# Patient Record
Sex: FEMALE | Race: WHITE | Employment: OTHER | URBAN - METROPOLITAN AREA
[De-identification: names, ages, dates, MRNs, and addresses within clinical notes are randomized per-mention and may not be internally consistent; named-entity substitution may affect disease eponyms.]

---

## 2017-01-19 RX ORDER — ZOLPIDEM TARTRATE 10 MG/1
TABLET ORAL
Qty: 90 TABLET | Refills: 3 | Status: SHIPPED | OUTPATIENT
Start: 2017-01-19 | End: 2017-09-27

## 2017-05-09 ENCOUNTER — OFFICE VISIT (OUTPATIENT)
Dept: DERMATOLOGY CLINIC | Facility: CLINIC | Age: 82
End: 2017-05-09

## 2017-05-09 DIAGNOSIS — L81.4 SOLAR LENTIGO: ICD-10-CM

## 2017-05-09 DIAGNOSIS — L57.0 ACTINIC KERATOSIS: Primary | ICD-10-CM

## 2017-05-09 PROCEDURE — 99212 OFFICE O/P EST SF 10 MIN: CPT | Performed by: DERMATOLOGY

## 2017-05-09 PROCEDURE — 17000 DESTRUCT PREMALG LESION: CPT | Performed by: DERMATOLOGY

## 2017-05-09 RX ORDER — CELECOXIB 200 MG/1
200 CAPSULE ORAL 2 TIMES DAILY
COMMUNITY
End: 2018-02-22 | Stop reason: CLARIF

## 2017-05-09 NOTE — PROGRESS NOTES
HPI:     Chief Complaint     Derm Problem        HPI     Derm Problem    Additional comments: Patient presents with lesion to left cheek x many years. Per patient, area was rough to touch when she made appt. Now area smooth.  No personal or family hx of ski [place] RAG[place]/ Dr. Tenzin Monzon / Dr. Aristides Flores and to reapply it every few hours. Orders Placed This Encounter  DESTRUCTION PREMALIGNANT LESIONS, FIRST LES    Results From Past 48 Hours:  No results found for this or any previous visit (from the past 50 hour(s)).     Meds This Visit:      Imaging

## 2017-05-31 ENCOUNTER — TELEPHONE (OUTPATIENT)
Dept: PULMONOLOGY | Facility: CLINIC | Age: 82
End: 2017-05-31

## 2017-05-31 RX ORDER — CELECOXIB 200 MG/1
200 CAPSULE ORAL
Qty: 90 CAPSULE | Refills: 1 | Status: SHIPPED | OUTPATIENT
Start: 2017-05-31 | End: 2017-11-04

## 2017-05-31 NOTE — TELEPHONE ENCOUNTER
V/O from Dr. Franci Jacobson with read back for Celebrex 200 mg daily as needed #90 R#1. Message left informing pt medication was approved and will be sent to [address].

## 2017-05-31 NOTE — TELEPHONE ENCOUNTER
Ari Alva at [facility] pt filled meloxicam on 5-2-17 that Dr. Tavares Holloway prescribed. Ari Alva states they left a message for pt to call back. Ari Alva notified this office will call pt to discuss medication.   Pt states she is not taking the meloxicam a

## 2017-06-27 NOTE — TELEPHONE ENCOUNTER
RN, these messages have been reviewed. Either medication is okay with me. Do I need to take any action here?

## 2017-06-28 NOTE — TELEPHONE ENCOUNTER
Dr. Yarelis Sequeira, nothing additional is needed. Celebrex has been filled and pt states she will only be taking Celebrex.

## 2017-08-17 RX ORDER — TRIAMTERENE AND HYDROCHLOROTHIAZIDE 37.5; 25 MG/1; MG/1
CAPSULE ORAL
Qty: 90 CAPSULE | Refills: 3 | Status: SHIPPED | OUTPATIENT
Start: 2017-08-17 | End: 2018-08-13

## 2017-08-21 RX ORDER — FLUTICASONE PROPIONATE 50 MCG
SPRAY, SUSPENSION (ML) NASAL
Qty: 16 G | Refills: 6 | Status: SHIPPED | OUTPATIENT
Start: 2017-08-21 | End: 2018-09-08

## 2017-09-28 RX ORDER — ZOLPIDEM TARTRATE 10 MG/1
TABLET ORAL
Qty: 90 TABLET | Refills: 1 | Status: SHIPPED
Start: 2017-09-28 | End: 2018-06-11

## 2017-11-06 RX ORDER — CELECOXIB 200 MG/1
CAPSULE ORAL
Qty: 90 CAPSULE | Refills: 1 | Status: SHIPPED | OUTPATIENT
Start: 2017-11-06 | End: 2018-05-11

## 2018-02-05 ENCOUNTER — TELEPHONE (OUTPATIENT)
Dept: PULMONOLOGY | Facility: CLINIC | Age: 83
End: 2018-02-05

## 2018-02-05 RX ORDER — AZITHROMYCIN 250 MG/1
TABLET, FILM COATED ORAL
Qty: 1 PACKAGE | Refills: 0 | Status: SHIPPED | OUTPATIENT
Start: 2018-02-05 | End: 2018-02-22 | Stop reason: ALTCHOICE

## 2018-02-05 NOTE — TELEPHONE ENCOUNTER
Pt c/o productive cough green sputum and nasal congestion for 1 week. Pt denies HA, fever, body aches, SOB, chest congestion/tightness.

## 2018-02-05 NOTE — TELEPHONE ENCOUNTER
Pt states that she has had nasal congestion w/ green discharge for last 3 wks and would like to speak to RN. Please call.

## 2018-02-05 NOTE — TELEPHONE ENCOUNTER
Dr. Ching Wells notified of below. Telephone order with read back from Dr. Ching Wells for z-amanda. Pt notified of orders. Pt verbalized understanding.

## 2018-02-21 ENCOUNTER — TELEPHONE (OUTPATIENT)
Dept: PULMONOLOGY | Facility: CLINIC | Age: 83
End: 2018-02-21

## 2018-02-21 NOTE — TELEPHONE ENCOUNTER
Pt states she was getting better after taking z-pack but symptoms has returned. Pls call. Thank you.

## 2018-02-21 NOTE — TELEPHONE ENCOUNTER
Pt c/o sinus/nasal congestion, yellow nasal drainage, and slight HA for 1 month. Pt states she has had SOB with ambulation for years. Pt is able to speak in clear complete sentences. No audible wheezing heard over phone.  Pt denies fever cough, body aches

## 2018-02-22 ENCOUNTER — OFFICE VISIT (OUTPATIENT)
Dept: PULMONOLOGY | Facility: CLINIC | Age: 83
End: 2018-02-22

## 2018-02-22 VITALS
BODY MASS INDEX: 25.3 KG/M2 | DIASTOLIC BLOOD PRESSURE: 80 MMHG | OXYGEN SATURATION: 99 % | HEART RATE: 76 BPM | HEIGHT: 63.75 IN | RESPIRATION RATE: 18 BRPM | SYSTOLIC BLOOD PRESSURE: 127 MMHG | WEIGHT: 146.38 LBS

## 2018-02-22 DIAGNOSIS — J06.9 VIRAL UPPER RESPIRATORY TRACT INFECTION: Primary | ICD-10-CM

## 2018-02-22 PROCEDURE — G0463 HOSPITAL OUTPT CLINIC VISIT: HCPCS | Performed by: INTERNAL MEDICINE

## 2018-02-22 PROCEDURE — 99213 OFFICE O/P EST LOW 20 MIN: CPT | Performed by: INTERNAL MEDICINE

## 2018-02-22 NOTE — PROGRESS NOTES
The patient is an 80-year-old female who I know well from prior evaluation who comes in now for follow-up. She has frequent upper respiratory tract infections and possibly early sinusitis. She does do nasal saline spray.   She feels as though she is on th

## 2018-03-15 ENCOUNTER — OFFICE VISIT (OUTPATIENT)
Dept: AUDIOLOGY | Facility: CLINIC | Age: 83
End: 2018-03-15

## 2018-03-15 ENCOUNTER — OFFICE VISIT (OUTPATIENT)
Dept: OTOLARYNGOLOGY | Facility: CLINIC | Age: 83
End: 2018-03-15

## 2018-03-15 VITALS
TEMPERATURE: 98 F | HEIGHT: 64 IN | BODY MASS INDEX: 24.75 KG/M2 | WEIGHT: 145 LBS | DIASTOLIC BLOOD PRESSURE: 77 MMHG | SYSTOLIC BLOOD PRESSURE: 131 MMHG

## 2018-03-15 DIAGNOSIS — H90.3 SENSORINEURAL HEARING LOSS, BILATERAL: Primary | ICD-10-CM

## 2018-03-15 DIAGNOSIS — H61.23 BILATERAL IMPACTED CERUMEN: Primary | ICD-10-CM

## 2018-03-15 PROCEDURE — 99213 OFFICE O/P EST LOW 20 MIN: CPT | Performed by: OTOLARYNGOLOGY

## 2018-03-15 PROCEDURE — 92593 HEARING AID CHECK, BOTH EARS: CPT | Performed by: AUDIOLOGIST

## 2018-03-15 PROCEDURE — 99070 SPECIAL SUPPLIES PHYS/QHP: CPT | Performed by: AUDIOLOGIST

## 2018-03-15 PROCEDURE — 69210 REMOVE IMPACTED EAR WAX UNI: CPT | Performed by: OTOLARYNGOLOGY

## 2018-03-15 NOTE — PROGRESS NOTES
HEARING AID FOLLOW-UP    [facility]  7/29/1928  [id number]      Pt was seen for routine follow-up. Aids were weak/dead. Cleaned and suctioned aids. Changed microphone covers, wax traps (right was occluded), and medium closed domes. .  Completed

## 2018-03-15 NOTE — PROGRESS NOTES
Sukumar Barber is a 80year old female. Patient presents with:  Ear Wax: bilateral ear cleaning        HISTORY OF PRESENT ILLNESS    5/5/16 Here for evaluation of   hearing loss.  Patient feels this has worsened over the las months and  is not  associated 2014   • Other and unspecified hyperlipidemia    • Other ill-defined conditions(799.89)     Left wrist trauma, surgical repair   • Posterior capsule opacification     OS, YAG laser 2014   • Ptosis of right eyelid 2014    Ptosis RUL     Past Surgical Histor lesions bruises or masses.    Constitutional Normal Overall appearance - Normal.   Head/Face Normal Facial features - Normal. Eyebrows - Normal. Skull - Normal.   Nasopharynx Normal External nose - Normal.  . She is very very congested nostrils turbinates p

## 2018-05-14 RX ORDER — CELECOXIB 200 MG/1
CAPSULE ORAL
Qty: 90 CAPSULE | Refills: 1 | Status: ON HOLD | OUTPATIENT
Start: 2018-05-14 | End: 2018-10-16

## 2018-05-14 NOTE — TELEPHONE ENCOUNTER
Last seen 02/22/2018  Last refill 11/06/2017  Routed to [facility] for sign off

## 2018-06-11 RX ORDER — ZOLPIDEM TARTRATE 10 MG/1
TABLET ORAL
Qty: 90 TABLET | Refills: 1 | Status: SHIPPED | OUTPATIENT
Start: 2018-06-11 | End: 2018-11-26

## 2018-06-11 NOTE — TELEPHONE ENCOUNTER
Please advise regarding refill request.   Refill Protocol Appointment Criteria  · Appointment scheduled in the past 6 months or in the next 3 months  Recent Outpatient Visits            2 months ago Bilateral impacted cerumen    [facility] for

## 2018-06-13 ENCOUNTER — TELEPHONE (OUTPATIENT)
Dept: DERMATOLOGY CLINIC | Facility: CLINIC | Age: 83
End: 2018-06-13

## 2018-06-14 NOTE — TELEPHONE ENCOUNTER
Tell pt right now we are swamped for that day, but we willl try to make it work- she she call us 6/19 after 8am for a definitive answer

## 2018-06-19 ENCOUNTER — OFFICE VISIT (OUTPATIENT)
Dept: DERMATOLOGY CLINIC | Facility: CLINIC | Age: 83
End: 2018-06-19

## 2018-06-19 DIAGNOSIS — Z87.2 HISTORY OF ACTINIC KERATOSES: ICD-10-CM

## 2018-06-19 DIAGNOSIS — L72.0 EPIDERMAL CYST: Primary | ICD-10-CM

## 2018-06-19 DIAGNOSIS — L82.1 SEBORRHEIC KERATOSES: ICD-10-CM

## 2018-06-19 PROCEDURE — 99212 OFFICE O/P EST SF 10 MIN: CPT | Performed by: DERMATOLOGY

## 2018-06-19 PROCEDURE — G0463 HOSPITAL OUTPT CLINIC VISIT: HCPCS | Performed by: DERMATOLOGY

## 2018-06-19 RX ORDER — AMOXICILLIN 500 MG/1
CAPSULE ORAL
Status: ON HOLD | COMMUNITY
Start: 2018-05-24 | End: 2018-10-16 | Stop reason: ALTCHOICE

## 2018-06-19 NOTE — PROGRESS NOTES
HPI:     Chief Complaint     Lesion        HPI     Lesion    Additional comments: LOV: 05/09/17. Pt presents with spot of concern on back, denies symptoms. Pt denies personal and family hx of sc, has personal hx of AK.        Last edited by SEBLE Gerardo YAG laser 2014   • Ptosis of right eyelid 2014    Ptosis RUL     Past Surgical History:  No date: CATARACT      Comment: cataract extraction  1997: CATARACT Left      Comment: Phaco w/PC IOL  1998: CATARACT Right      Comment: Phaco w/ PC IOL  1997: Dru Reynolds brown stuck on keratotic lesions on back and some cherry red papules along with some blotchy brown macules      ASSESSMENT/PLAN:   Epidermal cyst  (primary encounter diagnosis)-lesion on back–reassurance–no treatment necessary  Seborrheic keratoses-reassur

## 2018-06-19 NOTE — PROGRESS NOTES
Past Medical History:   Diagnosis Date   • Arthritis    • Cataracts, bilateral     cataracts, PC [place] Fails Dr. Jenni Miner in [place], [place]   • Colon polyp 3/24/2006    small polyps   • Macular degeneration of right eye 2014   • Other and un Problems Mother      hearing loss   • Other [OTHER] Father      emphysema   • Diabetes Neg    • Glaucoma Neg    • Macular degeneration Neg        No Known Allergies

## 2018-08-13 RX ORDER — TRIAMTERENE AND HYDROCHLOROTHIAZIDE 37.5; 25 MG/1; MG/1
CAPSULE ORAL
Qty: 90 CAPSULE | Refills: 3 | Status: SHIPPED | OUTPATIENT
Start: 2018-08-13 | End: 2019-08-26

## 2018-08-13 NOTE — TELEPHONE ENCOUNTER
Last seen 2-22-18   Last refill 8-17-17   Routed to [facility] for sign off.

## 2018-08-16 ENCOUNTER — OFFICE VISIT (OUTPATIENT)
Dept: AUDIOLOGY | Facility: CLINIC | Age: 83
End: 2018-08-16

## 2018-08-16 DIAGNOSIS — H90.3 SENSORINEURAL HEARING LOSS, BILATERAL: Primary | ICD-10-CM

## 2018-08-16 PROCEDURE — 92593 HEARING AID CHECK, BOTH EARS: CPT | Performed by: AUDIOLOGIST

## 2018-08-16 PROCEDURE — V5014 HEARING AID REPAIR/MODIFYING: HCPCS | Performed by: AUDIOLOGIST

## 2018-08-20 NOTE — PROGRESS NOTES
HEARING AID FOLLOW-UP    Ean Zhang  7/29/1928  [id number]    Patient is here because her left hearing aid has a broken  tube. Cleaned and suctioned aids  Replaced left  with a new #2 standard .   Changed microphone covers,

## 2018-09-08 NOTE — TELEPHONE ENCOUNTER
Patient is requesting refill for Fluticasone 50 mcg Nasal Spray, to use 2 sprays in each nostril daily. LOV 03/15/18. Last refill authorized on 08/21/17 for 16 g / 6 refills. Please review and advise.

## 2018-09-10 ENCOUNTER — TELEPHONE (OUTPATIENT)
Dept: PULMONOLOGY | Facility: CLINIC | Age: 83
End: 2018-09-10

## 2018-09-10 NOTE — TELEPHONE ENCOUNTER
As long as she is doing okay, she can remain on the Prilosec. I think that is a good idea. However, if problem begins to recur, we will get her in sooner.

## 2018-09-10 NOTE — TELEPHONE ENCOUNTER
Pt is having some stomach issues and requesting to discuss with clinical staff. Pls call. Thank you.

## 2018-09-10 NOTE — TELEPHONE ENCOUNTER
Pt c/o of nausea and abdominal discomfort 4/10 that comes and goes for over 1 month and only lasts for a few minutes at a time. Pt does not know any aggravating factors.   Pt states she had diarrhea once over this past weekend but thinks this was unrelated

## 2018-09-12 RX ORDER — FLUTICASONE PROPIONATE 50 MCG
SPRAY, SUSPENSION (ML) NASAL
Qty: 16 G | Refills: 6 | Status: SHIPPED | OUTPATIENT
Start: 2018-09-12 | End: 2020-10-12

## 2018-09-20 ENCOUNTER — OFFICE VISIT (OUTPATIENT)
Dept: OPTOMETRY | Facility: CLINIC | Age: 83
End: 2018-09-20
Payer: MEDICARE

## 2018-09-20 DIAGNOSIS — Z96.1 PSEUDOPHAKIA OF BOTH EYES: ICD-10-CM

## 2018-09-20 DIAGNOSIS — H35.89 RPE MOTTLING OF MACULA: Primary | ICD-10-CM

## 2018-09-20 DIAGNOSIS — H52.4 PRESBYOPIA: ICD-10-CM

## 2018-09-20 PROCEDURE — 92015 DETERMINE REFRACTIVE STATE: CPT | Performed by: OPTOMETRIST

## 2018-09-20 PROCEDURE — 92014 COMPRE OPH EXAM EST PT 1/>: CPT | Performed by: OPTOMETRIST

## 2018-09-20 NOTE — PATIENT INSTRUCTIONS
Presbyopia  New glasses RX given to update as needed. Pseudophakia of both eyes  No treatment is required. Will continue to observe.     RPE mottling of macula, right eye  Advised patient to continue using her eye vitamins and wear sunglasses when out

## 2018-09-20 NOTE — PROGRESS NOTES
Kay Batista is a 80year old female. HPI:     HPI     Patient is in for an annual eye exam. She had phaco ou in 1997 Had Yag laser OS. She was last here to see MAHAD for an EE in 2015. Has no complaints with her vision.     Last edited by Gabe Dumont, Never Used    Alcohol use:  Yes      Alcohol/week: 0.0 oz    Drug use: No      Medications:    Current Outpatient Medications:  FLUTICASONE PROPIONATE 50 MCG/ACT Nasal Suspension USE 2 SPRAYS IN EACH NOSTRIL DAILY Disp: 16 g Rfl: 6   TRIAMTERENE-HCTZ 37.5-2 pinguecula    Cornea Trace Guttata Trace Guttata    Anterior Chamber Deep and quiet Deep and quiet    Iris Normal Normal    Lens Posterior chamber intraocular lens with clear posterior capsule PC IOL with YAG     Vitreous Vitreous floaters Vitreous floater

## 2018-10-02 ENCOUNTER — OFFICE VISIT (OUTPATIENT)
Dept: PULMONOLOGY | Facility: CLINIC | Age: 83
End: 2018-10-02
Payer: MEDICARE

## 2018-10-02 ENCOUNTER — HOSPITAL ENCOUNTER (OUTPATIENT)
Dept: GENERAL RADIOLOGY | Facility: HOSPITAL | Age: 83
Discharge: HOME | End: 2018-10-02
Attending: INTERNAL MEDICINE | Admitting: INTERNAL MEDICINE
Payer: MEDICARE

## 2018-10-02 ENCOUNTER — TELEPHONE (OUTPATIENT)
Dept: PULMONOLOGY | Facility: CLINIC | Age: 83
End: 2018-10-02

## 2018-10-02 ENCOUNTER — LAB ENCOUNTER (OUTPATIENT)
Dept: LAB | Facility: HOSPITAL | Age: 83
End: 2018-10-02
Attending: INTERNAL MEDICINE
Payer: MEDICARE

## 2018-10-02 VITALS
SYSTOLIC BLOOD PRESSURE: 117 MMHG | WEIGHT: 133 LBS | HEIGHT: 64 IN | BODY MASS INDEX: 22.71 KG/M2 | RESPIRATION RATE: 16 BRPM | HEART RATE: 88 BPM | OXYGEN SATURATION: 99 % | DIASTOLIC BLOOD PRESSURE: 77 MMHG

## 2018-10-02 DIAGNOSIS — R06.00 DYSPNEA ON EXERTION: ICD-10-CM

## 2018-10-02 DIAGNOSIS — R11.0 NAUSEA: Primary | ICD-10-CM

## 2018-10-02 PROCEDURE — 71046 X-RAY EXAM CHEST 2 VIEWS: CPT | Performed by: INTERNAL MEDICINE

## 2018-10-02 PROCEDURE — 84443 ASSAY THYROID STIM HORMONE: CPT

## 2018-10-02 PROCEDURE — 83880 ASSAY OF NATRIURETIC PEPTIDE: CPT | Performed by: INTERNAL MEDICINE

## 2018-10-02 PROCEDURE — 85025 COMPLETE CBC W/AUTO DIFF WBC: CPT

## 2018-10-02 PROCEDURE — 99213 OFFICE O/P EST LOW 20 MIN: CPT | Performed by: INTERNAL MEDICINE

## 2018-10-02 PROCEDURE — G0463 HOSPITAL OUTPT CLINIC VISIT: HCPCS | Performed by: INTERNAL MEDICINE

## 2018-10-02 PROCEDURE — 36415 COLL VENOUS BLD VENIPUNCTURE: CPT

## 2018-10-02 RX ORDER — ONDANSETRON 4 MG/1
4 TABLET, ORALLY DISINTEGRATING ORAL EVERY 8 HOURS PRN
Qty: 10 TABLET | Refills: 6 | Status: ON HOLD | OUTPATIENT
Start: 2018-10-02 | End: 2018-10-17

## 2018-10-02 NOTE — PROGRESS NOTES
The patient is a 5year-old female who I know well from prior evaluation comes in now for follow-up. She has had nausea for 2 months with occasional dry heave. She took Prilosec which seemed to help. Her weight is down 45 pounds.   There is been no blood

## 2018-10-02 NOTE — TELEPHONE ENCOUNTER
Spoke w/ pt she reports nausea and dry heaving x few months. Pt reports it occurs off and on every few days. Pt reports use of Prilosec with minimal relief. Pt reports recent SOB with episodes.  Pt offered appt today for evaluation at 12:30 pm w/ Dr. Ele Costello,

## 2018-10-04 ENCOUNTER — HOSPITAL ENCOUNTER (OUTPATIENT)
Dept: CT IMAGING | Facility: HOSPITAL | Age: 83
Discharge: HOME | End: 2018-10-04
Attending: INTERNAL MEDICINE
Payer: MEDICARE

## 2018-10-04 DIAGNOSIS — R11.0 NAUSEA: ICD-10-CM

## 2018-10-04 PROCEDURE — 82565 ASSAY OF CREATININE: CPT

## 2018-10-04 PROCEDURE — 74176 CT ABD & PELVIS W/O CONTRAST: CPT | Performed by: INTERNAL MEDICINE

## 2018-10-09 ENCOUNTER — TELEPHONE (OUTPATIENT)
Dept: PULMONOLOGY | Facility: CLINIC | Age: 83
End: 2018-10-09

## 2018-10-09 DIAGNOSIS — R93.5 ABNORMAL COMPUTED TOMOGRAPHY ANGIOGRAPHY (CTA) OF ABDOMEN AND PELVIS: Primary | ICD-10-CM

## 2018-10-09 NOTE — TELEPHONE ENCOUNTER
----- Message from Joyce Farias MD sent at 10/2/2018  9:38 PM CDT -----  RN, please let the patient know that her blood tests and chest x-ray were unremarkable.

## 2018-10-09 NOTE — TELEPHONE ENCOUNTER
GI RNs -    CT scan images open and reviewed.     Yes, by the looks of this CT scan and her severe symptoms this woman needs an office consult and likely ERCP ASAP(!!)   This is most likely a common bile duct stone as per the report, less likely neoplasm di

## 2018-10-09 NOTE — TELEPHONE ENCOUNTER
----- Message from Jose R Felix MD sent at 10/4/2018  4:05 PM CDT -----  RN, please call the patient to let her know that she has dilated biliary duct with a retained stone. She needs to see gastroenterology promptly. Please facilitate appointment.

## 2018-10-09 NOTE — TELEPHONE ENCOUNTER
[facility]    Dr. HOFFMAN/Dr. TAVERAS/Dr. BAEZ-     Per Dr. Franci Jacobson message below, please advise as this pt may need GI consultation as well as ERCP with either Dr. Josephine Negrete or Dr. Mariam Howe, no sooner appts currently available with either provider.

## 2018-10-09 NOTE — TELEPHONE ENCOUNTER
Spoke with pt and notified with understanding. Message routed to [facility] clinical staff for triage and appointment scheduling. GI staff please see CT results and message below.

## 2018-10-10 NOTE — TELEPHONE ENCOUNTER
Spoke to the pt. She accepted tomorrow's appt.  Date, time and location verified    Future Appointments   Date Time Provider Benjamin Briggs   10/11/2018  2:00 PM Tena Oconnor MD [facility] Joey JOHNSON   11/26/2018  1:00 PM Wing Art

## 2018-10-11 ENCOUNTER — APPOINTMENT (OUTPATIENT)
Dept: LAB | Facility: HOSPITAL | Age: 83
End: 2018-10-11
Attending: INTERNAL MEDICINE
Payer: MEDICARE

## 2018-10-11 ENCOUNTER — OFFICE VISIT (OUTPATIENT)
Dept: GASTROENTEROLOGY | Facility: CLINIC | Age: 83
End: 2018-10-11
Payer: MEDICARE

## 2018-10-11 ENCOUNTER — TELEPHONE (OUTPATIENT)
Dept: GASTROENTEROLOGY | Facility: CLINIC | Age: 83
End: 2018-10-11

## 2018-10-11 VITALS
HEART RATE: 76 BPM | WEIGHT: 133 LBS | BODY MASS INDEX: 22.71 KG/M2 | SYSTOLIC BLOOD PRESSURE: 158 MMHG | DIASTOLIC BLOOD PRESSURE: 81 MMHG | HEIGHT: 64 IN

## 2018-10-11 DIAGNOSIS — Z01.818 ENCOUNTER FOR PREOPERATIVE EXAMINATION FOR GENERAL SURGICAL PROCEDURE: ICD-10-CM

## 2018-10-11 DIAGNOSIS — R11.0 NAUSEA: ICD-10-CM

## 2018-10-11 DIAGNOSIS — K80.50 CHOLEDOCHOLITHIASIS: ICD-10-CM

## 2018-10-11 DIAGNOSIS — K83.8 DILATED BILE DUCT: ICD-10-CM

## 2018-10-11 DIAGNOSIS — R93.5 ABNORMAL COMPUTED TOMOGRAPHY ANGIOGRAPHY (CTA) OF ABDOMEN AND PELVIS: ICD-10-CM

## 2018-10-11 DIAGNOSIS — Z01.818 ENCOUNTER FOR PREOPERATIVE EXAMINATION FOR GENERAL SURGICAL PROCEDURE: Primary | ICD-10-CM

## 2018-10-11 DIAGNOSIS — Z90.49 S/P LAPAROSCOPIC CHOLECYSTECTOMY: ICD-10-CM

## 2018-10-11 DIAGNOSIS — K83.9 BILE DUCT ABNORMALITY: Primary | ICD-10-CM

## 2018-10-11 DIAGNOSIS — R10.13 EPIGASTRIC ABDOMINAL PAIN: ICD-10-CM

## 2018-10-11 PROCEDURE — 85610 PROTHROMBIN TIME: CPT

## 2018-10-11 PROCEDURE — G0463 HOSPITAL OUTPT CLINIC VISIT: HCPCS | Performed by: INTERNAL MEDICINE

## 2018-10-11 PROCEDURE — 36415 COLL VENOUS BLD VENIPUNCTURE: CPT

## 2018-10-11 PROCEDURE — 99214 OFFICE O/P EST MOD 30 MIN: CPT | Performed by: INTERNAL MEDICINE

## 2018-10-11 PROCEDURE — 80048 BASIC METABOLIC PNL TOTAL CA: CPT

## 2018-10-11 PROCEDURE — 80076 HEPATIC FUNCTION PANEL: CPT

## 2018-10-11 RX ORDER — ONDANSETRON 4 MG/1
TABLET, FILM COATED ORAL
Status: ON HOLD | COMMUNITY
Start: 2018-10-02 | End: 2018-10-16 | Stop reason: DRUGHIGH

## 2018-10-11 NOTE — PATIENT INSTRUCTIONS
Schedule ERCP exam at [facility] (always with Anesthesia and Fluoro)    Either 10/23 Tues or 10/25 Thurs    Body mass index is 22.83 kg/m².     With Anesthesia    DX = Abnormal bile duct on CT scan, choledocholithiasis

## 2018-10-11 NOTE — PROGRESS NOTES
HPI:    Patient ID: Ean Zhang is a 80year old woman with BMI 22.8, history of arthritis and macular degeneration, taking a fairly minimal medication regimen of triamterene–hydrochlorothiazide, multivitamins calcium occasional Ambien who is referred tolerate these episodes. Dealing with this problem is not an emergency for her. She is actually asking to schedule any necessary procedures in 10 days or more from today. Body mass index is 22.83 kg/m².      Wt Readings from Last 20 Encounters:  10/1 present. GI/MESENTERY:           There is no bowel obstruction. There is extensive colonic diverticulosis. No active inflammatory changes are identified. A normal caliber appendix is present in the right lower quadrant. VASCULAR:      Unremarkable.     LY Dispersible Take 1 tablet (4 mg total) by mouth every 8 (eight) hours as needed for Nausea.  Disp: 10 tablet Rfl: 6   FLUTICASONE PROPIONATE 50 MCG/ACT Nasal Suspension USE 2 SPRAYS IN EACH NOSTRIL DAILY Disp: 16 g Rfl: 6   amoxicillin 500 MG Oral Cap  Disp suffering at least 2 months of recurring pain and nausea, retching syndrome. We do not yet have a recent set of LFTs.   Noncontrast CT scan ordered last week 10/4/2018 shows unexpected finding of significant intrahepatic and extrahepatic biliary dilation, the defined types were placed in this encounter.       Meds This Visit:  Requested Prescriptions      No prescriptions requested or ordered in this encounter       Imaging & Referrals:  None       LG#[id number]

## 2018-10-11 NOTE — TELEPHONE ENCOUNTER
Marcy Ojeda and schedulers–    Unfortunately, today's labs after the visit and scheduling show that we need to get Ms Malik's ERCP on the schedule for next week instead, Monday 10/15 (after 3pm) or Tuesday 10/16 only. Currently she is scheduled for 10/25.

## 2018-10-11 NOTE — TELEPHONE ENCOUNTER
Scheduled for:  ERCP w/Fluro 06676  Provider Name: Dr. Douglas Yarbrough  Date:  10/25/18  Location:  [facility]  Sedation:  General  Time:  9896 (pt is aware to arrive at 1200)   Prep:   Nothing to eat after midnight   Nothing to drink after 0900 the day of the procedure

## 2018-10-12 NOTE — TELEPHONE ENCOUNTER
I spoke with this patient to inform her of the corrected date and the time to arrive which she approved and will arrive on 10/16/18 at 1200. This patient will contact her daughter to inform her of the change.     The procedure was changed by Senait in [place] and

## 2018-10-16 ENCOUNTER — ANESTHESIA (OUTPATIENT)
Dept: ENDOSCOPY | Facility: HOSPITAL | Age: 83
End: 2018-10-16

## 2018-10-16 ENCOUNTER — ANESTHESIA EVENT (OUTPATIENT)
Dept: ENDOSCOPY | Facility: HOSPITAL | Age: 83
End: 2018-10-16

## 2018-10-16 ENCOUNTER — HOSPITAL ENCOUNTER (OUTPATIENT)
Facility: HOSPITAL | Age: 83
Setting detail: OBSERVATION
Discharge: HOME | End: 2018-10-17
Attending: INTERNAL MEDICINE | Admitting: INTERNAL MEDICINE
Payer: MEDICARE

## 2018-10-16 ENCOUNTER — APPOINTMENT (OUTPATIENT)
Dept: GENERAL RADIOLOGY | Facility: HOSPITAL | Age: 83
End: 2018-10-16
Attending: INTERNAL MEDICINE
Payer: MEDICARE

## 2018-10-16 DIAGNOSIS — K83.9 BILE DUCT ABNORMALITY: ICD-10-CM

## 2018-10-16 DIAGNOSIS — K80.50 CHOLEDOCHOLITHIASIS: ICD-10-CM

## 2018-10-16 PROCEDURE — 0F798ZZ DILATION OF COMMON BILE DUCT, VIA NATURAL OR ARTIFICIAL OPENING ENDOSCOPIC: ICD-10-PCS | Performed by: INTERNAL MEDICINE

## 2018-10-16 PROCEDURE — 99214 OFFICE O/P EST MOD 30 MIN: CPT | Performed by: INTERNAL MEDICINE

## 2018-10-16 PROCEDURE — 43264 ERCP REMOVE DUCT CALCULI: CPT | Performed by: INTERNAL MEDICINE

## 2018-10-16 PROCEDURE — 74330 X-RAY BILE/PANC ENDOSCOPY: CPT | Performed by: INTERNAL MEDICINE

## 2018-10-16 PROCEDURE — 0FC98ZZ EXTIRPATION OF MATTER FROM COMMON BILE DUCT, VIA NATURAL OR ARTIFICIAL OPENING ENDOSCOPIC: ICD-10-PCS | Performed by: INTERNAL MEDICINE

## 2018-10-16 PROCEDURE — 43262 ENDO CHOLANGIOPANCREATOGRAPH: CPT | Performed by: INTERNAL MEDICINE

## 2018-10-16 RX ORDER — LIDOCAINE HYDROCHLORIDE 10 MG/ML
INJECTION, SOLUTION EPIDURAL; INFILTRATION; INTRACAUDAL; PERINEURAL AS NEEDED
Status: DISCONTINUED | OUTPATIENT
Start: 2018-10-16 | End: 2018-10-16 | Stop reason: SURG

## 2018-10-16 RX ORDER — SODIUM CHLORIDE 0.9 % (FLUSH) 0.9 %
3 SYRINGE (ML) INJECTION AS NEEDED
Status: DISCONTINUED | OUTPATIENT
Start: 2018-10-16 | End: 2018-10-17

## 2018-10-16 RX ORDER — HYDROCODONE BITARTRATE AND ACETAMINOPHEN 5; 325 MG/1; MG/1
2 TABLET ORAL AS NEEDED
Status: DISCONTINUED | OUTPATIENT
Start: 2018-10-16 | End: 2018-10-16 | Stop reason: HOSPADM

## 2018-10-16 RX ORDER — HALOPERIDOL 5 MG/ML
0.25 INJECTION INTRAMUSCULAR ONCE AS NEEDED
Status: DISCONTINUED | OUTPATIENT
Start: 2018-10-16 | End: 2018-10-16 | Stop reason: HOSPADM

## 2018-10-16 RX ORDER — ACETAMINOPHEN 325 MG/1
650 TABLET ORAL EVERY 4 HOURS PRN
Status: DISCONTINUED | OUTPATIENT
Start: 2018-10-16 | End: 2018-10-17

## 2018-10-16 RX ORDER — SODIUM CHLORIDE, SODIUM LACTATE, POTASSIUM CHLORIDE, CALCIUM CHLORIDE 600; 310; 30; 20 MG/100ML; MG/100ML; MG/100ML; MG/100ML
INJECTION, SOLUTION INTRAVENOUS CONTINUOUS
Status: DISCONTINUED | OUTPATIENT
Start: 2018-10-16 | End: 2018-10-17

## 2018-10-16 RX ORDER — MORPHINE SULFATE 4 MG/ML
4 INJECTION, SOLUTION INTRAMUSCULAR; INTRAVENOUS EVERY 10 MIN PRN
Status: DISCONTINUED | OUTPATIENT
Start: 2018-10-16 | End: 2018-10-16 | Stop reason: HOSPADM

## 2018-10-16 RX ORDER — ONDANSETRON 2 MG/ML
4 INJECTION INTRAMUSCULAR; INTRAVENOUS ONCE AS NEEDED
Status: DISCONTINUED | OUTPATIENT
Start: 2018-10-16 | End: 2018-10-16 | Stop reason: HOSPADM

## 2018-10-16 RX ORDER — MORPHINE SULFATE 4 MG/ML
2 INJECTION, SOLUTION INTRAMUSCULAR; INTRAVENOUS EVERY 10 MIN PRN
Status: DISCONTINUED | OUTPATIENT
Start: 2018-10-16 | End: 2018-10-16 | Stop reason: HOSPADM

## 2018-10-16 RX ORDER — ONDANSETRON 2 MG/ML
4 INJECTION INTRAMUSCULAR; INTRAVENOUS EVERY 6 HOURS PRN
Status: DISCONTINUED | OUTPATIENT
Start: 2018-10-16 | End: 2018-10-17

## 2018-10-16 RX ORDER — ONDANSETRON 2 MG/ML
INJECTION INTRAMUSCULAR; INTRAVENOUS AS NEEDED
Status: DISCONTINUED | OUTPATIENT
Start: 2018-10-16 | End: 2018-10-16 | Stop reason: SURG

## 2018-10-16 RX ORDER — LABETALOL HYDROCHLORIDE 5 MG/ML
5 INJECTION, SOLUTION INTRAVENOUS EVERY 10 MIN PRN
Status: DISCONTINUED | OUTPATIENT
Start: 2018-10-16 | End: 2018-10-17

## 2018-10-16 RX ORDER — LABETALOL HYDROCHLORIDE 5 MG/ML
5 INJECTION, SOLUTION INTRAVENOUS EVERY 10 MIN PRN
Status: DISCONTINUED | OUTPATIENT
Start: 2018-10-16 | End: 2018-10-16

## 2018-10-16 RX ORDER — HYDROCODONE BITARTRATE AND ACETAMINOPHEN 5; 325 MG/1; MG/1
2 TABLET ORAL EVERY 4 HOURS PRN
Status: DISCONTINUED | OUTPATIENT
Start: 2018-10-16 | End: 2018-10-17

## 2018-10-16 RX ORDER — MORPHINE SULFATE 10 MG/ML
6 INJECTION, SOLUTION INTRAMUSCULAR; INTRAVENOUS EVERY 10 MIN PRN
Status: DISCONTINUED | OUTPATIENT
Start: 2018-10-16 | End: 2018-10-16 | Stop reason: HOSPADM

## 2018-10-16 RX ORDER — SODIUM CHLORIDE, SODIUM LACTATE, POTASSIUM CHLORIDE, CALCIUM CHLORIDE 600; 310; 30; 20 MG/100ML; MG/100ML; MG/100ML; MG/100ML
INJECTION, SOLUTION INTRAVENOUS CONTINUOUS
Status: DISCONTINUED | OUTPATIENT
Start: 2018-10-16 | End: 2018-10-16 | Stop reason: HOSPADM

## 2018-10-16 RX ORDER — ACETAMINOPHEN 325 MG/1
650 TABLET ORAL EVERY 6 HOURS PRN
Status: DISCONTINUED | OUTPATIENT
Start: 2018-10-16 | End: 2018-10-16

## 2018-10-16 RX ORDER — HYDROCODONE BITARTRATE AND ACETAMINOPHEN 5; 325 MG/1; MG/1
1 TABLET ORAL EVERY 4 HOURS PRN
Status: DISCONTINUED | OUTPATIENT
Start: 2018-10-16 | End: 2018-10-17

## 2018-10-16 RX ORDER — NALOXONE HYDROCHLORIDE 0.4 MG/ML
80 INJECTION, SOLUTION INTRAMUSCULAR; INTRAVENOUS; SUBCUTANEOUS AS NEEDED
Status: DISCONTINUED | OUTPATIENT
Start: 2018-10-16 | End: 2018-10-16 | Stop reason: HOSPADM

## 2018-10-16 RX ORDER — HYDROCODONE BITARTRATE AND ACETAMINOPHEN 5; 325 MG/1; MG/1
1 TABLET ORAL AS NEEDED
Status: DISCONTINUED | OUTPATIENT
Start: 2018-10-16 | End: 2018-10-16 | Stop reason: HOSPADM

## 2018-10-16 RX ADMIN — LIDOCAINE HYDROCHLORIDE 40 MG: 10 INJECTION, SOLUTION EPIDURAL; INFILTRATION; INTRACAUDAL; PERINEURAL at 13:26:00

## 2018-10-16 RX ADMIN — ONDANSETRON 4 MG: 2 INJECTION INTRAMUSCULAR; INTRAVENOUS at 13:44:00

## 2018-10-16 RX ADMIN — SODIUM CHLORIDE, SODIUM LACTATE, POTASSIUM CHLORIDE, CALCIUM CHLORIDE: 600; 310; 30; 20 INJECTION, SOLUTION INTRAVENOUS at 14:06:00

## 2018-10-16 NOTE — PLAN OF CARE
bp  Elevated sbp 180's-200's, dr. Paulino Novak at bedside, pt with admit orders but want to get bp down at this time, spoke with anesthesia, dr. French Guevara (anesthesia) at bedside, orders for prn labetalol, dr at bedside and gave one dose of labetalol 5mg, sbp d

## 2018-10-16 NOTE — ANESTHESIA PROCEDURE NOTES
ANESTHESIA INTUBATION  Date/Time: 10/16/2018 1:28 PM  Urgency: elective    Airway not difficult    General Information and Staff    Patient location during procedure:  Other (Note) (endo room 1)  Anesthesiologist: Yesi Willams MD  Resident/CRNA: Lou Tilley,

## 2018-10-16 NOTE — ANESTHESIA PREPROCEDURE EVALUATION
Anesthesia PreOp Note    HPI:     Therese Almazan is a 80year old female who presents for preoperative consultation requested by: Zeny Lees MD    Date of Surgery: 10/16/2018    Procedure(s):  ENDOSCOPIC RETROGRADE CHOLANGIOPANCREATOGRAPHY Ptosis of right eyelid 2014    Ptosis RUL       Past Surgical History:   Procedure Laterality Date   • CATARACT      cataract extraction   • CATARACT Left 1997    Phaco w/PC IOL   • CATARACT Right 1998    Phaco w/ PC IOL   • CATARACT EXTRACTION W/  INTRAOC Epic:  lactated ringers infusion  Intravenous Continuous Theola Krabbe, MD Last Rate: 20 mL/hr at 10/16/18 1305   Lidocaine HCl (PF) (XYLOCAINE) 1 % injection SOLN  Intravenous PRN Aby Larsen CRNA 40 mg at 10/16/18 1326   propofol (DIPR Sleep Concern: Not Asked        Stress Concern: Not Asked        Weight Concern: Not Asked        Special Diet: Not Asked        Back Care: Not Asked        Exercise: Not Asked        Bike Helmet: Not Asked        Seat Belt: Not Asked        Self-Exams: No Consent Plan and Risks Discussed With:  Patient  Discussed plan with:  CRNA      I have informed Jose Jayda and/or legal guardian or family member of the nature of the anesthetic plan, benefits, risks including possible dental damage if relevant, haley

## 2018-10-16 NOTE — CONSULTS
Caitlyn Beltran 98  Report of GI Consultation    Jesika Casas Patient Status:  Hospital Outpatient Surgery    1928 MRN [id number]   Location [facility] ENDOSCOPY LAB SUITES Attending Héctor Morris  Day # 0 PCP P Lupe Eng is looking quite weak, frail after General Anesthesia and the above procedure. Daughter accompanying her today requests that we admit her for observation.     Ms. Lupe Eng is complaining of mild, vague throat pain after endotracheal intubation a right eye 2014   • Osteoarthritis    • Other and unspecified hyperlipidemia    • Other ill-defined conditions(619.89)     Left wrist trauma, surgical repair   • Posterior capsule opacification     OS, YAG laser 2014   • Ptosis of right eyelid 2014    Ptosi PRN   Or      HYDROcodone-acetaminophen (NORCO) 5-325 MG per tab 1 tablet 1 tablet Oral Q4H PRN   Or      HYDROcodone-acetaminophen (NORCO) 5-325 MG per tab 2 tablet 2 tablet Oral Q4H PRN   ondansetron HCl (ZOFRAN) injection 4 mg 4 mg Intravenous Q6H PRN CA 9.7 10/11/2018    ALB 4.1 10/11/2018    ALKPHO 245 (H) 10/11/2018    TP 6.9 10/11/2018     (H) 10/11/2018     (H) 10/11/2018    BILT 0.8 10/11/2018    INR 1.0 10/11/2018    PTP 12.8 10/11/2018    TSH 1.41 10/02/2018     (H) 06/06

## 2018-10-16 NOTE — H&P
History & Physical Examination    Patient Name: Florentin Galvin  MRN: [id number]  [facility]: [id number]  YOB: 1928    Diagnosis: Choledocholithiasis, abdominal pain, abnormal LFTs    Present Illness:     12-year-old woman with history of arthritis HCl (ZOFRAN) 4 mg tablet  Disp:  Rfl:  Taking   FLUTICASONE PROPIONATE 50 MCG/ACT Nasal Suspension USE 2 SPRAYS IN EACH NOSTRIL DAILY Disp: 16 g Rfl: 6 Not Taking   CELECOXIB 200 MG Oral Cap TAKE 1 CAPSULE BY MOUTH DAILY AS NEEDED FOR PAIN Disp: 90 capsule Never Used    Alcohol use: No      Alcohol/week: 0.0 oz      Frequency: Never      SYSTEM Check if Review is Normal Check if Physical Exam is Normal If not normal, please explain:   JAMAAL [ ] Yun.Paty ]    Cindi Jumper [ ] [ ]    HEART [ X ] [ X ]    LUNGS [ X ]

## 2018-10-16 NOTE — ANESTHESIA POSTPROCEDURE EVALUATION
Patient:  Irina Rodriguez    Procedure Summary     Date:  10/16/18 Room / Location:  [address] [place] [address] ENDOSCOPY 01 / [address] [place] [address] ENDOSCOPY    Anesthesia Start:  1320 Anesthesia Stop:  5635    Procedure:  ENDOSCOPIC RETROGRADE CHOLANGIOPANCREATOGRAPHY (ERCP) (N/A ) Diagnosis:

## 2018-10-16 NOTE — OPERATIVE REPORT
ERCP PROCEDURE REPORT    DATE OF PROCEDURE:  10/16/2018    PCP: Niya Bernard MD     PREOPERATIVE DIAGNOSIS: Choledocholithiasis, abdominal pain, abnormal dilated bile duct, abnormal LFTs     POSTOPERATIVE DIAGNOSIS: Choledocholithiasis, dilated intrah duct at least 15 - 18mm in diameter. · Ovoid mobile filling defect 8-10 mm in size appreciated on initial cholangiograms. · Major papilla sphincterotomy performed with the Hydrotome sphincterotomy catheter and the ERBE surgical generator. No bleeding.

## 2018-10-17 VITALS
RESPIRATION RATE: 18 BRPM | HEIGHT: 65 IN | OXYGEN SATURATION: 96 % | SYSTOLIC BLOOD PRESSURE: 125 MMHG | BODY MASS INDEX: 23.32 KG/M2 | WEIGHT: 140 LBS | DIASTOLIC BLOOD PRESSURE: 46 MMHG | TEMPERATURE: 98 F | HEART RATE: 86 BPM

## 2018-10-17 PROCEDURE — 99217 OBSERVATION CARE DISCHARGE: CPT | Performed by: INTERNAL MEDICINE

## 2018-10-17 NOTE — DISCHARGE SUMMARY
Discharge Summary    Date of Admission: 10/16/2018    Date of Discharge: 10/17/2018    Hospital Course: The patient was admitted for observation after ERCP after removal of a large retained bile duct stone.   The patient had mild nausea and generalized w

## 2018-10-17 NOTE — PLAN OF CARE
Problem: Patient Centered Care  Goal: Patient preferences are identified and integrated in the patient's plan of care  Interventions:  - What would you like us to know as we care for you?  I need assistance when walking with my walker.  - Provide timely, co

## 2018-10-17 NOTE — H&P
YUDITH CROCKER [facility]    History & Physical    Date:  10/12/2018   Date of Admission:  10/16/2018      Chief Complaint:   Jesika Casas is a(n) 80year old female with choledocholithiasis status post ERCP with weakness.     HPI:   The patient had a 05/27/16    Laparoscopy with bilateral salpingo-oophorectomy   • SYNVISC, INTRA-ARTICULAR Bilateral 2013    Synvisc injection bilateral knees   • TOTAL HIP REPLACEMENT      bilateral hip replacements   • YAG CAPSULOTOMY - JOSE G - Christina Aguillon without hepatosplenomegaly and no mass appreciable. Extremities without clubbing cyanosis nor edema. Neurologic grossly intact with symmetric tone and strength and reflex. Results:        Assessment/Plan:   1.   Status post choledocholithiasis status

## 2018-11-26 ENCOUNTER — OFFICE VISIT (OUTPATIENT)
Dept: PULMONOLOGY | Facility: CLINIC | Age: 83
End: 2018-11-26
Payer: MEDICARE

## 2018-11-26 VITALS
OXYGEN SATURATION: 100 % | RESPIRATION RATE: 18 BRPM | WEIGHT: 132.19 LBS | DIASTOLIC BLOOD PRESSURE: 80 MMHG | SYSTOLIC BLOOD PRESSURE: 146 MMHG | HEIGHT: 64 IN | BODY MASS INDEX: 22.57 KG/M2 | HEART RATE: 90 BPM

## 2018-11-26 DIAGNOSIS — K80.40 CALCULUS OF BILE DUCT WITH CHOLECYSTITIS WITHOUT OBSTRUCTION, UNSPECIFIED CHOLECYSTITIS ACUITY: Primary | ICD-10-CM

## 2018-11-26 PROCEDURE — 99213 OFFICE O/P EST LOW 20 MIN: CPT | Performed by: INTERNAL MEDICINE

## 2018-11-26 PROCEDURE — G0463 HOSPITAL OUTPT CLINIC VISIT: HCPCS | Performed by: INTERNAL MEDICINE

## 2018-11-26 RX ORDER — GABAPENTIN 100 MG/1
100 CAPSULE ORAL NIGHTLY
Qty: 30 CAPSULE | Refills: 6 | Status: SHIPPED | OUTPATIENT
Start: 2018-11-26 | End: 2018-12-10

## 2018-11-26 RX ORDER — CELECOXIB 200 MG/1
200 CAPSULE ORAL DAILY
COMMUNITY

## 2018-11-26 RX ORDER — ZOLPIDEM TARTRATE 10 MG/1
10 TABLET ORAL NIGHTLY PRN
Qty: 90 TABLET | Refills: 4 | Status: SHIPPED | OUTPATIENT
Start: 2018-11-26 | End: 2019-08-02

## 2018-11-26 NOTE — PROGRESS NOTES
The patient is a 5year-old female who I know well from prior evaluation and comes in now for follow-up. In general, she is doing well. She had an ERCP to remove a gallstone and her nausea resolved. She does have severe shoulder discomfort.   She tells m

## 2018-12-06 ENCOUNTER — TELEPHONE (OUTPATIENT)
Dept: PULMONOLOGY | Facility: CLINIC | Age: 83
End: 2018-12-06

## 2018-12-06 NOTE — TELEPHONE ENCOUNTER
Pt requesting to attila wesley rn, pt only indicates she has addl questions since her last visit,pls call at:[phone],thanks.

## 2018-12-07 NOTE — TELEPHONE ENCOUNTER
Per Telephone Conversation w/ Dr. Miah Warren: pt to be added on Monday preferably 11:45 am front-load and can take Gabapentin 100 mg BID or 200 mg at one time.

## 2018-12-07 NOTE — TELEPHONE ENCOUNTER
Spoke w/ pt offered appt Monday 12/10 @ 2 pm (cancellation came up since speaking w/ [facility]), pt accepted. Pt informed of Gabapentin recommendation by [facility] and verbalized understanding.

## 2018-12-07 NOTE — TELEPHONE ENCOUNTER
Pt called to ask below 2 questions, aware [facility] out of clinic, pt will anticipate cb Mon 12/10 w/ recommendation, pt stts OK to leave detailed msg if no answer.      1. Reports she feels Gabapentin 100 mg is working but wants to know if dosage can be increase

## 2018-12-10 ENCOUNTER — TELEPHONE (OUTPATIENT)
Dept: PULMONOLOGY | Facility: CLINIC | Age: 83
End: 2018-12-10

## 2018-12-10 ENCOUNTER — OFFICE VISIT (OUTPATIENT)
Dept: PULMONOLOGY | Facility: CLINIC | Age: 83
End: 2018-12-10
Payer: MEDICARE

## 2018-12-10 VITALS
RESPIRATION RATE: 18 BRPM | DIASTOLIC BLOOD PRESSURE: 73 MMHG | SYSTOLIC BLOOD PRESSURE: 163 MMHG | OXYGEN SATURATION: 94 % | BODY MASS INDEX: 22.82 KG/M2 | HEIGHT: 65 IN | WEIGHT: 137 LBS | HEART RATE: 78 BPM

## 2018-12-10 DIAGNOSIS — L72.3 SEBACEOUS CYST: Primary | ICD-10-CM

## 2018-12-10 PROBLEM — N64.9 BREAST LESION: Status: ACTIVE | Noted: 2018-12-10

## 2018-12-10 PROCEDURE — G0463 HOSPITAL OUTPT CLINIC VISIT: HCPCS | Performed by: INTERNAL MEDICINE

## 2018-12-10 PROCEDURE — 99214 OFFICE O/P EST MOD 30 MIN: CPT | Performed by: INTERNAL MEDICINE

## 2018-12-10 RX ORDER — CEPHALEXIN 250 MG/1
250 CAPSULE ORAL 3 TIMES DAILY
Qty: 42 CAPSULE | Refills: 1 | Status: SHIPPED | OUTPATIENT
Start: 2018-12-10 | End: 2018-12-17

## 2018-12-10 RX ORDER — GABAPENTIN 100 MG/1
100 CAPSULE ORAL 3 TIMES DAILY
Qty: 90 CAPSULE | Refills: 6 | Status: ON HOLD | OUTPATIENT
Start: 2018-12-10 | End: 2019-01-28

## 2018-12-10 NOTE — PROGRESS NOTES
The patient has a history of an infected sebaceous cyst on her right breast.  She notes that it had been doing well for the past year and now it has become inflamed and enlarged. She had previously deferred mammography over the past several years.     Revi

## 2018-12-10 NOTE — TELEPHONE ENCOUNTER
Pt inquiring If [facility] after she has been seen my physician [facility] [address] [facility] [address] today.  Please call thank you [phone]

## 2018-12-11 NOTE — TELEPHONE ENCOUNTER
Yes, the patient should see me after she is seen by plastic surgery. She can see me either before the holidays or after the holidays. She should see me sooner, if she does not get more complete evacuation by the surgeon.

## 2018-12-12 ENCOUNTER — TELEPHONE (OUTPATIENT)
Dept: PULMONOLOGY | Facility: CLINIC | Age: 83
End: 2018-12-12

## 2018-12-12 NOTE — TELEPHONE ENCOUNTER
[facility]- when do you want to add pt to your sched? Pls advise re: aerobic bacterial culture as prelim result avail.

## 2018-12-12 NOTE — TELEPHONE ENCOUNTER
Pt [facility] first available appt 02/18/19 for f/up. Pt was advised to f/up after her upcoming appt [facility] 12/17/18 w Dr. Homer Cleary. Pt asking if this is ok, or if she needs to be sooner, pls call at:[phone],thanks. *Pt also asking if results from biopsy have been received yet.

## 2018-12-13 NOTE — TELEPHONE ENCOUNTER
Pt ashok she will see the plastic surgeon on Dec 17th 2018. What day do you want pt added to your schedule next week?

## 2018-12-14 ENCOUNTER — TELEPHONE (OUTPATIENT)
Dept: PULMONOLOGY | Facility: CLINIC | Age: 83
End: 2018-12-14

## 2018-12-14 RX ORDER — AMPICILLIN 500 MG/1
500 CAPSULE ORAL 4 TIMES DAILY
Qty: 120 CAPSULE | Refills: 1 | Status: ON HOLD | OUTPATIENT
Start: 2018-12-14 | End: 2019-01-28

## 2018-12-14 NOTE — TELEPHONE ENCOUNTER
RN, why do not we wait until after she is seen by plastic surgery. Have her call the office on Tuesday and let me know what happened. I anticipate he will have drained this abnormality more fully.

## 2018-12-15 NOTE — TELEPHONE ENCOUNTER
Spoke to patient and sent in prescription for ampicillin 500 mg 4 times a day for 1 month with 1 refill.

## 2018-12-17 ENCOUNTER — OFFICE VISIT (OUTPATIENT)
Dept: SURGERY | Facility: CLINIC | Age: 83
End: 2018-12-17
Payer: MEDICARE

## 2018-12-17 DIAGNOSIS — L72.0 INCLUSION CYST: Primary | ICD-10-CM

## 2018-12-17 PROCEDURE — G0463 HOSPITAL OUTPT CLINIC VISIT: HCPCS | Performed by: PLASTIC SURGERY

## 2018-12-17 PROCEDURE — 99203 OFFICE O/P NEW LOW 30 MIN: CPT | Performed by: PLASTIC SURGERY

## 2018-12-17 NOTE — TELEPHONE ENCOUNTER
Spoke to patient who sttd that she seen the plastic surgeon today. Pt stts surgeon aspirated the cyst on her breast and told her to continue the ampicillin. Per patient surgeon did not request follow up. Pt has a scheduled appt with you on Feb 18, 2018.  Do Red X (Sepsis)

## 2018-12-17 NOTE — H&P
Therese Almazan is a 80year old female that presents with Patient presents with:  Lesion: R breast  .    REFERRED BY:  eLx Fry    Pacemaker: No  Latex Allergy: no  Coumadin: No  Plavix: No  Other anticoagulants: No  Cardiac stents: No    HAND DOM Rfl: 6   celecoxib 200 MG Oral Cap Take 200 mg by mouth 2 (two) times daily. Disp:  Rfl:    Zolpidem Tartrate 10 MG Oral Tab Take 1 tablet (10 mg total) by mouth nightly as needed for Sleep.  Disp: 90 tablet Rfl: 4   FLUTICASONE PROPIONATE 50 MCG/ACT Nasal hip replacements   • YAG CAPSULOTOMY - OS - LEFT EYE Left 1998    Dr. Kenia Suárez History    Socioeconomic History      Marital status:       Spouse name: Not on file      Number of children: Not on file      Years of education: Not on file Asked        Regular use of sun block: Not Asked    Social History Narrative      Not on file    Family History   Problem Relation Age of Onset   • Ear Problems Mother         hearing loss   • Other (Other) Father         emphysema   • Diabetes Neg    • Gl

## 2018-12-26 NOTE — TELEPHONE ENCOUNTER
Celecoxib 200 mg rx request. Dr. Lori Benz for Dr. Yasmani Bautista (out of office), please review. Thank you. LOV: 12/10/18  Last Refill: provided by external provider.  rx NOT in pts current med list.

## 2018-12-27 RX ORDER — CELECOXIB 200 MG/1
CAPSULE ORAL
Qty: 90 CAPSULE | Refills: 1 | Status: SHIPPED | OUTPATIENT
Start: 2018-12-27 | End: 2019-08-26

## 2019-01-20 ENCOUNTER — APPOINTMENT (OUTPATIENT)
Dept: GENERAL RADIOLOGY | Facility: HOSPITAL | Age: 84
End: 2019-01-20
Attending: EMERGENCY MEDICINE
Payer: MEDICARE

## 2019-01-20 ENCOUNTER — HOSPITAL ENCOUNTER (EMERGENCY)
Facility: HOSPITAL | Age: 84
Discharge: HOME | End: 2019-01-20
Attending: EMERGENCY MEDICINE
Payer: MEDICARE

## 2019-01-20 VITALS
DIASTOLIC BLOOD PRESSURE: 65 MMHG | OXYGEN SATURATION: 96 % | BODY MASS INDEX: 24.16 KG/M2 | WEIGHT: 145 LBS | HEIGHT: 65 IN | RESPIRATION RATE: 16 BRPM | HEART RATE: 73 BPM | SYSTOLIC BLOOD PRESSURE: 177 MMHG | TEMPERATURE: 97 F

## 2019-01-20 DIAGNOSIS — M54.50 ACUTE BILATERAL LOW BACK PAIN WITHOUT SCIATICA: Primary | ICD-10-CM

## 2019-01-20 PROCEDURE — 99284 EMERGENCY DEPT VISIT MOD MDM: CPT

## 2019-01-20 PROCEDURE — 72100 X-RAY EXAM L-S SPINE 2/3 VWS: CPT | Performed by: EMERGENCY MEDICINE

## 2019-01-20 RX ORDER — HYDROCODONE BITARTRATE AND ACETAMINOPHEN 5; 325 MG/1; MG/1
1-2 TABLET ORAL EVERY 4 HOURS PRN
Qty: 15 TABLET | Refills: 0 | Status: SHIPPED | OUTPATIENT
Start: 2019-01-20 | End: 2019-01-27

## 2019-01-20 RX ORDER — IBUPROFEN 600 MG/1
600 TABLET ORAL EVERY 8 HOURS PRN
Qty: 20 TABLET | Refills: 0 | Status: SHIPPED | OUTPATIENT
Start: 2019-01-20 | End: 2019-01-27

## 2019-01-20 RX ORDER — HYDROCODONE BITARTRATE AND ACETAMINOPHEN 5; 325 MG/1; MG/1
1 TABLET ORAL ONCE
Status: COMPLETED | OUTPATIENT
Start: 2019-01-20 | End: 2019-01-20

## 2019-01-20 NOTE — ED PROVIDER NOTES
Patient Seen in: [facility] Emergency Department    History   Patient presents with:  Back Pain (musculoskeletal)    Stated Complaint: back pain    HPI    40-year-old female with history of chronic back pain, osteoarthritis, hyperlipidemia, here w SURGERY  97/0 per NG   • HYSTERECTOMY      Partial   • LAPAROSCOPIC CHOLECYSTECTOMY  05/27/16   • OTHER SURGICAL HISTORY      Left wrist trauma, surgical repair   • OTHER SURGICAL HISTORY  05/27/16    Laparoscopy with bilateral salpingo-oophorectomy   • SY distress. HENT:   Head: Normocephalic and atraumatic. Mouth/Throat: Oropharynx is clear and moist.   Eyes: EOM are normal. Pupils are equal, round, and reactive to light. Neck: Normal range of motion. Cardiovascular: Normal rate and regular rhythm. interpreted all the ED vitals  - afebrile, hemodynamically stable  - I ordered and personally reviewed the labs and imaging and found XR with arthritis   - norco ordered  - pain improved  - supportive care discussed - discussed need for physical therapy an pain, fracture, arthritis, Scitatica, UTI      Disposition and Plan     Clinical Impression:  Acute bilateral low back pain without sciatica  (primary encounter diagnosis)    Disposition:  Discharge  1/20/2019  2:25 pm    Follow-up:  Deep Putnam MD

## 2019-01-24 ENCOUNTER — TELEPHONE (OUTPATIENT)
Dept: PULMONOLOGY | Facility: CLINIC | Age: 84
End: 2019-01-24

## 2019-01-24 NOTE — TELEPHONE ENCOUNTER
Pt requesting this office speak to her daughter Felipa Schofield. Felipa Schofield states pt has been experiencing left hip and left leg pain 10/10 for a couple weeks, was seen in the ED Sunday (x-ray done), and has followed up with pain specialist Dr. Faheem Ruano.   Pt was seen by

## 2019-01-25 ENCOUNTER — TELEPHONE (OUTPATIENT)
Dept: AUDIOLOGY | Facility: CLINIC | Age: 84
End: 2019-01-25

## 2019-01-27 ENCOUNTER — TELEPHONE (OUTPATIENT)
Dept: PULMONOLOGY | Facility: CLINIC | Age: 84
End: 2019-01-27

## 2019-01-28 ENCOUNTER — TELEPHONE (OUTPATIENT)
Dept: PULMONOLOGY | Facility: CLINIC | Age: 84
End: 2019-01-28

## 2019-01-28 ENCOUNTER — HOSPITAL ENCOUNTER (INPATIENT)
Facility: HOSPITAL | Age: 84
LOS: 3 days | Discharge: NURSING, REHAB OR LONG-TERM CARE | DRG: 552 | End: 2019-02-02
Attending: EMERGENCY MEDICINE | Admitting: INTERNAL MEDICINE
Payer: MEDICARE

## 2019-01-28 DIAGNOSIS — G89.29 CHRONIC LEFT-SIDED LOW BACK PAIN WITH LEFT-SIDED SCIATICA: Primary | ICD-10-CM

## 2019-01-28 DIAGNOSIS — M12.9 ARTHROPATHY: ICD-10-CM

## 2019-01-28 DIAGNOSIS — M54.42 CHRONIC LEFT-SIDED LOW BACK PAIN WITH LEFT-SIDED SCIATICA: Primary | ICD-10-CM

## 2019-01-28 LAB
ANION GAP SERPL CALC-SCNC: 12 MMOL/L (ref 0–18)
BASOPHILS # BLD: 0 K/UL (ref 0–0.2)
BASOPHILS NFR BLD: 0 %
BILIRUB UR QL: NEGATIVE
BUN SERPL-MCNC: 47 MG/DL (ref 8–20)
BUN/CREAT SERPL: 37.3 (ref 10–20)
CALCIUM SERPL-MCNC: 9.3 MG/DL (ref 8.5–10.5)
CHLORIDE SERPL-SCNC: 104 MMOL/L (ref 95–110)
CLARITY UR: CLEAR
CO2 SERPL-SCNC: 20 MMOL/L (ref 22–32)
COLOR UR: YELLOW
CREAT SERPL-MCNC: 1.26 MG/DL (ref 0.5–1.5)
EOSINOPHIL # BLD: 0 K/UL (ref 0–0.7)
EOSINOPHIL NFR BLD: 0 %
ERYTHROCYTE [DISTWIDTH] IN BLOOD BY AUTOMATED COUNT: 14.5 % (ref 11–15)
GLUCOSE SERPL-MCNC: 84 MG/DL (ref 70–99)
GLUCOSE UR-MCNC: NEGATIVE MG/DL
HCT VFR BLD AUTO: 37.4 % (ref 35–48)
HGB BLD-MCNC: 12 G/DL (ref 12–16)
HGB UR QL STRIP.AUTO: NEGATIVE
KETONES UR-MCNC: NEGATIVE MG/DL
LYMPHOCYTES # BLD: 1.5 K/UL (ref 1–4)
LYMPHOCYTES NFR BLD: 14 %
MCH RBC QN AUTO: 30.2 PG (ref 27–32)
MCHC RBC AUTO-ENTMCNC: 32.1 G/DL (ref 32–37)
MCV RBC AUTO: 93.8 FL (ref 80–100)
MONOCYTES # BLD: 1.2 K/UL (ref 0–1)
MONOCYTES NFR BLD: 12 %
NEUTROPHILS # BLD AUTO: 7.7 K/UL (ref 1.8–7.7)
NEUTROPHILS NFR BLD: 73 %
NITRITE UR QL STRIP.AUTO: NEGATIVE
OSMOLALITY UR CALC.SUM OF ELEC: 293 MOSM/KG (ref 275–295)
PH UR: 5 [PH] (ref 5–8)
PLATELET # BLD AUTO: 420 K/UL (ref 140–400)
PMV BLD AUTO: 7.6 FL (ref 7.4–10.3)
POTASSIUM SERPL-SCNC: 4.6 MMOL/L (ref 3.3–5.1)
PROT UR-MCNC: NEGATIVE MG/DL
RBC # BLD AUTO: 3.98 M/UL (ref 3.7–5.4)
RBC #/AREA URNS AUTO: 3 /HPF
SODIUM SERPL-SCNC: 136 MMOL/L (ref 136–144)
SP GR UR STRIP: 1.01 (ref 1–1.03)
UROBILINOGEN UR STRIP-ACNC: <2
VIT C UR-MCNC: NEGATIVE MG/DL
WBC # BLD AUTO: 10.5 K/UL (ref 4–11)
WBC #/AREA URNS AUTO: 2 /HPF

## 2019-01-28 PROCEDURE — 99222 1ST HOSP IP/OBS MODERATE 55: CPT | Performed by: INTERNAL MEDICINE

## 2019-01-28 RX ORDER — METHYLPREDNISOLONE SODIUM SUCCINATE 125 MG/2ML
60 INJECTION, POWDER, LYOPHILIZED, FOR SOLUTION INTRAMUSCULAR; INTRAVENOUS ONCE
Status: COMPLETED | OUTPATIENT
Start: 2019-01-28 | End: 2019-01-28

## 2019-01-28 RX ORDER — ZOLPIDEM TARTRATE 5 MG/1
5 TABLET ORAL NIGHTLY PRN
Status: DISCONTINUED | OUTPATIENT
Start: 2019-01-28 | End: 2019-02-02

## 2019-01-28 RX ORDER — MORPHINE SULFATE 4 MG/ML
2 INJECTION, SOLUTION INTRAMUSCULAR; INTRAVENOUS ONCE
Status: COMPLETED | OUTPATIENT
Start: 2019-01-28 | End: 2019-01-28

## 2019-01-28 RX ORDER — PREDNISONE 20 MG/1
TABLET ORAL
Qty: 10 TABLET | Refills: 0 | Status: ON HOLD | OUTPATIENT
Start: 2019-01-28 | End: 2019-02-02

## 2019-01-28 RX ORDER — ZOLPIDEM TARTRATE 10 MG/1
5 TABLET ORAL NIGHTLY PRN
Status: DISCONTINUED | OUTPATIENT
Start: 2019-01-28 | End: 2019-02-02

## 2019-01-28 RX ORDER — TRIAMTERENE AND HYDROCHLOROTHIAZIDE 37.5; 25 MG/1; MG/1
1 CAPSULE ORAL
Status: DISCONTINUED | OUTPATIENT
Start: 2019-01-28 | End: 2019-02-02

## 2019-01-28 RX ORDER — HYDROCODONE BITARTRATE AND ACETAMINOPHEN 5; 325 MG/1; MG/1
1 TABLET ORAL EVERY 6 HOURS PRN
Status: ON HOLD | COMMUNITY
End: 2019-02-02

## 2019-01-28 RX ORDER — HYDROCODONE BITARTRATE AND ACETAMINOPHEN 10; 325 MG/1; MG/1
1 TABLET ORAL EVERY 6 HOURS PRN
Status: DISCONTINUED | OUTPATIENT
Start: 2019-01-28 | End: 2019-01-30

## 2019-01-28 RX ORDER — CELECOXIB 200 MG/1
200 CAPSULE ORAL DAILY
Status: DISCONTINUED | OUTPATIENT
Start: 2019-01-28 | End: 2019-02-02

## 2019-01-28 RX ORDER — FLUTICASONE PROPIONATE 50 MCG
1 SPRAY, SUSPENSION (ML) NASAL DAILY
Status: DISCONTINUED | OUTPATIENT
Start: 2019-01-28 | End: 2019-02-02

## 2019-01-28 NOTE — ED INITIAL ASSESSMENT (HPI)
Pain to left buttock that radiates down leg. Unable to stand independently. Denies loss of urine or bowel.

## 2019-01-28 NOTE — TELEPHONE ENCOUNTER
Pts daugh calling to let [address] know that her mom will be going to [facility] ER Dept. Ignacia 37 states she has called 911, so the paramedics will be p/up the pt. To take her to ER.

## 2019-01-28 NOTE — ED PROVIDER NOTES
Patient Seen in: [facility] Emergency Department    History   No chief complaint on file.     Stated Complaint: Back Pain    HPI    49-year-old female here in the emergency department about 10 days ago with complaints of some chronic low back pain CHOLECYSTECTOMY     • COLONOSCOPY     • ENDOSCOPIC RETROGRADE CHOLANGIOPANCREATOGRAPHY (ERCP) N/A 10/16/2018    Performed by Melanie Robins MD at [address] [place] [address]  97/0 per NG   • HYSTERECTOMY      Partial   • LAPAROSCOP palpation of the left lower lumbar paralumbar and upper buttock area. No significant midline lumbar pain otherwise. No signs of trauma or rash. Intact patellar reflexes and plantar flexion strength and sensation distally in both lower extremities.   Jada Mattson SPINE AP LAT, 3/10/2015, 14:22. INDICATIONS: Worsening lower back pain x3 weeks, no known injury.   TECHNIQUE: Lumbar spine radiographs (2-3 views)   FINDINGS:   ALIGNMENT: Subtle rightward curvature of the spine centered at L2 similar to prior imaging fro

## 2019-01-28 NOTE — TELEPHONE ENCOUNTER
Spoke to daughter and pt. Daughter states epidural is not helping, states Dr. Ingrid Cruz increased norco 5-325 mg to take 2 tablets twice a day and pt is stating still with 10/10 pain in back when moving. Pt requesting stronger pain meds.   Appt offered today 3

## 2019-01-28 NOTE — TELEPHONE ENCOUNTER
Called patient's daughter.  Apparently they already took care of this on 1/25- TE not closed, so she was called again today

## 2019-01-28 NOTE — ED NOTES
Orders for admission, patient is aware of plan and ready to go upstairs. Any questions, please call ED RN Maris  at extension [phone].

## 2019-01-28 NOTE — TELEPHONE ENCOUNTER
Pt has back pain from burst cysts - pt was given an epidural but it has not kicked in yet  - asking for stronger pain rx

## 2019-01-28 NOTE — TELEPHONE ENCOUNTER
Pt and dtr called  States severe back pain  Recent injection therapy by pain clinic and not better  Recent ER visit and Rx [place] and not better  Asking for stronger narcotics  I declined w/o additional office visit and exam  Suggested to cont NSAID, try ta

## 2019-01-28 NOTE — PROGRESS NOTES
Pending sale to [facility] Note:  Age Based Dose Adjustment    Tyrone Mena has been prescribed zolpidem (AMBIEN) 10 mg PO hs.  Patient is >71 years old therefore the dose has been adjusted to 5 mg, may repeat x1 dose if needed.       Thank you,  Leland Aguilar, PharmD

## 2019-01-29 ENCOUNTER — APPOINTMENT (OUTPATIENT)
Dept: GENERAL RADIOLOGY | Facility: HOSPITAL | Age: 84
DRG: 552 | End: 2019-01-29
Attending: ANESTHESIOLOGY
Payer: MEDICARE

## 2019-01-29 ENCOUNTER — TELEPHONE (OUTPATIENT)
Dept: PULMONOLOGY | Facility: CLINIC | Age: 84
End: 2019-01-29

## 2019-01-29 PROCEDURE — 73552 X-RAY EXAM OF FEMUR 2/>: CPT | Performed by: ANESTHESIOLOGY

## 2019-01-29 PROCEDURE — 99204 OFFICE O/P NEW MOD 45 MIN: CPT | Performed by: PHYSICAL MEDICINE & REHABILITATION

## 2019-01-29 PROCEDURE — 99232 SBSQ HOSP IP/OBS MODERATE 35: CPT | Performed by: INTERNAL MEDICINE

## 2019-01-29 NOTE — CM/SW NOTE
01/29/19 1500   CM/SW Screening   Referral Source Nurse;   Information Source Nursing rounds; Chart review;[facility] staff   Patient's Mental Status Alert;Oriented   Patient's Home Environment Senior Independent Housing   Number of Levels in Home (

## 2019-01-29 NOTE — H&P
[facility]UDITH CROCKER [facility]    History & Physical    Date:  1/28/2019   Date of Admission:  1/28/2019      Chief Complaint:   Guevara Morton is a(n) 80year old female with pain at the left back and hip.     HPI:   The patient has a history of severe arthr 05/27/16   • OTHER SURGICAL HISTORY      Left wrist trauma, surgical repair   • OTHER SURGICAL HISTORY  05/27/16    Laparoscopy with bilateral salpingo-oophorectomy   • SYNVISC, INTRA-ARTICULAR Bilateral 2013    Synvisc injection bilateral knees   • TOTAL not currently breastfeeding. Alert white female who is very uncomfortable if she straightens out her left leg and steps. HEENT examination is unremarkable with pupils equal round and reactive to light and accommodation.    Neck without adenopathy, thyro

## 2019-01-29 NOTE — TELEPHONE ENCOUNTER
Pt daughter Misa More would like to speak to dr Josie Mancuso in regards to pts plan of care please call thank you

## 2019-01-29 NOTE — CHRONIC PAIN
[facility] HOSP - [facility]  Report of Consultation    Carmen Party Patient Status:  Observation    1928 MRN [id number]   Location [facility] 5SW/SE Attending Deep Putnam MD   Hosp Day # 0 PCP Edward Morales MD     Date of Admiss • CATARACT EXTRACTION W/  INTRAOCULAR LENS IMPLANT Right 1998     IOL / Dr. Douglas Jones   • CHOLECYSTECTOMY     • COLONOSCOPY     • ENDOSCOPIC RETROGRADE CHOLANGIOPANCREATOGRAPHY (ERCP) N/A 10/16/2018    Performed by Carlos Levy MD at [address] [place] [address] Exam:  Blood pressure 158/71, pulse 86, temperature 97.8 °F (36.6 °C), temperature source Oral, resp. rate 20, height 5' 5\" (1.651 m), weight 122 lb 4.8 oz (55.5 kg), SpO2 97 %, not currently breastfeeding.    Alert  Oriented   Mild tenderness over pirifor at length to patients satisfaction during a comprehensive interactive discussion. All questions were answered during extended questions and answer session. Patient agreeable to discussion plan.  Greater than 50% of the time was spent with counseling (nature

## 2019-01-29 NOTE — PLAN OF CARE
Problem: Patient Centered Care  Goal: Patient preferences are identified and integrated in the patient's plan of care  Interventions:  - What would you like us to know as we care for you?    - Provide timely, complete, and accurate information to patient/f

## 2019-01-29 NOTE — CONSULTS
[facility]  Physical Medicine and Rehabilitation  Inpatient Consult Note    Requesting Physician: Dr. Nav Dillon (Reason for Visit):  Patient presents with:  Back Pain (musculoskeletal)    History of Present Illness:   The nicole Osteoarthritis    • Other and unspecified hyperlipidemia    • Other ill-defined conditions(359.89)     Left wrist trauma, surgical repair   • Posterior capsule opacification     OS, YAG laser 2014   • Ptosis of right eyelid 2014    Ptosis RUL       PAST AHUJA Review of Systems   Constitutional: Negative. HENT: Negative. Eyes: Negative. Respiratory: Negative. Cardiovascular: Negative. Gastrointestinal: Negative. Genitourinary: Negative. Musculoskeletal: As per HPI   Skin: Negative.     Luisa Martinez neuii*  Final   • Aerobic Smear 12/10/2018 4+ WBCs seen   Final   • Aerobic Smear 12/10/2018 2+ Gram Positive Cocci   Final   • Aerobic Smear 12/10/2018 1+ Gram variable rods   Final   Admission on 10/16/2018, Discharged on 10/17/2018   Component Date Valu 10/17/2018 9  % Final   • Eosinophil % 10/17/2018 0  % Final   • Basophil % 10/17/2018 0  % Final   • Neutrophil Absolute 10/17/2018 12.7* 1.8 - 7.7 K/UL Final   • Lymphocyte Absolute 10/17/2018 0.8* 1.0 - 4.0 K/UL Final   • Monocyte Absolute 10/17/2018 1. 0.45 - 5.33 uIU/mL Final   • WBC 10/02/2018 5.3  4.0 - 11.0 K/UL Final   • RBC 10/02/2018 3.93  3.70 - 5.40 M/UL Final   • HGB 10/02/2018 12.0  12.0 - 16.0 g/dL Final   • HCT 10/02/2018 35.6  35.0 - 48.0 % Final   • MCV 10/02/2018 90.5  80.0 - 100.0 fL Fin Dictated by (CST): Lucretia Harper MD on 1/29/2019 at 13:21       Approved by (CST): Lucretia Harper MD on 1/29/2019 at 13:22              Therapy Progress:  · PT: Pending  · OT: Pending      Assessment  The patient is a 80year old right handed female wit home regimen of Ambien to help with sleep.   #Bowel: Patient endorsing she is having regular bowel movements would continue to monitor       Chronic left-sided low back pain with left-sided sciatica  (primary encounter diagnosis)    Sarah Seals DO, [facility]

## 2019-01-30 PROCEDURE — 99232 SBSQ HOSP IP/OBS MODERATE 35: CPT | Performed by: INTERNAL MEDICINE

## 2019-01-30 RX ORDER — OXYCODONE AND ACETAMINOPHEN 10; 325 MG/1; MG/1
1 TABLET ORAL EVERY 4 HOURS PRN
Status: DISCONTINUED | OUTPATIENT
Start: 2019-01-30 | End: 2019-02-02

## 2019-01-30 NOTE — CM/SW NOTE
Addend 434p:     Pt's dtr requested to speak to SW. Pt and daughter, requesting [place] [address] and [address] room preference if available. Pt's dtr stated that [facility] would be their last choice.  SW asked [facility]/Katherin to send referral. Pt's dtr stated that

## 2019-01-30 NOTE — PLAN OF CARE
Problem: Patient Centered Care  Goal: Patient preferences are identified and integrated in the patient's plan of care  Interventions:  - What would you like us to know as we care for you?   - Provide timely, complete, and accurate information to patient/fa Notify MD/LIP if interventions unsuccessful or patient reports new pain  - Anticipate increased pain with activity and pre-medicate as appropriate  Outcome: Progressing      Problem: SAFETY ADULT - FALL  Goal: Free from fall injury  INTERVENTIONS:  - Asses

## 2019-01-30 NOTE — PHYSICAL THERAPY NOTE
PHYSICAL THERAPY EVALUATION - INPATIENT     Room Number: 527/527-A  Evaluation Date: 1/30/2019  Type of Evaluation: Initial   Physician Order: PT Eval and Treat    Presenting Problem: L side low back pain with sciatica  Reason for Therapy: Mobility Dysfun and transfers and primarily in [facility]. The patient's Approx Degree of Impairment: 64.91% has been calculated based on documentation in the [facility] '6 clicks' Inpatient Basic Mobility Short Form.   Research supports that patients with this level of impairment may Performed by Maegan Cordon MD at [facility]  97/0 per NG   • HYSTERECTOMY      Partial   • LAPAROSCOPIC CHOLECYSTECTOMY  05/27/16   • OTHER SURGICAL HISTORY      Left wrist trauma, surgical repair   • OTHER ANTONIA Rate Source: Monitor                   O2 WALK     SPO2 Ambulation on Room Air: 98            AM-PAC '6-Clicks' INPATIENT SHORT FORM - BASIC MOBILITY  How much difficulty does the patient currently have. ..  -   Turning over in bed (including adjusting bedc walker - rolling at assistance level: minimum assistance   Goal #3   Current Status    Goal #4 Patient to demonstrate independence with home activity/exercise instructions provided to patient in preparation for discharge.    Goal #4   Current Status

## 2019-01-30 NOTE — PROGRESS NOTES
[facility]UD[facility] CROCKER [facility]    Progress Note      Assessment and Plan:   1. Severe left lower back pain with sciatica–the patient is in such excruciating pain that she cannot take a single step on the left leg.   She had an MRI of the back recently and the Amrik Wagner MD  Medical Director, Critical Care, [facility]  Medical Director, [address] [place] [address]. [phone]  Pager: [phone]

## 2019-01-30 NOTE — CHRONIC PAIN
[facility]UDITH CROCKER [facility]  Anesthesiology Pain Management Progress Note      Patient name:  Rolando Scott 80year old female  : 1928  MRN: [id number]    Reason for Consult: Low back pain left buttock pain radiating down to left lower extremity salpingo-oophorectomy   • SYNVISC, INTRA-ARTICULAR Bilateral 2013    Synvisc injection bilateral knees   • TOTAL HIP REPLACEMENT      bilateral hip replacements   • YAG CAPSULOTOMY - OS - LEFT EYE Left 1998    Dr. Dayana Warren     Family History   Problem Relat Assessment:  Pain is still not controlled. Status post epidural steroid injection 1 week ago. Plan:  Would recommend to change Norco to Percocet 10–325 every 4 hours as needed. Physical therapy.         JAYNE YANG  1/30/2019  Chronic Pain Se

## 2019-01-30 NOTE — PLAN OF CARE
Problem: Patient Centered Care  Goal: Patient preferences are identified and integrated in the patient's plan of care  Interventions:  - What would you like us to know as we care for you?   - Provide timely, complete, and accurate information to patient/fa Anticipate increased pain with activity and pre-medicate as appropriate  Outcome: Progressing      Problem: SAFETY ADULT - FALL  Goal: Free from fall injury  INTERVENTIONS:  - Assess pt frequently for physical needs  - Identify cognitive and physical defic

## 2019-01-30 NOTE — PROGRESS NOTES
YUDITH CROCKER [facility]    Progress Note      Assessment and Plan:   1. Severe left lower back pain with sciatica–the patient is in such excruciating pain that she cannot take a single step on the left leg.   She had an MRI of the back recently and the

## 2019-01-31 PROCEDURE — 99232 SBSQ HOSP IP/OBS MODERATE 35: CPT | Performed by: PHYSICAL MEDICINE & REHABILITATION

## 2019-01-31 PROCEDURE — 99232 SBSQ HOSP IP/OBS MODERATE 35: CPT | Performed by: INTERNAL MEDICINE

## 2019-01-31 RX ORDER — OXYCODONE AND ACETAMINOPHEN 10; 325 MG/1; MG/1
1 TABLET ORAL EVERY 4 HOURS PRN
Qty: 20 TABLET | Refills: 0 | Status: SHIPPED | OUTPATIENT
Start: 2019-01-31

## 2019-01-31 RX ORDER — 0.9 % SODIUM CHLORIDE 0.9 %
VIAL (ML) INJECTION
Status: COMPLETED
Start: 2019-01-31 | End: 2019-01-31

## 2019-01-31 RX ORDER — POLYETHYLENE GLYCOL 3350 17 G/17G
17 POWDER, FOR SOLUTION ORAL DAILY
Status: DISCONTINUED | OUTPATIENT
Start: 2019-01-31 | End: 2019-02-02

## 2019-01-31 NOTE — CM/SW NOTE
Patients daughter requesting to speak with CTL    Per daughter rehab preferences are   [facility]  [facility]    Daughter states she spoke with [facility] and decided it will be there second choice    Referra

## 2019-01-31 NOTE — PROGRESS NOTES
[facility]  Physical Medicine and Rehabilitation  Inpatient Progress Note    Chief Complaint (Reason for Visit):  Patient presents with:  Back Pain (musculoskeletal)      History of Present Illness:   The patient is a 80year old right jadyn PC IOL / Dr. Hugo Canela   • CHOLECYSTECTOMY     • COLONOSCOPY     • ENDOSCOPIC RETROGRADE CHOLANGIOPANCREATOGRAPHY (ERCP) N/A 10/16/2018    Performed by Julianna Robertson MD at [address] [place] [address]  97/0 per    • HYSTERECTOMY Location: Right arm)   Pulse 78   Temp 97.8 °F (36.6 °C) (Oral)   Resp 18   Ht 65\"   Wt 122 lb 4.8 oz   SpO2 98%   BMI 20.35 kg/m²     Body mass index is 20.35 kg/m².       Musculoskeletal:  LUMBAR SPINE:  Inspection: no erythema, swelling, or obvious defo 2.5 - 3.7 g/dL Final   • A/G Ratio 10/17/2018 1.4  1.0 - 2.0 Final   • Anion Gap 10/17/2018 11  0 - 18 mmol/L Final   • BUN/CREA Ratio 10/17/2018 16.1  10.0 - 20.0 Final   • Calculated Osmolality 10/17/2018 271* 275 - 295 mOsm/kg Final   • GFR, Non-Evelina mmol/L Final   • Potassium 10/11/2018 4.2  3.3 - 5.1 mmol/L Final   • Chloride 10/11/2018 87* 95 - 110 mmol/L Final   • CO2 10/11/2018 25  22 - 32 mmol/L Final   • BUN 10/11/2018 22* 8 - 20 mg/dL Final   • Creatinine 10/11/2018 1.23  0.50 - 1.50 mg/dL [place] 10/02/2018   Component Date Value Ref Range Status   • Beta Natriuretic Peptide 10/02/2018 111* 0 - 100 pg/mL Final   ]      Radiology Imaging:  XR FEMUR MIN 2 VIEWS LEFT (CPT=73552)  Narrative: PROCEDURE: XR FEMUR MIN 2 VIEWS LEFT (CPT=73552)     COMPARIS primarily in [facility]. The patient's Approx Degree of Impairment: 64.91% has been calculated based on documentation in the [facility] '6 clicks' Inpatient Basic Mobility Short Form.   Research supports that patients with this level of impairment may benefit from sub-a

## 2019-01-31 NOTE — PHYSICAL THERAPY NOTE
PHYSICAL THERAPY TREATMENT NOTE - INPATIENT     Room Number: 527/527-A       Presenting Problem: L side low back pain with sciatica    Problem List  Principal Problem:    Chronic left-sided low back pain with left-sided sciatica      PHYSICAL THERAPY RENÉ PLAN  PT Treatment Plan: Bed mobility; Body mechanics; Endurance; Energy conservation;Patient education;Gait training;Range of motion;Strengthening;Transfer training;Balance training    SUBJECTIVE  \"That's the most I walked in 3 weeks! \"    OBJECTIVE  Pr session/findings; Family present    CURRENT GOALS   Goals to be met by: 2/13/19  Patient Goal Patient's self-stated goal is: Return to PLOF   Goal #1 Patient is able to demonstrate supine - sit EOB @ level: supervision      Goal #1   Current Status  CG/Min

## 2019-01-31 NOTE — PROGRESS NOTES
[facility]UDITH CROCKER [facility]    Progress Note      Assessment and Plan:   1. Severe left lower back pain with sciatica–the patient is in such excruciating pain that she cannot take a single step on the left leg.   She had an MRI of the back recently and the hepatosplenomegaly and no mass appreciable. Extremities without clubbing cyanosis nor edema. Neurologic grossly intact with symmetric tone and strength and reflex.   Skin without gross abnormality     Results:        Darren Ledesma MD  [employer]

## 2019-01-31 NOTE — OCCUPATIONAL THERAPY NOTE
OCCUPATIONAL THERAPY EVALUATION - INPATIENT     Room Number: 527/527-A  Evaluation Date: 1/31/2019  Type of Evaluation: Initial  Presenting Problem: (chronic L side low back pain, sciatica)    Physician Order: IP Consult to Occupational Therapy  Reason for disease. At end of session, pt up to chair with alarm set, PT in room with daughter as well. RN aware of findings.      The patient's Approx Degree of Impairment: 50.11% has been calculated based on documentation in the [facility] '6 clicks' Inpatient Daily Acti 10/16/2018    Performed by Jack Mancera MD at [facility]  97/0 per NG   • HYSTERECTOMY      Partial   • LAPAROSCOPIC CHOLECYSTECTOMY  05/27/16   • OTHER SURGICAL HISTORY      Left wrist trauma, surgical repair pleasant and cooperative    RANGE OF MOTION   Upper extremity ROM is within functional limits   Pt reports arthritis in B shoulders, slow ROM but able to reach back of head with compensatory movement as well    STRENGTH ASSESSMENT  Upper extremity strength Comment:     Patient will complete toileting with independence   Comment:     Patient will tolerate standing for 3-5 minutes in prep for adls at mod I level    Comment:    Patient will complete item retrieval at Mod I level   Comment:          Goals

## 2019-02-01 PROCEDURE — 99231 SBSQ HOSP IP/OBS SF/LOW 25: CPT | Performed by: INTERNAL MEDICINE

## 2019-02-01 NOTE — PROGRESS NOTES
[facility]  Inpatient Pain Management Progress Note      Patient name:  Raymond Perez 80year old female  : 1928  MRN: [id number]    Diagnosis: Chronic left-sided low back pain with left-sided sciatica  (primary encounter diagnosi salpingo-oophorectomy   • SYNVISC, INTRA-ARTICULAR Bilateral 2013    Synvisc injection bilateral knees   • TOTAL HIP REPLACEMENT      bilateral hip replacements   • YAG CAPSULOTOMY - OS - LEFT EYE Left 1998    Dr. Ginny Reyes     Family History   Problem Relat plan. Greater than 50% of the time was spent with counseling (nature of discussion centered around pain, therapy, and treatment options), face to face time, time spent reviewing data, obtaining patient information and discussing the care with the patients

## 2019-02-01 NOTE — PROGRESS NOTES
Pulmonary/Critical Care Follow Up Note    HPI:   Raymond Perez is a 80year old female with Patient presents with:  Back Pain (musculoskeletal)      PCP Sunny Man MD  Admission Attending Jone Bonds 15 Day #2    C/o pain  Severe pain service   Pt/OT   Rehab    2.  DVT prophylaxis  SCDs  Pharmacologic Px held (see prior notes)     3.  EOL  DNR              Caryle Parsons, MD

## 2019-02-02 VITALS
TEMPERATURE: 97 F | WEIGHT: 122.31 LBS | DIASTOLIC BLOOD PRESSURE: 70 MMHG | HEIGHT: 65 IN | RESPIRATION RATE: 18 BRPM | OXYGEN SATURATION: 99 % | SYSTOLIC BLOOD PRESSURE: 152 MMHG | HEART RATE: 93 BPM | BODY MASS INDEX: 20.38 KG/M2

## 2019-02-02 PROCEDURE — 99238 HOSP IP/OBS DSCHRG MGMT 30/<: CPT | Performed by: INTERNAL MEDICINE

## 2019-02-02 NOTE — PROGRESS NOTES
[facility]    Progress Note    María Elena Higginbotham Patient Status:  Inpatient    1928 MRN [id number]   Location [facility] 5SW/SE Attending Tye Vyas MD   Hosp Day # 3 PCP Edwin Stephens MD        Subjective:     Pascual Walsh documentation in H+P. Based on patients current state of illness, I anticipate that, after discharge, patient will require TBD. Rj Moreno.  Avi Oliva MD  2/2/2019

## 2019-02-02 NOTE — PLAN OF CARE
Problem: Patient Centered Care  Goal: Patient preferences are identified and integrated in the patient's plan of care  Interventions:  - What would you like us to know as we care for you?   - Provide timely, complete, and accurate information to patient/fa relaxation techniques  - Monitor for opioid side effects  - Notify MD/LIP if interventions unsuccessful or patient reports new pain  - Anticipate increased pain with activity and pre-medicate as appropriate  Outcome: Progressing  Patient denies pain, she s

## 2019-02-02 NOTE — CM/SW NOTE
Bed available at [facility] today (privat room per patient request)    CTL verified above with Noel Condons of [employer]    Patient agreeable for [facility] and costs    Per [employer]- requesting 2pm dc time    rn to rn report [phone]

## 2019-02-02 NOTE — DISCHARGE SUMMARY
[facility]    Discharge Summary    Jessica Shelton Patient Status:  Inpatient    1928 MRN [id number]   Location [facility] 5SW/SE Attending Mary Marie MD   Hosp Day # 3 PCP Kaitlin Bliss MD     Date of Admission: as needed for Pain. !! CELECOXIB 200 MG Oral Cap  TAKE 1 CAPSULE BY MOUTH DAILY AS NEEDED FOR PAIN    !! celecoxib 200 MG Oral Cap  Take 200 mg by mouth daily.       Zolpidem Tartrate 10 MG Oral Tab  Take 1 tablet (10 mg total) by mouth nightly as needed

## 2019-02-02 NOTE — PHYSICAL THERAPY NOTE
PHYSICAL THERAPY TREATMENT NOTE - INPATIENT     Room Number: 527/527-A       Presenting Problem: L side low back pain with sciatica    Problem List  Principal Problem:    Chronic left-sided low back pain with left-sided sciatica      PHYSICAL THERAPY RENÉ Poor +  Dynamic Standing: Poor -    ACTIVITY TOLERANCE  Pulse: 93  Heart Rate Source: Monitor  Resp: 18  BP: 152/70  BP Location: Right arm  BP Method: Automatic  Patient Position: Sitting    O2 WALK  SPO2 on Room Air at Rest: 100  SPO2 Ambulation on Room Status  CGA with RW   Goal #3 Patient is able to ambulate 25 feet with assist device: walker - rolling at assistance level: minimum assistance   Goal #3   Current Status  10ft with use of RW CGA  GOAL UPDATED:   Patient is able to ambulate 100 feet with as

## 2019-02-05 ENCOUNTER — INITIAL APN SNF VISIT (OUTPATIENT)
Dept: INTERNAL MEDICINE CLINIC | Facility: SKILLED NURSING FACILITY | Age: 84
End: 2019-02-05

## 2019-02-05 VITALS
HEART RATE: 78 BPM | TEMPERATURE: 98 F | WEIGHT: 125.81 LBS | DIASTOLIC BLOOD PRESSURE: 66 MMHG | OXYGEN SATURATION: 98 % | BODY MASS INDEX: 21 KG/M2 | RESPIRATION RATE: 20 BRPM | SYSTOLIC BLOOD PRESSURE: 141 MMHG

## 2019-02-05 DIAGNOSIS — R53.1 GENERALIZED WEAKNESS: ICD-10-CM

## 2019-02-05 DIAGNOSIS — M54.42 CHRONIC LEFT-SIDED LOW BACK PAIN WITH LEFT-SIDED SCIATICA: ICD-10-CM

## 2019-02-05 DIAGNOSIS — Z91.81 AT HIGH RISK FOR FALLS: ICD-10-CM

## 2019-02-05 DIAGNOSIS — G89.29 CHRONIC LEFT-SIDED LOW BACK PAIN WITH LEFT-SIDED SCIATICA: ICD-10-CM

## 2019-02-05 DIAGNOSIS — M12.9 ARTHROPATHY: ICD-10-CM

## 2019-02-05 DIAGNOSIS — R26.2 UNABLE TO AMBULATE: ICD-10-CM

## 2019-02-05 PROCEDURE — 99310 SBSQ NF CARE HIGH MDM 45: CPT | Performed by: NURSE PRACTITIONER

## 2019-02-05 NOTE — PROGRESS NOTES
Nadine Yost  : 1928  Age 80year old  female patient is admitted to [place] [address]   19 for rehab and strengthening     Chief complaint: Chronic left-sided low back pain with left-sided sciatica , unable to ambulate      [facility] admission :  CHOLANGIOPANCREATOGRAPHY (ERCP) N/A 10/16/2018    Performed by Acacia Akers MD at [facility]  97/0 per NG   • HYSTERECTOMY      Partial   • LAPAROSCOPIC CHOLECYSTECTOMY  05/27/16   • OTHER SURGICAL HISTORY      L SYSTEMS:  GENERAL HEALTH:feels well otherwise  SKIN: denies any unusual skin lesions or rashes  WOUNDS:none  EYES:no visual complaints or deficits  HENT: denies nasal congestion, sinus pain or sore throat;  RESPIRATORY: denies shortness of breath, wheezing sciatica and unable to ambulate    -recent MRI  -had been seen by pain clinic at outside hospital   -seen at [address] [place] [address] pain clinic- Dr. Casandra Jonesole , started on percocet   -PT/OT  -Physiatry consultation, to follow in rehab   -cont percocet 10mg /325mg q 4 hrs pr

## 2019-02-12 ENCOUNTER — TELEPHONE (OUTPATIENT)
Dept: PULMONOLOGY | Facility: CLINIC | Age: 84
End: 2019-02-12

## 2019-02-12 NOTE — TELEPHONE ENCOUNTER
Steph Oneill from [place] [address] home health would like to know if dr Deedee Hair would be ok with home health and would sign for episode once pt is home

## 2019-02-12 NOTE — TELEPHONE ENCOUNTER
Dr. Eliazar Ceballos from [facility] is giving courtesy call. States pt currently in rehab and estimated d/c time to home is in 2 weeks. Wants to know if you will sign for HHC at that time?

## 2019-02-13 ENCOUNTER — SNF VISIT (OUTPATIENT)
Dept: INTERNAL MEDICINE CLINIC | Facility: SKILLED NURSING FACILITY | Age: 84
End: 2019-02-13

## 2019-02-13 VITALS
RESPIRATION RATE: 18 BRPM | OXYGEN SATURATION: 98 % | SYSTOLIC BLOOD PRESSURE: 126 MMHG | DIASTOLIC BLOOD PRESSURE: 68 MMHG | WEIGHT: 124.38 LBS | BODY MASS INDEX: 21 KG/M2 | HEART RATE: 62 BPM

## 2019-02-13 DIAGNOSIS — G89.29 CHRONIC LEFT-SIDED LOW BACK PAIN WITH LEFT-SIDED SCIATICA: ICD-10-CM

## 2019-02-13 DIAGNOSIS — M54.42 CHRONIC LEFT-SIDED LOW BACK PAIN WITH LEFT-SIDED SCIATICA: ICD-10-CM

## 2019-02-13 PROCEDURE — 99308 SBSQ NF CARE LOW MDM 20: CPT | Performed by: NURSE PRACTITIONER

## 2019-02-13 NOTE — PROGRESS NOTES
Julianna Santos  : 1928  Age 80year old  female patient is admitted to [place] [address] for rehabilitation and strengthening     Admitted to [facility] on: 2019    Chief complaint: Chronic left-sided low back pain with left-sided sciati PROPIONATE 50 MCG/ACT Nasal Suspension USE 2 SPRAYS IN EACH NOSTRIL DAILY Disp: 16 g Rfl: 6   TRIAMTERENE-HCTZ 37.5-25 MG Oral Cap TAKE 1 CAPSULE BY MOUTH DAILY Disp: 90 capsule Rfl: 3       VITALS:  /68   Pulse 62   Resp 18   Wt 124 lb 6.4 oz (56.4 extremity  EXTREMITIES/VASCULAR:no cyanosis, clubbing or edema  NEUROLOGIC: follows commands  PSYCHIATRIC: alert and oriented x 3; affect appropriate       MEDICAL DECISION MAKING  Capable      SEE PLAN BELOW    1. Severe left lower back pain with sciatica

## 2019-02-13 NOTE — TELEPHONE ENCOUNTER
Doron Lanza informed Dr Miah Warren responded yes to signing for [address]. Doron Lanza verbalized understanding.

## 2019-02-15 ENCOUNTER — SNF VISIT (OUTPATIENT)
Dept: INTERNAL MEDICINE CLINIC | Facility: SKILLED NURSING FACILITY | Age: 84
End: 2019-02-15

## 2019-02-15 VITALS
SYSTOLIC BLOOD PRESSURE: 126 MMHG | HEART RATE: 62 BPM | BODY MASS INDEX: 21 KG/M2 | TEMPERATURE: 98 F | RESPIRATION RATE: 18 BRPM | DIASTOLIC BLOOD PRESSURE: 68 MMHG | WEIGHT: 124.38 LBS | OXYGEN SATURATION: 94 %

## 2019-02-15 VITALS
BODY MASS INDEX: 21 KG/M2 | SYSTOLIC BLOOD PRESSURE: 126 MMHG | OXYGEN SATURATION: 97 % | HEART RATE: 62 BPM | RESPIRATION RATE: 20 BRPM | WEIGHT: 124.38 LBS | DIASTOLIC BLOOD PRESSURE: 68 MMHG | TEMPERATURE: 98 F

## 2019-02-15 DIAGNOSIS — M54.42 CHRONIC LEFT-SIDED LOW BACK PAIN WITH LEFT-SIDED SCIATICA: Primary | ICD-10-CM

## 2019-02-15 DIAGNOSIS — G89.29 CHRONIC LEFT-SIDED LOW BACK PAIN WITH LEFT-SIDED SCIATICA: ICD-10-CM

## 2019-02-15 DIAGNOSIS — M12.9 ARTHROPATHY: ICD-10-CM

## 2019-02-15 DIAGNOSIS — M54.42 CHRONIC LEFT-SIDED LOW BACK PAIN WITH LEFT-SIDED SCIATICA: ICD-10-CM

## 2019-02-15 DIAGNOSIS — G89.29 CHRONIC LEFT-SIDED LOW BACK PAIN WITH LEFT-SIDED SCIATICA: Primary | ICD-10-CM

## 2019-02-15 DIAGNOSIS — R06.02 SHORTNESS OF BREATH: ICD-10-CM

## 2019-02-15 PROCEDURE — 99308 SBSQ NF CARE LOW MDM 20: CPT | Performed by: NURSE PRACTITIONER

## 2019-02-15 NOTE — PROGRESS NOTES
Sukumar Barber  : 1928  Age 80year old  female patient is admitted to [place] [address] for rehabilitation and strengthening     Admitted to [facility] on: 19 to 19    Chief complaint:  Chronic left-sided low back pain with le tablet Rfl: 4   FLUTICASONE PROPIONATE 50 MCG/ACT Nasal Suspension USE 2 SPRAYS IN EACH NOSTRIL DAILY Disp: 16 g Rfl: 6   TRIAMTERENE-HCTZ 37.5-25 MG Oral Cap TAKE 1 CAPSULE BY MOUTH DAILY Disp: 90 capsule Rfl: 3       VITALS:  /68   Pulse 62   Temp lymphedema  MUSCULOSKELETAL: no acute synovitis upper or lower extremity  EXTREMITIES/VASCULAR:no cyanosis, clubbing or edema  NEUROLOGIC: follows commands  PSYCHIATRIC: alert and oriented x 3; affect appropriate           SEE PLAN BELOW     1.Severe left

## 2019-02-18 ENCOUNTER — SNF VISIT (OUTPATIENT)
Dept: INTERNAL MEDICINE CLINIC | Facility: SKILLED NURSING FACILITY | Age: 84
End: 2019-02-18

## 2019-02-18 VITALS
DIASTOLIC BLOOD PRESSURE: 68 MMHG | WEIGHT: 124.81 LBS | BODY MASS INDEX: 21 KG/M2 | OXYGEN SATURATION: 96 % | RESPIRATION RATE: 18 BRPM | SYSTOLIC BLOOD PRESSURE: 126 MMHG | TEMPERATURE: 98 F | HEART RATE: 62 BPM

## 2019-02-18 DIAGNOSIS — G89.29 CHRONIC LEFT-SIDED LOW BACK PAIN WITH LEFT-SIDED SCIATICA: ICD-10-CM

## 2019-02-18 DIAGNOSIS — M54.42 CHRONIC LEFT-SIDED LOW BACK PAIN WITH LEFT-SIDED SCIATICA: ICD-10-CM

## 2019-02-18 PROCEDURE — 99308 SBSQ NF CARE LOW MDM 20: CPT | Performed by: NURSE PRACTITIONER

## 2019-02-18 NOTE — PROGRESS NOTES
David Johnson  : 1928  Age 80year old  female patient is admitted to [place] [address] for rehabilitation and strengthening    Admitted to [facility] on: 2019 - 2019    Chief complaint: Chronic left-sided low back pain with left- EACH NOSTRIL DAILY Disp: 16 g Rfl: 6   TRIAMTERENE-HCTZ 37.5-25 MG Oral Cap TAKE 1 CAPSULE BY MOUTH DAILY Disp: 90 capsule Rfl: 3       VITALS:  /68   Pulse 62   Temp 97.8 °F (36.6 °C)   Resp 18   Wt 124 lb 12.8 oz (56.6 kg)   SpO2 96%   BMI 20.77 kg extremity  EXTREMITIES/VASCULAR:no cyanosis, clubbing or edema  NEUROLOGIC: follows commands  PSYCHIATRIC: alert and oriented x 3; affect appropriate     MEDICAL DECISION MAKING  Capable    DIAGNOSTICS REVIEWED AT THIS VISIT:  [facility][note 3] records reviewed    SEE P

## 2019-02-19 ENCOUNTER — SNF VISIT (OUTPATIENT)
Dept: INTERNAL MEDICINE CLINIC | Facility: SKILLED NURSING FACILITY | Age: 84
End: 2019-02-19

## 2019-02-19 VITALS
RESPIRATION RATE: 18 BRPM | WEIGHT: 124.81 LBS | SYSTOLIC BLOOD PRESSURE: 126 MMHG | BODY MASS INDEX: 21 KG/M2 | HEART RATE: 62 BPM | DIASTOLIC BLOOD PRESSURE: 68 MMHG | TEMPERATURE: 98 F | OXYGEN SATURATION: 98 %

## 2019-02-19 DIAGNOSIS — M54.42 CHRONIC LEFT-SIDED LOW BACK PAIN WITH LEFT-SIDED SCIATICA: ICD-10-CM

## 2019-02-19 DIAGNOSIS — G89.29 CHRONIC LEFT-SIDED LOW BACK PAIN WITH LEFT-SIDED SCIATICA: ICD-10-CM

## 2019-02-19 PROCEDURE — 99308 SBSQ NF CARE LOW MDM 20: CPT | Performed by: NURSE PRACTITIONER

## 2019-02-19 NOTE — PROGRESS NOTES
Kaylen Ford  : 1928  Age 80year old  female patient is admitted to [place] [address] for rehabilitation and strengthening    Admitted to [facility] on: 2019 - 2019    Chief complaint: Chronic left-sided low back pain with left- FLUTICASONE PROPIONATE 50 MCG/ACT Nasal Suspension USE 2 SPRAYS IN EACH NOSTRIL DAILY Disp: 16 g Rfl: 6   TRIAMTERENE-HCTZ 37.5-25 MG Oral Cap TAKE 1 CAPSULE BY MOUTH DAILY Disp: 90 capsule Rfl: 3       VITALS:  /68   Pulse 62   Temp 97.8 °F (36.6 lymphedema  MUSCULOSKELETAL: no acute synovitis upper or lower extremity  EXTREMITIES/VASCULAR:no cyanosis, clubbing or edema  NEUROLOGIC: follows commands  PSYCHIATRIC: alert and oriented x 3; affect appropriate       MEDICAL DECISION MAKING  Capable    S

## 2019-02-21 ENCOUNTER — SNF DISCHARGE (OUTPATIENT)
Dept: INTERNAL MEDICINE CLINIC | Facility: SKILLED NURSING FACILITY | Age: 84
End: 2019-02-21

## 2019-02-21 VITALS
BODY MASS INDEX: 21 KG/M2 | TEMPERATURE: 98 F | SYSTOLIC BLOOD PRESSURE: 126 MMHG | DIASTOLIC BLOOD PRESSURE: 68 MMHG | WEIGHT: 124.81 LBS | HEART RATE: 62 BPM | RESPIRATION RATE: 18 BRPM

## 2019-02-21 DIAGNOSIS — R11.0 NAUSEA: ICD-10-CM

## 2019-02-21 DIAGNOSIS — R06.02 SHORTNESS OF BREATH: ICD-10-CM

## 2019-02-21 DIAGNOSIS — M54.32 SCIATICA OF LEFT SIDE: ICD-10-CM

## 2019-02-21 DIAGNOSIS — R52 PAIN: ICD-10-CM

## 2019-02-21 PROCEDURE — 99310 SBSQ NF CARE HIGH MDM 45: CPT | Performed by: NURSE PRACTITIONER

## 2019-02-21 NOTE — PROGRESS NOTES
Ean Zhang, 7/29/1928, 80year old, female is being discharged from [place] [address] to home ([facility]      DISCHARGE SUMMARY    Date of Admission to [place] [address]:    Date of Discharge from [facility] 6: 02/21/2019                          Adm Reconciliation Completed: Yes  All medications and side effects reviewed with patient      Follow Up Visits: Follow up with Dr Demetris Guevara as directed by him.  Your last visit with him was 02/18/219    Follow up with [address] [place] [address]

## 2019-02-28 ENCOUNTER — TELEPHONE (OUTPATIENT)
Dept: PULMONOLOGY | Facility: CLINIC | Age: 84
End: 2019-02-28

## 2019-02-28 NOTE — TELEPHONE ENCOUNTER
Spoke with pt. States she has been taking [place] for years. She takes 10mg every night. Noticed about 1 week ago it has decreased effectiveness. Has trouble falling asleep and maintaining sleep. Sleeps 3-4 hours at a time.   Recently hospitalized and i

## 2019-02-28 NOTE — TELEPHONE ENCOUNTER
Pt states she has been taking [place] and states it has not been working for her. Please call thank you.

## 2019-03-22 ENCOUNTER — TELEPHONE (OUTPATIENT)
Dept: PULMONOLOGY | Facility: CLINIC | Age: 84
End: 2019-03-22

## 2019-03-22 NOTE — TELEPHONE ENCOUNTER
Katty Sellers states pt is getting more weakness in left leg - from back of leg to knee. Requesting orders for re-evaluation for physical therapy. PLs call - aware [facility] is not in office today. Thank you.

## 2019-04-16 ENCOUNTER — OFFICE VISIT (OUTPATIENT)
Dept: PULMONOLOGY | Facility: CLINIC | Age: 84
End: 2019-04-16
Payer: MEDICARE

## 2019-04-16 VITALS
HEIGHT: 65 IN | HEART RATE: 90 BPM | OXYGEN SATURATION: 98 % | WEIGHT: 120 LBS | SYSTOLIC BLOOD PRESSURE: 116 MMHG | DIASTOLIC BLOOD PRESSURE: 72 MMHG | BODY MASS INDEX: 19.99 KG/M2

## 2019-04-16 DIAGNOSIS — M54.42 CHRONIC LEFT-SIDED LOW BACK PAIN WITH LEFT-SIDED SCIATICA: Primary | ICD-10-CM

## 2019-04-16 DIAGNOSIS — G89.29 CHRONIC LEFT-SIDED LOW BACK PAIN WITH LEFT-SIDED SCIATICA: Primary | ICD-10-CM

## 2019-04-16 PROCEDURE — 99213 OFFICE O/P EST LOW 20 MIN: CPT | Performed by: INTERNAL MEDICINE

## 2019-04-16 PROCEDURE — G0463 HOSPITAL OUTPT CLINIC VISIT: HCPCS | Performed by: INTERNAL MEDICINE

## 2019-04-16 NOTE — PROGRESS NOTES
The patient is a 5year-old female who I know well from prior evaluation of comes in now for follow-up. She looks great. She notes that in general she is much better. She has had chronic back discomforts with sciatica.   She is stabilizing at this junctu

## 2019-04-18 ENCOUNTER — OFFICE VISIT (OUTPATIENT)
Dept: DERMATOLOGY CLINIC | Facility: CLINIC | Age: 84
End: 2019-04-18
Payer: MEDICARE

## 2019-04-18 DIAGNOSIS — H61.001 CHONDRODERMATITIS NODULARIS HELICIS OF RIGHT EAR: Primary | ICD-10-CM

## 2019-04-18 PROCEDURE — 99213 OFFICE O/P EST LOW 20 MIN: CPT | Performed by: DERMATOLOGY

## 2019-04-18 PROCEDURE — G0463 HOSPITAL OUTPT CLINIC VISIT: HCPCS | Performed by: DERMATOLOGY

## 2019-04-29 NOTE — PROGRESS NOTES
Pierce Sanchez is a 80year old female. HPI:     CC:  Patient presents with:  Upper Body Exam: LOV 6/2018 with LSS.  Pt requesting upper body exam. Concerned with scaly, dry lesion on right ear that was treated with cryotheapy that returned and raised le History   Problem Relation Age of Onset   • Ear Problems Mother         hearing loss   • Other (Other) Father         emphysema   • Diabetes Neg    • Glaucoma Neg    • Macular degeneration Neg       Social History    Tobacco Use      Smoking status: Never CATARACT EXTRACTION W/  INTRAOCULAR LENS IMPLANT Right 1998     IOL / Dr. Capo Lieberman   • CHOLECYSTECTOMY     • COLONOSCOPY     • ENDOSCOPIC RETROGRADE CHOLANGIOPANCREATOGRAPHY (ERCP) N/A 10/16/2018    Performed by Jenifer Chu MD at [place] [address] Fear of current or ex partner: Not on file        Emotionally abused: Not on file        Physically abused: Not on file        Forced sexual activity: Not on file    Other Topics      Concerns:         Service: Not Asked        Blood Transfusion concerns above. Patient has been in their usual state of health. History, medications, allergies reviewed as noted. ROS:  Denies any other systemic complaints. No new or changeing lesions other than noted above.  No fevers, chills, night sweats, u no active AK's. Encourage sun protection. Multiple other benign keratoses on back no suspicious lesions noted  Please refer to map for specific lesions. See additional diagnoses. Pros cons of various therapies, risks benefits discussed. Pathophysiol

## 2019-08-02 RX ORDER — ZOLPIDEM TARTRATE 10 MG/1
TABLET ORAL
Qty: 90 TABLET | Refills: 4 | Status: SHIPPED | OUTPATIENT
Start: 2019-08-02 | End: 2020-04-14

## 2019-08-26 RX ORDER — TRIAMTERENE AND HYDROCHLOROTHIAZIDE 37.5; 25 MG/1; MG/1
CAPSULE ORAL
Qty: 90 CAPSULE | Refills: 3 | Status: SHIPPED | OUTPATIENT
Start: 2019-08-26 | End: 2020-08-26

## 2019-08-26 RX ORDER — CELECOXIB 200 MG/1
CAPSULE ORAL
Qty: 90 CAPSULE | Refills: 1 | Status: SHIPPED | OUTPATIENT
Start: 2019-08-26 | End: 2020-02-25

## 2019-08-26 NOTE — TELEPHONE ENCOUNTER
Last seen 4-16-19   Last refill 12-27-18 & 8-13-18   Route to [facility] for sign off.

## 2019-09-03 ENCOUNTER — TELEPHONE (OUTPATIENT)
Dept: PULMONOLOGY | Facility: CLINIC | Age: 84
End: 2019-09-03

## 2019-09-03 DIAGNOSIS — R19.7 DIARRHEA, UNSPECIFIED TYPE: Primary | ICD-10-CM

## 2019-09-03 DIAGNOSIS — R19.5 LOOSE STOOLS: ICD-10-CM

## 2019-09-03 NOTE — TELEPHONE ENCOUNTER
Notified pt of Dr. Jessy Jorge orders. Explained GI Stool Panel no longer avail outpt & she will have to p/u collection container @ lab. [facility] hours given. She voiced understanding. BRAYAN- GREGORIOI.  Pls see copy of GI Panel/C-Difficile Testing Updates e-mail left in yo

## 2019-09-03 NOTE — TELEPHONE ENCOUNTER
RN, please send GI stool panel as well as C. difficile PCR, encourage patient to drink plenty of fluids and can do bananas rice and toast diet. Call us in 2 days with an update.

## 2019-09-03 NOTE — TELEPHONE ENCOUNTER
Pt c/o intermittent bouts (mostly every day, but sometimes skips a day) of diarrhea & loose stool x 3 wks. She stts she has bloating & gas. Per pt no other sx/blood.  She stts she has been very careful w/ what she is eating & there is nothing new w/ her die

## 2019-09-03 NOTE — TELEPHONE ENCOUNTER
RN, then just send the C. difficile and suggest that she follow-up with her gastroenterologist.  She had been following with Dr. Maya Marcano in the past although Dr. Hari Marcial did her ERCP.

## 2019-09-04 ENCOUNTER — APPOINTMENT (OUTPATIENT)
Dept: LAB | Facility: HOSPITAL | Age: 84
End: 2019-09-04
Attending: INTERNAL MEDICINE
Payer: MEDICARE

## 2019-09-04 LAB — C DIFF TOX B STL QL: NEGATIVE

## 2019-09-04 PROCEDURE — 87493 C DIFF AMPLIFIED PROBE: CPT | Performed by: INTERNAL MEDICINE

## 2019-09-05 NOTE — TELEPHONE ENCOUNTER
Encounter was closed out by MD, so RN never rcvd below rx(s) from Dr. Abimael Friend until today when msg was routed again. Pt was informed of Dr. Paris Howard orders below. No improvement in sx per pt.  Explained will inform MD, ask he review results of C. difficile, &

## 2019-10-14 RX ORDER — FLUTICASONE PROPIONATE 50 MCG
SPRAY, SUSPENSION (ML) NASAL
Qty: 16 G | Refills: 6 | OUTPATIENT
Start: 2019-10-14

## 2020-01-14 ENCOUNTER — TELEPHONE (OUTPATIENT)
Dept: PULMONOLOGY | Facility: CLINIC | Age: 85
End: 2020-01-14

## 2020-01-14 RX ORDER — AZITHROMYCIN 250 MG/1
TABLET, FILM COATED ORAL
Qty: 1 PACKAGE | Refills: 0 | Status: SHIPPED | OUTPATIENT
Start: 2020-01-14

## 2020-01-14 RX ORDER — FLUTICASONE PROPIONATE 50 MCG
SPRAY, SUSPENSION (ML) NASAL
Qty: 16 G | Refills: 6 | OUTPATIENT
Start: 2020-01-14

## 2020-01-14 NOTE — TELEPHONE ENCOUNTER
Rn informed patient need to return to the office for refill as last visit was on 3/15/18,pt verbalized understanding,offered to schedule but stated pt will call.

## 2020-01-14 NOTE — TELEPHONE ENCOUNTER
Pt informed of Dr. Leonardo Conte message/orders below. Pt informed if symptoms worsen to go to ED for evaluation. Pt verbalized understanding.

## 2020-01-14 NOTE — TELEPHONE ENCOUNTER
pt states that she has a cold since Friday and having headaches, scratchy, eyes, stiff neck, runny nose, cough and very achy. No Fever. Pt is requesting to speak to the nurse.

## 2020-01-14 NOTE — TELEPHONE ENCOUNTER
Pt c/o productive cough light green sputum, a little SOB with exertion (states not bad), chest congestion/tightness, body/muscle aches, HA (comes and goes), scratchy/watery eyes, runny nose, nasal congestion, stiff neck (comes and goes) for 4 days.   Pt den last six months

## 2020-01-15 NOTE — TELEPHONE ENCOUNTER
Called pt to let her know they do have script, they said pt gets scripts delivered, and  was not in yesterday, he is in today. So pt should receive today or tomorrow.

## 2020-01-23 ENCOUNTER — OFFICE VISIT (OUTPATIENT)
Dept: PULMONOLOGY | Facility: CLINIC | Age: 85
End: 2020-01-23
Payer: MEDICARE

## 2020-01-23 VITALS
RESPIRATION RATE: 18 BRPM | BODY MASS INDEX: 19.83 KG/M2 | SYSTOLIC BLOOD PRESSURE: 147 MMHG | DIASTOLIC BLOOD PRESSURE: 85 MMHG | OXYGEN SATURATION: 100 % | HEIGHT: 65 IN | WEIGHT: 119 LBS | HEART RATE: 100 BPM

## 2020-01-23 DIAGNOSIS — R06.02 SHORTNESS OF BREATH: Primary | ICD-10-CM

## 2020-01-23 PROCEDURE — 99213 OFFICE O/P EST LOW 20 MIN: CPT | Performed by: INTERNAL MEDICINE

## 2020-01-23 PROCEDURE — G0463 HOSPITAL OUTPT CLINIC VISIT: HCPCS | Performed by: INTERNAL MEDICINE

## 2020-02-25 RX ORDER — CELECOXIB 200 MG/1
CAPSULE ORAL
Qty: 90 CAPSULE | Refills: 0 | Status: SHIPPED | OUTPATIENT
Start: 2020-02-25 | End: 2020-07-30

## 2020-02-25 NOTE — TELEPHONE ENCOUNTER
Last seen 1-23-19   Last refill 8-26-19  Routed to [facility] for sign off

## 2020-04-14 RX ORDER — ZOLPIDEM TARTRATE 10 MG/1
TABLET ORAL
Qty: 90 TABLET | Refills: 0 | Status: SHIPPED | OUTPATIENT
Start: 2020-04-14 | End: 2020-07-14

## 2020-04-14 NOTE — TELEPHONE ENCOUNTER
Zolpidem Tartrate 10 mg rx request. Please review and sign off if appropriate. Thank you.     Last Office Visit: 1/23/2020  Last Refill: 8/2/2019

## 2020-06-09 ENCOUNTER — OFFICE VISIT (OUTPATIENT)
Dept: OPTOMETRY | Facility: CLINIC | Age: 85
End: 2020-06-09
Payer: MEDICARE

## 2020-06-09 DIAGNOSIS — Z96.1 PSEUDOPHAKIA OF BOTH EYES: ICD-10-CM

## 2020-06-09 DIAGNOSIS — H35.89 RPE MOTTLING OF MACULA: Primary | ICD-10-CM

## 2020-06-09 DIAGNOSIS — H52.4 PRESBYOPIA: ICD-10-CM

## 2020-06-09 PROCEDURE — 92015 DETERMINE REFRACTIVE STATE: CPT | Performed by: OPTOMETRIST

## 2020-06-09 PROCEDURE — 92014 COMPRE OPH EXAM EST PT 1/>: CPT | Performed by: OPTOMETRIST

## 2020-06-09 RX ORDER — ZOLPIDEM TARTRATE 10 MG/1
TABLET ORAL
COMMUNITY
Start: 2020-04-14 | End: 2020-06-09

## 2020-06-09 RX ORDER — CELECOXIB 200 MG/1
CAPSULE ORAL
COMMUNITY
Start: 2020-05-19

## 2020-06-09 NOTE — PROGRESS NOTES
Ean Zhang is a 80year old female. HPI:     HPI     Patient is in for an annual eye exam. She states that her vision is blurry at times at distance---on and off. Works a lot on her I-pad and her vision blurs out then occasionally. She never filled • Glaucoma Neg    • Macular degeneration Neg        Social History: Social History    Tobacco Use      Smoking status: Never Smoker      Smokeless tobacco: Never Used    Alcohol use: No      Alcohol/week: 0.0 standard drinks      Frequency: Never    Drug Neuro/Psych     Oriented x3:  Yes    Mood/Affect:  Normal          Dilation     Both eyes:  1.0% Mydriacyl and 2.5% Felipe Synephrine @ 11:27 AM            Additional Tests     Amsler       Right Left     Normal Normal            Slit Lamp and Fundus Exam ordered in this encounter        Follow up instructions:  Return in about 1 year (around 6/9/2021) for Eye Exam.    6/9/2020  Scribed by: Tiffani Cross

## 2020-06-09 NOTE — ASSESSMENT & PLAN NOTE
No treatment is required. Will continue to observe. Patient will continue her eye vitamins and wear sunglasses when out doors.

## 2020-06-09 NOTE — PATIENT INSTRUCTIONS
Pseudophakia of both eyes  No treatment is required. Will continue to observe. RPE mottling of macula, right eye  No treatment is required. Will continue to observe. Patient will continue her eye vitamins and wear sunglasses when out doors.     Louis Mccabe

## 2020-07-13 ENCOUNTER — TELEPHONE (OUTPATIENT)
Dept: PULMONOLOGY | Facility: CLINIC | Age: 85
End: 2020-07-13

## 2020-07-13 DIAGNOSIS — M25.471 SWELLING OF ANKLE, RIGHT: ICD-10-CM

## 2020-07-13 DIAGNOSIS — M79.671 FOOT PAIN, RIGHT: Primary | ICD-10-CM

## 2020-07-13 DIAGNOSIS — M79.89 SWELLING OF RIGHT FOOT: ICD-10-CM

## 2020-07-13 NOTE — TELEPHONE ENCOUNTER
Sent call to RN - Patient thinks she has a stress fracture on right foot - can walk but very painful. Patient has been taking Tylenol and icing foot for the past week. PLease call. Thank you.

## 2020-07-13 NOTE — TELEPHONE ENCOUNTER
Notified pt of Dr. Digna Rubio orders below. Explained she will have to schedule x-ray through Centralized Scheduling & appt w/ Podiatry. Phone #s for [phone] [employer] were given.  Instructed pt not to exceed 6 capsules of Tylenol Extra Strengt

## 2020-07-13 NOTE — TELEPHONE ENCOUNTER
Per pt couple years ago she had stress fracture of R foot & her R foot feels the same way as it did then. She stts initially she had sharp stabbing pain of R foot 1.5 wks ago & this resolved.  Elba stts R foot where arch is hurts (pain 6-7/10) when she step

## 2020-07-14 ENCOUNTER — TELEPHONE (OUTPATIENT)
Dept: PULMONOLOGY | Facility: CLINIC | Age: 85
End: 2020-07-14

## 2020-07-14 ENCOUNTER — HOSPITAL ENCOUNTER (OUTPATIENT)
Dept: GENERAL RADIOLOGY | Facility: HOSPITAL | Age: 85
Discharge: HOME | End: 2020-07-14
Attending: INTERNAL MEDICINE
Payer: MEDICARE

## 2020-07-14 DIAGNOSIS — M25.471 SWELLING OF ANKLE, RIGHT: ICD-10-CM

## 2020-07-14 DIAGNOSIS — M79.671 FOOT PAIN, RIGHT: ICD-10-CM

## 2020-07-14 PROCEDURE — 73630 X-RAY EXAM OF FOOT: CPT | Performed by: INTERNAL MEDICINE

## 2020-07-14 RX ORDER — ZOLPIDEM TARTRATE 10 MG/1
TABLET ORAL
Qty: 90 TABLET | Refills: 0 | Status: SHIPPED | OUTPATIENT
Start: 2020-07-14 | End: 2020-10-13

## 2020-07-16 RX ORDER — ZOLPIDEM TARTRATE 10 MG/1
TABLET ORAL
Qty: 90 TABLET | Refills: 0 | OUTPATIENT
Start: 2020-07-16

## 2020-07-16 NOTE — TELEPHONE ENCOUNTER
Rx was approved by Dr. Kristel Waite on 7/14/2020 Juan Mendez  Rx called in to [address] [facility] [address] today. Spoke with Riky Souza, gave verbal order to refill script.    Pt notified on MyChart

## 2020-07-27 ENCOUNTER — TELEPHONE (OUTPATIENT)
Dept: PULMONOLOGY | Facility: CLINIC | Age: 85
End: 2020-07-27

## 2020-07-27 NOTE — TELEPHONE ENCOUNTER
Zolpidem Tartrate 10 mg oral tab script faxed to [facility]  Fax confirmation received.

## 2020-07-28 NOTE — TELEPHONE ENCOUNTER
Recommend Bilateral upper lid blepharoplasty. Medicare (discussed risks and benefits of sx. .. ). That strategy sounds good to me

## 2020-07-30 ENCOUNTER — TELEPHONE (OUTPATIENT)
Dept: NEPHROLOGY | Facility: CLINIC | Age: 85
End: 2020-07-30

## 2020-07-30 RX ORDER — CELECOXIB 200 MG/1
CAPSULE ORAL
Qty: 90 CAPSULE | Refills: 1 | Status: SHIPPED | OUTPATIENT
Start: 2020-07-30 | End: 2020-12-22

## 2020-07-30 NOTE — TELEPHONE ENCOUNTER
Pt states she needs a refill on her pres celecoxib 200 MG Oral Cap and she has been taking 2 a day.  Please advise

## 2020-08-20 ENCOUNTER — HOSPITAL ENCOUNTER (OUTPATIENT)
Dept: GENERAL RADIOLOGY | Facility: HOSPITAL | Age: 85
Discharge: HOME | End: 2020-08-20
Attending: PODIATRIST
Payer: MEDICARE

## 2020-08-20 DIAGNOSIS — R60.9 SWELLING: ICD-10-CM

## 2020-08-20 PROCEDURE — 73610 X-RAY EXAM OF ANKLE: CPT | Performed by: PODIATRIST

## 2020-08-26 RX ORDER — TRIAMTERENE AND HYDROCHLOROTHIAZIDE 37.5; 25 MG/1; MG/1
CAPSULE ORAL
Qty: 90 CAPSULE | Refills: 2 | Status: SHIPPED | OUTPATIENT
Start: 2020-08-26 | End: 2021-09-10

## 2020-08-26 NOTE — TELEPHONE ENCOUNTER
Dr Santoyo Roll- please advise on Triamterene-hctz refill.   LOV 01/23/20  Last refill: 08/26/19  RTC- 01/23/21

## 2020-10-12 RX ORDER — FLUTICASONE PROPIONATE 50 MCG
SPRAY, SUSPENSION (ML) NASAL
Qty: 16 G | Refills: 5 | Status: SHIPPED | OUTPATIENT
Start: 2020-10-12

## 2020-10-12 NOTE — PROGRESS NOTES
[facility]  Inpatient Pain Management Progress Note      Patient name:  Evelia Negro 80year old female  : 1928  MRN: [id number]    Diagnosis: Chronic left-sided low back pain with left-sided sciatica  (primary encounter diagnosi with bilateral salpingo-oophorectomy   • SYNVISC, INTRA-ARTICULAR Bilateral 2013    Synvisc injection bilateral knees   • TOTAL HIP REPLACEMENT      bilateral hip replacements   • YAG CAPSULOTOMY - OS - Gretchen Fee    Dr. Ezequiel Wells     Family History spent with counseling (nature of discussion centered around pain, therapy, and treatment options), face to face time, time spent reviewing data, obtaining patient information and discussing the care with the patients health care providers.      Chronic Pain yes

## 2020-10-12 NOTE — TELEPHONE ENCOUNTER
Last office visit 01/23/2020  Last refill -09/12/2018  Please advise last refill was done by .   Routed to Dr. Miah Warren for sign off

## 2020-10-13 RX ORDER — ZOLPIDEM TARTRATE 10 MG/1
TABLET ORAL
Qty: 90 TABLET | Refills: 0 | Status: SHIPPED | OUTPATIENT
Start: 2020-10-13 | End: 2021-01-06

## 2020-11-02 ENCOUNTER — TELEPHONE (OUTPATIENT)
Dept: PULMONOLOGY | Facility: CLINIC | Age: 85
End: 2020-11-02

## 2020-11-02 NOTE — TELEPHONE ENCOUNTER
Patient called in to advise that has tested positive for Covid-19 and was informed to let her Dr. Melissa Clifford this information. Patient states she only has a stuffy nose.  Please follow up thank you

## 2020-11-02 NOTE — TELEPHONE ENCOUNTER
Spoke with patient regarding message below. Patient states she took COVID test on Friday and found out on Sunday that she is COVID positive. She reports the only symptom is a stuffy nose that she has had for a week. Reminded patient to quarantine. Patient verbalized understanding.     Dr. Holland Andrade - [facility] [place]

## 2020-12-21 NOTE — TELEPHONE ENCOUNTER
Patient requesting script refill for rx:Celecoxib,patient indicates she takes 2 capsules daily  (not 1) please call patient at:[phone],thanks.   [facility]    Current Outpatient Medications:   •  CELECOXIB 200 MG Oral Cap, TAKE 1 CAPSULE

## 2020-12-22 RX ORDER — CELECOXIB 200 MG/1
200 CAPSULE ORAL DAILY PRN
Qty: 90 CAPSULE | Refills: 1 | Status: SHIPPED | OUTPATIENT
Start: 2020-12-22 | End: 2021-07-12

## 2020-12-22 NOTE — TELEPHONE ENCOUNTER
Dr Doris Barton- please see message and advise sig currently states to take once daily, pt states she is taking twice daily? Is that okay?

## 2020-12-23 RX ORDER — CELECOXIB 200 MG/1
CAPSULE ORAL
Qty: 90 CAPSULE | Refills: 1 | OUTPATIENT
Start: 2020-12-23

## 2020-12-29 ENCOUNTER — TELEPHONE (OUTPATIENT)
Dept: PULMONOLOGY | Facility: CLINIC | Age: 85
End: 2020-12-29

## 2020-12-29 NOTE — TELEPHONE ENCOUNTER
Current Outpatient Medications   Medication Sig Dispense Refill   • celecoxib 200 MG Oral Cap Take 1 capsule (200 mg total) by mouth daily as needed for Pain.  90 capsule 1       Per pharmacy- clarify directions- pt st they have changed

## 2020-12-30 NOTE — TELEPHONE ENCOUNTER
Stts she increased celecoxib 200 mg qd to bid after speaking w/ Dr. Salvatore Rogers (Podiatrist).  Per pt she was still having a lot of arthritis pain w/ celecoxib 200 mg qd, taking celecoxib 200 mg bid is helping, & she had previously contacted our office to let us

## 2020-12-31 NOTE — TELEPHONE ENCOUNTER
RN, I am okay with her taking it twice a day if she needs it. Go back to once a day if she is doing fine.

## 2021-01-06 NOTE — TELEPHONE ENCOUNTER
LOV 1/23/20  LR 10/13/20  Pt aware request for refill of Zolpidem Tartrate will be sent to MD for sign off. Confirmed w/ pt that [facility] Dr. Isabel Zuniga- jeanine sign rx if agreeable.

## 2021-01-07 RX ORDER — ZOLPIDEM TARTRATE 10 MG/1
TABLET ORAL
Qty: 90 TABLET | Refills: 1 | Status: SHIPPED | OUTPATIENT
Start: 2021-01-07

## 2021-02-11 ENCOUNTER — HOSPITAL ENCOUNTER (EMERGENCY)
Facility: HOSPITAL | Age: 86
Discharge: HOME | End: 2021-02-11
Attending: EMERGENCY MEDICINE
Payer: MEDICARE

## 2021-02-11 VITALS
DIASTOLIC BLOOD PRESSURE: 86 MMHG | SYSTOLIC BLOOD PRESSURE: 197 MMHG | BODY MASS INDEX: 21.69 KG/M2 | TEMPERATURE: 96 F | HEIGHT: 66 IN | RESPIRATION RATE: 12 BRPM | WEIGHT: 135 LBS | HEART RATE: 70 BPM | OXYGEN SATURATION: 92 %

## 2021-02-11 DIAGNOSIS — I10 ESSENTIAL HYPERTENSION: ICD-10-CM

## 2021-02-11 DIAGNOSIS — E87.1 HYPONATREMIA: Primary | ICD-10-CM

## 2021-02-11 LAB
ANION GAP SERPL CALC-SCNC: 6 MMOL/L (ref 0–18)
BASOPHILS # BLD AUTO: 0.03 X10(3) UL (ref 0–0.2)
BASOPHILS NFR BLD AUTO: 0.4 %
BUN BLD-MCNC: 33 MG/DL (ref 7–18)
BUN/CREAT SERPL: 29.5 (ref 10–20)
CALCIUM BLD-MCNC: 9.4 MG/DL (ref 8.5–10.1)
CHLORIDE SERPL-SCNC: 95 MMOL/L (ref 98–112)
CO2 SERPL-SCNC: 26 MMOL/L (ref 21–32)
CREAT BLD-MCNC: 1.12 MG/DL
DEPRECATED RDW RBC AUTO: 47.6 FL (ref 35.1–46.3)
EOSINOPHIL # BLD AUTO: 0.08 X10(3) UL (ref 0–0.7)
EOSINOPHIL NFR BLD AUTO: 1.1 %
ERYTHROCYTE [DISTWIDTH] IN BLOOD BY AUTOMATED COUNT: 14.2 % (ref 11–15)
GLUCOSE BLD-MCNC: 102 MG/DL (ref 70–99)
HCT VFR BLD AUTO: 35.4 %
HGB BLD-MCNC: 11.7 G/DL
IMM GRANULOCYTES # BLD AUTO: 0.03 X10(3) UL (ref 0–1)
IMM GRANULOCYTES NFR BLD: 0.4 %
LYMPHOCYTES # BLD AUTO: 1.88 X10(3) UL (ref 1–4)
LYMPHOCYTES NFR BLD AUTO: 24.7 %
MCH RBC QN AUTO: 30.2 PG (ref 26–34)
MCHC RBC AUTO-ENTMCNC: 33.1 G/DL (ref 31–37)
MCV RBC AUTO: 91.5 FL
MONOCYTES # BLD AUTO: 0.86 X10(3) UL (ref 0.1–1)
MONOCYTES NFR BLD AUTO: 11.3 %
NEUTROPHILS # BLD AUTO: 4.73 X10 (3) UL (ref 1.5–7.7)
NEUTROPHILS # BLD AUTO: 4.73 X10(3) UL (ref 1.5–7.7)
NEUTROPHILS NFR BLD AUTO: 62.1 %
OSMOLALITY SERPL CALC.SUM OF ELEC: 271 MOSM/KG (ref 275–295)
PLATELET # BLD AUTO: 292 10(3)UL (ref 150–450)
POTASSIUM SERPL-SCNC: 4.6 MMOL/L (ref 3.5–5.1)
RBC # BLD AUTO: 3.87 X10(6)UL
SODIUM SERPL-SCNC: 127 MMOL/L (ref 136–145)
WBC # BLD AUTO: 7.6 X10(3) UL (ref 4–11)

## 2021-02-11 PROCEDURE — 36415 COLL VENOUS BLD VENIPUNCTURE: CPT

## 2021-02-11 PROCEDURE — 93005 ELECTROCARDIOGRAM TRACING: CPT

## 2021-02-11 PROCEDURE — 80048 BASIC METABOLIC PNL TOTAL CA: CPT | Performed by: EMERGENCY MEDICINE

## 2021-02-11 PROCEDURE — 93010 ELECTROCARDIOGRAM REPORT: CPT | Performed by: EMERGENCY MEDICINE

## 2021-02-11 PROCEDURE — 99284 EMERGENCY DEPT VISIT MOD MDM: CPT

## 2021-02-11 PROCEDURE — 85025 COMPLETE CBC W/AUTO DIFF WBC: CPT | Performed by: EMERGENCY MEDICINE

## 2021-02-11 RX ORDER — AMLODIPINE BESYLATE 10 MG/1
10 TABLET ORAL ONCE
Status: COMPLETED | OUTPATIENT
Start: 2021-02-11 | End: 2021-02-11

## 2021-02-11 RX ORDER — AMLODIPINE BESYLATE 10 MG/1
10 TABLET ORAL DAILY
Qty: 30 TABLET | Refills: 0 | Status: SHIPPED | OUTPATIENT
Start: 2021-02-11 | End: 2021-06-07

## 2021-02-11 RX ORDER — SIMVASTATIN 20 MG
20 TABLET ORAL NIGHTLY
COMMUNITY

## 2021-02-11 NOTE — ED PROVIDER NOTES
Patient Seen in: [facility] Emergency Department      History   Patient presents with:  Hypertension    Stated Complaint: hypertension    HPI/Subjective:   HPI    80-year-old female with past medical history significant for hypertension, arthritis SURGERY  97/0 per NG   • HYSTERECTOMY      Partial   • LAPAROSCOPIC CHOLECYSTECTOMY  05/27/16   • OTHER SURGICAL HISTORY      Left wrist trauma, surgical repair   • OTHER SURGICAL HISTORY  05/27/16    Laparoscopy with bilateral salpingo-oophorectomy   • SY warm and dry. No rash noted. No erythema.    Psychiatric:    ED Course     Labs Reviewed   BASIC METABOLIC PANEL (8) - Abnormal; Notable for the following components:       Result Value    Glucose 102 (*)     Sodium 127 (*)     Chloride 95 (*)     BUN 33 (* [address] [facility] [address]  [phone]    Schedule an appointment as soon as possible for a visit in 2 weeks      We recommend that you schedule follow up care with a primary care provider within the next three months to obtain basic health screening includ

## 2021-02-11 NOTE — ED INITIAL ASSESSMENT (HPI)
Pt arrived per EMS. Pt was at the pain clinic getting a pain shot for her knee. Pt was found to have high blood pressure, sys 200's. Denies vision changes, dizziness, pain.

## 2021-02-15 ENCOUNTER — TELEPHONE (OUTPATIENT)
Dept: PULMONOLOGY | Facility: CLINIC | Age: 86
End: 2021-02-15

## 2021-02-15 NOTE — TELEPHONE ENCOUNTER
RN, encouraged the patient to keep taking what she is taking by the new regime with the addition of amlodipine. Have her continue to monitor the blood pressures at home and bring in the results when she sees me next week.

## 2021-02-15 NOTE — TELEPHONE ENCOUNTER
Noted. Spoke to patient and informed her of Dr. Delmy Downey message below. Patient verbalized understanding. She also stated she is still feeling well.

## 2021-02-15 NOTE — TELEPHONE ENCOUNTER
Spoke to patient regarding message below. Patient was seen in the ED on 2/11/21 r/t high blood pressure readings.   While in ED medications changes made: hydrochlorothiazide 3x weekly (M-W-F) amlodipine 10 mg DAILY    Patient's blood pressure has still bee

## 2021-02-22 ENCOUNTER — OFFICE VISIT (OUTPATIENT)
Dept: PULMONOLOGY | Facility: CLINIC | Age: 86
End: 2021-02-22
Payer: MEDICARE

## 2021-02-22 VITALS
RESPIRATION RATE: 18 BRPM | OXYGEN SATURATION: 97 % | BODY MASS INDEX: 22.37 KG/M2 | HEIGHT: 66 IN | HEART RATE: 83 BPM | WEIGHT: 139.19 LBS | TEMPERATURE: 98 F | DIASTOLIC BLOOD PRESSURE: 88 MMHG | SYSTOLIC BLOOD PRESSURE: 153 MMHG

## 2021-02-22 DIAGNOSIS — I10 ESSENTIAL HYPERTENSION: Primary | ICD-10-CM

## 2021-02-22 PROCEDURE — 99214 OFFICE O/P EST MOD 30 MIN: CPT | Performed by: INTERNAL MEDICINE

## 2021-02-22 RX ORDER — AMLODIPINE BESYLATE 10 MG/1
5 TABLET ORAL DAILY
Qty: 30 TABLET | Refills: 6 | Status: SHIPPED | OUTPATIENT
Start: 2021-02-22 | End: 2021-06-07

## 2021-02-22 NOTE — PROGRESS NOTES
The patient is a 80-year-old who Mario from prior evaluation comes in now to follow-up hypertension.   I recently instructed the emergency room to begin amlodipine 10 mg p.o. daily and cut back the Dyazide to 1 tablet 3 times weekly as she was found to

## 2021-04-12 ENCOUNTER — TELEPHONE (OUTPATIENT)
Dept: PULMONOLOGY | Facility: CLINIC | Age: 86
End: 2021-04-12

## 2021-04-12 RX ORDER — AMLODIPINE BESYLATE 5 MG/1
5 TABLET ORAL DAILY
Qty: 30 TABLET | Refills: 5 | Status: SHIPPED | OUTPATIENT
Start: 2021-04-12 | End: 2021-06-07

## 2021-04-12 NOTE — TELEPHONE ENCOUNTER
Noted. Informed patient to take Amlodipine 5 mg in the morning and take BP in the afternoon. Patient will call [address] [place] [address] office Thursday with BP readings. Patient verbalized understanding of all instruction given.

## 2021-04-12 NOTE — TELEPHONE ENCOUNTER
Pt called in stating she is having blood pressure issues. She states the blood pressure went from 140 to 170. She is wondering if she should continue taking medication amLODIPine Besylate 10 MG Oral Tab.  Please follow up

## 2021-04-12 NOTE — TELEPHONE ENCOUNTER
Spoke to patient regarding Amlodipine dosage. Patient wants to know whether to stay on 5 mg or go back to 10 mg daily.   Patient's blood pressure readings:  Sat: 170/73   Sun: did not take  Mon: 150/80  Patient would also like a prescitption for 5 mg tabs

## 2021-04-15 NOTE — TELEPHONE ENCOUNTER
Spoke to patient to injure about recent BP's. Patient stated they have been running 130's-150's over 70's. Improving since Monday.     Dr. Moulton

## 2021-06-07 ENCOUNTER — OFFICE VISIT (OUTPATIENT)
Dept: PULMONOLOGY | Facility: CLINIC | Age: 86
End: 2021-06-07
Payer: MEDICARE

## 2021-06-07 VITALS
DIASTOLIC BLOOD PRESSURE: 78 MMHG | WEIGHT: 147 LBS | HEART RATE: 85 BPM | SYSTOLIC BLOOD PRESSURE: 151 MMHG | RESPIRATION RATE: 18 BRPM | BODY MASS INDEX: 23.63 KG/M2 | OXYGEN SATURATION: 99 % | HEIGHT: 66 IN

## 2021-06-07 DIAGNOSIS — D64.9 ANEMIA, UNSPECIFIED TYPE: ICD-10-CM

## 2021-06-07 DIAGNOSIS — E87.1 HYPONATREMIA: ICD-10-CM

## 2021-06-07 DIAGNOSIS — M12.9 ARTHROPATHY: Primary | ICD-10-CM

## 2021-06-07 PROCEDURE — G0439 PPPS, SUBSEQ VISIT: HCPCS | Performed by: INTERNAL MEDICINE

## 2021-06-07 RX ORDER — AMLODIPINE BESYLATE 5 MG/1
5 TABLET ORAL DAILY
Qty: 90 TABLET | Refills: 3 | Status: SHIPPED | OUTPATIENT
Start: 2021-06-07

## 2021-06-07 RX ORDER — ZOLPIDEM TARTRATE 10 MG/1
10 TABLET ORAL NIGHTLY PRN
Qty: 90 TABLET | Refills: 3 | Status: SHIPPED | OUTPATIENT
Start: 2021-06-07

## 2021-06-07 NOTE — PROGRESS NOTES
The patient is a 80-year-old female who Mario from prior evaluation comes in now for follow-up and for a general health Medicare visit. She notes that she is doing well. She has no new complaints.   She does have chronic aches and pains with arthropat

## 2021-06-10 ENCOUNTER — LAB ENCOUNTER (OUTPATIENT)
Dept: LAB | Facility: HOSPITAL | Age: 86
End: 2021-06-10
Attending: INTERNAL MEDICINE
Payer: MEDICARE

## 2021-06-10 DIAGNOSIS — D64.9 ANEMIA, UNSPECIFIED TYPE: ICD-10-CM

## 2021-06-10 DIAGNOSIS — E87.1 HYPONATREMIA: ICD-10-CM

## 2021-06-10 PROCEDURE — 36415 COLL VENOUS BLD VENIPUNCTURE: CPT

## 2021-06-10 PROCEDURE — 85025 COMPLETE CBC W/AUTO DIFF WBC: CPT

## 2021-06-10 PROCEDURE — 80048 BASIC METABOLIC PNL TOTAL CA: CPT

## 2021-06-14 ENCOUNTER — TELEPHONE (OUTPATIENT)
Dept: PULMONOLOGY | Facility: CLINIC | Age: 86
End: 2021-06-14

## 2021-06-14 DIAGNOSIS — E87.1 HYPONATREMIA: ICD-10-CM

## 2021-06-14 DIAGNOSIS — D64.9 ANEMIA, UNSPECIFIED TYPE: Primary | ICD-10-CM

## 2021-06-14 NOTE — TELEPHONE ENCOUNTER
Spoke with patient informed her of Dr. Hai Skinner result note/order. Patient verbalized understanding. Dr. Franci Jacobson - Please review/sign BMP order.

## 2021-06-14 NOTE — TELEPHONE ENCOUNTER
----- Message from Artice Simmonds, MD sent at 6/11/2021  8:09 PM CDT -----  RN, please call the patient to let her know that the blood chemistries demonstrated mild abnormality of the kidney function.   Please add to the calendar a repeat basic metabolic p

## 2021-06-16 ENCOUNTER — TELEPHONE (OUTPATIENT)
Dept: PULMONOLOGY | Facility: CLINIC | Age: 86
End: 2021-06-16

## 2021-07-12 RX ORDER — CELECOXIB 200 MG/1
200 CAPSULE ORAL DAILY PRN
Qty: 90 CAPSULE | Refills: 2 | Status: SHIPPED | OUTPATIENT
Start: 2021-07-12

## 2021-07-15 ENCOUNTER — APPOINTMENT (OUTPATIENT)
Dept: GENERAL RADIOLOGY | Age: 86
End: 2021-07-15
Attending: EMERGENCY MEDICINE
Payer: MEDICARE

## 2021-07-15 ENCOUNTER — LAB ENCOUNTER (OUTPATIENT)
Dept: LAB | Age: 86
End: 2021-07-15
Attending: INTERNAL MEDICINE
Payer: MEDICARE

## 2021-07-15 ENCOUNTER — HOSPITAL ENCOUNTER (OUTPATIENT)
Age: 86
Discharge: HOME | End: 2021-07-15
Attending: EMERGENCY MEDICINE
Payer: MEDICARE

## 2021-07-15 VITALS
TEMPERATURE: 97 F | RESPIRATION RATE: 20 BRPM | DIASTOLIC BLOOD PRESSURE: 60 MMHG | SYSTOLIC BLOOD PRESSURE: 150 MMHG | OXYGEN SATURATION: 97 % | HEART RATE: 80 BPM

## 2021-07-15 DIAGNOSIS — M77.32 HEEL SPUR, LEFT: Primary | ICD-10-CM

## 2021-07-15 DIAGNOSIS — E87.1 HYPONATREMIA: ICD-10-CM

## 2021-07-15 LAB
ANION GAP SERPL CALC-SCNC: 8 MMOL/L (ref 0–18)
BUN BLD-MCNC: 43 MG/DL (ref 7–18)
BUN/CREAT SERPL: 31.4 (ref 10–20)
CALCIUM BLD-MCNC: 9.5 MG/DL (ref 8.5–10.1)
CHLORIDE SERPL-SCNC: 102 MMOL/L (ref 98–112)
CO2 SERPL-SCNC: 22 MMOL/L (ref 21–32)
CREAT BLD-MCNC: 1.37 MG/DL
GLUCOSE BLD-MCNC: 78 MG/DL (ref 70–99)
OSMOLALITY SERPL CALC.SUM OF ELEC: 284 MOSM/KG (ref 275–295)
PATIENT FASTING Y/N/NP: NO
POTASSIUM SERPL-SCNC: 4.2 MMOL/L (ref 3.5–5.1)
SODIUM SERPL-SCNC: 132 MMOL/L (ref 136–145)

## 2021-07-15 PROCEDURE — 99213 OFFICE O/P EST LOW 20 MIN: CPT

## 2021-07-15 PROCEDURE — 80048 BASIC METABOLIC PNL TOTAL CA: CPT

## 2021-07-15 PROCEDURE — 36415 COLL VENOUS BLD VENIPUNCTURE: CPT

## 2021-07-15 PROCEDURE — 73630 X-RAY EXAM OF FOOT: CPT | Performed by: EMERGENCY MEDICINE

## 2021-07-15 NOTE — ED PROVIDER NOTES
Patient Seen in: [facility]      History   Patient presents with:   Foot Pain    Stated Complaint: left heel pain    HPI/Subjective:   HPI    80year old female with a past medical history of arthritis, hypertension, hyperlipidemia who present CHOLECYSTECTOMY  05/27/16   • OTHER SURGICAL HISTORY      Left wrist trauma, surgical repair   • OTHER SURGICAL HISTORY  05/27/16    Laparoscopy with bilateral salpingo-oophorectomy   • SYNVISC, INTRA-ARTICULAR Bilateral 2013    Synvisc injection bilateral General: Skin is warm and dry. Findings: No rash. Neurological:      Mental Status: She is alert and oriented to person, place, and time.    Psychiatric:         Behavior: Behavior normal.           ED Course   Labs Reviewed - No data to display

## 2021-07-15 NOTE — ED INITIAL ASSESSMENT (HPI)
3 weeks of left heel pain. No injury. Resides at [place] [address] and saw a podiatrist who dx her with plantar fasciitis and \"didn't have the supplies for an injection\". Presents today with persistent pain.

## 2021-07-16 ENCOUNTER — TELEPHONE (OUTPATIENT)
Dept: PULMONOLOGY | Facility: CLINIC | Age: 86
End: 2021-07-16

## 2021-07-16 DIAGNOSIS — E87.1 HYPONATREMIA: Primary | ICD-10-CM

## 2021-07-16 NOTE — TELEPHONE ENCOUNTER
Marin Reed MD  P  [facility] Clinical Staff  RN, I spoke to the patient regarding her blood tests.  Please add to the calendar a BMP at the 1 month interval and send her a prescription at that time.  Blas PRUETT

## 2021-08-20 ENCOUNTER — LAB ENCOUNTER (OUTPATIENT)
Dept: LAB | Facility: HOSPITAL | Age: 86
End: 2021-08-20
Attending: INTERNAL MEDICINE
Payer: MEDICARE

## 2021-08-20 DIAGNOSIS — E87.1 HYPONATREMIA: ICD-10-CM

## 2021-08-20 LAB
ANION GAP SERPL CALC-SCNC: 7 MMOL/L (ref 0–18)
BUN BLD-MCNC: 35 MG/DL (ref 7–18)
BUN/CREAT SERPL: 25.5 (ref 10–20)
CALCIUM BLD-MCNC: 9.4 MG/DL (ref 8.5–10.1)
CHLORIDE SERPL-SCNC: 104 MMOL/L (ref 98–112)
CO2 SERPL-SCNC: 26 MMOL/L (ref 21–32)
CREAT BLD-MCNC: 1.37 MG/DL
GLUCOSE BLD-MCNC: 61 MG/DL (ref 70–99)
OSMOLALITY SERPL CALC.SUM OF ELEC: 290 MOSM/KG (ref 275–295)
PATIENT FASTING Y/N/NP: NO
POTASSIUM SERPL-SCNC: 4.7 MMOL/L (ref 3.5–5.1)
SODIUM SERPL-SCNC: 137 MMOL/L (ref 136–145)

## 2021-08-20 PROCEDURE — 36415 COLL VENOUS BLD VENIPUNCTURE: CPT

## 2021-08-20 PROCEDURE — 80048 BASIC METABOLIC PNL TOTAL CA: CPT

## 2021-09-07 ENCOUNTER — TELEPHONE (OUTPATIENT)
Dept: PULMONOLOGY | Facility: CLINIC | Age: 86
End: 2021-09-07

## 2021-09-07 NOTE — TELEPHONE ENCOUNTER
Spoke with patient. Patient inquiring about BMP results. Patient informed of Dr. Eileen Rojas result note. Patient verbalized understanding.

## 2021-09-10 RX ORDER — TRIAMTERENE AND HYDROCHLOROTHIAZIDE 37.5; 25 MG/1; MG/1
CAPSULE ORAL
Qty: 90 CAPSULE | Refills: 2 | Status: SHIPPED | OUTPATIENT
Start: 2021-09-10

## 2021-10-15 ENCOUNTER — TELEPHONE (OUTPATIENT)
Dept: PULMONOLOGY | Facility: CLINIC | Age: 86
End: 2021-10-15

## 2021-10-27 ENCOUNTER — TELEPHONE (OUTPATIENT)
Dept: PULMONOLOGY | Facility: CLINIC | Age: 86
End: 2021-10-27

## 2021-10-28 RX ORDER — PREDNISONE 20 MG/1
TABLET ORAL
Qty: 10 TABLET | Refills: 0 | Status: SHIPPED | OUTPATIENT
Start: 2021-10-28

## 2021-10-28 NOTE — TELEPHONE ENCOUNTER
Pt stts has had a series of injections for nabor shoulders & knees which did not help. Stts taking Celebrex & Tylenol. C/o pain of both shoulders, both knees, & lower back rated 6-8/10.  Stts pain constant in both shoulders, but only has knee pain if standing

## 2021-10-28 NOTE — TELEPHONE ENCOUNTER
Pt aware Dr. David Terrell orders below. Encouraged her to contact us if we may be of further assistance. She voiced understanding.

## 2021-11-01 ENCOUNTER — TELEPHONE (OUTPATIENT)
Dept: PULMONOLOGY | Facility: CLINIC | Age: 86
End: 2021-11-01

## 2021-11-01 NOTE — TELEPHONE ENCOUNTER
Pt is supposed to get covid booster shot tomorrow - asking if thet will be ok to get while taking Prednisone

## 2021-11-01 NOTE — TELEPHONE ENCOUNTER
Spoke with patient. Patient states she hasn't started taking short course Prednisone yet but is wondering if its okay to take if she is getting COVID booster shot tomorrow. Patient states okay to leave detailed message.

## 2021-11-16 ENCOUNTER — LAB ENCOUNTER (OUTPATIENT)
Dept: LAB | Facility: HOSPITAL | Age: 86
End: 2021-11-16
Attending: INTERNAL MEDICINE
Payer: MEDICARE

## 2021-11-16 DIAGNOSIS — E87.1 HYPONATREMIA: ICD-10-CM

## 2021-11-16 PROCEDURE — 36415 COLL VENOUS BLD VENIPUNCTURE: CPT

## 2021-11-16 PROCEDURE — 80048 BASIC METABOLIC PNL TOTAL CA: CPT

## 2021-11-24 ENCOUNTER — TELEPHONE (OUTPATIENT)
Dept: PULMONOLOGY | Facility: CLINIC | Age: 86
End: 2021-11-24

## 2021-11-24 DIAGNOSIS — E87.1 HYPONATREMIA: Primary | ICD-10-CM

## 2021-11-24 NOTE — TELEPHONE ENCOUNTER
----- Message from Milagro Ortiz MD sent at 11/24/2021  3:25 PM CST -----  RN, please call the patient to let her know that her kidney function testing is abnormal but relatively stable.   Please add to the calendar a repeat BMP at the 3-month interval.

## 2021-11-30 NOTE — TELEPHONE ENCOUNTER
Spoke with patient informed her of Dr. Hai Skinner result note/order. Patient verbalized understanding.

## 2022-01-13 ENCOUNTER — TELEPHONE (OUTPATIENT)
Dept: PULMONOLOGY | Facility: CLINIC | Age: 87
End: 2022-01-13

## 2022-01-13 DIAGNOSIS — M12.9 ARTHROPATHY: Primary | ICD-10-CM

## 2022-01-13 NOTE — TELEPHONE ENCOUNTER
Pt called to speak to RN about getting an order for PT for pain in both shoulders and arms and also her back. Please call.

## 2022-01-17 NOTE — TELEPHONE ENCOUNTER
Dr. Josefina Chaudhary, patient requesting PT order for pain in shoulder, arms, and back for arthritis pain. Feels her legs are getting weaker and she'd like PT. She lives at [facility], and she'd like the order faxed there with her name on it.  Please

## 2022-01-19 NOTE — TELEPHONE ENCOUNTER
Spoke with patient informed her that PT order was faxed to [facility] 2 days ago. Patient states she will check with [facility] and call pulmo office back if she requires anything else.

## 2022-01-21 ENCOUNTER — TELEPHONE (OUTPATIENT)
Dept: PULMONOLOGY | Facility: CLINIC | Age: 87
End: 2022-01-21

## 2022-02-14 RX ORDER — ZOLPIDEM TARTRATE 10 MG/1
10 TABLET ORAL NIGHTLY PRN
Qty: 90 TABLET | Refills: 1 | Status: SHIPPED | OUTPATIENT
Start: 2022-02-14

## 2022-02-14 NOTE — TELEPHONE ENCOUNTER
Rx request for Zolpidem Tartrate 10 mg, please review and sign off if appropriate. Thank you.     Last seen: 6/7/21  Last refill: 6/7/21

## 2022-03-02 ENCOUNTER — TELEPHONE (OUTPATIENT)
Dept: PULMONOLOGY | Facility: CLINIC | Age: 87
End: 2022-03-02

## 2022-03-02 NOTE — TELEPHONE ENCOUNTER
Received 1/28/22 physical therapy initial evaluation and plan of care from [facility]. Placed in Dr. May Mcdaniel folder for review and signatures.

## 2022-03-16 ENCOUNTER — TELEPHONE (OUTPATIENT)
Dept: PULMONOLOGY | Facility: CLINIC | Age: 87
End: 2022-03-16

## 2022-03-17 ENCOUNTER — TELEPHONE (OUTPATIENT)
Dept: PULMONOLOGY | Facility: CLINIC | Age: 87
End: 2022-03-17

## 2022-03-17 NOTE — TELEPHONE ENCOUNTER
Received discharge summary report ffrom [facility] and placed in Deryl Ezekiel folder for review.

## 2022-03-18 ENCOUNTER — TELEPHONE (OUTPATIENT)
Dept: PULMONOLOGY | Facility: CLINIC | Age: 87
End: 2022-03-18

## 2022-03-28 RX ORDER — CELECOXIB 200 MG/1
CAPSULE ORAL
Qty: 90 CAPSULE | Refills: 1 | Status: SHIPPED | OUTPATIENT
Start: 2022-03-28

## 2022-03-28 NOTE — TELEPHONE ENCOUNTER
Rx request for Celcoxib 200 mg, please review and sign off if appropriate. Thank you.     Last seen: 6/7/21  Last refill: 7/12/21

## 2022-04-03 ENCOUNTER — NURSE ONLY (OUTPATIENT)
Dept: LAB | Facility: HOSPITAL | Age: 87
End: 2022-04-03
Attending: INTERNAL MEDICINE
Payer: MEDICARE

## 2022-04-03 DIAGNOSIS — E87.1 HYPONATREMIA: ICD-10-CM

## 2022-04-03 LAB
ANION GAP SERPL CALC-SCNC: 5 MMOL/L (ref 0–18)
BUN BLD-MCNC: 44 MG/DL (ref 7–18)
BUN/CREAT SERPL: 30.1 (ref 10–20)
CALCIUM BLD-MCNC: 9.3 MG/DL (ref 8.5–10.1)
CHLORIDE SERPL-SCNC: 108 MMOL/L (ref 98–112)
CO2 SERPL-SCNC: 25 MMOL/L (ref 21–32)
CREAT BLD-MCNC: 1.46 MG/DL
FASTING STATUS PATIENT QL REPORTED: NO
GLUCOSE BLD-MCNC: 68 MG/DL (ref 70–99)
OSMOLALITY SERPL CALC.SUM OF ELEC: 295 MOSM/KG (ref 275–295)
POTASSIUM SERPL-SCNC: 5.2 MMOL/L (ref 3.5–5.1)
SODIUM SERPL-SCNC: 138 MMOL/L (ref 136–145)

## 2022-04-03 PROCEDURE — 80048 BASIC METABOLIC PNL TOTAL CA: CPT

## 2022-04-03 PROCEDURE — 36415 COLL VENOUS BLD VENIPUNCTURE: CPT

## 2022-04-05 ENCOUNTER — TELEPHONE (OUTPATIENT)
Dept: PULMONOLOGY | Facility: CLINIC | Age: 87
End: 2022-04-05

## 2022-04-05 NOTE — TELEPHONE ENCOUNTER
Spoke to patient and relayed Dr. Aleatha Fothergill message as shown below. Patient verbalized understanding of whole message and had no further questions at this time.

## 2022-04-05 NOTE — TELEPHONE ENCOUNTER
----- Message from Felipa Chavarria MD sent at 4/5/2022 11:48 AM CDT -----  RN, please let the patient know that her kidney function is stable and we can continue to follow.   Sabine Mueller

## 2022-04-21 ENCOUNTER — TELEPHONE (OUTPATIENT)
Dept: PULMONOLOGY | Facility: CLINIC | Age: 87
End: 2022-04-21

## 2022-04-21 NOTE — TELEPHONE ENCOUNTER
Spoke with patient. Patient informed of Dr. Mary Fairbanks message below. Patient verbalized understanding.

## 2022-04-21 NOTE — TELEPHONE ENCOUNTER
Pt states that she is at [facility] and is being offered the Covid booster . Pt had Pfizer previously and would like to know if Dr Sharmin Justice would recomened [employer] or [facility] for booster. Please call. OK to leave detailed message on VM.

## 2022-04-21 NOTE — TELEPHONE ENCOUNTER
Either one, whichever one they have available. Both of them work and it is okay to take some Moderna even if you had [employer] before.

## 2022-06-07 RX ORDER — AMLODIPINE BESYLATE 5 MG/1
5 TABLET ORAL DAILY
Qty: 90 TABLET | Refills: 0 | Status: SHIPPED | OUTPATIENT
Start: 2022-06-07

## 2022-06-07 NOTE — TELEPHONE ENCOUNTER
Dr. Valeriy Camarena, patient is requesting refill for Amlodipine. Patient has scheduled an appointment for 9/1/22.      Last Refill: 6/7/21  Last Office visit: 6/7/21

## 2022-06-23 ENCOUNTER — TELEPHONE (OUTPATIENT)
Dept: PULMONOLOGY | Facility: CLINIC | Age: 87
End: 2022-06-23

## 2022-06-23 RX ORDER — PREDNISONE 20 MG/1
TABLET ORAL
Qty: 10 TABLET | Refills: 0 | Status: SHIPPED | OUTPATIENT
Start: 2022-06-23

## 2022-06-23 NOTE — TELEPHONE ENCOUNTER
Dr. Efra Saenz to patient who is complaining of pain 10/10 to back and 8/10 bilateral shoulders. States only being able to walk a few feet without stopping to rest. Takes tylenol and celecoxib as prescribed but has offered very little relief. Asking for alternate pain medication.

## 2022-06-23 NOTE — TELEPHONE ENCOUNTER
Pt states that she has been having back and shoulder pain and would like RX if possible. Please call.

## 2022-07-05 ENCOUNTER — OFFICE VISIT (OUTPATIENT)
Dept: OTOLARYNGOLOGY | Facility: CLINIC | Age: 87
End: 2022-07-05
Payer: MEDICARE

## 2022-07-05 VITALS — WEIGHT: 147 LBS | BODY MASS INDEX: 23.63 KG/M2 | HEIGHT: 66 IN | TEMPERATURE: 98 F

## 2022-07-05 DIAGNOSIS — H83.3X3 SOUND SENSITIVITY IN BOTH EARS: Primary | ICD-10-CM

## 2022-07-05 PROCEDURE — 99203 OFFICE O/P NEW LOW 30 MIN: CPT | Performed by: OTOLARYNGOLOGY

## 2022-07-05 RX ORDER — MONTELUKAST SODIUM 10 MG/1
10 TABLET ORAL NIGHTLY
Qty: 30 TABLET | Refills: 3 | Status: SHIPPED | OUTPATIENT
Start: 2022-07-05

## 2022-07-05 RX ORDER — LORATADINE 10 MG/1
10 TABLET ORAL DAILY
Qty: 30 TABLET | Refills: 3 | Status: SHIPPED | OUTPATIENT
Start: 2022-07-05

## 2022-08-04 ENCOUNTER — TELEPHONE (OUTPATIENT)
Dept: OTOLARYNGOLOGY | Facility: CLINIC | Age: 87
End: 2022-08-04

## 2022-08-05 ENCOUNTER — TELEPHONE (OUTPATIENT)
Dept: PULMONOLOGY | Facility: CLINIC | Age: 87
End: 2022-08-05

## 2022-08-05 NOTE — TELEPHONE ENCOUNTER
[facility] called in to get refill on zolpidem 10 mg pt recently switched pharmacies.  Please follow up

## 2022-08-07 RX ORDER — ZOLPIDEM TARTRATE 10 MG/1
10 TABLET ORAL NIGHTLY PRN
Qty: 90 TABLET | Refills: 1 | Status: SHIPPED | OUTPATIENT
Start: 2022-08-07

## 2022-08-08 NOTE — TELEPHONE ENCOUNTER
LOV 7/5/22, RTC 1 month  This refill request is being sent to the provider for the following reason:  [x]Patient did not complete follow up recommendations

## 2022-08-08 NOTE — TELEPHONE ENCOUNTER
Per Christian Talavera @ [address] they did not receive script for zolpidem 10 mg oral tab. Explained rx shows e-prescribing status of receipt confirmed by pharmacy as of 8/7/22 2:31 pm. She denied receipt of script by their pharmacy. RN called in script to pharmacist. Christian Talavera voiced understanding.

## 2022-08-14 RX ORDER — LORATADINE 10 MG/1
TABLET ORAL
Qty: 30 TABLET | Refills: 2 | Status: SHIPPED | OUTPATIENT
Start: 2022-08-14

## 2022-08-17 NOTE — TELEPHONE ENCOUNTER
Patient returned call please call at [phone],MIGUEL. Ipledge Number (Optional): [phone] Ipledge Number (Optional): [id number]

## 2022-09-01 ENCOUNTER — OFFICE VISIT (OUTPATIENT)
Dept: PULMONOLOGY | Facility: CLINIC | Age: 87
End: 2022-09-01
Payer: MEDICARE

## 2022-09-01 VITALS
HEIGHT: 66 IN | SYSTOLIC BLOOD PRESSURE: 140 MMHG | BODY MASS INDEX: 24.11 KG/M2 | OXYGEN SATURATION: 99 % | HEART RATE: 66 BPM | WEIGHT: 150 LBS | DIASTOLIC BLOOD PRESSURE: 92 MMHG

## 2022-09-01 DIAGNOSIS — M12.9 ARTHROPATHY: Primary | ICD-10-CM

## 2022-09-01 PROCEDURE — 99213 OFFICE O/P EST LOW 20 MIN: CPT | Performed by: INTERNAL MEDICINE

## 2022-09-01 PROCEDURE — 1125F AMNT PAIN NOTED PAIN PRSNT: CPT | Performed by: INTERNAL MEDICINE

## 2022-09-01 RX ORDER — AMLODIPINE BESYLATE 5 MG/1
5 TABLET ORAL DAILY
Qty: 90 TABLET | Refills: 1 | Status: SHIPPED | OUTPATIENT
Start: 2022-09-01

## 2022-09-01 RX ORDER — PREDNISONE 20 MG/1
TABLET ORAL
Qty: 10 TABLET | Refills: 0 | Status: SHIPPED | OUTPATIENT
Start: 2022-09-01

## 2022-09-01 RX ORDER — CELECOXIB 200 MG/1
200 CAPSULE ORAL DAILY
Qty: 90 CAPSULE | Refills: 3 | Status: SHIPPED | OUTPATIENT
Start: 2022-09-01

## 2022-09-21 RX ORDER — ZOLPIDEM TARTRATE 10 MG/1
TABLET ORAL
Qty: 90 TABLET | Refills: 1 | Status: SHIPPED | OUTPATIENT
Start: 2022-09-21

## 2022-09-21 NOTE — TELEPHONE ENCOUNTER
Rx request for Zolpidem 10 mg, please review and sign off if appropriate. Thank you.     Last seen: 9/1/22  Last refill: 8/7/22

## 2022-09-24 RX ORDER — TRIAMTERENE AND HYDROCHLOROTHIAZIDE 37.5; 25 MG/1; MG/1
1 CAPSULE ORAL DAILY
Qty: 90 CAPSULE | Refills: 2 | Status: CANCELLED | OUTPATIENT
Start: 2022-09-24

## 2022-09-26 RX ORDER — TRIAMTERENE AND HYDROCHLOROTHIAZIDE 37.5; 25 MG/1; MG/1
1 CAPSULE ORAL DAILY
Qty: 90 CAPSULE | Refills: 1 | Status: SHIPPED | OUTPATIENT
Start: 2022-09-26

## 2022-10-04 ENCOUNTER — OFFICE VISIT (OUTPATIENT)
Dept: DERMATOLOGY CLINIC | Facility: CLINIC | Age: 87
End: 2022-10-04
Payer: MEDICARE

## 2022-10-04 DIAGNOSIS — B07.9 VERRUCA(E): Primary | ICD-10-CM

## 2022-10-04 PROCEDURE — 1126F AMNT PAIN NOTED NONE PRSNT: CPT | Performed by: PHYSICIAN ASSISTANT

## 2022-10-04 PROCEDURE — 17110 DESTRUCTION B9 LES UP TO 14: CPT | Performed by: PHYSICIAN ASSISTANT

## 2022-10-04 PROCEDURE — 99203 OFFICE O/P NEW LOW 30 MIN: CPT | Performed by: PHYSICIAN ASSISTANT

## 2022-10-04 RX ORDER — COVID-19 ANTIGEN TEST
KIT MISCELLANEOUS
COMMUNITY
Start: 2022-05-04

## 2022-10-12 ENCOUNTER — TELEPHONE (OUTPATIENT)
Dept: DERMATOLOGY CLINIC | Facility: CLINIC | Age: 87
End: 2022-10-12

## 2022-10-12 NOTE — TELEPHONE ENCOUNTER
Patient called    Asking to speak to RN. Had warts frozen at last visit but they have not gone away. Wants to know what the next steps are?  Please call

## 2022-10-13 RX ORDER — IMIQUIMOD 12.5 MG/.25G
1 CREAM TOPICAL NIGHTLY
Qty: 24 EACH | Refills: 1 | Status: SHIPPED | OUTPATIENT
Start: 2022-10-13

## 2022-10-13 NOTE — TELEPHONE ENCOUNTER
LOV 10/4/22 - Pt states she would like to try the cream and would like it sent to [facility] with delivery to her home. Should pt give it a little more time to see if the cryo worked or should she start on the cream?  Pt states it is hard for her to come into the office. Thank you.

## 2022-10-13 NOTE — TELEPHONE ENCOUNTER
Sent Aldara to the pharmacy. Please have her apply small amount around the lesions at night and cover with bandaid or tape. Should resolve in the next 2 months.

## 2022-10-13 NOTE — TELEPHONE ENCOUNTER
Left message for pt. [facility] informed that pt would like this medication delivered to her home.

## 2022-10-14 ENCOUNTER — PATIENT MESSAGE (OUTPATIENT)
Dept: DERMATOLOGY CLINIC | Facility: CLINIC | Age: 87
End: 2022-10-14

## 2022-10-14 NOTE — TELEPHONE ENCOUNTER
From: Amy Kumari  To: Maggi Sherwood PA-C  Sent: 10/14/2022 9:36 AM CDT  Subject: Warts    Your phone message said to cover warts with a band aid or tape after applying cream. Instructions that came with prescription said not to cover. Should I cover or not? Thanks!

## 2022-11-11 ENCOUNTER — TELEPHONE (OUTPATIENT)
Dept: PULMONOLOGY | Facility: CLINIC | Age: 87
End: 2022-11-11

## 2022-11-11 NOTE — TELEPHONE ENCOUNTER
LOV: 9/1/22  LR: 9/21/22    Spoke with Pharmacist at [facility], states there is refill on file, will process for delivery. Spoke with patient informed her of above, patient verbalized understanding.

## 2022-12-08 RX ORDER — AMLODIPINE BESYLATE 5 MG/1
5 TABLET ORAL DAILY
Qty: 90 TABLET | Refills: 1 | Status: SHIPPED | OUTPATIENT
Start: 2022-12-08

## 2022-12-27 ENCOUNTER — TELEPHONE (OUTPATIENT)
Dept: PULMONOLOGY | Facility: CLINIC | Age: 87
End: 2022-12-27

## 2022-12-27 RX ORDER — AMOXICILLIN AND CLAVULANATE POTASSIUM 875; 125 MG/1; MG/1
1 TABLET, FILM COATED ORAL 2 TIMES DAILY
Qty: 20 TABLET | Refills: 0 | Status: SHIPPED | OUTPATIENT
Start: 2022-12-27

## 2022-12-27 NOTE — TELEPHONE ENCOUNTER
Patient is calling because she has had a sore throat since 12/24 and wants to know if there is something she should do

## 2022-12-27 NOTE — TELEPHONE ENCOUNTER
Spoke with patient states she has laryngitis, mild sore throat, dry slight cough and feels like there is a lump in her throat since Friday. States she gargles with warm salt water. Denies fever, SOB, chest pain, chest tightness. Patient states she is not able to be evaluated at immediate/urgent care, she cannot get out. Aware Dr. Saucedo Bare out of office and call will be routed to on-call provider.

## 2022-12-30 ENCOUNTER — TELEPHONE (OUTPATIENT)
Dept: PULMONOLOGY | Facility: CLINIC | Age: 87
End: 2022-12-30

## 2022-12-30 NOTE — TELEPHONE ENCOUNTER
Dr. Bebeto Schumacher to patient who tested positive for Covid. +Congestion, cough , sore throat and fatigue, Symptoms first started 12/24/22. States, \"feeling crummy and tired all day\". Asking it is recommended to take Paxlovid and/or cough syrup?

## 2022-12-30 NOTE — TELEPHONE ENCOUNTER
Pt states that she tested positive for Covid last night and would like to know what she can take. Please call.

## 2023-01-03 RX ORDER — PREDNISONE 20 MG/1
TABLET ORAL
Qty: 10 TABLET | Refills: 0 | Status: SHIPPED | OUTPATIENT
Start: 2023-01-03

## 2023-01-03 NOTE — TELEPHONE ENCOUNTER
Pt is calling because she is still having a stuffy head and other issues with COVID     Pt has been on antibiotics for a week. Pt wants to know if there is anything else to help with the stuffiness.

## 2023-01-03 NOTE — TELEPHONE ENCOUNTER
Spoke with patient informed her of Dr. Matthew Yuen message. Patient verbalized understanding and accepts short course of prednisone. Dr. Melany Garcia - Please review/sign pended order.

## 2023-01-03 NOTE — TELEPHONE ENCOUNTER
Spoke with patient states she continues to have nasal congestion with pale mucous. Denies fever, chest congestion. Informed patient to continue supportive care, try OTC decongestant for high blood pressure, humidifier. Dr. Cottrell Overcast - Patient requesting further recommendations.

## 2023-01-10 ENCOUNTER — LAB ENCOUNTER (OUTPATIENT)
Dept: LAB | Facility: HOSPITAL | Age: 88
End: 2023-01-10
Attending: INTERNAL MEDICINE
Payer: MEDICARE

## 2023-01-10 ENCOUNTER — TELEPHONE (OUTPATIENT)
Dept: PULMONOLOGY | Facility: CLINIC | Age: 88
End: 2023-01-10

## 2023-01-10 ENCOUNTER — OFFICE VISIT (OUTPATIENT)
Dept: PULMONOLOGY | Facility: CLINIC | Age: 88
End: 2023-01-10
Payer: MEDICARE

## 2023-01-10 ENCOUNTER — HOSPITAL ENCOUNTER (OUTPATIENT)
Dept: GENERAL RADIOLOGY | Facility: HOSPITAL | Age: 88
Discharge: HOME | End: 2023-01-10
Attending: INTERNAL MEDICINE
Payer: MEDICARE

## 2023-01-10 VITALS
HEART RATE: 90 BPM | WEIGHT: 147 LBS | SYSTOLIC BLOOD PRESSURE: 163 MMHG | RESPIRATION RATE: 20 BRPM | OXYGEN SATURATION: 98 % | BODY MASS INDEX: 24.49 KG/M2 | DIASTOLIC BLOOD PRESSURE: 78 MMHG | HEIGHT: 65 IN

## 2023-01-10 DIAGNOSIS — R06.02 SHORTNESS OF BREATH: ICD-10-CM

## 2023-01-10 DIAGNOSIS — E03.9 HYPOTHYROIDISM, UNSPECIFIED TYPE: Primary | ICD-10-CM

## 2023-01-10 DIAGNOSIS — E03.9 HYPOTHYROIDISM, UNSPECIFIED TYPE: ICD-10-CM

## 2023-01-10 DIAGNOSIS — R06.02 SHORTNESS OF BREATH: Primary | ICD-10-CM

## 2023-01-10 LAB
BASOPHILS # BLD AUTO: 0.07 X10(3) UL (ref 0–0.2)
BASOPHILS NFR BLD AUTO: 0.4 %
D DIMER PPP FEU-MCNC: 0.63 UG/ML FEU (ref ?–0.94)
DEPRECATED RDW RBC AUTO: 46.9 FL (ref 35.1–46.3)
EOSINOPHIL # BLD AUTO: 0.1 X10(3) UL (ref 0–0.7)
EOSINOPHIL NFR BLD AUTO: 0.6 %
ERYTHROCYTE [DISTWIDTH] IN BLOOD BY AUTOMATED COUNT: 13.8 % (ref 11–15)
HCT VFR BLD AUTO: 40.1 %
HGB BLD-MCNC: 13.1 G/DL
IMM GRANULOCYTES # BLD AUTO: 0.21 X10(3) UL (ref 0–1)
IMM GRANULOCYTES NFR BLD: 1.2 %
LYMPHOCYTES # BLD AUTO: 4.94 X10(3) UL (ref 1–4)
LYMPHOCYTES NFR BLD AUTO: 28.6 %
MCH RBC QN AUTO: 29.8 PG (ref 26–34)
MCHC RBC AUTO-ENTMCNC: 32.7 G/DL (ref 31–37)
MCV RBC AUTO: 91.3 FL
MONOCYTES # BLD AUTO: 1.87 X10(3) UL (ref 0.1–1)
MONOCYTES NFR BLD AUTO: 10.8 %
NEUTROPHILS # BLD AUTO: 10.07 X10 (3) UL (ref 1.5–7.7)
NEUTROPHILS # BLD AUTO: 10.07 X10(3) UL (ref 1.5–7.7)
NEUTROPHILS NFR BLD AUTO: 58.4 %
NT-PROBNP SERPL-MCNC: 1289 PG/ML (ref ?–450)
PLATELET # BLD AUTO: 483 10(3)UL (ref 150–450)
RBC # BLD AUTO: 4.39 X10(6)UL
TSI SER-ACNC: 5.22 MIU/ML (ref 0.36–3.74)
WBC # BLD AUTO: 17.3 X10(3) UL (ref 4–11)

## 2023-01-10 PROCEDURE — 83880 ASSAY OF NATRIURETIC PEPTIDE: CPT

## 2023-01-10 PROCEDURE — 85379 FIBRIN DEGRADATION QUANT: CPT

## 2023-01-10 PROCEDURE — 84443 ASSAY THYROID STIM HORMONE: CPT

## 2023-01-10 PROCEDURE — 71046 X-RAY EXAM CHEST 2 VIEWS: CPT | Performed by: INTERNAL MEDICINE

## 2023-01-10 PROCEDURE — 99213 OFFICE O/P EST LOW 20 MIN: CPT | Performed by: INTERNAL MEDICINE

## 2023-01-10 PROCEDURE — 85025 COMPLETE CBC W/AUTO DIFF WBC: CPT

## 2023-01-10 PROCEDURE — 1125F AMNT PAIN NOTED PAIN PRSNT: CPT | Performed by: INTERNAL MEDICINE

## 2023-01-10 PROCEDURE — 36415 COLL VENOUS BLD VENIPUNCTURE: CPT

## 2023-01-10 RX ORDER — AZITHROMYCIN 250 MG/1
TABLET, FILM COATED ORAL
Qty: 6 TABLET | Refills: 0 | Status: SHIPPED | OUTPATIENT
Start: 2023-01-10 | End: 2023-01-15

## 2023-01-10 RX ORDER — LEVOTHYROXINE SODIUM 0.05 MG/1
50 TABLET ORAL
Qty: 30 TABLET | Refills: 5 | Status: SHIPPED | OUTPATIENT
Start: 2023-01-10

## 2023-01-10 NOTE — TELEPHONE ENCOUNTER
Per incoming call from Dr. Chayito Garcias, ok to add patient at 12pm today. Spoke with patient, follow up appointment scheduled with Dr. Chayito Garcias today (1/10/23) at 12pm.  Verified date, time, location & parking, patient verbalized understanding.

## 2023-01-10 NOTE — TELEPHONE ENCOUNTER
We canI spoke with the patient and her daughter. The white blood cell count is high. I am going to send in a course of azithromycin for possible bronchitis. The x-ray did not look bad and she has a chronic eventration of the diaphragm.

## 2023-01-10 NOTE — PROGRESS NOTES
The patient is a 80-year-old female who I know well from prior evaluation and comes in now for follow-up. She has a few weeks of an uncomfortable awareness of her breathing with exertion. She has chronic lumbar back discomfort more on the left than the right which is little worse. She had lumbar spine x-rays few years ago demonstrating degenerative disc disease she had comprehensive blood testing in 2021 that was relatively unrevealing. Review of Systems:  Vision normal. Ear nose and throat normal. Bowel normal. Bladder function normal. No depression. No thyroid disease. No lymphatic system concerns. No rash. Muscles and joints unremarkable except uses a walker. No weight loss no weight gain. Physical Examination:  Vital signs normal. HEENT examination is unremarkable with pupils equal round and reactive to light and accommodation. Neck without adenopathy, thyromegaly, JVD nor bruit. Lungs crackles bibasilar which resolved with first inspiration to auscultation and percussion. Cardiac regular rate and rhythm no murmur. Abdomen nontender, without hepatosplenomegaly and no mass appreciable. Extremities and Musculoskeletal without clubbing cyanosis nor edema, and mobility acceptable. Neurologic grossly intact with symmetric tone and strength and reflex. Assessment and plan:  1. Dyspnea-etiology is uncertain. Examination is unrevealing. Saturation is 96%. Recommendations: We will screen for heart, lung, thyroid, anemia with blood testing and chest x-ray. 2.  Degenerative lumbar disc disease-encourage the patient to make an appointment to see physiatry and she may be a candidate for injection therapeutics. 3.  General health and other medical problems-as per prior note.

## 2023-01-10 NOTE — TELEPHONE ENCOUNTER
RN, starting a tiny dose of Synthroid for hypothyroidism.   Please add to the calendar a repeat TSH at the 3-month interval.

## 2023-01-10 NOTE — TELEPHONE ENCOUNTER
Spoke with patient states she has shortness of breath when walking, feels she's not getting better, chest tightness. Denies fever, chest pain. Dr. Valeriy Camarena - Patient is requesting appointment, possibly today.

## 2023-01-25 ENCOUNTER — TELEPHONE (OUTPATIENT)
Dept: PULMONOLOGY | Facility: CLINIC | Age: 88
End: 2023-01-25

## 2023-01-25 RX ORDER — PREDNISONE 20 MG/1
TABLET ORAL
Qty: 10 TABLET | Refills: 0 | Status: SHIPPED | OUTPATIENT
Start: 2023-01-25

## 2023-01-25 RX ORDER — ZOLPIDEM TARTRATE 10 MG/1
TABLET ORAL
Qty: 90 TABLET | Refills: 1 | Status: SHIPPED | OUTPATIENT
Start: 2023-01-25

## 2023-01-25 NOTE — TELEPHONE ENCOUNTER
Spoke with patient informed her of Dr. Michael alarcon, patient verbalized understanding and accepted short course of prednisone.     Dr. Peri Fairchild - Please review/sign pended order

## 2023-01-25 NOTE — TELEPHONE ENCOUNTER
Avani Edelson calling to get an update on the pt's prednisone since it has not been sent to pharmacy yet

## 2023-01-25 NOTE — TELEPHONE ENCOUNTER
Spoke with patient states she has been feeling better, started to exercise again, thinks she pulled muscle in her shoulder. Taking Tylenol with very little relief, alternating ice and heat.     Dr. Suzette Branch - Patient asking if there is anything you can give her to help her \"get over the hump\"

## 2023-01-30 NOTE — TELEPHONE ENCOUNTER
Patient is calling because she is still having shoulder pain. Patient states she has one prednisone left but states medication has not helped.

## 2023-01-30 NOTE — TELEPHONE ENCOUNTER
Ortho Staff - Dr. [facility] is requesting patient to be seen this week if possible.   Can you please assist?

## 2023-01-30 NOTE — TELEPHONE ENCOUNTER
Aniyah Sagastume MD  You; Em [facility] Clinical Staff 58 minutes ago (3:27 PM)     RN, please see if you can get her into orthopedics this week.

## 2023-01-30 NOTE — TELEPHONE ENCOUNTER
Spoke with patient states she can barely lift her arm - only a few inches without having pain. Cannot put pressure on arm when using walker. Having a hard time getting dressed, rates pain 10/10 when getting dressed. Would like something to help with the pain. Taking Tylenol without relief.

## 2023-02-01 ENCOUNTER — HOSPITAL ENCOUNTER (OUTPATIENT)
Dept: GENERAL RADIOLOGY | Facility: HOSPITAL | Age: 88
Discharge: HOME | End: 2023-02-01
Attending: ORTHOPAEDIC SURGERY
Payer: MEDICARE

## 2023-02-01 ENCOUNTER — OFFICE VISIT (OUTPATIENT)
Dept: ORTHOPEDICS CLINIC | Facility: CLINIC | Age: 88
End: 2023-02-01

## 2023-02-01 VITALS
SYSTOLIC BLOOD PRESSURE: 159 MMHG | WEIGHT: 150 LBS | HEIGHT: 65 IN | HEART RATE: 93 BPM | DIASTOLIC BLOOD PRESSURE: 86 MMHG | BODY MASS INDEX: 24.99 KG/M2

## 2023-02-01 DIAGNOSIS — M12.812 LEFT ROTATOR CUFF TEAR ARTHROPATHY: ICD-10-CM

## 2023-02-01 DIAGNOSIS — R52 PAIN: ICD-10-CM

## 2023-02-01 DIAGNOSIS — M75.102 LEFT ROTATOR CUFF TEAR ARTHROPATHY: ICD-10-CM

## 2023-02-01 DIAGNOSIS — R52 PAIN: Primary | ICD-10-CM

## 2023-02-01 PROCEDURE — 99204 OFFICE O/P NEW MOD 45 MIN: CPT | Performed by: ORTHOPAEDIC SURGERY

## 2023-02-01 PROCEDURE — 73030 X-RAY EXAM OF SHOULDER: CPT | Performed by: ORTHOPAEDIC SURGERY

## 2023-02-01 PROCEDURE — 1125F AMNT PAIN NOTED PAIN PRSNT: CPT | Performed by: ORTHOPAEDIC SURGERY

## 2023-02-01 PROCEDURE — 20610 DRAIN/INJ JOINT/BURSA W/O US: CPT | Performed by: ORTHOPAEDIC SURGERY

## 2023-02-01 RX ORDER — TRIAMCINOLONE ACETONIDE 40 MG/ML
40 INJECTION, SUSPENSION INTRA-ARTICULAR; INTRAMUSCULAR ONCE
Status: COMPLETED | OUTPATIENT
Start: 2023-02-01 | End: 2023-02-01

## 2023-02-01 RX ORDER — DEXTROMETHORPHN/ACETAMINOPH/CP 10-325-2MG
TABLET ORAL
COMMUNITY
Start: 2023-01-03

## 2023-02-01 RX ADMIN — TRIAMCINOLONE ACETONIDE 40 MG: 40 INJECTION, SUSPENSION INTRA-ARTICULAR; INTRAMUSCULAR at 11:20:00

## 2023-02-01 NOTE — PROGRESS NOTES
Per verbal order from Dr. Gregorio Walsh, draw up and 4ml of 0.5% Marcaine and 1ml of Kenalog 40 for injection into left shoulder. Jovany Rodriguez RN  Patient provided education handout for cortisone injection.

## 2023-02-02 RX ORDER — IMIQUIMOD 12.5 MG/.25G
1 CREAM TOPICAL NIGHTLY
Qty: 24 EACH | Refills: 1 | Status: SHIPPED | OUTPATIENT
Start: 2023-02-02

## 2023-03-02 ENCOUNTER — OFFICE VISIT (OUTPATIENT)
Dept: PULMONOLOGY | Facility: CLINIC | Age: 88
End: 2023-03-02

## 2023-03-02 VITALS
OXYGEN SATURATION: 98 % | WEIGHT: 148 LBS | RESPIRATION RATE: 20 BRPM | SYSTOLIC BLOOD PRESSURE: 155 MMHG | HEIGHT: 65 IN | HEART RATE: 90 BPM | DIASTOLIC BLOOD PRESSURE: 77 MMHG | BODY MASS INDEX: 24.66 KG/M2

## 2023-03-02 DIAGNOSIS — R06.02 SHORTNESS OF BREATH: Primary | ICD-10-CM

## 2023-03-02 PROCEDURE — 99213 OFFICE O/P EST LOW 20 MIN: CPT | Performed by: INTERNAL MEDICINE

## 2023-03-02 PROCEDURE — 1126F AMNT PAIN NOTED NONE PRSNT: CPT | Performed by: INTERNAL MEDICINE

## 2023-03-02 NOTE — PROGRESS NOTES
The patient is a 77-year-old female who I know well from prior evaluation comes in now for follow-up. She has started on thyroid medication and is doing better with respect to her breathing. She is following with orthopedics and getting physical therapy for her multiple joint complaints. Review of Systems:  Vision normal. Ear nose and throat normal. Bowel normal. Bladder function normal. No depression. No thyroid disease. No lymphatic system concerns. No rash. Muscles and joints notable for arthritis. No weight loss no weight gain. Physical Examination:  Vital signs normal. HEENT examination is unremarkable with pupils equal round and reactive to light and accommodation. Neck without adenopathy, thyromegaly, JVD nor bruit. Lungs clear to auscultation and percussion. Cardiac regular rate and rhythm no murmur. Abdomen nontender, without hepatosplenomegaly and no mass appreciable. Extremities and Musculoskeletal without clubbing cyanosis nor edema, and mobility poor. Neurologic grossly intact with symmetric tone and strength and reflex. Assessment and plan:  1. Dyspnea on exertion-improved. The proBNP is 1289. The D-dimer is normal.    2.  Hypothyroidism- the patient will get follow-up TSH. The TSH baseline was 5.2. Remain on Synthroid. 3.  Degenerative joint disease-physical therapy and as per orthopedics    4.   General health and other medical problems-CPM

## 2023-04-17 ENCOUNTER — TELEPHONE (OUTPATIENT)
Dept: PULMONOLOGY | Facility: CLINIC | Age: 88
End: 2023-04-17

## 2023-04-17 RX ORDER — TRIAMTERENE AND HYDROCHLOROTHIAZIDE 37.5; 25 MG/1; MG/1
1 CAPSULE ORAL DAILY
Qty: 90 CAPSULE | Refills: 1 | Status: SHIPPED | OUTPATIENT
Start: 2023-04-17

## 2023-04-17 NOTE — TELEPHONE ENCOUNTER
LOV: 03/02/2023  Last refill: 9/26/2022    Dr. Amy Thomas review and sign pended prescription if agreeable.

## 2023-04-17 NOTE — TELEPHONE ENCOUNTER
Spoke with patient, informed her of Dr. Charissa Castle message, provided Rheumatology's phone number, patient verbalized understanding.

## 2023-04-17 NOTE — TELEPHONE ENCOUNTER
Spoke with patient states she is currently in PT for her shoulder, feels weak, pain in knees, shoulder and back all the time. Uses walker, shoulder and back hurt when she walks. Going to start using electric scooter. Using Celebrex and Tylenol with little relief. Dr. Sukumar Lopez - Patient asking if you would like to see her in the office?, if she should continue PT? and if you have any alternatives?

## 2023-05-31 RX ORDER — AMLODIPINE BESYLATE 5 MG/1
5 TABLET ORAL DAILY
Qty: 90 TABLET | Refills: 1 | Status: SHIPPED | OUTPATIENT
Start: 2023-05-31

## 2023-05-31 RX ORDER — LEVOTHYROXINE SODIUM 0.05 MG/1
50 TABLET ORAL
Qty: 30 TABLET | Refills: 5 | Status: SHIPPED | OUTPATIENT
Start: 2023-05-31

## 2023-05-31 NOTE — TELEPHONE ENCOUNTER
LOV: 3/02/2023  Last refill: 12/28/2022    Dr. Amy Thomas review and sign pended prescription if agreeable.

## 2023-06-05 ENCOUNTER — TELEPHONE (OUTPATIENT)
Dept: RHEUMATOLOGY | Facility: CLINIC | Age: 88
End: 2023-06-05

## 2023-06-05 ENCOUNTER — OFFICE VISIT (OUTPATIENT)
Dept: RHEUMATOLOGY | Facility: CLINIC | Age: 88
End: 2023-06-05

## 2023-06-05 ENCOUNTER — LAB ENCOUNTER (OUTPATIENT)
Dept: LAB | Facility: HOSPITAL | Age: 88
End: 2023-06-05
Attending: INTERNAL MEDICINE
Payer: MEDICARE

## 2023-06-05 VITALS
HEIGHT: 65 IN | BODY MASS INDEX: 24.16 KG/M2 | DIASTOLIC BLOOD PRESSURE: 81 MMHG | WEIGHT: 145 LBS | SYSTOLIC BLOOD PRESSURE: 158 MMHG | HEART RATE: 85 BPM

## 2023-06-05 DIAGNOSIS — E03.9 HYPOTHYROIDISM, UNSPECIFIED TYPE: ICD-10-CM

## 2023-06-05 DIAGNOSIS — G89.29 CHRONIC PAIN OF BOTH SHOULDERS: Primary | ICD-10-CM

## 2023-06-05 DIAGNOSIS — G89.29 CHRONIC BILATERAL LOW BACK PAIN WITHOUT SCIATICA: ICD-10-CM

## 2023-06-05 DIAGNOSIS — M54.50 CHRONIC BILATERAL LOW BACK PAIN WITHOUT SCIATICA: ICD-10-CM

## 2023-06-05 DIAGNOSIS — M25.511 CHRONIC PAIN OF BOTH SHOULDERS: Primary | ICD-10-CM

## 2023-06-05 DIAGNOSIS — M25.512 CHRONIC PAIN OF BOTH SHOULDERS: Primary | ICD-10-CM

## 2023-06-05 PROCEDURE — 99204 OFFICE O/P NEW MOD 45 MIN: CPT | Performed by: INTERNAL MEDICINE

## 2023-06-05 PROCEDURE — 1125F AMNT PAIN NOTED PAIN PRSNT: CPT | Performed by: INTERNAL MEDICINE

## 2023-06-05 PROCEDURE — 36415 COLL VENOUS BLD VENIPUNCTURE: CPT

## 2023-06-05 PROCEDURE — 84443 ASSAY THYROID STIM HORMONE: CPT

## 2023-06-05 RX ORDER — ACETAMINOPHEN AND CODEINE PHOSPHATE 300; 30 MG/1; MG/1
1 TABLET ORAL DAILY PRN
Qty: 30 TABLET | Refills: 0 | Status: SHIPPED | OUTPATIENT
Start: 2023-06-05 | End: 2023-06-05

## 2023-06-05 RX ORDER — ACETAMINOPHEN AND CODEINE PHOSPHATE 300; 30 MG/1; MG/1
1 TABLET ORAL DAILY PRN
Qty: 30 TABLET | Refills: 0 | Status: SHIPPED | OUTPATIENT
Start: 2023-06-05

## 2023-06-05 NOTE — TELEPHONE ENCOUNTER
Pharmacist is calling and states they need a diagnosis code on the prescription.        acetaminophen-codeine (TYLENOL WITH CODEINE #3) 300-30 MG Oral Tab

## 2023-06-05 NOTE — PATIENT INSTRUCTIONS
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

## 2023-06-06 LAB — TSI SER-ACNC: 1.43 MIU/ML (ref 0.36–3.74)

## 2023-06-06 NOTE — TELEPHONE ENCOUNTER
Spoke to [employer] and provided diagnosis codes. Verbalized understanding and had no further questions at this time.

## 2023-06-07 ENCOUNTER — TELEPHONE (OUTPATIENT)
Dept: PULMONOLOGY | Facility: CLINIC | Age: 88
End: 2023-06-07

## 2023-06-15 ENCOUNTER — TELEPHONE (OUTPATIENT)
Dept: RHEUMATOLOGY | Facility: CLINIC | Age: 88
End: 2023-06-15

## 2023-06-15 RX ORDER — DULOXETIN HYDROCHLORIDE 20 MG/1
20 CAPSULE, DELAYED RELEASE ORAL DAILY
Qty: 30 CAPSULE | Refills: 0 | Status: SHIPPED | OUTPATIENT
Start: 2023-06-15

## 2023-06-15 NOTE — TELEPHONE ENCOUNTER
Per patient since she is taking Tylenol with Codeine she is having symptoms of Dizziness, confusion, blurry vision, constipation and dry mouth, would like to talk to nurses as soon as possible.

## 2023-06-15 NOTE — TELEPHONE ENCOUNTER
Patient notified. She will try the medication. She will watch out for the side effects listed below and let us know if she experiences any of these.

## 2023-06-26 ENCOUNTER — TELEPHONE (OUTPATIENT)
Dept: RHEUMATOLOGY | Facility: CLINIC | Age: 88
End: 2023-06-26

## 2023-06-29 ENCOUNTER — TELEPHONE (OUTPATIENT)
Dept: RHEUMATOLOGY | Facility: CLINIC | Age: 88
End: 2023-06-29

## 2023-06-29 RX ORDER — TRAMADOL HYDROCHLORIDE 50 MG/1
50 TABLET ORAL 2 TIMES DAILY PRN
Qty: 60 TABLET | Refills: 0 | Status: SHIPPED | OUTPATIENT
Start: 2023-06-29

## 2023-07-31 ENCOUNTER — TELEPHONE (OUTPATIENT)
Dept: PULMONOLOGY | Facility: CLINIC | Age: 88
End: 2023-07-31

## 2023-07-31 NOTE — TELEPHONE ENCOUNTER
AZITHROMYCIN  MG DONNIE  Qty 6  Take 2 tablets by mouth on day 1 then take 1 tablet daily on days 2-5    RX [id number]

## 2023-08-01 NOTE — TELEPHONE ENCOUNTER
Spoke with patient regarding refill request. Patient states she did not request medication. Nothing else needed from pulmonary office.

## 2023-08-14 ENCOUNTER — OFFICE VISIT (OUTPATIENT)
Dept: DERMATOLOGY CLINIC | Facility: CLINIC | Age: 88
End: 2023-08-14

## 2023-08-14 DIAGNOSIS — B07.9 VERRUCA(E): Primary | ICD-10-CM

## 2023-08-14 DIAGNOSIS — D23.9 BENIGN NEOPLASM OF SKIN, UNSPECIFIED LOCATION: ICD-10-CM

## 2023-08-14 PROCEDURE — 99213 OFFICE O/P EST LOW 20 MIN: CPT | Performed by: DERMATOLOGY

## 2023-08-14 PROCEDURE — 1126F AMNT PAIN NOTED NONE PRSNT: CPT | Performed by: DERMATOLOGY

## 2023-08-21 NOTE — PROGRESS NOTES
Rodolfo Dexter is a 80year old female. HPI:     CC:  Patient presents with:  Warts: LOV 10/2022. Pt presents for warts to the 9 to the left hand and one on the right. She has had cryo in the past with Imiquimod (ALDARA) 5 % External Cream (filled in feb) Pt feels the cryo and topical did not help. Allergies:  Patient has no known allergies.     HISTORY:    Past Medical History:   Diagnosis Date    Arthritis     Back problem     multiple spinal injections    Cataract     bilateral cataract surgery    Cataracts, bilateral     cataracts, [facility] [address] [facility] Dr. Doherty Officer in [place], [place]    Colon polyp 3/24/2006    small polyps    Essential hypertension     Osteoarthritis     Other and unspecified hyperlipidemia     Other ill-defined conditions(799.89)     Left wrist trauma, surgical repair    Posterior capsule opacification     OS, YAG laser 2014    Ptosis of right eyelid 2014    Ptosis RUL      Past Surgical History:   Procedure Laterality Date    CATARACT      cataract extraction    CATARACT Left 1997    Phaco w/PC IOL    CATARACT Right 1998    Phaco w/ PC [facility] Rodgers IOL/ Dr. Luiz Wheeler / Dr. Alex Jackson  97/0 per NG    HYSTERECTOMY      Partial    LAPAROSCOPIC CHOLECYSTECTOMY  05/27/16    OTHER SURGICAL HISTORY      Left wrist trauma, surgical repair    OTHER SURGICAL HISTORY  05/27/16    Laparoscopy with bilateral salpingo-oophorectomy    SYNVISC, INTRA-ARTICULAR Bilateral 2013    Synvisc injection bilateral knees    TOTAL HIP REPLACEMENT      bilateral hip replacements    YAG CAPSULOTOMY - OS - LEFT EYE Left 1998    Dr. Doherty Officer      Family History   Problem Relation Age of Onset    Ear Problems Mother         hearing loss    Other (Other) Father         emphysema    Diabetes Neg     Glaucoma Neg Macular degeneration Neg       Social History     Socioeconomic History    Marital status:    Occupational History    Occupation: homemaker   Tobacco Use    Smoking status: Never    Smokeless tobacco: Never   Vaping Use    Vaping Use: Never used   Substance and Sexual Activity    Alcohol use: No     Alcohol/week: 0.0 standard drinks of alcohol    Drug use: No    Sexual activity: Never   Other Topics Concern    Caffeine Concern Yes     Comment: coffee, 1 cup daily    Grew up on a farm No    History of tanning No    Outdoor occupation No    Pt has a pacemaker No    Pt has a defibrillator No    Breast feeding No    Reaction to local anesthetic No        Current Outpatient Medications   Medication Sig Dispense Refill    Misc. Devices Does not apply Misc 19% salicylic acid with 2% 5-fluorouracil. Apply to warts daily as instructed 1 each 2    traMADol 50 MG Oral Tab Take 1 tablet (50 mg total) by mouth 2 (two) times daily as needed for Pain. 60 tablet 0    acetaminophen-codeine (TYLENOL WITH CODEINE #3) 300-30 MG Oral Tab Take 1 tablet by mouth daily as needed for Pain. 30 tablet 0    AMLODIPINE 5 MG Oral Tab Take 1 tablet (5 mg total) by mouth daily. 90 tablet 1    levothyroxine (SYNTHROID) 50 MCG Oral Tab Take 1 tablet (50 mcg total) by mouth before breakfast. 30 tablet 5    TRIAMTERENE-HYDROCHLOROTHIAZIDE 37.5-25 MG Oral Cap Take 1 capsule by mouth daily. 90 capsule 1    Imiquimod (ALDARA) 5 % External Cream Apply 1 Application. topically nightly. 24 each 1    ZOLPIDEM 10 MG Oral Tab TAKE 1 TABLET BY MOUTH DAILY AT BEDTIME AS NEEDED 90 tablet 1    FLOWFLEX COVID-19 AG HOME TEST In Vitro Kit       celecoxib 200 MG Oral Cap Take 1 capsule (200 mg total) by mouth daily.  90 capsule 3    FLUTICASONE PROPIONATE 50 MCG/ACT Nasal Suspension USE 2 SPRAYS IN EACH NOSTRIL DAILY 16 g 5    DM-APAP-CPM (CORICIDIN HBP) -2 MG Oral Tab       predniSONE 20 MG Oral Tab Take 2 tablets (40MG) daily for 3 days and then take 1 tablet (20MG) daily for 4 days. 10 tablet 0    predniSONE 20 MG Oral Tab 2 tabs daily for 3 days then one tab daily until completed (Patient not taking: Reported on 2/1/2023) 10 tablet 0     Allergies:   No Known Allergies    Past Medical History:   Diagnosis Date    Arthritis     Back problem     multiple spinal injections    Cataract     bilateral cataract surgery    Cataracts, bilateral     cataracts, [facility] in [place], [place]    Colon polyp 3/24/2006    small polyps    Essential hypertension     Osteoarthritis     Other and unspecified hyperlipidemia     Other ill-defined conditions(799.89)     Left wrist trauma, surgical repair    Posterior capsule opacification     OS, YAG laser 2014    Ptosis of right eyelid 2014    Ptosis RUL     Past Surgical History:   Procedure Laterality Date    CATARACT      cataract extraction    CATARACT Left 1997    Phaco w/PC IOL    CATARACT Right 1998    Phaco w/ PC Chante Sacks IOL/ Dr. Cari Main / Dr. Iggy Piper per NG    HYSTERECTOMY      Partial    LAPAROSCOPIC CHOLECYSTECTOMY  05/27/16    OTHER SURGICAL HISTORY      Left wrist trauma, surgical repair    OTHER SURGICAL HISTORY  05/27/16    Laparoscopy with bilateral salpingo-oophorectomy    SYNVISC, INTRA-ARTICULAR Bilateral 2013    Synvisc injection bilateral knees    TOTAL HIP REPLACEMENT      bilateral hip replacements    YAG CAPSULOTOMY - OS - LEFT EYE Left 1998    Dr. Shannon Andrade History    Socioeconomic History      Marital status:        Spouse name: Not on file      Number of children: Not on file      Years of education: Not on file      Highest education level: Not on file    Occupational History      Occupation: homemaker    Tobacco Use      Smoking status: Never      Smokeless tobacco: Never    Vaping Use      Vaping Use: Never used    Substance and Sexual Activity      Alcohol use: No        Alcohol/week: 0.0 standard drinks of alcohol      Drug use: No      Sexual activity: Never    Other Topics      Concerns:         Service: Not Asked        Blood Transfusions: Not Asked        Caffeine Concern: Yes          coffee, 1 cup daily        Occupational Exposure: Not Asked        Hobby Hazards: Not Asked        Sleep Concern: Not Asked        Stress Concern: Not Asked        Weight Concern: Not Asked        Special Diet: Not Asked        Back Care: Not Asked        Exercise: Not Asked        Bike Helmet: Not Asked        Seat Belt: Not Asked        Self-Exams: Not Asked        Grew up on a farm: No        History of tanning: No        Outdoor occupation: No        Pt has a pacemaker: No        Pt has a defibrillator: No        Breast feeding: No        Reaction to local anesthetic: No        Left Handed: Not Asked        Right Handed: Not Asked        Currently spends a great deal of time in the sun: Not Asked        Past Sunlamp Treatments for Acne: Not Asked        Hx of Spending [place] of Time in Shock: Not Asked        Bad sunburns in the past: Not Asked        Tanning Salons in the Past: Not Asked        Hx of Radiation Treatments: Not Asked        Regular use of sun block: Not Asked    Social History Narrative      Not on file    Social Determinants of Health  Financial Resource Strain: Not on file  Food Insecurity: Not on file  Transportation Needs: Not on file  Physical Activity: Not on file  Stress: Not on file  Social Connections: Not on file  Housing Stability: Not on file    Family History   Problem Relation Age of Onset    Ear Problems Mother         hearing loss    Other (Other) Father         emphysema    Diabetes Neg     Glaucoma Neg     Macular degeneration Neg        There were no vitals filed for this visit. HPI:    Patient presents with:  Warts: LOV 10/2022. Pt presents for warts to the 9 to the left hand and one on the right. She has had cryo in the past with Imiquimod (ALDARA) 5 % External Cream (filled in feb) Pt feels the cryo and topical did not help. Follow-up patient with history of warts left hand right hand. Treated with cryo. Without much improvement with NK, has used imiquimod over several months without much change. Lesions or not really particular bothersome. Patient concerned as she does play bridge frequently these have seemed to be more prominent. No one has commented but patient very self-conscious. Presently living in senior living. No significant housework not irritated by other issues have not really spread have been present for several years    Past notes/ records and appropriate/relevant lab results including pathology and past body maps reviewed. Updated and new information noted in current visit. Patient presents with concerns above. Patient has been in their usual state of health. History, medications, allergies reviewed as noted. ROS:  Denies any other systemic complaints. No new or changeing lesions other than noted above. No fevers, chills, night sweats, unusual sun sensitivity. No other skin complaints. History, medications, allergies reviewed as noted. Physical Examination:     Well-developed well-nourished patient alert oriented in no acute distress. Exam total-body performed, including scalp, head, neck, face,nails, hair, external eyes, including conjunctival mucosa, eyelids, lips external ears, back, chest,/ breasts, axillae,  abdomen, arms, legs, palms. Multiple light to medium brown, well marginated, uniformly pigmented, macules and papules 6 mm and less scattered on exam. pigmented lesions examined with dermoscopy benign-appearing patterns. Waxy tannish keratotic papules scattered, cherry-red vascular papules scattered. See map today's date for lesions noted .       Otherwise remarkable for lesions as noted on map. See details of examination  See Assessment /Plan for additional history and physical exam also:    Assessment / plan:    No orders of the defined types were placed in this encounter. Meds & Refills for this Visit:  Requested Prescriptions     Signed Prescriptions Disp Refills    Misc. Devices Does not apply Misc 1 each 2     Si% salicylic acid with 2% 5-fluorouracil. Apply to warts daily as instructed         Jose J(natalya)  (primary encounter diagnosis)  Benign neoplasm of skin, unspecified location    See details on map. Remarkable for:    Warts 8-12 warts over the dorsal hands. Really not bothersome discussed option of monitoring, using over-the-counter salicylic acid's, cryo would recommend coming in every 3 weeks for treatment versus trial of compound with 47% salicylic acid 2% fluorouracil from lumbar pharmacy patient agreeable to this. We will try it on several of the more prominent warts that are causing more concern to her. Given patient's age number of lesions location would not recommend surgical management since they are not particularly symptomatic or bothersome or spreading. Reassurance given. Okay to cover and bandage these. Currently are not interfering with her function including playing bridge frequently    They will update us over the next couple of months discussed with patient and her daughter  History of chondrodermatitis nodularis helicis stable observe. Multiple other benign keratoses on back no suspicious lesions noted    Multiple benign keratoses lentigines no suspicious lesions. Please refer to map for specific lesions. See additional diagnoses. Pros cons of various therapies, risks benefits discussed. Pathophysiology discussed with patient. Therapeutic options reviewed. See  Medications in grid. Instructions reviewed at length.     Benign nevi, seborrheic  keratoses, cherry angiomas:  Reassurance regarding other benign skin lesions. Signs and symptoms of skin cancer, ABCDE's of melanoma discussed with patient. Sunscreen use, sun protection, self exams reviewed. Followup as noted RTC routine checkup 6 mos - one year or p.r.n. The patient indicates understanding of these issues and agrees to the plan. The patient is asked to return as noted in follow-up/ above. This note was generated using Dragon voice recognition software. Please contact me regarding any confusion resulting from errors in recognition. Encounter Times   Including precharting, reviewing chart, prior notes obtaining history: 10 minutes, medical exam :10 minutes, notes on body map, plan, counseling 10minutes My total time spent caring for the patient on the day of the encounter: 30 minutes    Note to patient and family: [facility] makes medical notes like these available to patients. However, be advised this is a medical document. It is intended as rykn-lw-wahz communication and monitoring of a patient's care needs. It is written in medical language and may contain abbreviations or verbiage that are unfamiliar. It may appear blunt or direct. Medical documents are intended to carry relevant information, facts as evident and the clinical opinion of the practitioner.

## 2023-08-22 NOTE — TELEPHONE ENCOUNTER
LOV: 6/5/23  Last Refilled:#60, 0rfs 6/29/23    No future appointments. 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 No synovitis or swelling on exam  - She was seen by orthopedic in February, cortisone injection was given left shoulder but continues to have pain. Also physical therapy with minimal improvement. They recommended shoulder surgery but too high risk for her age  - Currently on Tylenol 5 mg 6 pills a day and Celebrex to her milligrams a day. Only helped slightly  - Discussed with patient that her options are limited. We will try Tylenol with codeine 1 pill a day as needed. Advised to watch out for any dizziness lightheadedness or feeling sleepy. Her granddaughter will watch her while she takes it. - Could also consider Cymbalta     Thank you for allowing me to participate in this patients care. Pt will f/u as needed      Diana Dyer MD  6/5/2023  2:12 PM  Please advise.

## 2023-08-23 RX ORDER — TRAMADOL HYDROCHLORIDE 50 MG/1
50 TABLET ORAL 2 TIMES DAILY PRN
Qty: 60 TABLET | Refills: 0 | Status: SHIPPED | OUTPATIENT
Start: 2023-08-23

## 2023-08-29 NOTE — PROGRESS NOTES
The patient is a 77-year-old female who I know well from prior evaluation and comes in now for follow-up. She notes that she is doing fairly well except that she has had a lingering chest and head symptoms since after Daniel.   She got a [facility] which hel
[phone]

## 2023-09-16 DIAGNOSIS — G47.00 INSOMNIA, UNSPECIFIED: ICD-10-CM

## 2023-09-19 RX ORDER — ZOLPIDEM TARTRATE 10 MG/1
10 TABLET ORAL NIGHTLY PRN
Qty: 90 TABLET | Refills: 1 | Status: SHIPPED | OUTPATIENT
Start: 2023-09-19

## 2023-09-27 ENCOUNTER — TELEPHONE (OUTPATIENT)
Dept: PULMONOLOGY | Facility: CLINIC | Age: 88
End: 2023-09-27

## 2023-09-27 RX ORDER — PREDNISONE 20 MG/1
TABLET ORAL
Qty: 10 TABLET | Refills: 0 | Status: SHIPPED | OUTPATIENT
Start: 2023-09-27

## 2023-09-27 NOTE — TELEPHONE ENCOUNTER
Pt calling with complaints of scratchy throat, eyes watery and head congestion. She is requesting to speak with an RN.   Please call

## 2023-09-27 NOTE — TELEPHONE ENCOUNTER
RN spoke with patient, informed them of Dr. Michael Eddy message and order. Patient agreeable and verbalized understanding. Telephone with readback order for prednisone sent to patient's pharmacy of choice. Informed patient to call pulmonary clinic for any future questions or concerns.

## 2023-09-28 ENCOUNTER — TELEPHONE (OUTPATIENT)
Dept: PULMONOLOGY | Facility: CLINIC | Age: 88
End: 2023-09-28

## 2023-09-28 RX ORDER — CELECOXIB 200 MG/1
CAPSULE ORAL
Qty: 90 CAPSULE | Refills: 3 | OUTPATIENT
Start: 2023-09-28

## 2023-09-28 RX ORDER — CELECOXIB 200 MG/1
200 CAPSULE ORAL DAILY
Qty: 90 CAPSULE | Refills: 3 | Status: SHIPPED | OUTPATIENT
Start: 2023-09-28

## 2023-09-28 NOTE — TELEPHONE ENCOUNTER
Spoke with patient, states she tested positive for covid today. Feels same as yesterday - head congestion. Also requesting refill of Celebrex medication.     Dr. Chayito Garcias - See patient message, and please review/sign pended refill

## 2023-09-28 NOTE — TELEPHONE ENCOUNTER
Spoke to patient and informed of celecoxib prescription. Patient does not want paxlovid at this time but will call if she changes her mind. Only expierencing mild congestion.

## 2023-09-29 ENCOUNTER — TELEPHONE (OUTPATIENT)
Dept: PULMONOLOGY | Facility: CLINIC | Age: 88
End: 2023-09-29

## 2023-09-29 RX ORDER — AZITHROMYCIN 250 MG/1
TABLET, FILM COATED ORAL
Qty: 6 TABLET | Refills: 0 | OUTPATIENT
Start: 2023-09-29

## 2023-09-29 RX ORDER — LEVOTHYROXINE SODIUM 0.05 MG/1
50 TABLET ORAL
Qty: 30 TABLET | Refills: 5 | Status: SHIPPED | OUTPATIENT
Start: 2023-09-29

## 2023-09-29 NOTE — TELEPHONE ENCOUNTER
Pt states that she changed her mind, and that she does want to start Paxlovid. Pt would like to speak with an RN.   Please call

## 2023-09-29 NOTE — TELEPHONE ENCOUNTER
Spoke to pt re: Paxlovid. Per pt [facility] does not have Paxlovid, so they transferred script to [facility]. Explained she may d/w pharmacist taking prednisone w/ Paxlovid. Reassured her. She voiced understanding & was agreeable.

## 2023-09-30 NOTE — TELEPHONE ENCOUNTER
Pls see TE 9/28/23 for Dr. Yarelis Burgess. Reassured pt that there doesn't appear to be any clinically relevant drug interactions between prednisone & Paxlovid. She voiced understanding. Per pt pharmacist told her that he didn't know of any drug interactions between prednisone & Paxlovid.

## 2023-10-16 ENCOUNTER — TELEPHONE (OUTPATIENT)
Dept: PULMONOLOGY | Facility: CLINIC | Age: 88
End: 2023-10-16

## 2023-10-16 NOTE — TELEPHONE ENCOUNTER
Spoke with patient. Patient reports congestion in nose/throat, phlegm clear. Denies shortness of breath/fever/cough. Patient tried \"regular allegra\" but didn't help. Patient requesting medication to help with congestion.

## 2023-10-16 NOTE — TELEPHONE ENCOUNTER
Spoke with patient. Patient informed of Dr. Abraham Russian message below. Patient states she is using Flonase. Provided phone number for ENT [phone]. Patient verbalized understanding.

## 2023-10-16 NOTE — TELEPHONE ENCOUNTER
RN, please make sure that she is taking Flonase 1 spray each in each nostril twice daily. She may benefit from being evaluated by ENT. See if she has seen someone here in our department previously and see if you can facilitate follow-up.

## 2023-10-16 NOTE — TELEPHONE ENCOUNTER
Patient calling states congestion in nose and throat, wondering if able to send any medications, please call.

## 2023-10-26 RX ORDER — TRIAMTERENE AND HYDROCHLOROTHIAZIDE 37.5; 25 MG/1; MG/1
1 CAPSULE ORAL DAILY
Qty: 90 CAPSULE | Refills: 1 | Status: SHIPPED | OUTPATIENT
Start: 2023-10-26

## 2023-10-31 RX ORDER — TRAMADOL HYDROCHLORIDE 50 MG/1
50 TABLET ORAL 2 TIMES DAILY PRN
Qty: 60 TABLET | Refills: 0 | Status: SHIPPED | OUTPATIENT
Start: 2023-10-31

## 2023-10-31 NOTE — TELEPHONE ENCOUNTER
Requested Prescriptions     Pending Prescriptions Disp Refills    traMADol 50 MG Oral Tab 60 tablet 0     Sig: Take 1 tablet (50 mg total) by mouth 2 (two) times daily as needed for Pain. LF: 8/23/23 #60 TAB W/ 0 RF  LOV: 6/5/23   No future appointments. Labs:   Component      Latest Ref Rng 1/10/2023   WBC      4.0 - 11.0 x10(3) uL 17.3 (H)    RBC      3.80 - 5.30 x10(6)uL 4.39    Hemoglobin      12.0 - 16.0 g/dL 13.1    Hematocrit      35.0 - 48.0 % 40.1    MCV      80.0 - 100.0 fL 91.3    MCH      26.0 - 34.0 pg 29.8    MCHC      31.0 - 37.0 g/dL 32.7    RDW-SD      35.1 - 46.3 fL 46.9 (H)    RDW      11.0 - 15.0 % 13.8    Platelet Count      441.4 - 450.0 10(3)uL 483.0 (H)    Prelim Neutrophil Abs      1.50 - 7.70 x10 (3) uL 10.07 (H)    Neutrophils Absolute      1.50 - 7.70 x10(3) uL 10.07 (H)    Lymphocytes Absolute      1.00 - 4.00 x10(3) uL 4.94 (H)    Monocytes Absolute      0.10 - 1.00 x10(3) uL 1.87 (H)    Eosinophils Absolute      0.00 - 0.70 x10(3) uL 0.10    Basophils Absolute      0.00 - 0.20 x10(3) uL 0.07    Immature Granulocyte Absolute      0.00 - 1.00 x10(3) uL 0.21    Neutrophils %      % 58.4    Lymphocytes %      % 28.6    Monocytes %      % 10.8    Eosinophils %      % 0.6    Basophils %      % 0.4    Immature Granulocyte %      % 1.2       Component      Latest Ref Rng 4/3/2022   Glucose      70 - 99 mg/dL 68 (L)    Sodium      136 - 145 mmol/L 138    Potassium      3.5 - 5.1 mmol/L 5.2 (H)    Chloride      98 - 112 mmol/L 108    Carbon Dioxide, Total      21.0 - 32.0 mmol/L 25.0    ANION GAP      0 - 18 mmol/L 5    BUN      7 - 18 mg/dL 44 (H)    CREATININE      0.55 - 1.02 mg/dL 1.46 (H)    BUN/CREATININE RATIO      10.0 - 20.0  30.1 (H)    CALCIUM      8.5 - 10.1 mg/dL 9.3    CALCULATED OSMOLALITY      275 - 295 mOsm/kg 295    eGFR NON-AFR. AMERICAN      >=60  31 (L)    eGFR       >=60  36 (L)    Patient Fasting for BMP?  No       Legend:  (L) Low  (H) High  Legend:  (H) High  You were seen today for joint pain in your shoulder your lower spine  X-rays are showing severe osteoarthritis in your shoulder and your lower spine  This is from osteoarthritis, no evidence of rheumatoid arthritis  Take Tylenol 4 times a day and Celebrex  We will try Tylenol with codeine once a day, watch out for any dizziness, lightheadedness or feeling sleepy on the medication

## 2023-11-21 RX ORDER — AMLODIPINE BESYLATE 5 MG/1
5 TABLET ORAL DAILY
Qty: 90 TABLET | Refills: 3 | Status: SHIPPED | OUTPATIENT
Start: 2023-11-21

## 2023-12-04 ENCOUNTER — TELEPHONE (OUTPATIENT)
Dept: PULMONOLOGY | Facility: CLINIC | Age: 88
End: 2023-12-04

## 2023-12-04 NOTE — TELEPHONE ENCOUNTER
Pt states the nursing facility she is at will be administering tomorrow the pneumonia shot, shingles and HIV she is wondering if she needs to have any of these shots please call

## 2023-12-04 NOTE — TELEPHONE ENCOUNTER
Spoke with patient and informed of Dr. Carleen Nelson message below. Patient verbalized understanding.

## 2023-12-04 NOTE — TELEPHONE ENCOUNTER
Spoke with patient. Patient wondering if she needs to get the pneumonia vaccine, shingles, or RSV.    Last Pneumovax 23: 11/16/15  Zoster vaccine 3/10/13

## 2023-12-04 NOTE — TELEPHONE ENCOUNTER
The patient can get the shingles and the RSV. If she has already had 2 pneumonia vaccines in the past, she would not need another.

## 2024-01-01 ENCOUNTER — TELEPHONE (OUTPATIENT)
Dept: PULMONOLOGY | Facility: CLINIC | Age: 89
End: 2024-01-01

## 2024-01-01 ENCOUNTER — HOSPITAL ENCOUNTER (INPATIENT)
Facility: HOSPITAL | Age: 89
LOS: 2 days | End: 2024-01-01
Attending: STUDENT IN AN ORGANIZED HEALTH CARE EDUCATION/TRAINING PROGRAM | Admitting: INTERNAL MEDICINE
Payer: MEDICARE

## 2024-01-01 ENCOUNTER — APPOINTMENT (OUTPATIENT)
Dept: CT IMAGING | Facility: HOSPITAL | Age: 89
End: 2024-01-01
Attending: EMERGENCY MEDICINE
Payer: MEDICARE

## 2024-01-01 ENCOUNTER — APPOINTMENT (OUTPATIENT)
Dept: CT IMAGING | Facility: HOSPITAL | Age: 89
End: 2024-01-01
Attending: INTERNAL MEDICINE
Payer: MEDICARE

## 2024-01-01 VITALS
HEART RATE: 69 BPM | TEMPERATURE: 96 F | RESPIRATION RATE: 15 BRPM | DIASTOLIC BLOOD PRESSURE: 33 MMHG | WEIGHT: 149 LBS | OXYGEN SATURATION: 86 % | BODY MASS INDEX: 24.83 KG/M2 | SYSTOLIC BLOOD PRESSURE: 59 MMHG | HEIGHT: 65 IN

## 2024-01-01 DIAGNOSIS — E87.5 HYPERKALEMIA: Primary | ICD-10-CM

## 2024-01-01 LAB
ALBUMIN SERPL-MCNC: 4.1 G/DL (ref 3.2–4.8)
ALBUMIN SERPL-MCNC: 4.3 G/DL (ref 3.2–4.8)
ALBUMIN/GLOB SERPL: 1.7 {RATIO} (ref 1–2)
ALBUMIN/GLOB SERPL: 1.7 {RATIO} (ref 1–2)
ALP LIVER SERPL-CCNC: 95 U/L
ALP LIVER SERPL-CCNC: 95 U/L
ALT SERPL-CCNC: 15 U/L
ALT SERPL-CCNC: 19 U/L
ANION GAP SERPL CALC-SCNC: 7 MMOL/L (ref 0–18)
ANION GAP SERPL CALC-SCNC: 7 MMOL/L (ref 0–18)
ANION GAP SERPL CALC-SCNC: 8 MMOL/L (ref 0–18)
ANION GAP SERPL CALC-SCNC: 8 MMOL/L (ref 0–18)
ANTIBODY SCREEN: NEGATIVE
APTT PPP: 30.7 SECONDS (ref 23–36)
AST SERPL-CCNC: 23 U/L (ref ?–34)
AST SERPL-CCNC: 28 U/L (ref ?–34)
ATRIAL RATE: 81 BPM
BASOPHILS # BLD AUTO: 0.03 X10(3) UL (ref 0–0.2)
BASOPHILS NFR BLD AUTO: 0.3 %
BASOPHILS NFR BLD AUTO: 0.4 %
BASOPHILS NFR BLD AUTO: 0.4 %
BILIRUB SERPL-MCNC: 0.3 MG/DL (ref 0.2–0.9)
BILIRUB SERPL-MCNC: 0.4 MG/DL (ref 0.2–0.9)
BILIRUB UR QL: NEGATIVE
BUN BLD-MCNC: 32 MG/DL (ref 9–23)
BUN BLD-MCNC: 33 MG/DL (ref 9–23)
BUN BLD-MCNC: 34 MG/DL (ref 9–23)
BUN BLD-MCNC: 36 MG/DL (ref 9–23)
BUN/CREAT SERPL: 26.4 (ref 10–20)
BUN/CREAT SERPL: 27.4 (ref 10–20)
BUN/CREAT SERPL: 28.7 (ref 10–20)
BUN/CREAT SERPL: 32.4 (ref 10–20)
CALCIUM BLD-MCNC: 10 MG/DL (ref 8.7–10.4)
CALCIUM BLD-MCNC: 10.1 MG/DL (ref 8.7–10.4)
CALCIUM BLD-MCNC: 9.5 MG/DL (ref 8.7–10.4)
CALCIUM BLD-MCNC: 9.9 MG/DL (ref 8.7–10.4)
CHLORIDE SERPL-SCNC: 110 MMOL/L (ref 98–112)
CHLORIDE SERPL-SCNC: 111 MMOL/L (ref 98–112)
CHLORIDE SERPL-SCNC: 111 MMOL/L (ref 98–112)
CHLORIDE SERPL-SCNC: 112 MMOL/L (ref 98–112)
CLARITY UR: CLEAR
CO2 SERPL-SCNC: 21 MMOL/L (ref 21–32)
CO2 SERPL-SCNC: 21 MMOL/L (ref 21–32)
CO2 SERPL-SCNC: 24 MMOL/L (ref 21–32)
CO2 SERPL-SCNC: 25 MMOL/L (ref 21–32)
COLOR UR: COLORLESS
CREAT BLD-MCNC: 1.11 MG/DL
CREAT BLD-MCNC: 1.15 MG/DL
CREAT BLD-MCNC: 1.17 MG/DL
CREAT BLD-MCNC: 1.29 MG/DL
CREAT UR-SCNC: 7.2 MG/DL
DEPRECATED RDW RBC AUTO: 55.7 FL (ref 35.1–46.3)
DEPRECATED RDW RBC AUTO: 56.6 FL (ref 35.1–46.3)
DEPRECATED RDW RBC AUTO: 56.8 FL (ref 35.1–46.3)
EGFRCR SERPLBLD CKD-EPI 2021: 38 ML/MIN/1.73M2 (ref 60–?)
EGFRCR SERPLBLD CKD-EPI 2021: 43 ML/MIN/1.73M2 (ref 60–?)
EGFRCR SERPLBLD CKD-EPI 2021: 44 ML/MIN/1.73M2 (ref 60–?)
EGFRCR SERPLBLD CKD-EPI 2021: 45 ML/MIN/1.73M2 (ref 60–?)
EOSINOPHIL # BLD AUTO: 0.03 X10(3) UL (ref 0–0.7)
EOSINOPHIL # BLD AUTO: 0.03 X10(3) UL (ref 0–0.7)
EOSINOPHIL # BLD AUTO: 0.05 X10(3) UL (ref 0–0.7)
EOSINOPHIL NFR BLD AUTO: 0.3 %
EOSINOPHIL NFR BLD AUTO: 0.4 %
EOSINOPHIL NFR BLD AUTO: 0.7 %
ERYTHROCYTE [DISTWIDTH] IN BLOOD BY AUTOMATED COUNT: 16 % (ref 11–15)
ERYTHROCYTE [DISTWIDTH] IN BLOOD BY AUTOMATED COUNT: 16.1 % (ref 11–15)
ERYTHROCYTE [DISTWIDTH] IN BLOOD BY AUTOMATED COUNT: 16.1 % (ref 11–15)
GLOBULIN PLAS-MCNC: 2.4 G/DL (ref 2–3.5)
GLOBULIN PLAS-MCNC: 2.5 G/DL (ref 2–3.5)
GLUCOSE BLD-MCNC: 106 MG/DL (ref 70–99)
GLUCOSE BLD-MCNC: 113 MG/DL (ref 70–99)
GLUCOSE BLD-MCNC: 78 MG/DL (ref 70–99)
GLUCOSE BLD-MCNC: 81 MG/DL (ref 70–99)
GLUCOSE BLDC GLUCOMTR-MCNC: 111 MG/DL (ref 70–99)
GLUCOSE BLDC GLUCOMTR-MCNC: 88 MG/DL (ref 70–99)
GLUCOSE UR-MCNC: NORMAL MG/DL
HCT VFR BLD AUTO: 32.1 %
HCT VFR BLD AUTO: 33 %
HCT VFR BLD AUTO: 33.7 %
HGB BLD-MCNC: 10.1 G/DL
HGB BLD-MCNC: 10.3 G/DL
HGB BLD-MCNC: 10.5 G/DL
HGB UR QL STRIP.AUTO: NEGATIVE
IMM GRANULOCYTES # BLD AUTO: 0.03 X10(3) UL (ref 0–1)
IMM GRANULOCYTES # BLD AUTO: 0.04 X10(3) UL (ref 0–1)
IMM GRANULOCYTES # BLD AUTO: 0.04 X10(3) UL (ref 0–1)
IMM GRANULOCYTES NFR BLD: 0.3 %
IMM GRANULOCYTES NFR BLD: 0.5 %
IMM GRANULOCYTES NFR BLD: 0.6 %
INR BLD: 0.97 (ref 0.8–1.2)
KETONES UR-MCNC: NEGATIVE MG/DL
LEUKOCYTE ESTERASE UR QL STRIP.AUTO: NEGATIVE
LYMPHOCYTES # BLD AUTO: 1.46 X10(3) UL (ref 1–4)
LYMPHOCYTES # BLD AUTO: 1.86 X10(3) UL (ref 1–4)
LYMPHOCYTES # BLD AUTO: 2.34 X10(3) UL (ref 1–4)
LYMPHOCYTES NFR BLD AUTO: 18.3 %
LYMPHOCYTES NFR BLD AUTO: 26.4 %
LYMPHOCYTES NFR BLD AUTO: 26.7 %
MCH RBC QN AUTO: 29.3 PG (ref 26–34)
MCH RBC QN AUTO: 29.8 PG (ref 26–34)
MCH RBC QN AUTO: 30.8 PG (ref 26–34)
MCHC RBC AUTO-ENTMCNC: 30.6 G/DL (ref 31–37)
MCHC RBC AUTO-ENTMCNC: 31.5 G/DL (ref 31–37)
MCHC RBC AUTO-ENTMCNC: 31.8 G/DL (ref 31–37)
MCV RBC AUTO: 94.7 FL
MCV RBC AUTO: 96 FL
MCV RBC AUTO: 96.8 FL
MONOCYTES # BLD AUTO: 0.83 X10(3) UL (ref 0.1–1)
MONOCYTES # BLD AUTO: 0.98 X10(3) UL (ref 0.1–1)
MONOCYTES # BLD AUTO: 1.01 X10(3) UL (ref 0.1–1)
MONOCYTES NFR BLD AUTO: 11.2 %
MONOCYTES NFR BLD AUTO: 11.8 %
MONOCYTES NFR BLD AUTO: 12.7 %
MRSA DNA SPEC QL NAA+PROBE: NEGATIVE
NEUTROPHILS # BLD AUTO: 4.23 X10 (3) UL (ref 1.5–7.7)
NEUTROPHILS # BLD AUTO: 4.23 X10(3) UL (ref 1.5–7.7)
NEUTROPHILS # BLD AUTO: 5.35 X10 (3) UL (ref 1.5–7.7)
NEUTROPHILS # BLD AUTO: 5.35 X10(3) UL (ref 1.5–7.7)
NEUTROPHILS # BLD AUTO: 5.39 X10 (3) UL (ref 1.5–7.7)
NEUTROPHILS # BLD AUTO: 5.39 X10(3) UL (ref 1.5–7.7)
NEUTROPHILS NFR BLD AUTO: 60.1 %
NEUTROPHILS NFR BLD AUTO: 61.2 %
NEUTROPHILS NFR BLD AUTO: 67.7 %
NITRITE UR QL STRIP.AUTO: NEGATIVE
OSMOLALITY SERPL CALC.SUM OF ELEC: 297 MOSM/KG (ref 275–295)
OSMOLALITY SERPL CALC.SUM OF ELEC: 299 MOSM/KG (ref 275–295)
OSMOLALITY SERPL CALC.SUM OF ELEC: 301 MOSM/KG (ref 275–295)
OSMOLALITY SERPL CALC.SUM OF ELEC: 302 MOSM/KG (ref 275–295)
P AXIS: 105 DEGREES
P-R INTERVAL: 208 MS
PH UR: 6 [PH] (ref 5–8)
PLATELET # BLD AUTO: 259 10(3)UL (ref 150–450)
PLATELET # BLD AUTO: 277 10(3)UL (ref 150–450)
PLATELET # BLD AUTO: 289 10(3)UL (ref 150–450)
POTASSIUM SERPL-SCNC: 5.4 MMOL/L (ref 3.5–5.1)
POTASSIUM SERPL-SCNC: 5.6 MMOL/L (ref 3.5–5.1)
POTASSIUM SERPL-SCNC: 5.6 MMOL/L (ref 3.5–5.1)
POTASSIUM SERPL-SCNC: 6.3 MMOL/L (ref 3.5–5.1)
PROT SERPL-MCNC: 6.5 G/DL (ref 5.7–8.2)
PROT SERPL-MCNC: 6.8 G/DL (ref 5.7–8.2)
PROT UR-MCNC: NEGATIVE MG/DL
PROTHROMBIN TIME: 13.5 SECONDS (ref 11.6–14.8)
Q-T INTERVAL: 366 MS
QRS DURATION: 86 MS
QTC CALCULATION (BEZET): 425 MS
R AXIS: 21 DEGREES
RBC # BLD AUTO: 3.39 X10(6)UL
RBC # BLD AUTO: 3.41 X10(6)UL
RBC # BLD AUTO: 3.51 X10(6)UL
RH BLOOD TYPE: POSITIVE
RH BLOOD TYPE: POSITIVE
SODIUM SERPL-SCNC: 136 MMOL/L
SODIUM SERPL-SCNC: 140 MMOL/L (ref 136–145)
SODIUM SERPL-SCNC: 140 MMOL/L (ref 136–145)
SODIUM SERPL-SCNC: 142 MMOL/L (ref 136–145)
SODIUM SERPL-SCNC: 143 MMOL/L (ref 136–145)
SP GR UR STRIP: 1 (ref 1–1.03)
T AXIS: 58 DEGREES
TROPONIN I SERPL HS-MCNC: 7 NG/L
UROBILINOGEN UR STRIP-ACNC: NORMAL
VENTRICULAR RATE: 81 BPM
WBC # BLD AUTO: 7 X10(3) UL (ref 4–11)
WBC # BLD AUTO: 8 X10(3) UL (ref 4–11)
WBC # BLD AUTO: 8.8 X10(3) UL (ref 4–11)

## 2024-01-01 PROCEDURE — 70450 CT HEAD/BRAIN W/O DYE: CPT | Performed by: INTERNAL MEDICINE

## 2024-01-01 PROCEDURE — 70496 CT ANGIOGRAPHY HEAD: CPT | Performed by: EMERGENCY MEDICINE

## 2024-01-01 PROCEDURE — 3E03317 INTRODUCTION OF OTHER THROMBOLYTIC INTO PERIPHERAL VEIN, PERCUTANEOUS APPROACH: ICD-10-PCS | Performed by: INTERNAL MEDICINE

## 2024-01-01 PROCEDURE — 70498 CT ANGIOGRAPHY NECK: CPT | Performed by: EMERGENCY MEDICINE

## 2024-01-01 PROCEDURE — 99223 1ST HOSP IP/OBS HIGH 75: CPT | Performed by: INTERNAL MEDICINE

## 2024-01-01 PROCEDURE — 70450 CT HEAD/BRAIN W/O DYE: CPT | Performed by: EMERGENCY MEDICINE

## 2024-01-01 PROCEDURE — 99232 SBSQ HOSP IP/OBS MODERATE 35: CPT | Performed by: INTERNAL MEDICINE

## 2024-01-01 RX ORDER — HYDRALAZINE HYDROCHLORIDE 20 MG/ML
10 INJECTION INTRAMUSCULAR; INTRAVENOUS EVERY 2 HOUR PRN
Status: DISCONTINUED | OUTPATIENT
Start: 2024-01-01 | End: 2024-01-01

## 2024-01-01 RX ORDER — METHYLPREDNISOLONE SODIUM SUCCINATE 125 MG/2ML
125 INJECTION INTRAMUSCULAR; INTRAVENOUS ONCE AS NEEDED
Status: ACTIVE | OUTPATIENT
Start: 2024-01-01 | End: 2024-01-01

## 2024-01-01 RX ORDER — METOCLOPRAMIDE HYDROCHLORIDE 5 MG/ML
5 INJECTION INTRAMUSCULAR; INTRAVENOUS EVERY 8 HOURS PRN
Status: DISCONTINUED | OUTPATIENT
Start: 2024-01-01 | End: 2024-01-01

## 2024-01-01 RX ORDER — DIPHENHYDRAMINE HYDROCHLORIDE 50 MG/ML
50 INJECTION INTRAMUSCULAR; INTRAVENOUS ONCE AS NEEDED
Status: ACTIVE | OUTPATIENT
Start: 2024-01-01 | End: 2024-01-01

## 2024-01-01 RX ORDER — MORPHINE SULFATE 2 MG/ML
2 INJECTION, SOLUTION INTRAMUSCULAR; INTRAVENOUS
Status: DISCONTINUED | OUTPATIENT
Start: 2024-01-01 | End: 2024-01-01

## 2024-01-01 RX ORDER — GABAPENTIN 100 MG/1
100 CAPSULE ORAL NIGHTLY
Status: DISCONTINUED | OUTPATIENT
Start: 2024-01-01 | End: 2024-01-01

## 2024-01-01 RX ORDER — FAMOTIDINE 10 MG/ML
20 INJECTION, SOLUTION INTRAVENOUS ONCE AS NEEDED
Status: ACTIVE | OUTPATIENT
Start: 2024-01-01 | End: 2024-01-01

## 2024-01-01 RX ORDER — LEVOTHYROXINE SODIUM 50 UG/1
50 TABLET ORAL
Status: DISCONTINUED | OUTPATIENT
Start: 2024-01-01 | End: 2024-01-01

## 2024-01-01 RX ORDER — LABETALOL HYDROCHLORIDE 5 MG/ML
20 INJECTION, SOLUTION INTRAVENOUS ONCE
Status: COMPLETED | OUTPATIENT
Start: 2024-01-01 | End: 2024-01-01

## 2024-01-01 RX ORDER — DEXTROSE MONOHYDRATE 25 G/50ML
50 INJECTION, SOLUTION INTRAVENOUS ONCE
Status: COMPLETED | OUTPATIENT
Start: 2024-01-01 | End: 2024-01-01

## 2024-01-01 RX ORDER — LABETALOL HYDROCHLORIDE 5 MG/ML
INJECTION, SOLUTION INTRAVENOUS
Status: COMPLETED
Start: 2024-01-01 | End: 2024-01-01

## 2024-01-01 RX ORDER — FLUTICASONE PROPIONATE 50 MCG
2 SPRAY, SUSPENSION (ML) NASAL DAILY
Status: DISCONTINUED | OUTPATIENT
Start: 2024-01-01 | End: 2024-01-01

## 2024-01-01 RX ORDER — HYDRALAZINE HYDROCHLORIDE 20 MG/ML
10 INJECTION INTRAMUSCULAR; INTRAVENOUS EVERY 6 HOURS PRN
Status: DISCONTINUED | OUTPATIENT
Start: 2024-01-01 | End: 2024-01-01

## 2024-01-01 RX ORDER — MORPHINE SULFATE 4 MG/ML
4 INJECTION, SOLUTION INTRAMUSCULAR; INTRAVENOUS
Status: DISCONTINUED | OUTPATIENT
Start: 2024-01-01 | End: 2024-01-01

## 2024-01-01 RX ORDER — ACETAMINOPHEN 500 MG
500 TABLET ORAL EVERY 6 HOURS PRN
Status: DISCONTINUED | OUTPATIENT
Start: 2024-01-01 | End: 2024-01-01

## 2024-01-01 RX ORDER — SODIUM CHLORIDE 9 MG/ML
INJECTION, SOLUTION INTRAVENOUS CONTINUOUS
Status: DISCONTINUED | OUTPATIENT
Start: 2024-01-01 | End: 2024-01-01

## 2024-01-01 RX ORDER — HYDRALAZINE HYDROCHLORIDE 25 MG/1
25 TABLET, FILM COATED ORAL EVERY 8 HOURS SCHEDULED
Status: DISCONTINUED | OUTPATIENT
Start: 2024-01-01 | End: 2024-01-01

## 2024-01-01 RX ORDER — ACETAMINOPHEN 325 MG/1
650 TABLET ORAL EVERY 4 HOURS PRN
Status: DISCONTINUED | OUTPATIENT
Start: 2024-01-01 | End: 2024-01-01

## 2024-01-01 RX ORDER — MORPHINE SULFATE 2 MG/ML
INJECTION, SOLUTION INTRAMUSCULAR; INTRAVENOUS
Status: DISCONTINUED
Start: 2024-01-01 | End: 2024-01-01

## 2024-01-01 RX ORDER — ONDANSETRON 2 MG/ML
4 INJECTION INTRAMUSCULAR; INTRAVENOUS EVERY 6 HOURS PRN
Status: DISCONTINUED | OUTPATIENT
Start: 2024-01-01 | End: 2024-01-01

## 2024-01-01 RX ORDER — METOPROLOL TARTRATE 25 MG/1
25 TABLET, FILM COATED ORAL
Status: DISCONTINUED | OUTPATIENT
Start: 2024-01-01 | End: 2024-01-01

## 2024-01-01 RX ORDER — HEPARIN SODIUM 5000 [USP'U]/ML
5000 INJECTION, SOLUTION INTRAVENOUS; SUBCUTANEOUS EVERY 8 HOURS SCHEDULED
Status: DISCONTINUED | OUTPATIENT
Start: 2024-01-01 | End: 2024-01-01

## 2024-01-01 RX ORDER — ACETAMINOPHEN 650 MG/1
650 SUPPOSITORY RECTAL EVERY 4 HOURS PRN
Status: DISCONTINUED | OUTPATIENT
Start: 2024-01-01 | End: 2024-01-01

## 2024-01-01 RX ORDER — LABETALOL HYDROCHLORIDE 5 MG/ML
10 INJECTION, SOLUTION INTRAVENOUS EVERY 10 MIN PRN
Status: DISCONTINUED | OUTPATIENT
Start: 2024-01-01 | End: 2024-01-01

## 2024-01-01 RX ORDER — TRAMADOL HYDROCHLORIDE 50 MG/1
50 TABLET ORAL ONCE
Status: COMPLETED | OUTPATIENT
Start: 2024-01-01 | End: 2024-01-01

## 2024-01-01 RX ORDER — FUROSEMIDE 10 MG/ML
40 INJECTION INTRAMUSCULAR; INTRAVENOUS ONCE
Status: COMPLETED | OUTPATIENT
Start: 2024-01-01 | End: 2024-01-01

## 2024-01-01 RX ORDER — DEXTROSE MONOHYDRATE 25 G/50ML
25 INJECTION, SOLUTION INTRAVENOUS
Status: ACTIVE | OUTPATIENT
Start: 2024-01-01 | End: 2024-01-01

## 2024-01-01 RX ORDER — ZOLPIDEM TARTRATE 5 MG/1
5 TABLET ORAL NIGHTLY PRN
Status: DISCONTINUED | OUTPATIENT
Start: 2024-01-01 | End: 2024-01-01

## 2024-01-11 ENCOUNTER — APPOINTMENT (OUTPATIENT)
Dept: GENERAL RADIOLOGY | Facility: HOSPITAL | Age: 89
End: 2024-01-11
Attending: EMERGENCY MEDICINE
Payer: MEDICARE

## 2024-01-11 ENCOUNTER — HOSPITAL ENCOUNTER (INPATIENT)
Facility: HOSPITAL | Age: 89
LOS: 1 days | Discharge: HOME HEALTH | End: 2024-01-15
Attending: EMERGENCY MEDICINE | Admitting: INTERNAL MEDICINE
Payer: MEDICARE

## 2024-01-11 DIAGNOSIS — M25.512 ACUTE PAIN OF LEFT SHOULDER: Primary | ICD-10-CM

## 2024-01-11 LAB
ANION GAP SERPL CALC-SCNC: 13 MMOL/L (ref 0–18)
BASOPHILS # BLD AUTO: 0.03 X10(3) UL (ref 0–0.2)
BASOPHILS NFR BLD AUTO: 0.3 %
BUN BLD-MCNC: 39 MG/DL (ref 9–23)
BUN/CREAT SERPL: 34.8 (ref 10–20)
CALCIUM BLD-MCNC: 9.6 MG/DL (ref 8.7–10.4)
CHLORIDE SERPL-SCNC: 104 MMOL/L (ref 98–112)
CO2 SERPL-SCNC: 18 MMOL/L (ref 21–32)
CREAT BLD-MCNC: 1.12 MG/DL
DEPRECATED RDW RBC AUTO: 48.3 FL (ref 35.1–46.3)
EGFRCR SERPLBLD CKD-EPI 2021: 45 ML/MIN/1.73M2 (ref 60–?)
EOSINOPHIL # BLD AUTO: 0.08 X10(3) UL (ref 0–0.7)
EOSINOPHIL NFR BLD AUTO: 0.9 %
ERYTHROCYTE [DISTWIDTH] IN BLOOD BY AUTOMATED COUNT: 14.8 % (ref 11–15)
GLUCOSE BLD-MCNC: 114 MG/DL (ref 70–99)
HCT VFR BLD AUTO: 34.4 %
HGB BLD-MCNC: 11.2 G/DL
IMM GRANULOCYTES # BLD AUTO: 0.02 X10(3) UL (ref 0–1)
IMM GRANULOCYTES NFR BLD: 0.2 %
LYMPHOCYTES # BLD AUTO: 3.93 X10(3) UL (ref 1–4)
LYMPHOCYTES NFR BLD AUTO: 42.1 %
MCH RBC QN AUTO: 29.2 PG (ref 26–34)
MCHC RBC AUTO-ENTMCNC: 32.6 G/DL (ref 31–37)
MCV RBC AUTO: 89.8 FL
MONOCYTES # BLD AUTO: 0.9 X10(3) UL (ref 0.1–1)
MONOCYTES NFR BLD AUTO: 9.6 %
NEUTROPHILS # BLD AUTO: 4.38 X10 (3) UL (ref 1.5–7.7)
NEUTROPHILS # BLD AUTO: 4.38 X10(3) UL (ref 1.5–7.7)
NEUTROPHILS NFR BLD AUTO: 46.9 %
OSMOLALITY SERPL CALC.SUM OF ELEC: 290 MOSM/KG (ref 275–295)
PLATELET # BLD AUTO: 327 10(3)UL (ref 150–450)
POTASSIUM SERPL-SCNC: 5 MMOL/L (ref 3.5–5.1)
RBC # BLD AUTO: 3.83 X10(6)UL
SODIUM SERPL-SCNC: 135 MMOL/L (ref 136–145)
TROPONIN I SERPL HS-MCNC: 6 NG/L
WBC # BLD AUTO: 9.3 X10(3) UL (ref 4–11)

## 2024-01-11 PROCEDURE — 73030 X-RAY EXAM OF SHOULDER: CPT | Performed by: EMERGENCY MEDICINE

## 2024-01-11 RX ORDER — MORPHINE SULFATE 2 MG/ML
2 INJECTION, SOLUTION INTRAMUSCULAR; INTRAVENOUS ONCE
Status: COMPLETED | OUTPATIENT
Start: 2024-01-11 | End: 2024-01-12

## 2024-01-11 RX ORDER — MORPHINE SULFATE 2 MG/ML
2 INJECTION, SOLUTION INTRAMUSCULAR; INTRAVENOUS ONCE
Status: COMPLETED | OUTPATIENT
Start: 2024-01-11 | End: 2024-01-11

## 2024-01-12 ENCOUNTER — TELEPHONE (OUTPATIENT)
Dept: PULMONOLOGY | Facility: CLINIC | Age: 89
End: 2024-01-12

## 2024-01-12 ENCOUNTER — APPOINTMENT (OUTPATIENT)
Dept: CT IMAGING | Facility: HOSPITAL | Age: 89
End: 2024-01-12
Attending: STUDENT IN AN ORGANIZED HEALTH CARE EDUCATION/TRAINING PROGRAM
Payer: MEDICARE

## 2024-01-12 PROBLEM — R79.89 AZOTEMIA: Status: ACTIVE | Noted: 2024-01-12

## 2024-01-12 PROBLEM — E87.1 HYPONATREMIA: Status: ACTIVE | Noted: 2024-01-01

## 2024-01-12 PROBLEM — E87.20 METABOLIC ACIDOSIS: Status: ACTIVE | Noted: 2024-01-12

## 2024-01-12 PROBLEM — E87.1 HYPONATREMIA: Status: ACTIVE | Noted: 2024-01-12

## 2024-01-12 PROBLEM — D64.9 ANEMIA: Status: ACTIVE | Noted: 2024-01-01

## 2024-01-12 PROBLEM — R73.9 HYPERGLYCEMIA: Status: ACTIVE | Noted: 2024-01-12

## 2024-01-12 PROBLEM — D64.9 ANEMIA: Status: ACTIVE | Noted: 2024-01-12

## 2024-01-12 PROBLEM — M25.512 ACUTE PAIN OF LEFT SHOULDER: Status: ACTIVE | Noted: 2024-01-12

## 2024-01-12 PROBLEM — R79.89 AZOTEMIA: Status: ACTIVE | Noted: 2024-01-01

## 2024-01-12 PROBLEM — M25.512 ACUTE PAIN OF LEFT SHOULDER: Status: ACTIVE | Noted: 2024-01-01

## 2024-01-12 PROBLEM — R73.9 HYPERGLYCEMIA: Status: ACTIVE | Noted: 2024-01-01

## 2024-01-12 PROBLEM — E87.20 METABOLIC ACIDOSIS: Status: ACTIVE | Noted: 2024-01-01

## 2024-01-12 LAB
ANION GAP SERPL CALC-SCNC: 7 MMOL/L (ref 0–18)
ATRIAL RATE: 103 BPM
BASOPHILS # BLD AUTO: 0.02 X10(3) UL (ref 0–0.2)
BASOPHILS NFR BLD AUTO: 0.2 %
BUN BLD-MCNC: 33 MG/DL (ref 9–23)
BUN/CREAT SERPL: 32 (ref 10–20)
CALCIUM BLD-MCNC: 9.2 MG/DL (ref 8.7–10.4)
CHLORIDE SERPL-SCNC: 104 MMOL/L (ref 98–112)
CHLORIDE UR-SCNC: 128 MMOL/L
CO2 SERPL-SCNC: 22 MMOL/L (ref 21–32)
CREAT BLD-MCNC: 1.03 MG/DL
DEPRECATED RDW RBC AUTO: 49 FL (ref 35.1–46.3)
EGFRCR SERPLBLD CKD-EPI 2021: 50 ML/MIN/1.73M2 (ref 60–?)
EOSINOPHIL # BLD AUTO: 0.01 X10(3) UL (ref 0–0.7)
EOSINOPHIL NFR BLD AUTO: 0.1 %
ERYTHROCYTE [DISTWIDTH] IN BLOOD BY AUTOMATED COUNT: 14.6 % (ref 11–15)
GLUCOSE BLD-MCNC: 121 MG/DL (ref 70–99)
HCT VFR BLD AUTO: 31.9 %
HGB BLD-MCNC: 10.3 G/DL
IMM GRANULOCYTES # BLD AUTO: 0.03 X10(3) UL (ref 0–1)
IMM GRANULOCYTES NFR BLD: 0.3 %
LYMPHOCYTES # BLD AUTO: 0.9 X10(3) UL (ref 1–4)
LYMPHOCYTES NFR BLD AUTO: 9.2 %
MCH RBC QN AUTO: 29.3 PG (ref 26–34)
MCHC RBC AUTO-ENTMCNC: 32.3 G/DL (ref 31–37)
MCV RBC AUTO: 90.6 FL
MONOCYTES # BLD AUTO: 0.63 X10(3) UL (ref 0.1–1)
MONOCYTES NFR BLD AUTO: 6.5 %
NEUTROPHILS # BLD AUTO: 8.16 X10 (3) UL (ref 1.5–7.7)
NEUTROPHILS # BLD AUTO: 8.16 X10(3) UL (ref 1.5–7.7)
NEUTROPHILS NFR BLD AUTO: 83.7 %
OSMOLALITY SERPL CALC.SUM OF ELEC: 285 MOSM/KG (ref 275–295)
OSMOLALITY UR: 431 MOSM/KG (ref 300–1100)
P AXIS: 63 DEGREES
P-R INTERVAL: 212 MS
PLATELET # BLD AUTO: 282 10(3)UL (ref 150–450)
POTASSIUM SERPL-SCNC: 5 MMOL/L (ref 3.5–5.1)
Q-T INTERVAL: 344 MS
QRS DURATION: 84 MS
QTC CALCULATION (BEZET): 450 MS
R AXIS: 52 DEGREES
RBC # BLD AUTO: 3.52 X10(6)UL
SODIUM SERPL-SCNC: 121 MMOL/L
SODIUM SERPL-SCNC: 133 MMOL/L (ref 136–145)
T AXIS: 97 DEGREES
VENTRICULAR RATE: 103 BPM
WBC # BLD AUTO: 9.8 X10(3) UL (ref 4–11)

## 2024-01-12 PROCEDURE — 73200 CT UPPER EXTREMITY W/O DYE: CPT | Performed by: STUDENT IN AN ORGANIZED HEALTH CARE EDUCATION/TRAINING PROGRAM

## 2024-01-12 RX ORDER — BISACODYL 10 MG
10 SUPPOSITORY, RECTAL RECTAL
Status: DISCONTINUED | OUTPATIENT
Start: 2024-01-12 | End: 2024-01-15

## 2024-01-12 RX ORDER — HYDROCODONE BITARTRATE AND ACETAMINOPHEN 5; 325 MG/1; MG/1
1 TABLET ORAL EVERY 6 HOURS PRN
Status: DISCONTINUED | OUTPATIENT
Start: 2024-01-12 | End: 2024-01-15

## 2024-01-12 RX ORDER — TRAMADOL HYDROCHLORIDE 50 MG/1
50 TABLET ORAL 2 TIMES DAILY PRN
Status: DISCONTINUED | OUTPATIENT
Start: 2024-01-12 | End: 2024-01-15

## 2024-01-12 RX ORDER — ZOLPIDEM TARTRATE 5 MG/1
10 TABLET ORAL NIGHTLY PRN
Status: DISCONTINUED | OUTPATIENT
Start: 2024-01-12 | End: 2024-01-15

## 2024-01-12 RX ORDER — SODIUM CHLORIDE 9 MG/ML
INJECTION, SOLUTION INTRAVENOUS CONTINUOUS
Status: ACTIVE | OUTPATIENT
Start: 2024-01-12 | End: 2024-01-12

## 2024-01-12 RX ORDER — ONDANSETRON 2 MG/ML
4 INJECTION INTRAMUSCULAR; INTRAVENOUS EVERY 6 HOURS PRN
Status: DISCONTINUED | OUTPATIENT
Start: 2024-01-12 | End: 2024-01-15

## 2024-01-12 RX ORDER — POLYETHYLENE GLYCOL 3350 17 G/17G
17 POWDER, FOR SOLUTION ORAL DAILY PRN
Status: DISCONTINUED | OUTPATIENT
Start: 2024-01-12 | End: 2024-01-15

## 2024-01-12 RX ORDER — LABETALOL HYDROCHLORIDE 5 MG/ML
10 INJECTION, SOLUTION INTRAVENOUS EVERY 4 HOURS PRN
Status: DISCONTINUED | OUTPATIENT
Start: 2024-01-12 | End: 2024-01-15

## 2024-01-12 RX ORDER — SENNOSIDES 8.6 MG
17.2 TABLET ORAL NIGHTLY PRN
Status: DISCONTINUED | OUTPATIENT
Start: 2024-01-12 | End: 2024-01-15

## 2024-01-12 RX ORDER — LIDOCAINE HYDROCHLORIDE 10 MG/ML
5 INJECTION, SOLUTION EPIDURAL; INFILTRATION; INTRACAUDAL; PERINEURAL ONCE
Status: DISCONTINUED | OUTPATIENT
Start: 2024-01-12 | End: 2024-01-15

## 2024-01-12 RX ORDER — AMLODIPINE BESYLATE 5 MG/1
5 TABLET ORAL DAILY
Status: DISCONTINUED | OUTPATIENT
Start: 2024-01-12 | End: 2024-01-15

## 2024-01-12 RX ORDER — FLUTICASONE PROPIONATE 50 MCG
2 SPRAY, SUSPENSION (ML) NASAL DAILY
Status: DISCONTINUED | OUTPATIENT
Start: 2024-01-12 | End: 2024-01-15

## 2024-01-12 RX ORDER — ACETAMINOPHEN 500 MG
500 TABLET ORAL EVERY 4 HOURS PRN
Status: DISCONTINUED | OUTPATIENT
Start: 2024-01-12 | End: 2024-01-15

## 2024-01-12 RX ORDER — KETOROLAC TROMETHAMINE 15 MG/ML
15 INJECTION, SOLUTION INTRAMUSCULAR; INTRAVENOUS ONCE
Status: COMPLETED | OUTPATIENT
Start: 2024-01-12 | End: 2024-01-12

## 2024-01-12 RX ORDER — TRIAMTERENE AND HYDROCHLOROTHIAZIDE 37.5; 25 MG/1; MG/1
1 CAPSULE ORAL DAILY
Status: DISCONTINUED | OUTPATIENT
Start: 2024-01-12 | End: 2024-01-13

## 2024-01-12 RX ORDER — TRIAMCINOLONE ACETONIDE 40 MG/ML
40 INJECTION, SUSPENSION INTRA-ARTICULAR; INTRAMUSCULAR ONCE
Status: DISCONTINUED | OUTPATIENT
Start: 2024-01-12 | End: 2024-01-15

## 2024-01-12 RX ORDER — HEPARIN SODIUM 5000 [USP'U]/ML
5000 INJECTION, SOLUTION INTRAVENOUS; SUBCUTANEOUS EVERY 12 HOURS SCHEDULED
Status: DISCONTINUED | OUTPATIENT
Start: 2024-01-12 | End: 2024-01-13

## 2024-01-12 RX ORDER — LEVOTHYROXINE SODIUM 0.05 MG/1
50 TABLET ORAL
Status: DISCONTINUED | OUTPATIENT
Start: 2024-01-12 | End: 2024-01-15

## 2024-01-12 NOTE — H&P
[facility]    History & Physical    Elba Malik Patient Status:  Observation    1928 MRN [id number]   Location [facility] 4W/SW/SE Attending Kell Barlow MD   Hosp Day # 0 PCP Sharan Lopez MD     Date:  2024  Date of Admission:  2024    Chief Complaint:  Chief Complaint   Patient presents with    Pain       History of Present Illness:  Elba Malik is a(n) 89 year old female, who presents for evaluation of sudden onset of left shoulder pain.  Patient reports that she does have chronic pain to the left shoulder but has not been able to move her left shoulder prior to presentation.  Patient was reaching out for something and then all of a sudden developed left shoulder pain, no recent falls or trauma to the area.  She reports that she uses her walker mostly and has been using both of her shoulders a lot to prevent her chronic back pain from hurting even more. She apparently took some Ultram without any relief.  Denies any fever or chills.  Denies any weakness or numbness in the left hand as well as her right hand.  Denies any chest pain, shortness of breath.  Reports that she drinks about 3 glasses of water a day, no loss of appetite.  In the ED, initial vital signs reveal temp 98, heart rate 109, respiratory 23, blood pressure 233/95, SpO2 99% on room air.  Lab work significant for bicarb 18, creatinine 1.12, EGFR 34, WBC 9.3 . X-ray of the left shoulder reveals no fractures/no dislocation but there is a large shoulder effusion.  Patient was treated with multiple doses of morphine and after that became mildly hypoxic.  She was admitted under hospitalist service for pain control.    History:  Past Medical History:   Diagnosis Date    Arthritis     Back problem     multiple spinal injections    Cataract     bilateral cataract surgery    Cataracts, bilateral     cataracts, PC IOL  OD Dr. Tate,  PC IOL Dr. Concepcion in [place], OS    Colon polyp  3/24/2006    small polyps    Essential hypertension     Osteoarthritis     Other and unspecified hyperlipidemia     Other ill-defined conditions(799.89)     Left wrist trauma, surgical repair    Posterior capsule opacification     OS, YAG laser 2014    Ptosis of right eyelid 2014    Ptosis RUL     Past Surgical History:   Procedure Laterality Date    CATARACT      cataract extraction    CATARACT Left 1997    Phaco w/PC IOL    CATARACT Right 1998    Phaco w/ PC IOL    CATARACT EXTRACTION W/  INTRAOCULAR LENS IMPLANT Left 1997    PC IOL/ Dr. Pichardo    CATARACT EXTRACTION W/  INTRAOCULAR LENS IMPLANT Right 1998    PC IOL / Dr. Concepcion    CHOLECYSTECTOMY      COLONOSCOPY      HIP REPLACEMENT SURGERY  97/0 per NG    HYSTERECTOMY      Partial    LAPAROSCOPIC CHOLECYSTECTOMY  05/27/16    OTHER SURGICAL HISTORY      Left wrist trauma, surgical repair    OTHER SURGICAL HISTORY  05/27/16    Laparoscopy with bilateral salpingo-oophorectomy    SYNVISC, INTRA-ARTICULAR Bilateral 2013    Synvisc injection bilateral knees    TOTAL HIP REPLACEMENT      bilateral hip replacements    YAG CAPSULOTOMY - OS - LEFT EYE Left 1998    Dr. Concepcion     Family History   Problem Relation Age of Onset    Ear Problems Mother         hearing loss    Other (Other) Father         emphysema    Diabetes Neg     Glaucoma Neg     Macular degeneration Neg       reports that she has never smoked. She has never used smokeless tobacco. She reports that she does not drink alcohol and does not use drugs.    Allergies:  No Known Allergies    Home Medications:  Prior to Admission Medications   Prescriptions Last Dose Informant Patient Reported? Taking?   DM-APAP-CPM (CORICIDIN HBP) -2 MG Oral Tab   Yes No   FLOWFLEX COVID-19 AG HOME TEST In Vitro Kit   Yes No   FLUTICASONE PROPIONATE 50 MCG/ACT Nasal Suspension   No No   Sig: USE 2 SPRAYS IN EACH NOSTRIL DAILY   Imiquimod (ALDARA) 5 % External Cream   No No   Sig: Apply 1 Application. topically nightly.    Misc. Devices Does not apply Misc   No No   Si% salicylic acid with 2% 5-fluorouracil.  Apply to warts daily as instructed   acetaminophen-codeine (TYLENOL WITH CODEINE #3) 300-30 MG Oral Tab   No No   Sig: Take 1 tablet by mouth daily as needed for Pain.   amLODIPine 5 MG Oral Tab   No No   Sig: Take 1 tablet (5 mg total) by mouth daily.   celecoxib 200 MG Oral Cap   No No   Sig: Take 1 capsule (200 mg total) by mouth daily.   levothyroxine 50 MCG Oral Tab   No No   Sig: Take 1 tablet (50 mcg total) by mouth before breakfast.   predniSONE 20 MG Oral Tab   No No   Si tabs daily for 3 days then one tab daily until completed   Patient not taking: Reported on 2023   predniSONE 20 MG Oral Tab   No No   Sig: Take 2 tablets (40MG) daily for 3 days and then take 1 tablet (20MG) daily for 4 days.   predniSONE 20 MG Oral Tab   No No   Sig: Take 2 tablets (40MG) daily for 3 days and then take 1 tablet (20MG) daily for 4 days.   traMADol 50 MG Oral Tab   No No   Sig: Take 1 tablet (50 mg total) by mouth 2 (two) times daily as needed for Pain.   triamterene-hydroCHLOROthiazide 37.5-25 MG Oral Cap   No No   Sig: Take 1 capsule by mouth daily.   zolpidem 10 MG Oral Tab   No No   Sig: Take 1 tablet (10 mg total) by mouth nightly as needed for Sleep. AT BEDTIME      Facility-Administered Medications: None       Review of Systems:  Constitutional: Negative  Eye:  Negative.  Ear/Nose/Mouth/Throat:  Negative.  Respiratory:  Negative  Cardiovascular: Negative  Gastrointestinal:  Negative.  Genitourinary:  Negative  Endocrine:  Negative.  Immunologic:  Negative.  Musculoskeletal: + Left shoulder pain  Integumentary:  Negative.  Neurologic:  Negative.  Psychiatric:  Negative.  ROS reviewed as documented in chart    Physical Exam:  Temp:  [97.7 °F (36.5 °C)-98 °F (36.7 °C)] 97.7 °F (36.5 °C)  Pulse:  [] 87  Resp:  [11-23] 16  BP: (165-223)/(73-95) 170/81  SpO2:  [95 %-99 %] 98 %    General:  Alert and  oriented.  Diffuse skin problem:  None.  Eye:  Pupils are equal, round and reactive to light, extraocular movements are intact, Normal conjunctiva.  HENT:  Normocephalic, oral mucosa is moist.  Head:  Normocephalic, atraumatic.  Neck:  Supple, non-tender, no carotid bruit, no jugular venous distention, no lymphadenopathy, no thyromegaly.  Respiratory:  Lungs are clear to auscultation, respirations are non-labored, breath sounds are equal, symmetrical chest wall expansion.  Cardiovascular:  Normal rate, regular rhythm, no murmur, no edema.  Gastrointestinal:  Soft, non-tender, non-distended, normal bowel sounds, no organomegaly.  Lymphatics:  No lymphadenopathy neck, axilla, groin.  Musculoskeletal: Normal range of motion.  normal strength.  Left shoulder noted to be swollen but without any erythema or warmth  Feet:  Normal pulses.  Neurologic:  Alert, oriented, no focal deficits, cranial nerves II-XII are grossly intact.  Cognition and Speech:  Oriented, speech clear and coherent.  Psychiatric:  Cooperative, appropriate mood & affect.      Laboratory Data:   Lab Results   Component Value Date    WBC 9.3 01/11/2024    HGB 11.2 01/11/2024    HCT 34.4 01/11/2024    .0 01/11/2024    CREATSERUM 1.12 01/11/2024    BUN 39 01/11/2024     01/11/2024    K 5.0 01/11/2024     01/11/2024    CO2 18.0 01/11/2024     01/11/2024    CA 9.6 01/11/2024       Imaging:  No results found.     Assessment and Plan:    Left shoulder pain  Large shoulder effusion -seen on left shoulder x-ray.  Patient with chronic left shoulder pain but now has worsened.  Received multiple doses of morphine 2 mg IV x 3 as well as Toradol x 1 in the ED  -Low suspicion for septic joint, afebrile, no leukocytosis, no edema/not warm to the touch, however we will obtain CT left shoulder to further characterize the shoulder effusion.  If findings significant on CT, consider orthopedic evaluation the a.m.  -Continue pain control with [place].      Renal insufficiency -noted to have creatinine level 1.12 w/ EGFR 45 along with metabolic acidosis bicarb 18.  No previous creatinine level to evaluate what her baseline is.  -Hold home dose of diuretic and initiate gentle IV fluid hydration.  Obtain urine lites.  Recheck renal function in the a.m. with BMP.  Renally dose medications.  If her renal function worsens, consider nephrology consult in the a.m.    Essential hypertension -noted to have elevated blood pressure on presentation, suspecting secondary to pain.  -Resume home dose of Norvasc  -Will also add labetalol as needed    Hypothyroidism  -Resume Synthroid    Prophylaxis  Subcutaneous heparin    CODE STATUS  Full    Primary care physician  Sharan Lopez MD    60 minutes spent on this admission - examining patient, obtaining history, reviewing previous medical records, going over test results/imaging and discussing plan of care. All questions answered.     Disposition  Clinical course will dictate outcome      Kell Barlow MD  1/12/2024  1:43 AM

## 2024-01-12 NOTE — ED NOTES
Orders for admission, patient is aware of plan and ready to go upstairs. Any questions, please call ED RN Huang  at extension [phone].   Type of COVID test sent:n/a  COVID Suspicion level: Low    Titratable drug(s) infusing:n/a  Rate:n/a    LOC at time of transport:x4    Other pertinent information:    Ambulatory, refusing sling    CIWA score=n/a  NIH score=n/a

## 2024-01-12 NOTE — PROGRESS NOTES
Dr Schulz   Patient followed in the outpatient setting with Dr Lopez /primary care.  Admitted to hospitalist and H&P done by hospitalist  Admitted for left shoulder pain and effusion  Otherwise denies chest pain or shortness of breath or cough  Comfortable on room air  Vitals stable and physical exam unremarkable  To be seen by Ortho Dr. Patel   D/w hospitalist Dr. Cao

## 2024-01-12 NOTE — PLAN OF CARE
Problem: PAIN - ADULT  Goal: Verbalizes/displays adequate comfort level or patient's stated pain goal  Description: INTERVENTIONS:  - Encourage pt to monitor pain and request assistance  - Assess pain using appropriate pain scale  - Administer analgesics based on type and severity of pain and evaluate response  - Implement non-pharmacological measures as appropriate and evaluate response  - Consider cultural and social influences on pain and pain management  - Manage/alleviate anxiety  - Utilize distraction and/or relaxation techniques  - Monitor for opioid side effects  - Notify MD/LIP if interventions unsuccessful or patient reports new pain  - Anticipate increased pain with activity and pre-medicate as appropriate  Outcome: Progressing

## 2024-01-12 NOTE — ED PROVIDER NOTES
Patient Seen in: [facility] Emergency Department      History     Chief Complaint   Patient presents with    Pain     Stated Complaint: atraumatic left shoulder pain    Subjective:   HPI    Patient presents emergency department complaining of left shoulder pain.  She states that she was reaching for something this evening and felt severe pain in her left shoulder.  She states she is now unable to move it.  She has a history of chronic shoulder problems but has never had pain this bad.  There is no blunt trauma.  There is no weakness or numbness in the hand.  No other aggravating or alleviating factors.    Objective:   Past Medical History:   Diagnosis Date    Arthritis     Back problem     multiple spinal injections    Cataract     bilateral cataract surgery    Cataracts, bilateral     cataracts, PC IOL 1997 OD Dr. Tate,  PC IOL Dr. Concepcion in [place], OS    Colon polyp 3/24/2006    small polyps    Essential hypertension     Osteoarthritis     Other and unspecified hyperlipidemia     Other ill-defined conditions(799.89)     Left wrist trauma, surgical repair    Posterior capsule opacification     OS, YAG laser 2014    Ptosis of right eyelid 2014    Ptosis RUL              Past Surgical History:   Procedure Laterality Date    CATARACT      cataract extraction    CATARACT Left 1997    Phaco w/PC IOL    CATARACT Right 1998    Phaco w/ PC IOL    CATARACT EXTRACTION W/  INTRAOCULAR LENS IMPLANT Left 1997    PC IOL/ Dr. Pichardo    CATARACT EXTRACTION W/  INTRAOCULAR LENS IMPLANT Right 1998    PC IOL / Dr. Concepcion    CHOLECYSTECTOMY      COLONOSCOPY      HIP REPLACEMENT SURGERY  97/0 per NG    HYSTERECTOMY      Partial    LAPAROSCOPIC CHOLECYSTECTOMY  05/27/16    OTHER SURGICAL HISTORY      Left wrist trauma, surgical repair    OTHER SURGICAL HISTORY  05/27/16    Laparoscopy with bilateral salpingo-oophorectomy    SYNVISC, INTRA-ARTICULAR Bilateral 2013    Synvisc injection bilateral knees    TOTAL HIP REPLACEMENT       bilateral hip replacements    YAG CAPSULOTOMY - OS - LEFT EYE Left 1998    Dr. Concepcion                Social History     Socioeconomic History    Marital status:    Occupational History    Occupation: homemaker   Tobacco Use    Smoking status: Never    Smokeless tobacco: Never   Vaping Use    Vaping Use: Never used   Substance and Sexual Activity    Alcohol use: No     Alcohol/week: 0.0 standard drinks of alcohol    Drug use: No    Sexual activity: Never   Other Topics Concern    Caffeine Concern Yes     Comment: coffee, 1 cup daily    Grew up on a farm No    History of tanning No    Outdoor occupation No    Pt has a pacemaker No    Pt has a defibrillator No    Breast feeding No    Reaction to local anesthetic No              Review of Systems    Positive for stated complaint: atraumatic left shoulder pain  Other systems are as noted in HPI.  Constitutional and vital signs reviewed.      All other systems reviewed and negative except as noted above.    Physical Exam     ED Triage Vitals [01/11/24 2209]   BP (!) 223/95   Pulse 109   Resp 23   Temp 98 °F (36.7 °C)   Temp src Temporal   SpO2 99 %   O2 Device None (Room air)       Current:BP (!) 165/73   Pulse 86   Temp 98 °F (36.7 °C) (Temporal)   Resp 11   Wt 65.8 kg   SpO2 95%   BMI 24.14 kg/m²         Physical Exam  Vitals and nursing note reviewed.   Constitutional:       General: She is not in acute distress.     Appearance: She is well-developed.   HENT:      Head: Normocephalic.      Nose: Nose normal.      Mouth/Throat:      Mouth: Mucous membranes are moist.   Eyes:      Conjunctiva/sclera: Conjunctivae normal.   Cardiovascular:      Rate and Rhythm: Normal rate and regular rhythm.      Heart sounds: No murmur heard.  Pulmonary:      Effort: Pulmonary effort is normal. No respiratory distress.      Breath sounds: Normal breath sounds.   Abdominal:      General: There is no distension.      Palpations: Abdomen is soft.      Tenderness: There is  no abdominal tenderness.   Musculoskeletal:      Cervical back: Normal range of motion and neck supple.      Comments: The left shoulder is swollen without erythema or warmth.  Strong pulses are noted in the wrist.  Intrinsic muscles the hand are intact.  She is unable to move the shoulder due to pain.   Skin:     General: Skin is warm and dry.      Findings: No rash.   Neurological:      Mental Status: She is alert and oriented to person, place, and time.               ED Course     Labs Reviewed   BASIC METABOLIC PANEL (8) - Abnormal; Notable for the following components:       Result Value    Glucose 114 (*)     Sodium 135 (*)     CO2 18.0 (*)     BUN 39 (*)     Creatinine 1.12 (*)     BUN/CREA Ratio 34.8 (*)     eGFR-Cr 45 (*)     All other components within normal limits   CBC W/ DIFFERENTIAL - Abnormal; Notable for the following components:    HGB 11.2 (*)     HCT 34.4 (*)     RDW-SD 48.3 (*)     All other components within normal limits   TROPONIN I HIGH SENSITIVITY - Normal   CBC WITH DIFFERENTIAL WITH PLATELET    Narrative:     The following orders were created for panel order CBC With Differential With Platelet.  Procedure                               Abnormality         Status                     ---------                               -----------         ------                     CBC W/ DIFFERENTIAL[[id number]]          Abnormal            Final result                 Please view results for these tests on the individual orders.     EKG    Rate, intervals and axes as noted on EKG Report.  Rate: 103 bpm  Rhythm: Sinus Rhythm  Reading: Normal EKG                          [facility]        Admission disposition: 1/12/2024 12:18 AM                Medical Decision Making  Differential diagnosis considered for shoulder strain, rotator cuff injury, bursitis, dislocation, fracture.    Problems Addressed:  Acute pain of left shoulder: acute illness or injury    Amount and/or Complexity of Data Reviewed  Labs: ordered.  Decision-making details documented in ED Course.     Details: Normal CBC chemistry and troponin  Radiology: ordered and independent interpretation performed. Decision-making details documented in ED Course.     Details: X-ray of the shoulder shows no fracture or dislocation.  Chronic osteopenia and humeral head deformity.  Large shoulder effusion present.  Discussion of management or test interpretation with external provider(s): Patient was given multiple doses of morphine and became mildly hypoxic due to hypoventilation.  Oxygen supplied.  Patient still complaining of extreme and severe pain with inability to move shoulder.  Will admit for pain control.  Discussed with the hospitalist.    Risk  Prescription drug management.  Parenteral controlled substances.  Decision regarding hospitalization.        Disposition and Plan     Clinical Impression:  1. Acute pain of left shoulder         Disposition:  Admit  1/12/2024 12:18 am    Follow-up:  No follow-up provider specified.  We recommend that you schedule follow up care with a primary care provider within the next three months to obtain basic health screening including reassessment of your blood pressure.      Medications Prescribed:  Current Discharge Medication List                            Hospital Problems       Present on Admission  Date Reviewed: 8/20/2023            ICD-10-CM Noted POA    * (Principal) Acute pain of left shoulder M25.512 1/12/2024 Unknown    Anemia D64.9 1/12/2024 Yes    Azotemia R79.89 1/12/2024 Yes    Hyperglycemia R73.9 1/12/2024 Yes    Hyponatremia E87.1 1/12/2024 Yes    Metabolic acidosis E87.20 1/12/2024 Yes

## 2024-01-12 NOTE — PLAN OF CARE
Patient Alert and Oriented X4. Given PRN pain medication. Call light and personal belongings within arms reach. On 3l Nasal cannula. Purewick on. Fall precautions in place. Plan for CT in AM  Problem: Patient Centered Care  Goal: Patient preferences are identified and integrated in the patient's plan of care  Description: Interventions:  - What would you like us to know as we care for you?   - Provide timely, complete, and accurate information to patient/family  - Incorporate patient and family knowledge, values, beliefs, and cultural backgrounds into the planning and delivery of care  - Encourage patient/family to participate in care and decision-making at the level they choose  - Honor patient and family perspectives and choices  Outcome: Progressing

## 2024-01-12 NOTE — ED INITIAL ASSESSMENT (HPI)
Pt to ED via EMS with c/o increased pain to left shoulder. Pt states chronic pain to left shoulder from \"Arthritis\". Pt states she took an ambien and ultram prior to arrival. Pt denies chest pain or sob. Pt is alert and oriented x4. No respiratory distress noted. Pt skin warm and dry.

## 2024-01-12 NOTE — TELEPHONE ENCOUNTER
[facility] REFILL CONSULTATION      Situation  Writer is following up with patient for 90-day assessment of Afinitor prescription.           Background of Drug Therapy (include start date)     Patient has been receiving Afinitor from [facility] since 1/2019 for treatment of Carcinoid Tumor.        Assessment Questionnaires        A. What changes do you have on your medication regimen (ie. new medications, dose changes etc)?  None  ? Does change result in:  i.           New drug-drug interactions?  No  ii. New drug-food interactions?  No  iii. Therapy modifications?  No                Medication reconciliation completed today? yes     B. What kind of side effects are you experiencing?  No changes, managable     C. How do you feel this medication is working for you? What did the doctor tell you about the effectiveness of this medication?             stable     D. How many doses have you missed since your last refill?             none     E. Education Materials or supplies given today             Drug specific information: none        Recommendations and pharmacist's care plan including follow up (including clinic contact instructions)  1. Recommend to continue current medication as prescribed. Patient is having any side effects, is adherent, and is stable on therapy.  2. Desires/movitation of the patient: Carcinoid Tumor treatment  3. Problems/needs identified from LAST assessment: none  a.   Strategies/Interventions implemented to address problems/needs: none  b.   Progress towards treatment goals: none  4. NEW problems and/or needs identified upon today's assessment: none  a.   Strategies to address problems and/or needs: none  b.   Measurable goals and timeframe for each: none  5. No changes to the goals of care:  a.   Ensure adherence  b.   Minimize side effects  c.   Maximize patient’s response to therapy  6. Patient was counseled on proper use of additional supplies  Patient's daughter calling to inform  that patient was admitted in to hospital due to extreme pain. Just wanted inform     provided, if any, as detailed above to help with common side effects and promote adherence.  7. Resources available to implement this care plan: verbal education by pharmacist, written education materials, pharmacist follow-up assessments.   8. Patient’s medical records (EPIC SmartChart) will be reviewed once every month or every cycle, whichever is less, to evaluate refill appropriateness and make interventions based on changes in their profile (ie. new medications, recent lab results etc).  9. An [employer] caregiver will follow up for refill delivery in 4 weeks, and determine treatment compliance and presence of adverse effects to treatment.  10. [facility] 90-day assessment due in 12 weeks to evaluate patient’s response to therapy, side effects, changes in their medications/allergies, drug interactions and adherence.  11. Medication will be shipped  to [facility] via [facility] and estimated delivery on 5/20.  12. Patient has [employer] phone number, [phone], for questions and concerns.   13. Patient acknowledges and agrees with plan of care.

## 2024-01-12 NOTE — CONSULTS
[facility]    Report of Consultation    Elba Malik Patient Status:  Observation    1928 MRN [id number]   Location [facility] 4W/SW/SE Attending Nohemy Cao,    Hosp Day # 0 PCP Sharan Lopez MD     Date of Admission:  2024  Date of Consult: 2024  Reason for Consultation:   Left shoulder pain    History of Present Illness:   Patient is a 89 year old female who was admitted to the hospital for Acute pain of left shoulder:  She ambulates with a walker.  She reports that leaning on the walker aggravated her shoulder.  She developed sudden increase in her left shoulder pain yesterday and was unable to move the shoulder.  She was admitted from the emergency room.    Past Medical History  Past Medical History:   Diagnosis Date    Arthritis     Back problem     multiple spinal injections    Cataract     bilateral cataract surgery    Cataracts, bilateral     cataracts, PC IOL  OD Dr. Tate,  PC IOL Dr. Concepcion in [place], OS    Colon polyp 3/24/2006    small polyps    Essential hypertension     Osteoarthritis     Other and unspecified hyperlipidemia     Other ill-defined conditions(799.89)     Left wrist trauma, surgical repair    Posterior capsule opacification     OS, YAG laser     Ptosis of right eyelid 2014    Ptosis RUL       Past Surgical History  Past Surgical History:   Procedure Laterality Date    CATARACT      cataract extraction    CATARACT Left     Phaco w/PC IOL    CATARACT Right     Phaco w/ PC IOL    CATARACT EXTRACTION W/  INTRAOCULAR LENS IMPLANT Left     PC IOL/ Dr. Pichardo    CATARACT EXTRACTION W/  INTRAOCULAR LENS IMPLANT Right     PC IOL / Dr. Concepcion    CHOLECYSTECTOMY      COLONOSCOPY      HIP REPLACEMENT SURGERY  97/0 per NG    HYSTERECTOMY      Partial    LAPAROSCOPIC CHOLECYSTECTOMY  16    OTHER SURGICAL HISTORY      Left wrist trauma, surgical repair    OTHER SURGICAL HISTORY  16    Laparoscopy with  bilateral salpingo-oophorectomy    SYNVISC, INTRA-ARTICULAR Bilateral 2013    Synvisc injection bilateral knees    TOTAL HIP REPLACEMENT      bilateral hip replacements    YAG CAPSULOTOMY - OS - LEFT EYE Left 1998    Dr. Concepcion       Family History  Family History   Problem Relation Age of Onset    Ear Problems Mother         hearing loss    Other (Other) Father         emphysema    Diabetes Neg     Glaucoma Neg     Macular degeneration Neg        Social History  Social History     Socioeconomic History    Marital status:    Occupational History    Occupation: homemaker   Tobacco Use    Smoking status: Never    Smokeless tobacco: Never   Vaping Use    Vaping Use: Never used   Substance and Sexual Activity    Alcohol use: No     Alcohol/week: 0.0 standard drinks of alcohol    Drug use: No    Sexual activity: Never   Other Topics Concern    Caffeine Concern Yes     Comment: coffee, 1 cup daily    Grew up on a farm No    History of tanning No    Outdoor occupation No    Pt has a pacemaker No    Pt has a defibrillator No    Breast feeding No    Reaction to local anesthetic No     Social Determinants of Health     Food Insecurity: No Food Insecurity (1/12/2024)    Food Insecurity     Food Insecurity: Never true   Transportation Needs: No Transportation Needs (1/12/2024)    Transportation Needs     Lack of Transportation: No   Housing Stability: Low Risk  (1/12/2024)    Housing Stability     Housing Instability: No           Current Medications:  Current Facility-Administered Medications   Medication Dose Route Frequency    ondansetron (Zofran) 4 MG/2ML injection 4 mg  4 mg Intravenous Q6H PRN    heparin (Porcine) 5000 UNIT/ML injection 5,000 Units  5,000 Units Subcutaneous 2 times per day    acetaminophen (Tylenol Extra Strength) tab 500 mg  500 mg Oral Q4H PRN    polyethylene glycol (PEG 3350) (Miralax) 17 g oral packet 17 g  17 g Oral Daily PRN    sennosides (Senokot) tab 17.2 mg  17.2 mg Oral Nightly PRN     bisacodyl (Dulcolax) 10 MG rectal suppository 10 mg  10 mg Rectal Daily PRN    amLODIPine (Norvasc) tab 5 mg  5 mg Oral Daily    fluticasone propionate (Flonase) 50 MCG/ACT nasal suspension 2 spray  2 spray Nasal Daily    levothyroxine (Synthroid) tab 50 mcg  50 mcg Oral Before breakfast    traMADol (Ultram) tab 50 mg  50 mg Oral BID PRN    zolpidem (Ambien) tab 10 mg  10 mg Oral Nightly PRN    [Held by provider] triamterene-hydroCHLOROthiazide (Dyazide) 37.5-25 MG per cap 1 capsule  1 capsule Oral Daily    labetalol (Trandate) 5 mg/mL injection 10 mg  10 mg Intravenous Q4H PRN    HYDROcodone-acetaminophen (Norco) 5-325 MG per tab 1 tablet  1 tablet Oral Q6H PRN    triamcinolone acetonide (Kenalog-40) 40 MG/ML injection 40 mg  40 mg Intra-articular Once    lidocaine PF (Xylocaine-MPF) 1% injection  5 mL Intradermal Once     Medications Prior to Admission   Medication Sig    amLODIPine 5 MG Oral Tab Take 1 tablet (5 mg total) by mouth daily.    traMADol 50 MG Oral Tab Take 1 tablet (50 mg total) by mouth 2 (two) times daily as needed for Pain.    triamterene-hydroCHLOROthiazide 37.5-25 MG Oral Cap Take 1 capsule by mouth daily.    levothyroxine 50 MCG Oral Tab Take 1 tablet (50 mcg total) by mouth before breakfast.    zolpidem 10 MG Oral Tab Take 1 tablet (10 mg total) by mouth nightly as needed for Sleep. AT BEDTIME    acetaminophen-codeine (TYLENOL WITH CODEINE #3) 300-30 MG Oral Tab Take 1 tablet by mouth daily as needed for Pain.    Misc. Devices Does not apply Misc 20% salicylic acid with 2% 5-fluorouracil.  Apply to warts daily as instructed    Imiquimod (ALDARA) 5 % External Cream Apply 1 Application. topically nightly.    DM-APAP-CPM (CORICIDIN HBP) -2 MG Oral Tab     FLUTICASONE PROPIONATE 50 MCG/ACT Nasal Suspension USE 2 SPRAYS IN EACH NOSTRIL DAILY       Allergies  No Known Allergies    Review of Systems:    Pertinent items are noted in HPI.    Physical Exam:   Blood pressure (!) 172/72, pulse 60,  temperature 98.2 °F (36.8 °C), temperature source Oral, resp. rate 18, weight 146 lb (66.2 kg), SpO2 95%, not currently breastfeeding.    General appearance:  alert, appears younger than stated age, and cooperative  Extremities: Obvious large effusion of the left shoulder and pain with range of motion.  No erythema or cellulitis.    Results:     Laboratory Data:  Lab Results   Component Value Date    WBC 9.8 01/12/2024    HGB 10.3 (L) 01/12/2024    HCT 31.9 (L) 01/12/2024    .0 01/12/2024    CREATSERUM 1.03 (H) 01/12/2024    BUN 33 (H) 01/12/2024     (L) 01/12/2024    K 5.0 01/12/2024     01/12/2024    CO2 22.0 01/12/2024     (H) 01/12/2024    CA 9.2 01/12/2024    ALB 3.4 (L) 10/17/2018    ALKPHO 186 (H) 10/17/2018    TP 5.9 10/17/2018     (H) 10/17/2018     (H) 10/17/2018    INR 1.0 10/11/2018    PTP 12.8 10/11/2018    TSH 1.430 06/05/2023    DDIMER 0.63 01/10/2023     (H) 06/06/2016    TROPHS 6 01/11/2024         Imaging:  XR SHOULDER, COMPLETE (MIN 2 VIEWS), LEFT (CPT=73030)    Result Date: 1/12/2024  CONCLUSION: Severe osteoarthritis of the left shoulder at the glenohumeral joint.  No acute osseous abnormality.  Prominent soft tissue density around the humeral head which may reflect large effusion.  [facility] provided a preliminary report for this examination. This final report agrees with their preliminary findings.    Dictated by (CST): Angelo Diaz MD on 1/12/2024 at 12:34 PM     Finalized by (CST): Angelo Diaz MD on 1/12/2024 at 12:35 PM          CT SHOULDER LEFT (CPT=73200)    Result Date: 1/12/2024  CONCLUSION:  1.  Severe osteoarthritis within the left shoulder.  Chronic appearing fragmentation and remodeling of the left glenoid.  No acute osseous abnormality. 2.  Large left glenohumeral effusion. 3.  Os acromiale.  Mild osteoarthritis in the acromioclavicular joint. 4.  Nonspecific lobulated contour of the left 3rd rib which may represent sequela of  remote trauma or vascular shunting.   Dictated by (CST): Angelo Diaz MD on 1/12/2024 at 8:32 AM     Finalized by (CST): Angelo Diaz MD on 1/12/2024 at 8:37 AM              Impression:     Acute pain of left shoulder  Using sterile technique aspirated 75 mL of blood from the left shoulder and injected for mL 1% Xylocaine and 40 mg Kenalog.      Hyponatremia        Anemia        Azotemia        Metabolic acidosis        Hyperglycemia          Recommendations:  She has a hemarthrosis of the left shoulder with underlying advanced osteoarthritis.  If she were younger, she would have been a good candidate for reverse shoulder arthroplasty, however due to her advanced age nonsurgical  treatment is preferred.  She can resume normal activity.  If the hemarthrosis reoccurs repeat aspiration can be performed.  She is appropriate for discharge and follow-up as an outpatient from an orthopedic viewpoint.    Thank you for allowing me to participate in the care of your patient.    KAYCE OROZCO MD  1/12/2024

## 2024-01-13 LAB
ANION GAP SERPL CALC-SCNC: 7 MMOL/L (ref 0–18)
BUN BLD-MCNC: 33 MG/DL (ref 9–23)
BUN/CREAT SERPL: 28 (ref 10–20)
CALCIUM BLD-MCNC: 9.2 MG/DL (ref 8.7–10.4)
CHLORIDE SERPL-SCNC: 107 MMOL/L (ref 98–112)
CO2 SERPL-SCNC: 22 MMOL/L (ref 21–32)
CREAT BLD-MCNC: 1.18 MG/DL
EGFRCR SERPLBLD CKD-EPI 2021: 43 ML/MIN/1.73M2 (ref 60–?)
GLUCOSE BLD-MCNC: 131 MG/DL (ref 70–99)
OSMOLALITY SERPL CALC.SUM OF ELEC: 291 MOSM/KG (ref 275–295)
POTASSIUM SERPL-SCNC: 5.4 MMOL/L (ref 3.5–5.1)
SODIUM SERPL-SCNC: 136 MMOL/L (ref 136–145)

## 2024-01-13 PROCEDURE — 99232 SBSQ HOSP IP/OBS MODERATE 35: CPT | Performed by: INTERNAL MEDICINE

## 2024-01-13 PROCEDURE — 99233 SBSQ HOSP IP/OBS HIGH 50: CPT | Performed by: HOSPITALIST

## 2024-01-13 RX ORDER — HYDROCHLOROTHIAZIDE 25 MG/1
25 TABLET ORAL DAILY
Status: DISCONTINUED | OUTPATIENT
Start: 2024-01-13 | End: 2024-01-14

## 2024-01-13 RX ORDER — TRIAMTERENE AND HYDROCHLOROTHIAZIDE 37.5; 25 MG/1; MG/1
1 CAPSULE ORAL DAILY
Status: DISCONTINUED | OUTPATIENT
Start: 2024-01-13 | End: 2024-01-13

## 2024-01-13 NOTE — PLAN OF CARE
Patient alert and orientated x4. [place].Tolerating general diet. Purewick in place. PRN Norco given for pain. Up x1 assist and walker. Plan to discharge home when medically cleared. PT/OT recommending home with home health.             Problem: Patient Centered Care  Goal: Patient preferences are identified and integrated in the patient's plan of care  Description: Interventions:  - What would you like us to know as we care for you?   - Provide timely, complete, and accurate information to patient/family  - Incorporate patient and family knowledge, values, beliefs, and cultural backgrounds into the planning and delivery of care  - Encourage patient/family to participate in care and decision-making at the level they choose  - Honor patient and family perspectives and choices  Outcome: Progressing     Problem: Patient/Family Goals  Goal: Patient/Family Long Term Goal  Description: Patient's Long Term Goal:     Interventions  - See additional Care Plan goals for specific interventions  Outcome: Progressing  Goal: Patient/Family Short Term Goal  Description: Patient's Short Term Goal:     Interventions:  - See additional Care Plan goals for specific interventions  Outcome: Progressing     Problem: PAIN - ADULT  Goal: Verbalizes/displays adequate comfort level or patient's stated pain goal  Description: INTERVENTIONS:  - Encourage pt to monitor pain and request assistance  - Assess pain using appropriate pain scale  - Administer analgesics based on type and severity of pain and evaluate response  - Implement non-pharmacological measures as appropriate and evaluate response  - Consider cultural and social influences on pain and pain management  - Manage/alleviate anxiety  - Utilize distraction and/or relaxation techniques  - Monitor for opioid side effects  - Notify MD/LIP if interventions unsuccessful or patient reports new pain  - Anticipate increased pain with activity and pre-medicate as appropriate  Outcome:  Progressing

## 2024-01-13 NOTE — PHYSICAL THERAPY NOTE
PHYSICAL THERAPY EVALUATION - INPATIENT     Room Number: 425/425-A  Evaluation Date: 1/13/2024  Type of Evaluation: Initial        Presenting Problem: acute L shoulder pain     Reason for Therapy: Mobility Dysfunction and Discharge Planning    PHYSICAL THERAPY ASSESSMENT   PPE: PT: mask and gloves, Patient and family: none  Patient is a 89 year old female admitted 1/11/2024 for L shoulder pain.  Patient cleared for therapy by RN.  Patient's current functional deficits include decreased activity tolerance, which are below the patient's pre-admission status.   Bed Mobility:supine to sit: stand by assistance  Transfers: sit to stand from bed: modified independent with RW. CGA from lower recliner chair.  Ambulation: 10', and 20' with RW and stand by assistance.        The patient's Approx Degree of Impairment: 35.83% has been calculated based on documentation in the [facility] '6 clicks' Inpatient Basic Mobility Short Form.  Research supports that patients with this level of impairment may benefit from returning home with HHPT.    Patient will benefit from continued IP PT services to address these deficits in preparation for discharge.    DISCHARGE RECOMMENDATIONS  PT Discharge Recommendations: Home with home health PT    PLAN  PT Treatment Plan: Transfer training;Gait training;Strengthening;Patient education;Energy conservation  Rehab Potential : Good  Frequency (Obs): 5x/week       PHYSICAL THERAPY MEDICAL/SOCIAL HISTORY         Problem List  Principal Problem:    Acute pain of left shoulder  Active Problems:    Hyponatremia    Anemia    Azotemia    Metabolic acidosis    Hyperglycemia      HOME SITUATION  Home Situation  Type of Home: Independent living facility  Home Layout: Elevator  Stairs to Enter : 0  Lives With: Alone  Patient Owned Equipment: Rolling walker;Rollator (electric scooter)     Prior Level of [place]: modified independent with rollator use in apartment and electric scooter to go to dinner in dining  manpreet. Patient enjoys playing [employer] 2-3x/wk.     SUBJECTIVE  \"I always have shoulder pain, but what I had is nothing I've ever felt.\" Patient verbalizes the safety measure she takes at home including taking her time and using her rollator all the time.  She reports sitting to rest, making sure the breaks are engaged, all the time.  \"I don't want to fall. It is such a hassle.\"     PHYSICAL THERAPY EXAMINATION     OBJECTIVE            PAIN ASSESSMENT  Rating:  (did not rate)          COGNITION  Overall Cognitive Status:  WFL - within functional limits    RANGE OF MOTION AND STRENGTH ASSESSMENT  Upper extremity ROM and strength are within functional limits   Lower extremity ROM is within functional limits   Lower extremity strength is within functional limits     BALANCE  Static Sitting: Good  Dynamic Sitting: Good  Static Standing: Fair -  Dynamic Standing: Fair -      AM-PAC '6-Clicks' INPATIENT SHORT FORM - BASIC MOBILITY  How much difficulty does the patient currently have...  Patient Difficulty: Turning over in bed (including adjusting bedclothes, sheets and blankets)?: None   Patient Difficulty: Sitting down on and standing up from a chair with arms (e.g., wheelchair, bedside commode, etc.): None   Patient Difficulty: Moving from lying on back to sitting on the side of the bed?: None   How much help from another person does the patient currently need...   Help from Another: Moving to and from a bed to a chair (including a wheelchair)?: A Little   Help from Another: Need to walk in hospital room?: A Little   Help from Another: Climbing 3-5 steps with a railing?: A Lot     AM-PAC Score:  Raw Score: 20   Approx Degree of Impairment: 35.83%   Standardized Score (AM-PAC Scale): 47.67   CMS Modifier (G-Code): CJ    FUNCTIONAL ABILITY STATUS      Exercise/Education Provided:  Bed mobility  Functional activity tolerated  Gait training  Transfer training    Patient End of Session: Up in chair;Needs met;Call light within  reach;RN aware of session/findings;Family present    CURRENT GOALS    Goals to be met by: 1/20/24  Patient Goal Patient's self-stated goal is: to go home   Goal #1 Patient is able to demonstrate supine - sit EOB @ level: modified independent     Goal #1   Current Status    Goal #2 Patient is able to demonstrate transfers Sit to/from Stand at assistance level: modified independent with walker - rolling     Goal #2  Current Status    Goal #3 Patient is able to ambulate 50 feet with assist device: walker - rolling at assistance level: modified independent   Goal #3   Current Status    Goal #4 Patient to demonstrate independence with home activity/exercise instructions provided to patient in preparation for discharge.   Goal #4   Current Status      Patient Evaluation Complexity Level:  History Moderate - 1 or 2 personal factors and/or co-morbidities   Examination of body systems Low - addressing 1-2 elements   Clinical Presentation Low - Stable   Clinical Decision Making Low Complexity       Gait Training: 15 minutes  Therapeutic Activity: 15 minutes

## 2024-01-13 NOTE — PROGRESS NOTES
[facility]     Progress Note    Elba Malik Patient Status:  Observation    1928 MRN [id number]   Location [facility] 4W/SW/SE Attending oNhemy Cao,    Hosp Day # 0 PCP Sharan Lopez MD       Subjective:   Seen and examined.  Admits to some left shoulder pain    Objective:   Blood pressure 158/59, pulse 92, temperature 97.2 °F (36.2 °C), temperature source Temporal, resp. rate 16, weight 146 lb (66.2 kg), SpO2 97%, not currently breastfeeding.  Intake/Output:   Last 3 shifts: I/O last 3 completed shifts:  In: 200 [P.O.:200]  Out: 1100 [Urine:1100]   This shift: No intake/output data recorded.     Vent Settings:      Hemodynamic parameters (last 24 hours):      Scheduled Meds:   Current Facility-Administered Medications   Medication Dose Route Frequency    [Held by provider] triamterene-hydroCHLOROthiazide (Dyazide) 37.5-25 MG per cap 1 capsule  1 capsule Oral Daily    ondansetron (Zofran) 4 MG/2ML injection 4 mg  4 mg Intravenous Q6H PRN    heparin (Porcine) 5000 UNIT/ML injection 5,000 Units  5,000 Units Subcutaneous 2 times per day    acetaminophen (Tylenol Extra Strength) tab 500 mg  500 mg Oral Q4H PRN    polyethylene glycol (PEG 3350) (Miralax) 17 g oral packet 17 g  17 g Oral Daily PRN    sennosides (Senokot) tab 17.2 mg  17.2 mg Oral Nightly PRN    bisacodyl (Dulcolax) 10 MG rectal suppository 10 mg  10 mg Rectal Daily PRN    amLODIPine (Norvasc) tab 5 mg  5 mg Oral Daily    fluticasone propionate (Flonase) 50 MCG/ACT nasal suspension 2 spray  2 spray Nasal Daily    levothyroxine (Synthroid) tab 50 mcg  50 mcg Oral Before breakfast    traMADol (Ultram) tab 50 mg  50 mg Oral BID PRN    zolpidem (Ambien) tab 10 mg  10 mg Oral Nightly PRN    labetalol (Trandate) 5 mg/mL injection 10 mg  10 mg Intravenous Q4H PRN    HYDROcodone-acetaminophen (Norco) 5-325 MG per tab 1 tablet  1 tablet Oral Q6H PRN    triamcinolone acetonide (Kenalog-40) 40 MG/ML injection 40 mg   40 mg Intra-articular Once    lidocaine PF (Xylocaine-MPF) 1% injection  5 mL Intradermal Once       Continuous Infusions:     Physical Exam  Constitutional: no acute distress  Eyes: PERRL  ENT: nares pateint  Neck: supple, no JVD  Cardio: RRR, S1 S2  Respiratory: clear to auscultation bilaterally, no wheezing, rales, rhonchi, crackles  GI: abdomen soft, non tender, active bowel sounds, no organomegaly  Extremities: no clubbing, cyanosis, edema  Neurologic: no gross motor deficits  Skin: warm, dry      Results:     Lab Results   Component Value Date    CREATSERUM 1.18 01/13/2024    BUN 33 01/13/2024     01/13/2024    K 5.4 01/13/2024     01/13/2024    CO2 22.0 01/13/2024     01/13/2024    CA 9.2 01/13/2024       XR SHOULDER, COMPLETE (MIN 2 VIEWS), LEFT (CPT=73030)    Result Date: 1/12/2024  CONCLUSION: Severe osteoarthritis of the left shoulder at the glenohumeral joint.  No acute osseous abnormality.  Prominent soft tissue density around the humeral head which may reflect large effusion.  [facility] provided a preliminary report for this examination. This final report agrees with their preliminary findings.    Dictated by (CST): Angelo Diaz MD on 1/12/2024 at 12:34 PM     Finalized by (CST): Angelo Diaz MD on 1/12/2024 at 12:35 PM          CT SHOULDER LEFT (CPT=73200)    Result Date: 1/12/2024  CONCLUSION:  1.  Severe osteoarthritis within the left shoulder.  Chronic appearing fragmentation and remodeling of the left glenoid.  No acute osseous abnormality. 2.  Large left glenohumeral effusion. 3.  Os acromiale.  Mild osteoarthritis in the acromioclavicular joint. 4.  Nonspecific lobulated contour of the left 3rd rib which may represent sequela of remote trauma or vascular shunting.   Dictated by (CST): Angelo Diaz MD on 1/12/2024 at 8:32 AM     Finalized by (CST): Angelo Diaz MD on 1/12/2024 at 8:37 AM           EKG 12 Lead    Result Date: 1/12/2024  Sinus tachycardia with 1st degree  A-V block with Premature atrial complexes Nonspecific ST abnormality Abnormal ECG No previous ECGs found in [facility] Confirmed by YOSEPH DHALIWAL ([id number]) on 1/12/2024 11:23:17 AM     Assessment   1.  Left shoulder pain  2.  Acute kidney injury, improved  3.  Hypertension  4.  Hypothyroidism       Plan   -Patient presents with evidence of left shoulder pain  -Evaluated by orthopedic surgeon aspirated 75 mL blood from left shoulder and Xylocaine and Kenalog administered.  No plans for surgical intervention at this time  -Pain control  -PT/OT  -Continue antihypertensives  -DVT prophylaxis: Heparin      Liane Loyd,   Pulmonary Critical Care Medicine  [facility]

## 2024-01-13 NOTE — OCCUPATIONAL THERAPY NOTE
OCCUPATIONAL THERAPY EVALUATION - INPATIENT     Room Number: 425/425-A  Evaluation Date: 1/13/2024  Type of Evaluation: Quick Eval  Presenting Problem: 89 year old female who was admitted to the hospital for Acute pain of left shoulder:    Physician Order: IP Consult to Occupational Therapy  Reason for Therapy: ADL/IADL Dysfunction and Discharge Planning    OCCUPATIONAL THERAPY ASSESSMENT   Patient is a 89 year old female admitted 1/11/2024 for shoulder pain.  In this OT evaluation patient presents with the following impairments: dec strength and endurance during household mobility when using the walker.  These deficits manifest functionally while performing functional mobility.   The patient is below baseline and would benefit from skilled inpatient OT to address the above deficits, maximizing patient's ability to return to prior level of function.    The patient's Approx Degree of Impairment: 15.86% has been calculated based on documentation in the [facility] '6 clicks' Inpatient Daily Activity Short Form.  Research supports that patients with this level of impairment may benefit from home w/ home health.    DISCHARGE RECOMMENDATIONS  OT Discharge Recommendations: Home with home health PT/OT    PLAN  Patient has been evaluated and presents with no skilled Occupational Therapy needs  at this time.  Patient will be discharged from Occupational Therapy services. Please re-order if a new functional limitation presents during this admission.    OCCUPATIONAL THERAPY MEDICAL/SOCIAL HISTORY   Problem List  Principal Problem:    Acute pain of left shoulder  Active Problems:    Hyponatremia    Anemia    Azotemia    Metabolic acidosis    Hyperglycemia    HOME SITUATION  Type of Home: Independent living facility  Home Layout: Elevator  Lives With: Alone  Toilet and Equipment: Comfort height toilet  Shower/Tub and Equipment: Walk-in shower; Shower chair; Grab bar  Other Equipment: None  Drives: No    Prior Level of [place]:  independent    SUBJECTIVE  \"Did you meet Sandy the PT, she is faraz\"    OCCUPATIONAL THERAPY EXAMINATION    OBJECTIVE  Fall Risk: Standard fall risk       PAIN ASSESSMENT  Ratin  Location: Left shld      COGNITION  intact    VISION  intact    Behavioral/Emotional/Social: cooperative     RANGE OF MOTION   Upper extremity ROM is within functional limits     STRENGTH ASSESSMENT  Upper extremity strength is within functional limits     COORDINATION  Gross Motor: WFL   Fine Motor: WFL     ACTIVITIES OF DAILY LIVING ASSESSMENT  AM-PAC ‘6-Clicks’ Inpatient Daily Activity Short Form  How much help from another person does the patient currently need…  -   Putting on and taking off regular lower body clothing?: A Little  -   Bathing (including washing, rinsing, drying)?: None  -   Toileting, which includes using toilet, bedpan or urinal? : None  -   Putting on and taking off regular upper body clothing?: None  -   Taking care of personal grooming such as brushing teeth?: None  -   Eating meals?: None    AM-PAC Score:  Score: 23  Approx Degree of Impairment: 15.86%  Standardized Score (AM-PAC Scale): 51.12  CMS Modifier (G-Code): CI    FUNCTIONAL TRANSFER ASSESSMENT  Sit to Stand: Edge of Bed  Edge of Bed: Modified Independent     Skilled Therapy Provided: safety and energy conservation     EDUCATION PROVIDED  Patient : Role of Occupational Therapy; Plan of Care; Discharge Recommendations  Patient's Response to Education: Verbalized Understanding    Patient End of Session: In bed;Needs met;Call light within reach;RN aware of session/findings    Patient was able to achieve the following ...   Patient able to toilet transfer  safely and independently    Patient able to dress lower extremities  safely and independently    Patient/Caregiver able to demonstrate safety with ADLS  safely and independently     Patient Evaluation Complexity Level:   Occupational Profile/Medical History LOW - Brief history including review of  medical or therapy records    Specific performance deficits impacting engagement in ADL/IADL LOW  1 - 3 performance deficits    Client Assessment/Performance Deficits LOW - No comorbidities nor modifications of tasks    Clinical Decision Making LOW - Analysis of occupational profile, problem-focused assessments, limited treatment options    Overall Complexity LOW     OT Session Time: 15 minutes    Therapeutic Activity: 15 minutes

## 2024-01-14 LAB
ANION GAP SERPL CALC-SCNC: 4 MMOL/L (ref 0–18)
BASOPHILS # BLD AUTO: 0.01 X10(3) UL (ref 0–0.2)
BASOPHILS NFR BLD AUTO: 0.1 %
BUN BLD-MCNC: 50 MG/DL (ref 9–23)
BUN/CREAT SERPL: 37.9 (ref 10–20)
CALCIUM BLD-MCNC: 9.1 MG/DL (ref 8.7–10.4)
CHLORIDE SERPL-SCNC: 107 MMOL/L (ref 98–112)
CO2 SERPL-SCNC: 23 MMOL/L (ref 21–32)
CREAT BLD-MCNC: 1.32 MG/DL
DEPRECATED RDW RBC AUTO: 49.4 FL (ref 35.1–46.3)
EGFRCR SERPLBLD CKD-EPI 2021: 37 ML/MIN/1.73M2 (ref 60–?)
EOSINOPHIL # BLD AUTO: 0 X10(3) UL (ref 0–0.7)
EOSINOPHIL NFR BLD AUTO: 0 %
ERYTHROCYTE [DISTWIDTH] IN BLOOD BY AUTOMATED COUNT: 15 % (ref 11–15)
GLUCOSE BLD-MCNC: 98 MG/DL (ref 70–99)
HCT VFR BLD AUTO: 26.9 %
HGB BLD-MCNC: 9.2 G/DL
IMM GRANULOCYTES # BLD AUTO: 0.04 X10(3) UL (ref 0–1)
IMM GRANULOCYTES NFR BLD: 0.5 %
LYMPHOCYTES # BLD AUTO: 1.12 X10(3) UL (ref 1–4)
LYMPHOCYTES NFR BLD AUTO: 13.5 %
MCH RBC QN AUTO: 30.8 PG (ref 26–34)
MCHC RBC AUTO-ENTMCNC: 34.2 G/DL (ref 31–37)
MCV RBC AUTO: 90 FL
MONOCYTES # BLD AUTO: 0.88 X10(3) UL (ref 0.1–1)
MONOCYTES NFR BLD AUTO: 10.6 %
NEUTROPHILS # BLD AUTO: 6.22 X10 (3) UL (ref 1.5–7.7)
NEUTROPHILS # BLD AUTO: 6.22 X10(3) UL (ref 1.5–7.7)
NEUTROPHILS NFR BLD AUTO: 75.3 %
OSMOLALITY SERPL CALC.SUM OF ELEC: 291 MOSM/KG (ref 275–295)
PLATELET # BLD AUTO: 283 10(3)UL (ref 150–450)
POTASSIUM SERPL-SCNC: 5.4 MMOL/L (ref 3.5–5.1)
RBC # BLD AUTO: 2.99 X10(6)UL
SODIUM SERPL-SCNC: 134 MMOL/L (ref 136–145)
WBC # BLD AUTO: 8.3 X10(3) UL (ref 4–11)

## 2024-01-14 PROCEDURE — 99233 SBSQ HOSP IP/OBS HIGH 50: CPT | Performed by: HOSPITALIST

## 2024-01-14 PROCEDURE — 99232 SBSQ HOSP IP/OBS MODERATE 35: CPT | Performed by: INTERNAL MEDICINE

## 2024-01-14 RX ORDER — SODIUM POLYSTYRENE SULFONATE 4.1 MEQ/G
15 POWDER, FOR SUSPENSION ORAL; RECTAL ONCE
Status: COMPLETED | OUTPATIENT
Start: 2024-01-14 | End: 2024-01-14

## 2024-01-14 RX ORDER — SODIUM CHLORIDE 9 MG/ML
INJECTION, SOLUTION INTRAVENOUS CONTINUOUS
Status: DISCONTINUED | OUTPATIENT
Start: 2024-01-14 | End: 2024-01-14

## 2024-01-14 NOTE — PLAN OF CARE
No acute changes overnite. Gave norco for pain. 1 episode of elevated blood pressure 164/69 gave prn labetalol. Remote tele no calls. Will discharge when potassium returns to normal range.    Problem: Patient Centered Care  Goal: Patient preferences are identified and integrated in the patient's plan of care  Description: Interventions:  - What would you like us to know as we care for you?   - Provide timely, complete, and accurate information to patient/family  - Incorporate patient and family knowledge, values, beliefs, and cultural backgrounds into the planning and delivery of care  - Encourage patient/family to participate in care and decision-making at the level they choose  - Honor patient and family perspectives and choices  Outcome: Progressing     Problem: Patient/Family Goals  Goal: Patient/Family Long Term Goal  Description: Patient's Long Term Goal:     Interventions:  -   - See additional Care Plan goals for specific interventions  Outcome: Progressing  Goal: Patient/Family Short Term Goal  Description: Patient's Short Term Goal:     Interventions:   -   - See additional Care Plan goals for specific interventions  Outcome: Progressing     Problem: PAIN - ADULT  Goal: Verbalizes/displays adequate comfort level or patient's stated pain goal  Description: INTERVENTIONS:  - Encourage pt to monitor pain and request assistance  - Assess pain using appropriate pain scale  - Administer analgesics based on type and severity of pain and evaluate response  - Implement non-pharmacological measures as appropriate and evaluate response  - Consider cultural and social influences on pain and pain management  - Manage/alleviate anxiety  - Utilize distraction and/or relaxation techniques  - Monitor for opioid side effects  - Notify MD/LIP if interventions unsuccessful or patient reports new pain  - Anticipate increased pain with activity and pre-medicate as appropriate  Outcome: Progressing

## 2024-01-14 NOTE — PLAN OF CARE
Patient alert and orientated x4. [place]. Up x1 assist and walker. Voiding via purewick. On remote tele. VSS. RA. PRN Norco given for pain. Patient potassium was 5.4- Kayexalate given per MD orders. Plan to discharge home with home PT when medically cleared.             Problem: Patient Centered Care  Goal: Patient preferences are identified and integrated in the patient's plan of care  Description: Interventions:  - What would you like us to know as we care for you?   - Provide timely, complete, and accurate information to patient/family  - Incorporate patient and family knowledge, values, beliefs, and cultural backgrounds into the planning and delivery of care  - Encourage patient/family to participate in care and decision-making at the level they choose  - Honor patient and family perspectives and choices  Outcome: Progressing     Problem: Patient/Family Goals  Goal: Patient/Family Long Term Goal  Description: Patient's Long Term Goal    Interventions:  - See additional Care Plan goals for specific interventions  Outcome: Progressing  Goal: Patient/Family Short Term Goal  Description: Patient's Short Term Goal:     Interventions:   - See additional Care Plan goals for specific interventions  Outcome: Progressing     Problem: PAIN - ADULT  Goal: Verbalizes/displays adequate comfort level or patient's stated pain goal  Description: INTERVENTIONS:  - Encourage pt to monitor pain and request assistance  - Assess pain using appropriate pain scale  - Administer analgesics based on type and severity of pain and evaluate response  - Implement non-pharmacological measures as appropriate and evaluate response  - Consider cultural and social influences on pain and pain management  - Manage/alleviate anxiety  - Utilize distraction and/or relaxation techniques  - Monitor for opioid side effects  - Notify MD/LIP if interventions unsuccessful or patient reports new pain  - Anticipate increased pain with activity and pre-medicate as  appropriate  Outcome: Progressing

## 2024-01-14 NOTE — PROGRESS NOTES
[facility]     Progress Note    Elba Malik Patient Status:  Observation    1928 MRN [id number]   Location [facility] 4W/SW/SE Attending Nohemy Cao,    Hosp Day # 0 PCP Sharan Lopez MD       Subjective:   Seen and examined.  Left shoulder pain and mobility gradually improving    Objective:   Blood pressure 142/69, pulse 82, temperature 97.6 °F (36.4 °C), temperature source Oral, resp. rate 18, weight 146 lb (66.2 kg), SpO2 96%, not currently breastfeeding.  Intake/Output:   Last 3 shifts: I/O last 3 completed shifts:  In: -   Out: 1100 [Urine:1100]   This shift: No intake/output data recorded.     Vent Settings:      Hemodynamic parameters (last 24 hours):      Scheduled Meds:         Continuous Infusions:    sodium chloride 100 mL/hr at 24 0943       Physical Exam  Constitutional: no acute distress  Eyes: PERRL  ENT: nares pateint  Neck: supple, no JVD  Cardio: RRR, S1 S2  Respiratory: clear to auscultation bilaterally, no wheezing, rales, rhonchi, crackles  GI: abdomen soft, non tender, active bowel sounds, no organomegaly  Extremities: no clubbing, cyanosis, edema  Neurologic: no gross motor deficits  Skin: warm, dry      Results:     Lab Results   Component Value Date    WBC 8.3 2024    HGB 9.2 2024    HCT 26.9 2024    .0 2024    CREATSERUM 1.32 2024    BUN 50 2024     2024    K 5.4 2024     2024    CO2 23.0 2024    GLU 98 2024    CA 9.1 2024       No results found.          Assessment   1.  Left shoulder pain  2.  Acute kidney injury, improved  3.  Hypertension  4.  Hypothyroidism       Plan   -Patient presents with evidence of left shoulder pain  -Evaluated by orthopedic surgeon aspirated 75 mL blood from left shoulder and Xylocaine and Kenalog administered.  No plans for surgical intervention at this time  -Pain control  -PT/OT  -Continue antihypertensives  -DVT  prophylaxis: Heparin      Liane Loyd,   Pulmonary Critical Care Medicine  [facility]

## 2024-01-14 NOTE — CM/SW NOTE
Expected Discharge Plan:    Destination: home w/home health  Referrals pending to: HH    Current Barriers to d/c: Medical Clearance;    PT/OT recommendations of HH.  CM uploaded HH aidin referral with face to face order.    Discharge Planner Follow up Needed: open assessment, HH list for choice.    DC planner/CM to remain available for support and/or discharge planning.    Kasey Trevino RN, [facility]  DC PLANNER / RN Case Manager  Ext.  [phone]

## 2024-01-15 VITALS
TEMPERATURE: 98 F | HEART RATE: 90 BPM | SYSTOLIC BLOOD PRESSURE: 156 MMHG | WEIGHT: 146 LBS | RESPIRATION RATE: 18 BRPM | BODY MASS INDEX: 24 KG/M2 | OXYGEN SATURATION: 96 % | DIASTOLIC BLOOD PRESSURE: 77 MMHG

## 2024-01-15 DIAGNOSIS — G89.4 CHRONIC PAIN SYNDROME: ICD-10-CM

## 2024-01-15 LAB
ANION GAP SERPL CALC-SCNC: 5 MMOL/L (ref 0–18)
BASOPHILS # BLD AUTO: 0.01 X10(3) UL (ref 0–0.2)
BASOPHILS NFR BLD AUTO: 0.1 %
BUN BLD-MCNC: 50 MG/DL (ref 9–23)
BUN/CREAT SERPL: 42 (ref 10–20)
CALCIUM BLD-MCNC: 9.3 MG/DL (ref 8.7–10.4)
CHLORIDE SERPL-SCNC: 109 MMOL/L (ref 98–112)
CO2 SERPL-SCNC: 23 MMOL/L (ref 21–32)
CREAT BLD-MCNC: 1.19 MG/DL
DEPRECATED RDW RBC AUTO: 50.4 FL (ref 35.1–46.3)
EGFRCR SERPLBLD CKD-EPI 2021: 42 ML/MIN/1.73M2 (ref 60–?)
EOSINOPHIL # BLD AUTO: 0.01 X10(3) UL (ref 0–0.7)
EOSINOPHIL NFR BLD AUTO: 0.1 %
ERYTHROCYTE [DISTWIDTH] IN BLOOD BY AUTOMATED COUNT: 15.2 % (ref 11–15)
GLUCOSE BLD-MCNC: 87 MG/DL (ref 70–99)
HCT VFR BLD AUTO: 28.5 %
HGB BLD-MCNC: 9.4 G/DL
IMM GRANULOCYTES # BLD AUTO: 0.03 X10(3) UL (ref 0–1)
IMM GRANULOCYTES NFR BLD: 0.3 %
LYMPHOCYTES # BLD AUTO: 1.49 X10(3) UL (ref 1–4)
LYMPHOCYTES NFR BLD AUTO: 16.2 %
MCH RBC QN AUTO: 30.3 PG (ref 26–34)
MCHC RBC AUTO-ENTMCNC: 33 G/DL (ref 31–37)
MCV RBC AUTO: 91.9 FL
MONOCYTES # BLD AUTO: 1.02 X10(3) UL (ref 0.1–1)
MONOCYTES NFR BLD AUTO: 11.1 %
NEUTROPHILS # BLD AUTO: 6.62 X10 (3) UL (ref 1.5–7.7)
NEUTROPHILS # BLD AUTO: 6.62 X10(3) UL (ref 1.5–7.7)
NEUTROPHILS NFR BLD AUTO: 72.2 %
OSMOLALITY SERPL CALC.SUM OF ELEC: 297 MOSM/KG (ref 275–295)
PLATELET # BLD AUTO: 298 10(3)UL (ref 150–450)
POTASSIUM SERPL-SCNC: 4.8 MMOL/L (ref 3.5–5.1)
RBC # BLD AUTO: 3.1 X10(6)UL
SODIUM SERPL-SCNC: 137 MMOL/L (ref 136–145)
WBC # BLD AUTO: 9.2 X10(3) UL (ref 4–11)

## 2024-01-15 PROCEDURE — 99238 HOSP IP/OBS DSCHRG MGMT 30/<: CPT | Performed by: INTERNAL MEDICINE

## 2024-01-15 RX ORDER — HYDROCODONE BITARTRATE AND ACETAMINOPHEN 5; 325 MG/1; MG/1
1-2 TABLET ORAL EVERY 8 HOURS PRN
Qty: 20 TABLET | Refills: 0 | Status: SHIPPED | OUTPATIENT
Start: 2024-01-15

## 2024-01-15 NOTE — PLAN OF CARE
Pt is aox 4, ambulating . Voiding wnl. SCD for DVT prophylaxis. Norco and Tramadol prn  for pain. Pt plans to d/c home with PT . Disease process discussed with pt. Bed in lowest position, call light and personal possessions within reach. Pt instructed to call for assistance before getting up.     Patient cleared by internal medicine, PT/OT, and social work. Going home . Verified that pain medications are at patient's pharmacy. IV removed, discharge education provided, patient sent home with all personal belongings, medications, scripts, and discharge instructions. Addressed additional questions.       Problem: Patient Centered Care  Goal: Patient preferences are identified and integrated in the patient's plan of care  Description: Interventions:  - What would you like us to know as we care for you?   - Provide timely, complete, and accurate information to patient/family  - Incorporate patient and family knowledge, values, beliefs, and cultural backgrounds into the planning and delivery of care  - Encourage patient/family to participate in care and decision-making at the level they choose  - Honor patient and family perspectives and choices  Outcome: Progressing     Problem: Patient/Family Goals  Goal: Patient/Family Long Term Goal  Description: Patient's Long Term Goal:     Interventions:  -   - See additional Care Plan goals for specific interventions  Outcome: Progressing  Goal: Patient/Family Short Term Goal  Description: Patient's Short Term Goal:     Interventions:   -   - See additional Care Plan goals for specific interventions  Outcome: Progressing     Problem: PAIN - ADULT  Goal: Verbalizes/displays adequate comfort level or patient's stated pain goal  Description: INTERVENTIONS:  - Encourage pt to monitor pain and request assistance  - Assess pain using appropriate pain scale  - Administer analgesics based on type and severity of pain and evaluate response  - Implement non-pharmacological measures as  appropriate and evaluate response  - Consider cultural and social influences on pain and pain management  - Manage/alleviate anxiety  - Utilize distraction and/or relaxation techniques  - Monitor for opioid side effects  - Notify MD/LIP if interventions unsuccessful or patient reports new pain  - Anticipate increased pain with activity and pre-medicate as appropriate  Outcome: Progressing

## 2024-01-15 NOTE — PLAN OF CARE
No acute changes.  BP has been elevated, prn labetalol given for reading of 189/99, recheck was 166/78. Asymptomatic. Remote tele, no calls. Voiding via purewick.  Will discharge home to assisted living facility when medically cleared.    Problem: Patient Centered Care  Goal: Patient preferences are identified and integrated in the patient's plan of care  Description: Interventions:  - What would you like us to know as we care for you?   - Provide timely, complete, and accurate information to patient/family  - Incorporate patient and family knowledge, values, beliefs, and cultural backgrounds into the planning and delivery of care  - Encourage patient/family to participate in care and decision-making at the level they choose  - Honor patient and family perspectives and choices  Outcome: Progressing     Problem: Patient/Family Goals  Goal: Patient/Family Long Term Goal  Description: Patient's Long Term Goal:     Interventions:  -   - See additional Care Plan goals for specific interventions  Outcome: Progressing  Goal: Patient/Family Short Term Goal  Description: Patient's Short Term Goal:     Interventions:   -   - See additional Care Plan goals for specific interventions  Outcome: Progressing     Problem: PAIN - ADULT  Goal: Verbalizes/displays adequate comfort level or patient's stated pain goal  Description: INTERVENTIONS:  - Encourage pt to monitor pain and request assistance  - Assess pain using appropriate pain scale  - Administer analgesics based on type and severity of pain and evaluate response  - Implement non-pharmacological measures as appropriate and evaluate response  - Consider cultural and social influences on pain and pain management  - Manage/alleviate anxiety  - Utilize distraction and/or relaxation techniques  - Monitor for opioid side effects  - Notify MD/LIP if interventions unsuccessful or patient reports new pain  - Anticipate increased pain with activity and pre-medicate as  appropriate  Outcome: Progressing

## 2024-01-15 NOTE — DISCHARGE SUMMARY
Discharge Summary    Date of Admission: 1/11/2024    Date of Discharge:  1/15/24    Hospital Course:   The patient was hospitalized with severe left shoulder pain.  The shoulder was large and there was a bloody effusion which was drained by orthopedics.  She required tramadol and Norco.  She had mild renal insufficiency as well and was hydrated.  She had developed worsening hypotension.  Her IV fluids were then stopped.  The Synthroid was resumed for her hypothyroidism.  She received subcutaneous heparin.  Her blood pressure stabilized with addition of metoprolol and with labetalol as needed.  She remained on amlodipine.  Her blood pressure was acceptable at discharge.  The pain was somewhat better controlled but she was still requiring tramadol and Norco.    Discharge Medications:     Medication List        START taking these medications      HYDROcodone-acetaminophen 5-325 MG Tabs  Commonly known as: Norco  Take 1-2 tablets by mouth every 8 (eight) hours as needed for Pain.     metoprolol tartrate 25 MG Tabs  Commonly known as: Lopressor  Take 0.5 tablets (12.5 mg total) by mouth 2x Daily(Beta Blocker).            CONTINUE taking these medications      acetaminophen-codeine 300-30 MG Tabs  Commonly known as: Tylenol with Codeine #3  Take 1 tablet by mouth daily as needed for Pain.     amLODIPine 5 MG Tabs  Commonly known as: Norvasc  Take 1 tablet (5 mg total) by mouth daily.     fluticasone propionate 50 MCG/ACT Susp  Commonly known as: Flonase  USE 2 SPRAYS IN EACH NOSTRIL DAILY     Imiquimod 5 % Crea  Commonly known as: Aldara  Apply 1 Application. topically nightly.     levothyroxine 50 MCG Tabs  Commonly known as: Synthroid  Take 1 tablet (50 mcg total) by mouth before breakfast.     Misc. Devices Misc  20% salicylic acid with 2% 5-fluorouracil.  Apply to warts daily as instructed     traMADol 50 MG Tabs  Commonly known as: Ultram  Take 1 tablet (50 mg total) by mouth 2 (two) times daily as needed for Pain.      triamterene-hydroCHLOROthiazide 37.5-25 MG Caps  Commonly known as: Dyazide  Take 1 capsule by mouth daily.     zolpidem 10 MG Tabs  Commonly known as: Ambien  Take 1 tablet (10 mg total) by mouth nightly as needed for Sleep. AT BEDTIME            STOP taking these medications      Coricidin HBP -2 MG Tabs  Generic drug: DM-APAP-CPM               Where to Get Your Medications        These medications were sent to [facility] - [place], [place] - [address] [place] [address] [phone], [phone]  [address], [place] [address]      Phone: [phone]   HYDROcodone-acetaminophen 5-325 MG Tabs  metoprolol tartrate 25 MG Tabs         Discharge Plans:  Patient follow-up with orthopedics per their directive    Discharge Diagnosis:  Patient should get physical therapy at the nursing facility where she lives.  She can see me in follow-up on an as-needed basis.    Sharan Lopez MD  Medical Director, Critical Care, [facility]  Medical Director, [facility]  Pager: [phone]

## 2024-01-15 NOTE — DISCHARGE INSTRUCTIONS
A HH ref was faxed to [email or web address] [phone]  [email or web address] is the name of the therapy company at [facility].

## 2024-01-15 NOTE — CDS QUERY
How to answer this Query:    1.) DON'T CLICK COSIGN BUTTON FIRST  2.) Click \"3 dots...\" to the right of cosign button and click EDIT on the toolbar.  2.) Type an \"X\" in the bracket for the diagnosis that applies. (You may also add additional clinical details as you feel necessary to substantiate your response).   3.) Finally click \"Sign\" to complete response.  Thank You    CONFLICTING DOCUMENTATION CLARIFICATION    Please choose the clarification below as appropriate:    1.  The attending physician is required to clarify conflicting documentation in the medical record.  The following documentation is noted in the medical record:    114 DR COKER-  Acute kidney injury, improved  115 DR REDD-  She had mild renal insufficiency as well and was hydrated.     Please clarify the appropriate diagnosis for this patient.    ( x) ACUTE RENAL INSUFFICIENCY  ( ) ACUTE RENAL FAILURE  ( ) OTHER  ____________________________________________________________________    RISK FACTORS: ADVANCED AGE, UNKNOWN BASELINE-PER MD NOTES  CLINICAL INDICATORS: CR=1.12/1.03/1.18/1.32/1.19  TREATMENT: IVF, HOLD HOME DIURETIC    If you have any questions, please contact Clinical :  Gema MAE RN at [phone]     Thank You!    THIS FORM IS A PERMANENT PART OF THE MEDICAL RECORD

## 2024-01-15 NOTE — PROGRESS NOTES
[facility]    Progress Note    Elba Malik Patient Status:  Observation    1928 MRN [id number]   Location [facility] 4W/SW/SE Attending Nohemy Cao,    Hosp Day # 0 PCP Sharan Lopez MD     Chief complaint shoulder pain     Subjective:   Elba Malik is a(n) 89 year old female Pt has no c/o's this am, but doesn't feel ready to go home.     ROS:   No cp, sob   No c/d   No n/v     Objective:   Blood pressure 152/59, pulse 91, temperature 97 °F (36.1 °C), temperature source Axillary, resp. rate 18, weight 146 lb (66.2 kg), SpO2 100%, not currently breastfeeding.      Intake/Output Summary (Last 24 hours) at 2024 1858  Last data filed at 2024 1456  Gross per 24 hour   Intake --   Output 850 ml   Net -850 ml       Patient Weight(s) for the past 336 hrs:   Weight   24 0756 146 lb (66.2 kg)   24 2209 145 lb 1 oz (65.8 kg)           General appearance: alert, appears stated age and cooperative  Pulmonary:  clear to auscultation bilaterally  Cardiovascular: S1, S2 normal, no murmur, click, rub or gallop, regular rate and rhythm  Abdominal: soft, non-tender; bowel sounds normal; no masses,  no organomegaly  Extremities: extremities normal, atraumatic, no cyanosis or edema        Medicines:           Lab Results   Component Value Date    WBC 8.3 2024    HGB 9.2 (L) 2024    HCT 26.9 (L) 2024    .0 2024    CREATSERUM 1.32 (H) 2024    BUN 50 (H) 2024     (L) 2024    K 5.4 (H) 2024     2024    CO2 23.0 2024    GLU 98 2024    CA 9.1 2024    ALB 3.4 (L) 10/17/2018    ALKPHO 186 (H) 10/17/2018    BILT 0.7 10/17/2018    TP 5.9 10/17/2018     (H) 10/17/2018     (H) 10/17/2018    INR 1.0 10/11/2018    TSH 1.430 2023    DDIMER 0.63 01/10/2023     (H) 2016       No results found.        Results:     CBC:    Lab Results   Component Value  Date    WBC 8.3 01/14/2024    WBC 9.8 01/12/2024    WBC 9.3 01/11/2024     Lab Results   Component Value Date    HGB 9.2 (L) 01/14/2024    HGB 10.3 (L) 01/12/2024    HGB 11.2 (L) 01/11/2024      Lab Results   Component Value Date    .0 01/14/2024    .0 01/12/2024    .0 01/11/2024       Recent Labs   Lab 01/12/24  0329 01/13/24  0434 01/14/24  0412   * 131* 98   BUN 33* 33* 50*   CREATSERUM 1.03* 1.18* 1.32*   CA 9.2 9.2 9.1   * 136 134*   K 5.0 5.4* 5.4*    107 107   CO2 22.0 22.0 23.0             Assessment and Plan:           Left shoulder pain  Large shoulder effusion -seen on left shoulder x-ray.  Patient with chronic left shoulder pain but now has worsened.  Received multiple doses of morphine 2 mg IV x 3 as well as Toradol x 1 in the ED  -sp CT and eval by Ortho - apprec input - sp removal of blood. Improved today. Cont pain control      Renal insufficiency -noted to have creatinine level 1.12 w/ EGFR 45 along with metabolic acidosis bicarb 18.  No previous creatinine level to evaluate what her baseline is.  - creat near baseline. Restart diuretic. Monitor K -> received kayexalate and ivfs today. Repeat bmp in am     Essential hypertension -noted to have elevated blood pressure on presentation, suspecting secondary to pain.  -cont norvasc. Hold hydrochlorothiazide.   - add metoprolol      Hypothyroidism  -Resume Synthroid     Prophylaxis  Subcutaneous heparin     CODE STATUS  Full       Home tomorrow with  if doing well     Nohemy Cao,          Chart reviewed, including current vitals, notes, labs and imaging  Labs ordered and medications adjusted as outlined above  Coordinate care with care team/consultants  Discussed with patient results of tests, management plan as outlined above, and the need for ongoing hospitalization  D/w RN     MDM high

## 2024-01-15 NOTE — PHYSICAL THERAPY NOTE
PHYSICAL THERAPY TREATMENT NOTE - INPATIENT     Room Number: 425/425-A       Presenting Problem: acute L shoulder pain    Problem List  Principal Problem:    Acute pain of left shoulder  Active Problems:    Hyponatremia    Anemia    Azotemia    Metabolic acidosis    Hyperglycemia      PHYSICAL THERAPY ASSESSMENT   Chart reviewed. RN  approved participation in physical therapy.  PPE worn by therapist: mask, gloves, and goggles. Patient was wearing a mask during session. Patient presented in bed with 6/10 pain. Patient with good  progress towards goals during this session. Education provided on Physical therapy plan of care and physiological benefits of out of bed mobility. Patient with good carryover.     Bed mobility: Min assist  Transfers: Min assist  Gait Assistance: Contact guard assist  Distance (ft): 2 x 50  Assistive Device: Rolling walker  Pattern: Shuffle          Pt seen daily. Min a for bed mobility and transfer;extra time provided to complete task.EOB sitting balance activity with emphasis on core stabilization.Pt  educated on deep breathing and relaxation technique.Pt amb 2 x 50 ft with RW and CGA;cuing for gait pattern as well as for postural awareness.There  ex.Patient was left in bedside chair at end of session with all needs in reach. The patient's Approx Degree of Impairment: 35.83% has been calculated based on documentation in the [facility] '6 clicks' Inpatient Basic Mobility Short Form.  Research supports that patients with this level of impairment may benefit from Home with home health PT.  RN aware of patient status post session.    DISCHARGE RECOMMENDATIONS  PT Discharge Recommendations: Home with home health PT     PLAN  PT Treatment Plan: Body mechanics;Coordination;Endurance;Patient education;Gait training    SUBJECTIVE  Pt reports being ready for PT RX    OBJECTIVE       WEIGHT BEARING RESTRICTION  Weight Bearing Restriction: None                PAIN ASSESSMENT   Ratin          BALANCE                                                                                                                        Static Sitting: Good  Dynamic Sitting: Good           Static Standing: Fair -  Dynamic Standing: Fair -    ACTIVITY TOLERANCE           BP: 156/77  BP Location: Right arm  BP Method: Automatic  Patient Position: Sitting    O2 WALK       AM-PAC '6-Clicks' INPATIENT SHORT FORM - BASIC MOBILITY  How much difficulty does the patient currently have...  Patient Difficulty: Turning over in bed (including adjusting bedclothes, sheets and blankets)?: None   Patient Difficulty: Sitting down on and standing up from a chair with arms (e.g., wheelchair, bedside commode, etc.): None   Patient Difficulty: Moving from lying on back to sitting on the side of the bed?: None   How much help from another person does the patient currently need...   Help from Another: Moving to and from a bed to a chair (including a wheelchair)?: A Little   Help from Another: Need to walk in hospital room?: A Little   Help from Another: Climbing 3-5 steps with a railing?: A Lot     AM-PAC Score:  Raw Score: 20   Approx Degree of Impairment: 35.83%   Standardized Score (AM-PAC Scale): 47.67   CMS Modifier (G-Code): CJ      Additional information:     THERAPEUTIC EXERCISES  Lower Extremity Ankle pumps  Glut sets  Quad sets     Position Supine       Patient End of Session: Up in chair;Call light within reach;RN aware of session/findings;All patient questions and concerns addressed    CURRENT GOALS     Patient Goal Patient's self-stated goal is: to go home   Goal #1 Patient is able to demonstrate supine - sit EOB @ level: modified independent     Goal #1   Current Status Min a   Goal #2 Patient is able to demonstrate transfers Sit to/from Stand at assistance level: modified independent with walker - rolling     Goal #2  Current Status Min a   Goal #3 Patient is able to ambulate 50 feet with assist device: walker - rolling at assistance level:  modified independent   Goal #3   Current Status Pt amb 2 x 50 ft with RW and CGA   Goal #4 Patient to demonstrate independence with home activity/exercise instructions provided to patient in preparation for discharge.   Goal #4   Current Status In progress   There activity-15 minutes; Gait -15 minutes

## 2024-01-15 NOTE — CM/SW NOTE
01/15/24 1200   CM/ Referral Data   Referral Source Physician   Reason for Referral Discharge planning   Informant Patient   Patient Info   Patient's Current Mental Status at Time of Assessment Alert;Oriented   Patient Communication Issues Hearing impairment   Patient's Home Environment Assisted Living   Post Acute Care Provider Upon Admission Megan Glover   Patient Status Prior to Admission   Independent with ADLs and Mobility Yes   Discharge Needs   Anticipated D/C needs Home health care     CM met with Elba at bedside to discuss dc plan and therapy recommendation for HH on dc.   Pt agrees to therapy on discharge but requesting to use the onsite therapy at [facility].     CM called and spoke to  at [facility], they use [facility] for in house therapy.  Fax # [phone]    CM faxed HH ref to above fax per their request via [facility].     Plan: Home today with therapy at [facility].     / to remain available for support and/or discharge planning.   Joyce Handy RN, BSN  Nurse   [phone]

## 2024-01-16 ENCOUNTER — PATIENT OUTREACH (OUTPATIENT)
Dept: CASE MANAGEMENT | Age: 89
End: 2024-01-16

## 2024-01-16 ENCOUNTER — TELEPHONE (OUTPATIENT)
Dept: PULMONOLOGY | Facility: CLINIC | Age: 89
End: 2024-01-16

## 2024-01-16 PROCEDURE — 1125F AMNT PAIN NOTED PAIN PRSNT: CPT

## 2024-01-16 PROCEDURE — 1111F DSCHRG MED/CURRENT MED MERGE: CPT

## 2024-01-16 RX ORDER — TRAMADOL HYDROCHLORIDE 50 MG/1
50 TABLET ORAL 2 TIMES DAILY PRN
Qty: 60 TABLET | Refills: 0 | Status: SHIPPED | OUTPATIENT
Start: 2024-01-16

## 2024-01-16 RX ORDER — CELECOXIB 200 MG/1
200 CAPSULE ORAL DAILY
COMMUNITY

## 2024-01-16 NOTE — PROGRESS NOTES
Initial Post Discharge Follow Up   Discharge Date: 1/15/24  Contact Date: 1/16/2024    Consent Verification:  Assessment Completed With: Patient  HIPAA Verified?  Yes    Discharge Dx:   Left shoulder pain  Large shoulder effusion    sp removal of blood.  Renal insufficiency   Essential hypertension  Hypothyroidism         General:   How have you been since your discharge from the hospital? The patient reported improvement with symptoms at home. The patient did admit to left shoulder pain rated 5/10. The patient again reported pain is significantly improved form the hospital stay. The patient reported aspiration site has been healing well and denies any signs of infection. The patient decreased/no urine output, trouble breathing, chest pain,  difficulty with eating/drinking, muscle aches, fever, hematuria, melena/hematochezia, headache, n/v, or edema. The patient has not checked BP since discharge. The patient reported the visiting nurse is scheduled for later today through [facility].   Do you have any pain since discharge?  Yes  Where: left shoulder pain    Rating on pain scale 1-10, 10 being the worst pain you have ever experienced, what is current pain: 5  Is the pain manageable at home? Yes  How well was your pain managed while in the hospital?   On a scale of 1-5   1- Very Poor and 5- Very well   Very Well  When you were leaving the hospital were your discharge instructions reviewed with you? Yes  How well were your discharge instructions explained to you?   On a scale of 1-5   1- Very Poor and 5- Very well   Very Well  Do you have any questions about your discharge instructions?  No  Before leaving the hospital was your diagnoses explained to you? Yes  Do you have any questions about your diagnoses? No  Are you able to perform normal daily activities of living as you have prior to your hospital stay (dressing, bathing, ambulating to the bathroom, etc)? yes  (NCM) Was patient given a different diet per AVS?  no      Medications: NCM did confirm the patient has discontinued the following as directed:  STOP taking these medications       Coricidin HBP -2 MG Tabs  Generic drug: DM-APAP-CPM     Current Outpatient Medications   Medication Sig Dispense Refill    TRAMADOL 50 MG Oral Tab Take 1 tablet (50 mg total) by mouth 2 (two) times daily as needed for Pain. 60 tablet 0    metoprolol tartrate 25 MG Oral Tab Take 0.5 tablets (12.5 mg total) by mouth 2x Daily(Beta Blocker). 60 tablet 5    HYDROcodone-acetaminophen 5-325 MG Oral Tab Take 1-2 tablets by mouth every 8 (eight) hours as needed for Pain. 20 tablet 0    amLODIPine 5 MG Oral Tab Take 1 tablet (5 mg total) by mouth daily. 90 tablet 3    triamterene-hydroCHLOROthiazide 37.5-25 MG Oral Cap Take 1 capsule by mouth daily. 90 capsule 1    levothyroxine 50 MCG Oral Tab Take 1 tablet (50 mcg total) by mouth before breakfast. 30 tablet 5    zolpidem 10 MG Oral Tab Take 1 tablet (10 mg total) by mouth nightly as needed for Sleep. AT BEDTIME 90 tablet 1    Misc. Devices Does not apply Misc 20% salicylic acid with 2% 5-fluorouracil.  Apply to warts daily as instructed 1 each 2    acetaminophen-codeine (TYLENOL WITH CODEINE #3) 300-30 MG Oral Tab Take 1 tablet by mouth daily as needed for Pain. 30 tablet 0    Imiquimod (ALDARA) 5 % External Cream Apply 1 Application. topically nightly. 24 each 1    FLUTICASONE PROPIONATE 50 MCG/ACT Nasal Suspension USE 2 SPRAYS IN EACH NOSTRIL DAILY 16 g 5     Were there any changes to your current medication(s) noted on the AVS? Yes  If so, were these medication changes discussed with you prior to leaving the hospital? Yes  If a new medication was prescribed:    Was the new medication's purpose & side effects reviewed? Yes  Do you have any questions about your new medication? No  Did you  your discharge medications when you left the hospital? Yes  Let's go over your medications together to make sure we are not missing anything.  Medications Reviewed  Are there any reasons that keep you from taking your medication as prescribed? No  Are you having any concerns with constipation? No  Did patient receive their flu shot (Sept-March)? Yes; NCM updated the patient's chart.     Discharge medications reviewed/discussed/and reconciled against outpatient medications with patient.  Any changes or updates to medications sent to PCP.  Patient Acknowledged     Referrals/orders at D/C:The patient lives at [place].  Referrals/orders placed at D/C? no   Except for Home Health Services mentioned above, have you scheduled these other services? No The patient will complete therapy through [facility].   If no, do you need help with this? no    DME ordered at D/C? No    The patient has a raised toilet seat and a walker at home.     The patient also has a motor scooter.     Discharge orders, AVS reviewed and discussed with patient. Any changes or updates to orders sent to PCP.  Patient Acknowledged      SDOH:   Transportation:   Transportation Needs: No Transportation Needs (1/16/2024)    Transportation Needs     Lack of Transportation: No      Financial Strain:    Financial Resource Strain: Low Risk  (1/16/2024)    Financial Resource Strain     Difficulty of Paying Living Expenses: Not hard at all     Med Affordability: No       The patient was already aware of the following:     Healthy Driven Van  by  [facility]  Must not being a patient that is being discharged from the hospital.  Patients must be ambulatory or wheelchair independent.  This program serves individuals north of the [facility] to [place]/[facility] including [place], East to [place], South to [facility] including [place] excluding [facility], and West to [address] [facility] [facility] [address] including [place].  Availability:  available  Description:  [facility] has partnered with [facility] to offer a patient transportation service for  patients who need help with transportation from home to the hospital for medical visits. The service provides transportation to patients who live in communities surrounding the hospital campus.    This program provides:    - Transportation for Healthcare    An experienced  will provide service from a patient’s door and with stops at the Main Entrance of [facility], the [facility], the [facility], and the [facility] in [place]. The Healthy Driven van is available for patients who live in a 7-10 mile radius of the hospital.    Oxygen can be transported if properly secured. Patients must have their own oxygen. Let the service know at the time the appointment is scheduled so that mounting equipment can be provided.    This service costs $5 for one-way and $10 for two-way.    Call [phone]  Website:   [email or web address]      The patient reported plans to use the wheel chair accessible bus provided at [place].       Follow up appointments:          TCC  Was TCC ordered: No      PCP (If no TCC appointment)  Does patient already have a PCP appointment scheduled? No    A TE was sent to the PCP office for an appointment request.     Specialist    Does the patient have any other follow up appointment(s) needing to be scheduled? No      Is there any reason as to why you cannot make your appointment(s)?  No     Needs post D/C:   Now that you are home, are there any needs or concerns you need addressed before your next visit with your PCP?  (DME, meds, questions, etc.): No    Interventions by NCM:    Reviewed all discharge instructions with the patient with a focus on pain management and fall precuations. All medications were reviewed and educated on the importance of taking the medications as directed.  Patient symptoms were assessed, education was completed for signs/symptoms to monitor, and reviewed  when to contact providers vs go to the ED/call 911. Appointments were reviewed and stressed the importance of a close follow up with providers. A TE as sent to the PCP office for an appointment request.  The patient received the influenza vaccine this season and this was updated in the patient's Health Maintenance.  The patient verbalized understanding and will contact the office with any further questions or concerns.       Overall Rating:   How would you rate the care you received while in the hospital? excellent    CCM referral placed:    No    BOOK BY DATE: 1/29/2024

## 2024-01-16 NOTE — TELEPHONE ENCOUNTER
Spoke to the pt today for TCM. The patient was recently hospitalized for the following:     Left shoulder pain  Large shoulder effusion    sp removal of blood.  Renal insufficiency   Essential hypertension  Hypothyroidism    The pt does not have a TCM- HFU appt scheduled at this time. A TCM/HFU appt is recommended by 1/29/2024 as the pt is a moderate risk for readmission.  Please advise.    BOOK BY DATE (last date for TCM): 1/29/2024      Please f/u with the pt and assist with scheduling a TCM/HFU appt.  Thank you!

## 2024-01-16 NOTE — TELEPHONE ENCOUNTER
Requested Prescriptions     Pending Prescriptions Disp Refills    TRAMADOL 50 MG Oral Tab [Pharmacy Med Name: tramadol 50 mg tablet] 60 tablet 0     Sig: Take 1 tablet (50 mg total) by mouth 2 (two) times daily as needed for Pain.       No future appointments.  LOV: 6/5/2023  Last Refilled:10/31/2023 #60 0RF        ASSESSMENT AND PLAN:      Chronic shoulder pain secondary to severe OA  Chronic lower back pain due to advanced DJD     - Her symptoms are consistent with generalized osteoarthritis and degenerative disc disease  - She has no symptoms consistent with inflammatory arthritis.  No synovitis or swelling on exam  - She was seen by orthopedic in February, cortisone injection was given left shoulder but continues to have pain.  Also physical therapy with minimal improvement.  They recommended shoulder surgery but too high risk for her age  - Currently on Tylenol 5 mg 6 pills a day and Celebrex to her milligrams a day.  Only helped slightly  - Discussed with patient that her options are limited.  We will try Tylenol with codeine 1 pill a day as needed.  Advised to watch out for any dizziness lightheadedness or feeling sleepy.  Her granddaughter will watch her while she takes it.  - Could also consider Cymbalta     Thank you for allowing me to participate in this patients care. Pt will f/u as needed      Mirna Sharp MD  6/5/2023  2:12 PM

## 2024-01-16 NOTE — TELEPHONE ENCOUNTER
Spoke with patient, [facility] follow up appointment scheduled with Dr. Loepz on 1/29/24 at 11:45am.  Verified, date, time and location, patient verbalized understanding.

## 2024-01-22 ENCOUNTER — TELEPHONE (OUTPATIENT)
Dept: PULMONOLOGY | Facility: CLINIC | Age: 89
End: 2024-01-22

## 2024-01-22 NOTE — TELEPHONE ENCOUNTER
Pt called to see if her 1/29/24 appt can be a video visit due to mobility issues.  Please call.    
Spoke with patient. Appointment on 1/29/24 changed to video visit.   
Yes  
The patient is a 21y Female complaining of urinary symptoms.

## 2024-01-29 ENCOUNTER — TELEMEDICINE (OUTPATIENT)
Dept: PULMONOLOGY | Facility: CLINIC | Age: 89
End: 2024-01-29
Payer: MEDICARE

## 2024-01-29 DIAGNOSIS — I10 ESSENTIAL HYPERTENSION: Primary | ICD-10-CM

## 2024-01-29 NOTE — PROGRESS NOTES
Virtual Video Visit    NAYELY BALDERRAMA verbally consents to to a Video Visit on 1/29/2024. Patient understands and accepts financial responsibility for any deductible, co-insurance and/or co-pays associated with this service.    Please note that the following visit was completed using two-way, real-time interactive audio and/or video communication. This has been done in good alonzo to provide continuity of care in the best interest of the provider-patient relationship, due to the ongoing public health crisis/national emergency and because of restrictions of visitation. There are limitations of this visit as the physical exam was observed through audio/video only. Every conscious effort was taken to allow for sufficient and adequate time. This billing was spent on reviewing labs, medications, radiology tests and decision making. Appropriate medical decision-making and tests are ordered as detailed in the plan of care above.    History of Present Illness: The patient is a 89-year-old female who I know well from prior evaluation as she was hospitalized with shoulder hemarthrosis status post aspiration and she has done well clinically.  She remains on Tylenol with tramadol as needed and has no other new complaint.    Review of Systems: Vision normal. Ear nose and throat normal. Bowel normal. Bladder function normal. No depression. No thyroid disease. No rash. Muscles and joints normal. No weight loss or weight gain.    Physical Exam: She appears well on the video and is conversant.  Her blood pressure this morning was 130/70 as taken by the nursing facility nurse.    Assessment and Plan:  1.  Left shoulder pain-status post hemarthrosis aspiration.  Doing well clinically.    Recommendations: Tylenol, tramadol as needed, contact me promptly if new trouble.    2.  Hypertension-acceptably controlled at present    3.  General health and other medical problems-as per prior notes.  The patient should do another office visit or  video visit at the 6-month interval or sooner if needed.    Duration of the Service: 15 minutes    Sharan Lopez MD

## 2024-02-27 ENCOUNTER — TELEPHONE (OUTPATIENT)
Dept: PULMONOLOGY | Facility: CLINIC | Age: 89
End: 2024-02-27

## 2024-02-29 NOTE — TELEPHONE ENCOUNTER
Fax's order #[id number] and #[id number] from [facility] has been signed by Dr. Lopez and faxed back to #[phone] with confirmation and order has been sent for scanning.

## 2024-03-04 DIAGNOSIS — G89.4 CHRONIC PAIN SYNDROME: ICD-10-CM

## 2024-03-04 NOTE — TELEPHONE ENCOUNTER
Requested Prescriptions     Pending Prescriptions Disp Refills    TRAMADOL 50 MG Oral Tab [Pharmacy Med Name: tramadol 50 mg tablet] 60 tablet 0     Sig: Take 1 tablet (50 mg total) by mouth 2 (two) times daily as needed for Pain.     No future appointments.    LOV: 6/5/23   Last Refilled:1/16/24 #60 0RF  Labs:    Component      Latest Ref Rng 1/15/2024   WBC      4.0 - 11.0 x10(3) uL 9.2    RBC      3.80 - 5.30 x10(6)uL 3.10 (L)    Hemoglobin      12.0 - 16.0 g/dL 9.4 (L)    Hematocrit      35.0 - 48.0 % 28.5 (L)    MCV      80.0 - 100.0 fL 91.9    MCH      26.0 - 34.0 pg 30.3    MCHC      31.0 - 37.0 g/dL 33.0    RDW-SD      35.1 - 46.3 fL 50.4 (H)    RDW      11.0 - 15.0 % 15.2 (H)    Platelet Count      150.0 - 450.0 10(3)uL 298.0    Prelim Neutrophil Abs      1.50 - 7.70 x10 (3) uL 6.62    Neutrophils Absolute      1.50 - 7.70 x10(3) uL 6.62    Lymphocytes Absolute      1.00 - 4.00 x10(3) uL 1.49    Monocytes Absolute      0.10 - 1.00 x10(3) uL 1.02 (H)    Eosinophils Absolute      0.00 - 0.70 x10(3) uL 0.01    Basophils Absolute      0.00 - 0.20 x10(3) uL 0.01    Immature Granulocyte Absolute      0.00 - 1.00 x10(3) uL 0.03    Neutrophils %      % 72.2    Lymphocytes %      % 16.2    Monocytes %      % 11.1    Eosinophils %      % 0.1    Basophils %      % 0.1    Immature Granulocyte %      % 0.3    Glucose      70 - 99 mg/dL 87    Sodium      136 - 145 mmol/L 137    Potassium      3.5 - 5.1 mmol/L 4.8    Chloride      98 - 112 mmol/L 109    Carbon Dioxide, Total      21.0 - 32.0 mmol/L 23.0    ANION GAP      0 - 18 mmol/L 5    BUN      9 - 23 mg/dL 50 (H)    CREATININE      0.55 - 1.02 mg/dL 1.19 (H)    BUN/CREATININE RATIO      10.0 - 20.0  42.0 (H)    CALCIUM      8.7 - 10.4 mg/dL 9.3    CALCULATED OSMOLALITY      275 - 295 mOsm/kg 297 (H)    EGFR      >=60 mL/min/1.73m2 42 (L)       Legend:  (L) Low  (H) High    ASSESSMENT AND PLAN:      Chronic shoulder pain secondary to severe OA  Chronic lower back pain  due to advanced DJD     - Her symptoms are consistent with generalized osteoarthritis and degenerative disc disease  - She has no symptoms consistent with inflammatory arthritis.  No synovitis or swelling on exam  - She was seen by orthopedic in February, cortisone injection was given left shoulder but continues to have pain.  Also physical therapy with minimal improvement.  They recommended shoulder surgery but too high risk for her age  - Currently on Tylenol 5 mg 6 pills a day and Celebrex to her milligrams a day.  Only helped slightly  - Discussed with patient that her options are limited.  We will try Tylenol with codeine 1 pill a day as needed.  Advised to watch out for any dizziness lightheadedness or feeling sleepy.  Her granddaughter will watch her while she takes it.  - Could also consider Cymbalta     Thank you for allowing me to participate in this patients care. Pt will f/u as needed      Mirna Sharp MD  6/5/2023  2:12 PM

## 2024-03-05 RX ORDER — TRAMADOL HYDROCHLORIDE 50 MG/1
50 TABLET ORAL 2 TIMES DAILY PRN
Qty: 60 TABLET | Refills: 0 | OUTPATIENT
Start: 2024-03-05

## 2024-03-11 ENCOUNTER — TELEPHONE (OUTPATIENT)
Dept: PULMONOLOGY | Facility: CLINIC | Age: 89
End: 2024-03-11

## 2024-03-11 DIAGNOSIS — M25.519 CHRONIC SHOULDER PAIN, UNSPECIFIED LATERALITY: ICD-10-CM

## 2024-03-11 DIAGNOSIS — G89.29 CHRONIC SHOULDER PAIN, UNSPECIFIED LATERALITY: ICD-10-CM

## 2024-03-11 DIAGNOSIS — G47.00 INSOMNIA, UNSPECIFIED: ICD-10-CM

## 2024-03-11 DIAGNOSIS — D64.9 ANEMIA, UNSPECIFIED TYPE: Primary | ICD-10-CM

## 2024-03-11 NOTE — TELEPHONE ENCOUNTER
Last seen 01/23/2020  Last refill 04/14/2020  Routed to dr Joan Johnson for sign off. soft/nontender.../nondistended

## 2024-03-13 RX ORDER — TRAMADOL HYDROCHLORIDE 50 MG/1
50 TABLET ORAL EVERY 12 HOURS PRN
Qty: 40 TABLET | Refills: 3 | Status: SHIPPED | OUTPATIENT
Start: 2024-03-13

## 2024-03-13 NOTE — TELEPHONE ENCOUNTER
Pt aware Tramadol script was sent to [facility]. Discussed directions with pt for Tramadol. Explained those directions will be on the bottle and baths, exercise even stretching & moving legs, bathing, massages, cool/warm packs, good sleep hygiene, cutting out/back on caffeine including chocolate, specially designed foot wraps for RLS, & vibrating pads for under the legs can help sx of RLS. Explained will ask MD if he has any other recommendations & for refill of zolpidem. She voiced appreciation.

## 2024-03-13 NOTE — TELEPHONE ENCOUNTER
Pt calling again.  She states it is regarding her pain medication, joint pain and increased leg restlessness.  Pt states that she is having trouble walking.  Please call

## 2024-03-13 NOTE — TELEPHONE ENCOUNTER
C/o a lot of pain in back, shoulders, & knees onset quite a while, exercises from therapy don't seem to help much, and having trouble walking with walker. Stts using motorized scooter. Stts was previously prescribed Tramadol by hospitalist (takes the edge off a little bit), wanted refill, was told she would have to come in, but it is difficult for her to go anywhere. Per pt also has restless legs mostly in the evening, knows she has arthritis, & takes Celebrex. Requesting Dr. Lopez to prescribe something. Aware RN will f/u s/p d/w MD.

## 2024-03-14 DIAGNOSIS — G47.00 INSOMNIA, UNSPECIFIED: ICD-10-CM

## 2024-03-14 RX ORDER — ZOLPIDEM TARTRATE 10 MG/1
10 TABLET ORAL NIGHTLY PRN
Qty: 90 TABLET | Refills: 1 | Status: SHIPPED | OUTPATIENT
Start: 2024-03-14

## 2024-03-14 NOTE — TELEPHONE ENCOUNTER
Prescription refill request  LOV: 1/29/24  LR: 9/19/23    Dr. Lopez - Please review/sign pended refill

## 2024-03-15 RX ORDER — ZOLPIDEM TARTRATE 10 MG/1
10 TABLET ORAL NIGHTLY PRN
Qty: 90 TABLET | Refills: 1 | Status: SHIPPED | OUTPATIENT
Start: 2024-03-15

## 2024-03-15 NOTE — TELEPHONE ENCOUNTER
I spoke to the patient.  Refilled zolpidem and sent iron studies for restless legs.  She is not taking the tramadol for restless legs but for her orthopedic discomfort

## 2024-03-19 PROBLEM — J06.9 VIRAL UPPER RESPIRATORY TRACT INFECTION: Status: RESOLVED | Noted: 2018-02-22 | Resolved: 2024-03-19

## 2024-03-19 PROBLEM — J06.9 VIRAL UPPER RESPIRATORY TRACT INFECTION: Status: RESOLVED | Noted: 2018-02-22 | Resolved: 2024-01-01

## 2024-03-29 ENCOUNTER — TELEPHONE (OUTPATIENT)
Dept: PULMONOLOGY | Facility: CLINIC | Age: 89
End: 2024-03-29

## 2024-04-01 NOTE — TELEPHONE ENCOUNTER
Order signed by Dr Lopez and faxed back to [facility]. Confirmation received and sent to scanning  
Received fax from [facility] order [id number]. Placed in Dr Lopez's folder for signature  
Alert and oriented to person, place and time

## 2024-04-03 RX ORDER — LEVOTHYROXINE SODIUM 0.05 MG/1
50 TABLET ORAL
Qty: 90 TABLET | Refills: 3 | Status: ON HOLD | OUTPATIENT
Start: 2024-04-03

## 2024-04-04 ENCOUNTER — TELEPHONE (OUTPATIENT)
Dept: PULMONOLOGY | Facility: CLINIC | Age: 89
End: 2024-04-04

## 2024-04-04 NOTE — TELEPHONE ENCOUNTER
Katherin Gomez  is requesting lab orders to be faxed to them.  Pt is in assisted living and the lab comes to residents once a week.  She states that she will call back with fax # to send to.

## 2024-04-05 ENCOUNTER — HOSPITAL ENCOUNTER (INPATIENT)
Facility: HOSPITAL | Age: 89
LOS: 7 days | Discharge: NURSING, REHAB OR LONG-TERM CARE | End: 2024-04-12
Attending: EMERGENCY MEDICINE | Admitting: INTERNAL MEDICINE
Payer: MEDICARE

## 2024-04-05 ENCOUNTER — APPOINTMENT (OUTPATIENT)
Dept: CT IMAGING | Facility: HOSPITAL | Age: 89
End: 2024-04-05
Attending: EMERGENCY MEDICINE
Payer: MEDICARE

## 2024-04-05 ENCOUNTER — APPOINTMENT (OUTPATIENT)
Dept: GENERAL RADIOLOGY | Facility: HOSPITAL | Age: 89
End: 2024-04-05
Attending: INTERNAL MEDICINE
Payer: MEDICARE

## 2024-04-05 ENCOUNTER — APPOINTMENT (OUTPATIENT)
Dept: GENERAL RADIOLOGY | Facility: HOSPITAL | Age: 89
End: 2024-04-05
Attending: EMERGENCY MEDICINE
Payer: MEDICARE

## 2024-04-05 DIAGNOSIS — M97.8XXA PERIPROSTHETIC HIP FRACTURE, INITIAL ENCOUNTER: Primary | ICD-10-CM
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
## 2024-04-05 DIAGNOSIS — I10 ESSENTIAL HYPERTENSION: ICD-10-CM

## 2024-04-05 DIAGNOSIS — Z96.649 PERIPROSTHETIC HIP FRACTURE, INITIAL ENCOUNTER: Primary | ICD-10-CM

## 2024-04-05 DIAGNOSIS — M25.552 LEFT HIP PAIN: ICD-10-CM

## 2024-04-05 DIAGNOSIS — F51.04 PSYCHOPHYSIOLOGICAL INSOMNIA: ICD-10-CM

## 2024-04-05 LAB
ANION GAP SERPL CALC-SCNC: 6 MMOL/L (ref 0–18)
APTT PPP: 33 SECONDS (ref 23.3–35.6)
ATRIAL RATE: 72 BPM
BASOPHILS # BLD AUTO: 0.02 X10(3) UL (ref 0–0.2)
BASOPHILS NFR BLD AUTO: 0.2 %
BUN BLD-MCNC: 24 MG/DL (ref 9–23)
BUN/CREAT SERPL: 23.5 (ref 10–20)
CALCIUM BLD-MCNC: 9.2 MG/DL (ref 8.7–10.4)
CHLORIDE SERPL-SCNC: 96 MMOL/L (ref 98–112)
CO2 SERPL-SCNC: 23 MMOL/L (ref 21–32)
CREAT BLD-MCNC: 1.02 MG/DL
DEPRECATED RDW RBC AUTO: 45.9 FL (ref 35.1–46.3)
EGFRCR SERPLBLD CKD-EPI 2021: 51 ML/MIN/1.73M2 (ref 60–?)
EOSINOPHIL # BLD AUTO: 0.02 X10(3) UL (ref 0–0.7)
EOSINOPHIL NFR BLD AUTO: 0.2 %
ERYTHROCYTE [DISTWIDTH] IN BLOOD BY AUTOMATED COUNT: 14.2 % (ref 11–15)
GLUCOSE BLD-MCNC: 90 MG/DL (ref 70–99)
HCT VFR BLD AUTO: 28.5 %
HGB BLD-MCNC: 9.5 G/DL
IMM GRANULOCYTES # BLD AUTO: 0.05 X10(3) UL (ref 0–1)
IMM GRANULOCYTES NFR BLD: 0.5 %
INR BLD: 0.96 (ref 0.8–1.2)
LYMPHOCYTES # BLD AUTO: 1.26 X10(3) UL (ref 1–4)
LYMPHOCYTES NFR BLD AUTO: 12.4 %
MCH RBC QN AUTO: 29.5 PG (ref 26–34)
MCHC RBC AUTO-ENTMCNC: 33.3 G/DL (ref 31–37)
MCV RBC AUTO: 88.5 FL
MONOCYTES # BLD AUTO: 1.25 X10(3) UL (ref 0.1–1)
MONOCYTES NFR BLD AUTO: 12.3 %
NEUTROPHILS # BLD AUTO: 7.58 X10 (3) UL (ref 1.5–7.7)
NEUTROPHILS # BLD AUTO: 7.58 X10(3) UL (ref 1.5–7.7)
NEUTROPHILS NFR BLD AUTO: 74.4 %
OSMOLALITY SERPL CALC.SUM OF ELEC: 264 MOSM/KG (ref 275–295)
P AXIS: 87 DEGREES
P-R INTERVAL: 212 MS
PLATELET # BLD AUTO: 258 10(3)UL (ref 150–450)
POTASSIUM SERPL-SCNC: 5 MMOL/L (ref 3.5–5.1)
PROTHROMBIN TIME: 13.3 SECONDS (ref 11.6–14.8)
Q-T INTERVAL: 416 MS
QRS DURATION: 88 MS
QTC CALCULATION (BEZET): 455 MS
R AXIS: 18 DEGREES
RBC # BLD AUTO: 3.22 X10(6)UL
SODIUM SERPL-SCNC: 125 MMOL/L (ref 136–145)
T AXIS: 37 DEGREES
VENTRICULAR RATE: 72 BPM
WBC # BLD AUTO: 10.2 X10(3) UL (ref 4–11)

## 2024-04-05 PROCEDURE — 72125 CT NECK SPINE W/O DYE: CPT | Performed by: EMERGENCY MEDICINE

## 2024-04-05 PROCEDURE — 71045 X-RAY EXAM CHEST 1 VIEW: CPT | Performed by: INTERNAL MEDICINE

## 2024-04-05 PROCEDURE — 72192 CT PELVIS W/O DYE: CPT | Performed by: EMERGENCY MEDICINE

## 2024-04-05 PROCEDURE — 99223 1ST HOSP IP/OBS HIGH 75: CPT | Performed by: INTERNAL MEDICINE

## 2024-04-05 PROCEDURE — 99223 1ST HOSP IP/OBS HIGH 75: CPT | Performed by: STUDENT IN AN ORGANIZED HEALTH CARE EDUCATION/TRAINING PROGRAM

## 2024-04-05 PROCEDURE — 73502 X-RAY EXAM HIP UNI 2-3 VIEWS: CPT | Performed by: EMERGENCY MEDICINE

## 2024-04-05 PROCEDURE — 70450 CT HEAD/BRAIN W/O DYE: CPT | Performed by: EMERGENCY MEDICINE

## 2024-04-05 RX ORDER — ZOLPIDEM TARTRATE 5 MG/1
10 TABLET ORAL NIGHTLY PRN
Status: DISCONTINUED | OUTPATIENT
Start: 2024-04-05 | End: 2024-04-12

## 2024-04-05 RX ORDER — ENEMA 19; 7 G/133ML; G/133ML
1 ENEMA RECTAL ONCE AS NEEDED
Status: DISCONTINUED | OUTPATIENT
Start: 2024-04-05 | End: 2024-04-12

## 2024-04-05 RX ORDER — HYDROCODONE BITARTRATE AND ACETAMINOPHEN 5; 325 MG/1; MG/1
1 TABLET ORAL ONCE
Status: COMPLETED | OUTPATIENT
Start: 2024-04-05 | End: 2024-04-05

## 2024-04-05 RX ORDER — MORPHINE SULFATE 4 MG/ML
4 INJECTION, SOLUTION INTRAMUSCULAR; INTRAVENOUS EVERY 2 HOUR PRN
Status: DISCONTINUED | OUTPATIENT
Start: 2024-04-05 | End: 2024-04-12

## 2024-04-05 RX ORDER — LEVOTHYROXINE SODIUM 0.05 MG/1
50 TABLET ORAL
Status: DISCONTINUED | OUTPATIENT
Start: 2024-04-05 | End: 2024-04-12

## 2024-04-05 RX ORDER — AMLODIPINE BESYLATE 5 MG/1
5 TABLET ORAL DAILY
Status: DISCONTINUED | OUTPATIENT
Start: 2024-04-05 | End: 2024-04-12

## 2024-04-05 RX ORDER — ONDANSETRON 2 MG/ML
4 INJECTION INTRAMUSCULAR; INTRAVENOUS EVERY 6 HOURS PRN
Status: DISCONTINUED | OUTPATIENT
Start: 2024-04-05 | End: 2024-04-12

## 2024-04-05 RX ORDER — ENOXAPARIN SODIUM 100 MG/ML
30 INJECTION SUBCUTANEOUS DAILY
Status: DISCONTINUED | OUTPATIENT
Start: 2024-04-05 | End: 2024-04-12

## 2024-04-05 RX ORDER — SODIUM CHLORIDE 9 MG/ML
INJECTION, SOLUTION INTRAVENOUS CONTINUOUS
Status: DISCONTINUED | OUTPATIENT
Start: 2024-04-05 | End: 2024-04-09

## 2024-04-05 RX ORDER — POLYETHYLENE GLYCOL 3350 17 G/17G
17 POWDER, FOR SOLUTION ORAL DAILY PRN
Status: DISCONTINUED | OUTPATIENT
Start: 2024-04-05 | End: 2024-04-12

## 2024-04-05 RX ORDER — ONDANSETRON 2 MG/ML
4 INJECTION INTRAMUSCULAR; INTRAVENOUS EVERY 6 HOURS PRN
Status: DISCONTINUED | OUTPATIENT
Start: 2024-04-05 | End: 2024-04-05

## 2024-04-05 RX ORDER — FLUTICASONE PROPIONATE 50 MCG
2 SPRAY, SUSPENSION (ML) NASAL DAILY
Status: DISCONTINUED | OUTPATIENT
Start: 2024-04-05 | End: 2024-04-10

## 2024-04-05 RX ORDER — MORPHINE SULFATE 4 MG/ML
2 INJECTION, SOLUTION INTRAMUSCULAR; INTRAVENOUS EVERY 2 HOUR PRN
Status: DISCONTINUED | OUTPATIENT
Start: 2024-04-05 | End: 2024-04-12

## 2024-04-05 RX ORDER — MORPHINE SULFATE 4 MG/ML
1 INJECTION, SOLUTION INTRAMUSCULAR; INTRAVENOUS EVERY 2 HOUR PRN
Status: DISCONTINUED | OUTPATIENT
Start: 2024-04-05 | End: 2024-04-12

## 2024-04-05 RX ORDER — SENNOSIDES 8.6 MG
17.2 TABLET ORAL NIGHTLY PRN
Status: DISCONTINUED | OUTPATIENT
Start: 2024-04-05 | End: 2024-04-12

## 2024-04-05 RX ORDER — SODIUM CHLORIDE 450 MG/100ML
INJECTION, SOLUTION INTRAVENOUS CONTINUOUS
Status: DISCONTINUED | OUTPATIENT
Start: 2024-04-05 | End: 2024-04-09

## 2024-04-05 RX ORDER — BISACODYL 10 MG
10 SUPPOSITORY, RECTAL RECTAL
Status: DISCONTINUED | OUTPATIENT
Start: 2024-04-05 | End: 2024-04-12

## 2024-04-05 RX ORDER — MORPHINE SULFATE 4 MG/ML
1 INJECTION, SOLUTION INTRAMUSCULAR; INTRAVENOUS EVERY 2 HOUR PRN
Status: DISCONTINUED | OUTPATIENT
Start: 2024-04-05 | End: 2024-04-05

## 2024-04-05 NOTE — ED QUICK NOTES
Orders for admission, patient is aware of plan and ready to go upstairs. Any questions, please call ED RN Drake at extension [phone].     Patient Covid vaccination status: Fully vaccinated     COVID Test Ordered in ED: None    COVID Suspicion at Admission: N/A    Running Infusions:  None    Mental Status/LOC at time of transport: a&ox4    Other pertinent information: Fx to lt femur, purewick, [place], AM labs      CIWA score: N/A   NIH score:  N/A         Introduction Text (Please End With A Colon): (Biopsy) Size Of Lesion In Cm (Optional): 0 Procedure To Be Performed At Next Visit: Biopsy by shave method Instructions (Optional): Left thigh 0.3x0.3 Detail Level: Detailed

## 2024-04-05 NOTE — ED INITIAL ASSESSMENT (HPI)
Pt arrives via EMS after mechanical fall around 10pm tonight. Pt states she was in the bathroom when her legs got tangled up and she stumbled over her feet. Per EMS, pt was assisted up from the ground and is now complaining of L hip pain. Pt describes increased pain to hip with movement, rated as 10/10. Denies hitting head/LOC. -thinners. Hx of arthritis. Aox4, speaking in full sentences.     No apparent shortening/rotation. Pt ambulates with a walker at home.

## 2024-04-05 NOTE — PLAN OF CARE
Patient admitted after fall at home, has stefan-prosthetic Left femur fx, per Ortho no surgery is needed. Will have PT/OT eval, WBAT. Awaiting MRI. IVF infusing. Call light within reach and bed in low position.   Problem: Patient Centered Care  Goal: Patient preferences are identified and integrated in the patient's plan of care  Description: Interventions:  - What would you like us to know as we care for you? From [facility]  - Provide timely, complete, and accurate information to patient/family  - Incorporate patient and family knowledge, values, beliefs, and cultural backgrounds into the planning and delivery of care  - Encourage patient/family to participate in care and decision-making at the level they choose  - Honor patient and family perspectives and choices  Outcome: Progressing     Problem: Patient/Family Goals  Goal: Patient/Family Long Term Goal  Description: Patient's Long Term Goal:     Interventions:  -   - See additional Care Plan goals for specific interventions  Outcome: Progressing  Goal: Patient/Family Short Term Goal  Description: Patient's Short Term Goal:     Interventions:   -   - See additional Care Plan goals for specific interventions  Outcome: Progressing     Problem: PAIN - ADULT  Goal: Verbalizes/displays adequate comfort level or patient's stated pain goal  Description: INTERVENTIONS:  - Encourage pt to monitor pain and request assistance  - Assess pain using appropriate pain scale  - Administer analgesics based on type and severity of pain and evaluate response  - Implement non-pharmacological measures as appropriate and evaluate response  - Consider cultural and social influences on pain and pain management  - Manage/alleviate anxiety  - Utilize distraction and/or relaxation techniques  - Monitor for opioid side effects  - Notify MD/LIP if interventions unsuccessful or patient reports new pain  - Anticipate increased pain with activity and pre-medicate as appropriate  Outcome:  Progressing     Problem: SAFETY ADULT - FALL  Goal: Free from fall injury  Description: INTERVENTIONS:  - Assess pt frequently for physical needs  - Identify cognitive and physical deficits and behaviors that affect risk of falls.  - [place] fall precautions as indicated by assessment.  - Educate pt/family on patient safety including physical limitations  - Instruct pt to call for assistance with activity based on assessment  - Modify environment to reduce risk of injury  - Provide assistive devices as appropriate  - Consider OT/PT consult to assist with strengthening/mobility  - Encourage toileting schedule  Outcome: Progressing     Problem: Impaired Functional Mobility  Goal: Achieve highest/safest level of mobility/gait  Description: Interventions:  - Assess patient's functional ability and stability  - Promote increasing activity/tolerance for mobility and gait  - Educate and engage patient/family in tolerated activity level and precautions    Outcome: Progressing     Problem: Impaired Activities of Daily Living  Goal: Achieve highest/safest level of independence in self care  Description: Interventions:  - Assess ability and encourage patient to participate in ADLs to maximize function  - Promote sitting position while performing ADLs such as feeding, grooming, and bathing  - Educate and encourage patient/family in tolerated functional activity level and precautions during self-care    Outcome: Progressing

## 2024-04-05 NOTE — CONSULTS
[facility]  part of [facility]    Report of Consultation    Elba Malik Patient Status:  Inpatient    1928 MRN [id number]   Location [facility] 1W Attending Liane Loyd, DO   Hosp Day # 0 PCP Sharan Lopez MD     Date of Admission:  2024    Reason for Consultation:   Status post fall    History of Present Illness:   Patient is a 89-year-old female who presents after mechanical fall with left hip pain.  Prior history of multiple falls states she was in the bathroom when she fell without tracking her head.  Denies loss of consciousness.  Moving all extremities.  Workup in emergency department revealed evidence of questionable acute nondisplaced periprosthetic fracture of left hip fracture at intertrochanteric proximal left femur.  States pain relatively controlled unless she moves.  Denies dyspnea.  Hemodynamically stable.    Past Medical History  Past Medical History:   Diagnosis Date    Arthritis     Asthma (HCC)     Back problem     multiple spinal injections    Cataract     bilateral cataract surgery    Cataracts, bilateral     cataracts, PC IOL  OD Dr. Tate,  PC IOL Dr. Concepcion in [place], OS    Colon polyp 2006    small polyps    Disorder of thyroid     Essential hypertension     Hearing impairment     High blood pressure     Osteoarthritis     Other and unspecified hyperlipidemia     Other ill-defined conditions(799.89)     Left wrist trauma, surgical repair    Posterior capsule opacification     OS, YAG laser     Ptosis of right eyelid 2014    Ptosis RUL    Shortness of breath     Visual impairment        Past Surgical History  Past Surgical History:   Procedure Laterality Date    CATARACT      cataract extraction    CATARACT Left     Phaco w/PC IOL    CATARACT Right     Phaco w/ PC IOL    CATARACT EXTRACTION W/  INTRAOCULAR LENS IMPLANT Left     PC IOL/ Dr. Pichardo    CATARACT EXTRACTION W/  INTRAOCULAR LENS IMPLANT Right      PC IOL / Dr. Concepcion    CHOLECYSTECTOMY      COLONOSCOPY      ERCP,DIAGNOSTIC  10/16/2018    Choledocholithiasis, dilated intrahepatic and extrahepatic biliary tree    HIP REPLACEMENT SURGERY  97/0 per NG    HYSTERECTOMY      Partial    LAPAROSCOPIC CHOLECYSTECTOMY  05/27/2016    OTHER SURGICAL HISTORY      Left wrist trauma, surgical repair    OTHER SURGICAL HISTORY  05/27/2016    Laparoscopy with bilateral salpingo-oophorectomy    SYNVISC, INTRA-ARTICULAR Bilateral 2013    Synvisc injection bilateral knees    TOTAL HIP REPLACEMENT      bilateral hip replacements    YAG CAPSULOTOMY - OS - LEFT EYE Left 1998    Dr. Concepcion       Family History  Family History   Problem Relation Age of Onset    Ear Problems Mother         hearing loss    Other (Other) Father         emphysema    Diabetes Neg     Glaucoma Neg     Macular degeneration Neg        Social History  Social History     Socioeconomic History    Marital status:    Occupational History    Occupation: homemaker   Tobacco Use    Smoking status: Never    Smokeless tobacco: Never   Vaping Use    Vaping Use: Never used   Substance and Sexual Activity    Alcohol use: No     Alcohol/week: 0.0 standard drinks of alcohol    Drug use: No    Sexual activity: Never   Other Topics Concern    Caffeine Concern Yes     Comment: coffee, 1 cup daily    Grew up on a farm No    History of tanning No    Outdoor occupation No    Pt has a pacemaker No    Pt has a defibrillator No    Breast feeding No    Reaction to local anesthetic No           Current Medications:  Current Facility-Administered Medications   Medication Dose Route Frequency    amLODIPine (Norvasc) tab 5 mg  5 mg Oral Daily    fluticasone propionate (Flonase) 50 MCG/ACT nasal suspension 2 spray  2 spray Nasal Daily    levothyroxine (Synthroid) tab 50 mcg  50 mcg Oral Before breakfast    metoprolol tartrate (Lopressor) partial tab 12.5 mg  12.5 mg Oral 2x Daily(Beta Blocker)    zolpidem (Ambien) tab 10 mg  10  mg Oral Nightly PRN    ondansetron (Zofran) 4 MG/2ML injection 4 mg  4 mg Intravenous Q6H PRN    sodium chloride 0.9% infusion   Intravenous Continuous    enoxaparin (Lovenox) 30 MG/0.3ML SUBQ injection 30 mg  30 mg Subcutaneous Daily    morphINE PF 4 MG/ML injection 1 mg  1 mg Intravenous Q2H PRN    Or    morphINE PF 4 MG/ML injection 2 mg  2 mg Intravenous Q2H PRN    Or    morphINE PF 4 MG/ML injection 4 mg  4 mg Intravenous Q2H PRN    polyethylene glycol (PEG 3350) (Miralax) 17 g oral packet 17 g  17 g Oral Daily PRN    sennosides (Senokot) tab 17.2 mg  17.2 mg Oral Nightly PRN    bisacodyl (Dulcolax) 10 MG rectal suppository 10 mg  10 mg Rectal Daily PRN    fleet enema (Fleet) 7-19 GM/118ML rectal enema 133 mL  1 enema Rectal Once PRN    sodium chloride 0.45% infusion   Intravenous Continuous     Medications Prior to Admission   Medication Sig    levothyroxine 50 MCG Oral Tab Take 1 tablet (50 mcg total) by mouth before breakfast.    zolpidem 10 MG Oral Tab Take 1 tablet (10 mg total) by mouth nightly as needed for Sleep. AT BEDTIME    traMADol 50 MG Oral Tab Take 1 tablet (50 mg total) by mouth every 12 (twelve) hours as needed for Pain.    celecoxib 200 MG Oral Cap Take 1 capsule (200 mg total) by mouth daily.    metoprolol tartrate 25 MG Oral Tab Take 0.5 tablets (12.5 mg total) by mouth 2x Daily(Beta Blocker).    amLODIPine 5 MG Oral Tab Take 1 tablet (5 mg total) by mouth daily. (Patient taking differently: Take 1 tablet (5 mg total) by mouth daily.)    triamterene-hydroCHLOROthiazide 37.5-25 MG Oral Cap Take 1 capsule by mouth daily.    FLUTICASONE PROPIONATE 50 MCG/ACT Nasal Suspension USE 2 SPRAYS IN EACH NOSTRIL DAILY    zolpidem 10 MG Oral Tab Take 1 tablet (10 mg total) by mouth nightly as needed for Sleep.    TRAMADOL 50 MG Oral Tab Take 1 tablet (50 mg total) by mouth 2 (two) times daily as needed for Pain.    Misc. Devices Does not apply Misc 20% salicylic acid with 2% 5-fluorouracil.  Apply to  warts daily as instructed       Allergies  No Known Allergies    Review of Systems:   Constitutional: denies fevers, chills, weakness, fatigue, recent illness  HEENT: denies headache, sore throat, vision loss  Cardio: denies chest pain, chest pressure, palpitations  Respiratory: denies dyspnea, cough, wheezing, hemoptysis   GI: denies nausea, vomiting, abdominal pain  : denies dysuria, hematuria  Musculoskeletal: Left hip pain  Integumentary: denies rash, itching  Neurological: denies syncope, weakness, dizziness,   Psychiatric: denies depression, anxiety  Hematologic: denies bruising        Physical Exam:   Blood pressure (!) 172/78, pulse 67, temperature 96.8 °F (36 °C), temperature source Temporal, resp. rate 18, height 5' 5\" (1.651 m), weight 153 lb 11.2 oz (69.7 kg), SpO2 97%, not currently breastfeeding.    Constitutional: no acute distress  Eyes: PERRL  ENT: nares patent  Neck: neck supple, no JVD  Cardio: RRR, S1 S2  Respiratory: clear to auscultation bilaterally, no wheezing, rales, rhonchi, crackles  GI: abdomen soft, non tender, active bowel souds, no organomegaly  Extremities: no clubbing, cyanosis, edema  Neurologic: no gross motor deficits  Skin: warm, dry    Results:   Laboratory Data  Lab Results   Component Value Date    WBC 10.2 04/05/2024    HGB 9.5 (L) 04/05/2024    HCT 28.5 (L) 04/05/2024    .0 04/05/2024    CREATSERUM 1.02 04/05/2024    BUN 24 (H) 04/05/2024     (L) 04/05/2024    K 5.0 04/05/2024    CL 96 (L) 04/05/2024    CO2 23.0 04/05/2024    GLU 90 04/05/2024    CA 9.2 04/05/2024    ALB 3.4 (L) 10/17/2018    ALKPHO 186 (H) 10/17/2018    TP 5.9 10/17/2018     (H) 10/17/2018     (H) 10/17/2018    PTT 33.0 04/05/2024    INR 0.96 04/05/2024    PTP 13.3 04/05/2024    TSH 1.430 06/05/2023    DDIMER 0.63 01/10/2023     (H) 06/06/2016         Imaging  XR CHEST AP PORTABLE  (CPT=71045)    Result Date: 4/5/2024  CONCLUSION:   Pulmonary edema and small bilateral  pleural effusions.  Superimposed consolidations are not excluded.    Preliminary report was given by [facility].  There are no clinically significant discrepancies.    elm-remote    Dictated by (CST): Jame Napier MD on 4/05/2024 at 11:32 AM     Finalized by (CST): Jame Napier MD on 4/05/2024 at 11:34 AM          CT PELVIS (CPT=72192)    Result Date: 4/5/2024  CONCLUSION: Minimally displaced comminuted fracture adjacent to the left hip prosthesis at the greater trochanter. Additional chronic or incidental findings are described in the body of this report.  Preliminary report was given by [facility].  There are no clinically significant discrepancies.    elm-remote    Dictated by (CST): Jame Napier MD on 4/05/2024 at 10:03 AM     Finalized by (CST): Jame Napier MD on 4/05/2024 at 10:06 AM          CT BRAIN OR HEAD (58831)    Result Date: 4/5/2024  CONCLUSION:   No acute intracranial abnormality.  No acute intracranial hemorrhage.  No calvarial fracture.  Mild chronic microvascular ischemic changes.  Expansile density in the left maxillary sinus extending into the left nasal passage, probably a polyp.    Preliminary report was given by [facility].  There are no clinically significant discrepancies.    elm-remote  Dictated by (CST): Jame Napier MD on 4/05/2024 at 9:36 AM     Finalized by (CST): Jame Napier MD on 4/05/2024 at 9:37 AM          CT SPINE CERVICAL (CPT=72125)    Result Date: 4/5/2024  CONCLUSION:   1. No acute fracture of the cervical spine.  Marked osteopenia.  2. Minimal anterolisthesis of C3 on C4 and C4 on C5 is favored degenerative in etiology.  Overall advanced spondylosis with suspected multilevel spinal canal and foraminal stenosis.  This would be better evaluated with MRI.  3. Polyp or mucous retention cyst in the left maxillary sinus.    Preliminary report was given by [facility].  There are no clinically significant discrepancies.    elm-remote    Dictated by  (CST): Jame Napier MD on 4/05/2024 at 9:30 AM     Finalized by (CST): Jame Napier MD on 4/05/2024 at 9:36 AM          XR HIP W OR WO PELVIS 2 OR 3 VIEWS, LEFT (CPT=73502)    Result Date: 4/5/2024   Bilateral hip arthroplasties are seen.  No evidence of hardware complication.  Question minimally displaced fracture of the left greater trochanter.  Consider further evaluation with CT of the pelvis.  Osteopenia.  Soft tissues are unremarkable aside from vascular calcifications.    Preliminary report was given by [facility].  There are no clinically significant discrepancies.    elm-remote   Dictated by (CST): Jame Napier MD on 4/05/2024 at 9:13 AM     Finalized by (CST): Jame Napier MD on 4/05/2024 at 9:19 AM           Assessment   1.  Status post mechanical fall  2.  Periprosthetic left hip fracture  3.  Chronic kidney disease  4.  Anemia  5.  Chronic pain  6.  Arthritis  7.  Hyponatremia    Plan   -Patient presents after mechanical fall.  Concern for periprosthetic left hip fracture.  -Evaluated by orthopedic surgery.  Stable fracture with no orthopedic surgical intervention recommended at this time  -PT/OT  -Pain control  -Gentle hydration  -Resume home medications  -DVT prophylaxis: Lovenox  -Reviewed vitals, labs imaging    Liane Loyd DO  Pulmonary Critical Care Medicine  [facility]  4/5/2024  12:51 PM

## 2024-04-05 NOTE — ED QUICK NOTES
Care endorsed to Drake NICOLE.  Patient resting on bed, on VS monitor.    No new requests at this time, awaiting CT results> dc vs admit.

## 2024-04-05 NOTE — CONSULTS
[facility]  part of [facility]    Report of Consultation    Elba Malik Patient Status:  Inpatient    1928 MRN [id number]   Location [facility] 1W Attending Liane Loyd, DO   Hosp Day # 0 PCP Sharan Lopez MD     Date of Admission:  2024  Date of Consult:  2024  Reason for Consultation:   Left hip fracture    History of Present Illness:   Patient is a 89 year old female who was admitted to the hospital for Periprosthetic hip fracture, initial encounter:   89F h/o bilateral KYLE with left acute on chronic hip pain after a mechanical fall. Seen in ER. Admitted due to poor ambulatory status. Doing better now. \"Everything hurts except the hips\"     Past Medical History  Past Medical History:   Diagnosis Date    Arthritis     Asthma (HCC)     Back problem     multiple spinal injections    Cataract     bilateral cataract surgery    Cataracts, bilateral     cataracts, PC IOL  OD Dr. Tate,  PC IOL Dr. Concepcion in [place], OS    Colon polyp 2006    small polyps    Disorder of thyroid     Essential hypertension     Hearing impairment     High blood pressure     Osteoarthritis     Other and unspecified hyperlipidemia     Other ill-defined conditions(799.89)     Left wrist trauma, surgical repair    Posterior capsule opacification     OS, YAG laser     Ptosis of right eyelid 2014    Ptosis RUL    Shortness of breath     Visual impairment        Past Surgical History  Past Surgical History:   Procedure Laterality Date    CATARACT      cataract extraction    CATARACT Left     Phaco w/PC IOL    CATARACT Right     Phaco w/ PC IOL    CATARACT EXTRACTION W/  INTRAOCULAR LENS IMPLANT Left     PC IOL/ Dr. Pichardo    CATARACT EXTRACTION W/  INTRAOCULAR LENS IMPLANT Right     PC IOL / Dr. Concepcion    CHOLECYSTECTOMY      COLONOSCOPY      ERCP,DIAGNOSTIC  10/16/2018    Choledocholithiasis, dilated intrahepatic and extrahepatic biliary tree    HIP  REPLACEMENT SURGERY  97/0 per NG    HYSTERECTOMY      Partial    LAPAROSCOPIC CHOLECYSTECTOMY  05/27/2016    OTHER SURGICAL HISTORY      Left wrist trauma, surgical repair    OTHER SURGICAL HISTORY  05/27/2016    Laparoscopy with bilateral salpingo-oophorectomy    SYNVISC, INTRA-ARTICULAR Bilateral 2013    Synvisc injection bilateral knees    TOTAL HIP REPLACEMENT      bilateral hip replacements    YAG CAPSULOTOMY - OS - LEFT EYE Left 1998    Dr. Concepcion       Family History  Family History   Problem Relation Age of Onset    Ear Problems Mother         hearing loss    Other (Other) Father         emphysema    Diabetes Neg     Glaucoma Neg     Macular degeneration Neg        Social History  Social History     Socioeconomic History    Marital status:    Occupational History    Occupation: homemaker   Tobacco Use    Smoking status: Never    Smokeless tobacco: Never   Vaping Use    Vaping Use: Never used   Substance and Sexual Activity    Alcohol use: No     Alcohol/week: 0.0 standard drinks of alcohol    Drug use: No    Sexual activity: Never   Other Topics Concern    Caffeine Concern Yes     Comment: coffee, 1 cup daily    Grew up on a farm No    History of tanning No    Outdoor occupation No    Pt has a pacemaker No    Pt has a defibrillator No    Breast feeding No    Reaction to local anesthetic No     Social Determinants of Health     Financial Resource Strain: Low Risk  (1/16/2024)    Financial Resource Strain     Difficulty of Paying Living Expenses: Not hard at all     Med Affordability: No   Food Insecurity: No Food Insecurity (4/5/2024)    Food Insecurity     Food Insecurity: Never true   Transportation Needs: No Transportation Needs (4/5/2024)    Transportation Needs     Lack of Transportation: No   Housing Stability: Low Risk  (4/5/2024)    Housing Stability     Housing Instability: No           Current Medications:  Current Facility-Administered Medications   Medication Dose Route Frequency     amLODIPine (Norvasc) tab 5 mg  5 mg Oral Daily    fluticasone propionate (Flonase) 50 MCG/ACT nasal suspension 2 spray  2 spray Nasal Daily    levothyroxine (Synthroid) tab 50 mcg  50 mcg Oral Before breakfast    metoprolol tartrate (Lopressor) partial tab 12.5 mg  12.5 mg Oral 2x Daily(Beta Blocker)    zolpidem (Ambien) tab 10 mg  10 mg Oral Nightly PRN    ondansetron (Zofran) 4 MG/2ML injection 4 mg  4 mg Intravenous Q6H PRN    sodium chloride 0.9% infusion   Intravenous Continuous    enoxaparin (Lovenox) 30 MG/0.3ML SUBQ injection 30 mg  30 mg Subcutaneous Daily    morphINE PF 4 MG/ML injection 1 mg  1 mg Intravenous Q2H PRN    Or    morphINE PF 4 MG/ML injection 2 mg  2 mg Intravenous Q2H PRN    Or    morphINE PF 4 MG/ML injection 4 mg  4 mg Intravenous Q2H PRN    polyethylene glycol (PEG 3350) (Miralax) 17 g oral packet 17 g  17 g Oral Daily PRN    sennosides (Senokot) tab 17.2 mg  17.2 mg Oral Nightly PRN    bisacodyl (Dulcolax) 10 MG rectal suppository 10 mg  10 mg Rectal Daily PRN    fleet enema (Fleet) 7-19 GM/118ML rectal enema 133 mL  1 enema Rectal Once PRN    sodium chloride 0.45% infusion   Intravenous Continuous     Medications Prior to Admission   Medication Sig    levothyroxine 50 MCG Oral Tab Take 1 tablet (50 mcg total) by mouth before breakfast.    zolpidem 10 MG Oral Tab Take 1 tablet (10 mg total) by mouth nightly as needed for Sleep. AT BEDTIME    traMADol 50 MG Oral Tab Take 1 tablet (50 mg total) by mouth every 12 (twelve) hours as needed for Pain.    celecoxib 200 MG Oral Cap Take 1 capsule (200 mg total) by mouth daily.    metoprolol tartrate 25 MG Oral Tab Take 0.5 tablets (12.5 mg total) by mouth 2x Daily(Beta Blocker).    amLODIPine 5 MG Oral Tab Take 1 tablet (5 mg total) by mouth daily. (Patient taking differently: Take 1 tablet (5 mg total) by mouth daily.)    triamterene-hydroCHLOROthiazide 37.5-25 MG Oral Cap Take 1 capsule by mouth daily.    FLUTICASONE PROPIONATE 50 MCG/ACT Nasal  Suspension USE 2 SPRAYS IN EACH NOSTRIL DAILY    zolpidem 10 MG Oral Tab Take 1 tablet (10 mg total) by mouth nightly as needed for Sleep.    TRAMADOL 50 MG Oral Tab Take 1 tablet (50 mg total) by mouth 2 (two) times daily as needed for Pain.    Misc. Devices Does not apply Misc 20% salicylic acid with 2% 5-fluorouracil.  Apply to warts daily as instructed       Allergies  No Known Allergies    Review of Systems:   Pertinent items are noted in HPI.    Physical Exam:   Vital Signs:  Blood pressure (!) 172/78, pulse 67, temperature 96.8 °F (36 °C), temperature source Temporal, resp. rate 18, height 5' 5\" (1.651 m), weight 153 lb 11.2 oz (69.7 kg), SpO2 97%, not currently breastfeeding.     General: No acute distress. Alert and oriented x 3.  HEENT: Moist mucous membranes. EOM-I. PERRL  Neck: No lymphadenopathy.  No JVD. No carotid bruits.  Respiratory: Clear to auscultation bilaterally.  No wheezes. No rhonchi.  Cardiovascular: S1, S2.  Regular rate and rhythm.  No murmurs. Equal pulses   Abdomen: Soft, nontender, nondistended.  Positive bowel sounds. No rebound tenderness  Neurologic: No focal neurological deficits.  Musculoskeletal: Left hip mild TTP, log roll painless, unable to SLR due to weakness.   Integument: No lesions. No erythema.  Psychiatric: Appropriate mood and affect.    Results:     Laboratory Data:  Lab Results   Component Value Date    WBC 10.2 04/05/2024    HGB 9.5 (L) 04/05/2024    HCT 28.5 (L) 04/05/2024    .0 04/05/2024    CREATSERUM 1.02 04/05/2024    BUN 24 (H) 04/05/2024     (L) 04/05/2024    K 5.0 04/05/2024    CL 96 (L) 04/05/2024    CO2 23.0 04/05/2024    GLU 90 04/05/2024    CA 9.2 04/05/2024    ALB 3.4 (L) 10/17/2018    ALKPHO 186 (H) 10/17/2018    TP 5.9 10/17/2018     (H) 10/17/2018     (H) 10/17/2018    PTT 33.0 04/05/2024    INR 0.96 04/05/2024    PTP 13.3 04/05/2024    TSH 1.430 06/05/2023    DDIMER 0.63 01/10/2023     (H) 06/06/2016          Imaging:  XR CHEST AP PORTABLE  (CPT=71045)    Result Date: 4/5/2024  CONCLUSION:   Pulmonary edema and small bilateral pleural effusions.  Superimposed consolidations are not excluded.    Preliminary report was given by [facility].  There are no clinically significant discrepancies.    elm-remote    Dictated by (CST): Jame Napier MD on 4/05/2024 at 11:32 AM     Finalized by (CST): Jame Napier MD on 4/05/2024 at 11:34 AM          CT PELVIS (CPT=72192)    Result Date: 4/5/2024  CONCLUSION: Minimally displaced comminuted fracture adjacent to the left hip prosthesis at the greater trochanter. Additional chronic or incidental findings are described in the body of this report.  Preliminary report was given by [facility].  There are no clinically significant discrepancies.    elm-remote    Dictated by (CST): Jame Napier MD on 4/05/2024 at 10:03 AM     Finalized by (CST): Jame Napier MD on 4/05/2024 at 10:06 AM          CT BRAIN OR HEAD (76453)    Result Date: 4/5/2024  CONCLUSION:   No acute intracranial abnormality.  No acute intracranial hemorrhage.  No calvarial fracture.  Mild chronic microvascular ischemic changes.  Expansile density in the left maxillary sinus extending into the left nasal passage, probably a polyp.    Preliminary report was given by [facility].  There are no clinically significant discrepancies.    elm-remote  Dictated by (CST): Jame Napier MD on 4/05/2024 at 9:36 AM     Finalized by (CST): Jame Napier MD on 4/05/2024 at 9:37 AM          CT SPINE CERVICAL (CPT=72125)    Result Date: 4/5/2024  CONCLUSION:   1. No acute fracture of the cervical spine.  Marked osteopenia.  2. Minimal anterolisthesis of C3 on C4 and C4 on C5 is favored degenerative in etiology.  Overall advanced spondylosis with suspected multilevel spinal canal and foraminal stenosis.  This would be better evaluated with MRI.  3. Polyp or mucous retention cyst in the left maxillary sinus.     Preliminary report was given by [facility].  There are no clinically significant discrepancies.    elm-remote    Dictated by (CST): Jame Napier MD on 4/05/2024 at 9:30 AM     Finalized by (CST): Jame Napier MD on 4/05/2024 at 9:36 AM          XR HIP W OR WO PELVIS 2 OR 3 VIEWS, LEFT (CPT=73502)    Result Date: 4/5/2024   Bilateral hip arthroplasties are seen.  No evidence of hardware complication.  Question minimally displaced fracture of the left greater trochanter.  Consider further evaluation with CT of the pelvis.  Osteopenia.  Soft tissues are unremarkable aside from vascular calcifications.    Preliminary report was given by [facility].  There are no clinically significant discrepancies.    elm-remote   Dictated by (CST): Jame Napier MD on 4/05/2024 at 9:13 AM     Finalized by (CST): Jame Napier MD on 4/05/2024 at 9:19 AM              Impression:     Periprosthetic hip fracture, initial encounter  - Stable fracture pattern, no orthopedic surgical intervention at this time  - WBAT   - PT/OT as indicated for independent ambulation, if possible    Thank you for allowing me to participate in the care of your patient.    Tee Orantes MD  4/5/2024

## 2024-04-05 NOTE — H&P
[facility]  part of [facility]    History & Physical    Elba Malik Patient Status:  Emergency    1928 MRN [id number]   Location [facility] EMERGENCY DEPARTMENT Attending Sandy Horner,    Hosp Day # 0 PCP Sharan Lopez MD     Date:  2024  Date of Admission:  2024    Chief Complaint:  Chief Complaint   Patient presents with    Hip Pain       History of Present Illness:  Elba Malik is a(n) 89 year old female, who presents after sustaining mechanical fall and now with left hip pain. PMHx significant for arthritis, essential hypertension.  Patient was in the bathroom when she felt that her legs were giving out and she fell to the ground without hitting her head.  She did not have any loss of consciousness prior to the episode.  She did not feel dizzy or lightheaded.  Upon getting up from the bathroom floor she felt left hip pain and unable to ambulate.  Usually she gets around with a walker.  Denies any numbness or tingling sensation in her extremities.  Denies any chest pain or shortness of breath.  Denies any dizziness.  On presentation to the ED, initial vital signs reveal temp 97.3, heart rate 81, respiratory rate 16, blood pressure 171/66.  Imaging including CT head without any intracranial abnormalities.  CT spine no fractures.  X-ray of left hip reveals questionable acute nondisplaced periprosthetic fracture at the intertrochanteric proximal left femur.  Patient treated with a dose of Norco.  Patient admitted under hospitalist service with consultation to orthopedic surgery.    History:  Past Medical History:   Diagnosis Date    Arthritis     Back problem     multiple spinal injections    Cataract     bilateral cataract surgery    Cataracts, bilateral     cataracts, PC IOL  OD Dr. Tate,  PC IOL Dr. Concepcion in [place], OS    Colon polyp 2006    small polyps    Essential hypertension     Osteoarthritis     Other and unspecified  hyperlipidemia     Other ill-defined conditions(799.89)     Left wrist trauma, surgical repair    Posterior capsule opacification     OS, YAG laser     Ptosis of right eyelid     Ptosis RUL     Past Surgical History:   Procedure Laterality Date    CATARACT      cataract extraction    CATARACT Left     Phaco w/PC IOL    CATARACT Right     Phaco w/ PC IOL    CATARACT EXTRACTION W/  INTRAOCULAR LENS IMPLANT Left     PC IOL/ Dr. Pichardo    CATARACT EXTRACTION W/  INTRAOCULAR LENS IMPLANT Right     PC IOL / Dr. Concepcion    CHOLECYSTECTOMY      COLONOSCOPY      ERCP,DIAGNOSTIC  10/16/2018    Choledocholithiasis, dilated intrahepatic and extrahepatic biliary tree    HIP REPLACEMENT SURGERY  97/0 per NG    HYSTERECTOMY      Partial    LAPAROSCOPIC CHOLECYSTECTOMY  2016    OTHER SURGICAL HISTORY      Left wrist trauma, surgical repair    OTHER SURGICAL HISTORY  2016    Laparoscopy with bilateral salpingo-oophorectomy    SYNVISC, INTRA-ARTICULAR Bilateral     Synvisc injection bilateral knees    TOTAL HIP REPLACEMENT      bilateral hip replacements    YAG CAPSULOTOMY - OS - LEFT EYE Left     Dr. Concepcion     Family History   Problem Relation Age of Onset    Ear Problems Mother         hearing loss    Other (Other) Father         emphysema    Diabetes Neg     Glaucoma Neg     Macular degeneration Neg       reports that she has never smoked. She has never used smokeless tobacco. She reports that she does not drink alcohol and does not use drugs.    Allergies:  No Known Allergies    Home Medications:  Prior to Admission Medications   Prescriptions Last Dose Informant Patient Reported? Taking?   FLUTICASONE PROPIONATE 50 MCG/ACT Nasal Suspension   No No   Sig: USE 2 SPRAYS IN EACH NOSTRIL DAILY   HYDROcodone-acetaminophen 5-325 MG Oral Tab   No No   Sig: Take 1-2 tablets by mouth every 8 (eight) hours as needed for Pain.   Misc. Devices Does not apply Misc   No No   Si% salicylic acid  with 2% 5-fluorouracil.  Apply to warts daily as instructed   TRAMADOL 50 MG Oral Tab   No No   Sig: Take 1 tablet (50 mg total) by mouth 2 (two) times daily as needed for Pain.   acetaminophen-codeine (TYLENOL WITH CODEINE #3) 300-30 MG Oral Tab   No No   Sig: Take 1 tablet by mouth daily as needed for Pain.   Patient not taking: Reported on 1/16/2024   amLODIPine 5 MG Oral Tab   No No   Sig: Take 1 tablet (5 mg total) by mouth daily.   celecoxib 200 MG Oral Cap   Yes No   Sig: Take 1 capsule (200 mg total) by mouth daily.   levothyroxine 50 MCG Oral Tab   No No   Sig: Take 1 tablet (50 mcg total) by mouth before breakfast.   metoprolol tartrate 25 MG Oral Tab   No No   Sig: Take 0.5 tablets (12.5 mg total) by mouth 2x Daily(Beta Blocker).   traMADol 50 MG Oral Tab   No No   Sig: Take 1 tablet (50 mg total) by mouth every 12 (twelve) hours as needed for Pain.   triamterene-hydroCHLOROthiazide 37.5-25 MG Oral Cap   No No   Sig: Take 1 capsule by mouth daily.   zolpidem 10 MG Oral Tab   No No   Sig: Take 1 tablet (10 mg total) by mouth nightly as needed for Sleep. AT BEDTIME   zolpidem 10 MG Oral Tab   No No   Sig: Take 1 tablet (10 mg total) by mouth nightly as needed for Sleep.      Facility-Administered Medications: None       Review of Systems:  Constitutional: Negative  Eye:  Negative.  Ear/Nose/Mouth/Throat:  Negative.  Respiratory:  Negative  Cardiovascular: Negative  Gastrointestinal:  Negative.  Genitourinary:  Negative  Endocrine:  Negative.  Immunologic:  Negative.  Musculoskeletal:  + Left hip pain  Integumentary:  Negative.  Neurologic:  Negative.  Psychiatric:  Negative.  ROS reviewed as documented in chart    Physical Exam:  Temp:  [97.3 °F (36.3 °C)] 97.3 °F (36.3 °C)  Pulse:  [69-81] 69  Resp:  [16-20] 20  BP: (152-186)/(59-77) 152/59  SpO2:  [93 %-95 %] 95 %    General:  Alert and oriented.  Elderly.  Cachectic  Diffuse skin problem:  None.  Eye:  Pupils are equal, round and reactive to light,  extraocular movements are intact, Normal conjunctiva.  HENT:  Normocephalic, oral mucosa is moist.  Head:  Normocephalic, atraumatic.  Neck:  Supple, non-tender, no carotid bruit, no jugular venous distention, no lymphadenopathy, no thyromegaly.  Respiratory:  Lungs are clear to auscultation, respirations are non-labored, breath sounds are equal, symmetrical chest wall expansion.  Cardiovascular:  Normal rate, regular rhythm, no murmur, no edema.  Gastrointestinal:  Soft, non-tender, non-distended, normal bowel sounds, no organomegaly.  Lymphatics:  No lymphadenopathy neck, axilla, groin.  Musculoskeletal: Normal range of motion.  normal strength.  Left hip pain  Feet:  Normal pulses.  Neurologic:  Alert, oriented, no focal deficits, cranial nerves II-XII are grossly intact.  Cognition and Speech:  Oriented, speech clear and coherent.  Psychiatric:  Cooperative, appropriate mood & affect.      Laboratory Data:        Imaging:  No results found.     Assessment and Plan:    Questionable acute nondisplaced periprosthetic fx L femur s/p  mechanical fall  -Noted on left x-ray hip, images reviewed.  -Orthopedic surgery consulted, pending evaluation  -Obtain MRI to further evaluate the fracture  -Pain control  -Maintain n.p.o. pending evaluation  -Patient  has chronic pain and is on Norco as well as tramadol at home given her age, she may benefit from being off of pain medications as she is at high risk for further falls  -PT OT/    CKD stage III  -BMP to evaluate renal function is currently pending  -Initiate gentle IV fluid hydration given n.p.o. status  -Avoid nephrotoxins.  Renally dose medications.  -Hold home dose of triamterene-hydrochlorothiazide    Essential hypertension-elevated   -suspecting secondary to pain  - resume Lopressor with holding parameters.  Resume amlodipine.  Continue to closely monitor blood pressure    Other medical conditions  Arthritis   Chronic pain  Bilateral  cataracts      Prophylaxis  Lovenox    CODE STATUS  Full    Primary care physician  Sharan Lopez MD    60 minutes spent on this admission - examining patient, obtaining history, reviewing previous medical records, going over test results/imaging and discussing plan of care. All questions answered.     Disposition  Clinical course will dictate outcome      Kell Barlow MD  4/5/2024  4:26 AM

## 2024-04-05 NOTE — ED PROVIDER NOTES
[place] Emergency Department Note  Patient: Elba Malik Age: 89 year old Sex: female      MRN: [id number]  : 1928    Patient Seen in: [facility] Emergency Department    History     Chief Complaint   Patient presents with    Hip Pain     Stated Complaint: L hip pain    History obtained from: patient, EMS       This is a very pleasant 89-year-old history of osteoarthritis, hypertension, bilateral hip replacement in the past presents via EMS due to left hip pain after a mechanical fall.  Patient states that she was trying to ambulate in her bathroom just prior to arrival and she felt like she could not move her left leg due to left hip pain.  States she chronically gets pain in multiple areas of her body including her back and shoulders.  States that she fell onto the ground onto her left hip.  She does not think she hit her head or pass out previously not on anticoagulation.  States that she has not been able to bear weight due to left hip pain since the fall.  Denies numbness or tingling in her extremities.   Reports mild neck pain but otherwise denies any other areas of acute pain.  Denies chest pain, shortness of breath, palpitations or lightheadedness prior to falling today.    Review of Systems:  Review of Systems  Positive for stated complaint: L hip pain. Constitutional and vital signs reviewed. All other systems reviewed and negative except as noted above.    Patient History:  Past Medical History:   Diagnosis Date    Arthritis     Back problem     multiple spinal injections    Cataract     bilateral cataract surgery    Cataracts, bilateral     cataracts, PC IOL  OD Dr. Tate,  PC IOL Dr. Concepcion in [place], OS    Colon polyp 2006    small polyps    Essential hypertension     Osteoarthritis     Other and unspecified hyperlipidemia     Other ill-defined conditions(799.89)     Left wrist trauma, surgical repair    Posterior capsule opacification     OS, YAG laser     Ptosis  of right eyelid 2014    Ptosis RUL       Past Surgical History:   Procedure Laterality Date    CATARACT      cataract extraction    CATARACT Left 1997    Phaco w/PC IOL    CATARACT Right 1998    Phaco w/ PC IOL    CATARACT EXTRACTION W/  INTRAOCULAR LENS IMPLANT Left 1997    PC IOL/ Dr. Pichardo    CATARACT EXTRACTION W/  INTRAOCULAR LENS IMPLANT Right 1998    PC IOL / Dr. Concepcion    CHOLECYSTECTOMY      COLONOSCOPY      ERCP,DIAGNOSTIC  10/16/2018    Choledocholithiasis, dilated intrahepatic and extrahepatic biliary tree    HIP REPLACEMENT SURGERY  97/0 per NG    HYSTERECTOMY      Partial    LAPAROSCOPIC CHOLECYSTECTOMY  05/27/2016    OTHER SURGICAL HISTORY      Left wrist trauma, surgical repair    OTHER SURGICAL HISTORY  05/27/2016    Laparoscopy with bilateral salpingo-oophorectomy    SYNVISC, INTRA-ARTICULAR Bilateral 2013    Synvisc injection bilateral knees    TOTAL HIP REPLACEMENT      bilateral hip replacements    YAG CAPSULOTOMY - OS - LEFT EYE Left 1998    Dr. Concepcion        Family History   Problem Relation Age of Onset    Ear Problems Mother         hearing loss    Other (Other) Father         emphysema    Diabetes Neg     Glaucoma Neg     Macular degeneration Neg        Specific Social Determinants of Health:   Social History     Socioeconomic History    Marital status:    Occupational History    Occupation: homemaker   Tobacco Use    Smoking status: Never    Smokeless tobacco: Never   Vaping Use    Vaping Use: Never used   Substance and Sexual Activity    Alcohol use: No     Alcohol/week: 0.0 standard drinks of alcohol    Drug use: No    Sexual activity: Never   Other Topics Concern    Caffeine Concern Yes     Comment: coffee, 1 cup daily    Grew up on a farm No    History of tanning No    Outdoor occupation No    Pt has a pacemaker No    Pt has a defibrillator No    Breast feeding No    Reaction to local anesthetic No     Social Determinants of Health     Financial Resource Strain: Low Risk   (1/16/2024)    Financial Resource Strain     Difficulty of Paying Living Expenses: Not hard at all     Med Affordability: No   Food Insecurity: No Food Insecurity (1/12/2024)    Food Insecurity     Food Insecurity: Never true   Transportation Needs: No Transportation Needs (1/16/2024)    Transportation Needs     Lack of Transportation: No   Housing Stability: Low Risk  (1/12/2024)    Housing Stability     Housing Instability: No           PSFH elements reviewed from today and agreed except as otherwise stated in HPI.    Physical Exam     ED Triage Vitals   BP 04/05/24 0100 (!) 171/66   Pulse 04/05/24 0100 81   Resp 04/05/24 0100 16   Temp 04/05/24 0103 97.3 °F (36.3 °C)   Temp src 04/05/24 0103 Temporal   SpO2 04/05/24 0100 93 %   O2 Device 04/05/24 0100 None (Room air)       Current:/59   Pulse 69   Temp 97.3 °F (36.3 °C) (Temporal)   Resp 20   Ht 162.6 cm (5' 4\")   Wt 65.8 kg   SpO2 95%   BMI 24.89 kg/m²         Physical Exam  Vitals and nursing note reviewed.   Constitutional:       General: She is not in acute distress.     Appearance: She is not ill-appearing.   HENT:      Head: Normocephalic and atraumatic.      Right Ear: External ear normal.      Left Ear: External ear normal.      Nose: Nose normal.      Mouth/Throat:      Mouth: Mucous membranes are moist.   Eyes:      Conjunctiva/sclera: Conjunctivae normal.   Cardiovascular:      Rate and Rhythm: Normal rate and regular rhythm.      Heart sounds: No murmur heard.  Pulmonary:      Effort: Pulmonary effort is normal. No respiratory distress.      Breath sounds: No wheezing or rales.   Abdominal:      General: There is no distension.      Palpations: Abdomen is soft.      Tenderness: There is no abdominal tenderness. There is no guarding or rebound.   Musculoskeletal:         General: No deformity.      Cervical back: Normal range of motion and neck supple. No tenderness.      Comments: No midline C/T/L spine ttp. No stepoff or deformity. No  paraspinal mm tenderness throughout. No ttp throughout bilat UE proximally or distally or throughout entire RLE. TTP to anterior portion of L hip joint with severely limited ROM 2/2 pain. No ttp to L thigh, knee, shin, or ankle. Compartments soft in bilat LE. 1+ edema in distal bilat LE that is symmetric without calf erythema or tenderness.    Skin:     General: Skin is warm and dry.      Capillary Refill: Capillary refill takes less than 2 seconds.   Neurological:      General: No focal deficit present.      Mental Status: She is alert.      Comments: Patient antigravity bilateral upper extremities, able to minimally lift bilateral legs off the bed, right more so than left, able to flex and extend knees but only flexes to about 30% and then cannot flex anymore particularly worse on the left secondary to left hip pain.  Sensation is intact to light touch and symmetric bilateral lower extremities proximally and distally.         ED Course   Labs:   Labs Reviewed   CBC WITH DIFFERENTIAL WITH PLATELET   BASIC METABOLIC PANEL (8)   PROTHROMBIN TIME (PT)   PTT, ACTIVATED     Radiology findings:  I personally reviewed the images.   No results found.    Ecg my interpretation sinus rhythm with 1st degree AV block rate 72 bpm, PACs noted, normal axis, normal intervals, no stemi qtc 455 ms   Cardiac Monitor: Interpreted by me.   Pulse Readings from Last 1 Encounters:   04/05/24 69   , sinus,       MDM   This patient presents status post fall c/o severe pain to the L hip. Exam is as above, pt distally NVI.     Patient history does not suggest other factor that precipitated patient fall rather than mechanical fall.     Ddx  -ich  -c spine injury   -periprosthetic hip fx or dislocation  -msk strain   -ligamentous injury     Given patient age will obtain CT head, CT C spine in setting of ground level fall as well as XR pelvis for further management. Norco ordered for pain will reevaluate.         ED Course as of 04/05/24  0420  ------------------------------------------------------------  Time: 04/05 0257  Value: CT BRAIN OR HEAD (15358)  Comment: CT Head without contrast   CT Cervical Spine without contrast    Impression:   CT Head  -No acute intracranial hemorrhage, hydrocephalus, midline shift, mass effect, or herniation.  -No calvarial fracture.   -Mild generalized parenchymal atrophy.   -Hypoattenuation of the periventricular white matter, suggestive of sequelae of chronic microvascular ischemic change.   -No CT criteria for an acute ischemic event, however CT has a diminished sensitivity for hyperacute or acute on chronic stroke.  Consider more sensitive evaluation with MRI if clinically indicated.  -Left maxillary sinus and ethmoid air cell expansile opacification, may indicate underlying nasal polyps.    CT C-spine  -No acute fracture or traumatic malalignment.  -Normal pre-vertebral soft tissue thickness.  -Moderate to severe multi-level degenerative changes.  -Generalized osseous demineralization.  -Pleural parenchymal scarring of the bilateral lung apices.    ------------------------------------------------------------  Time: 04/05 0257  Value: XR HIP W OR WO PELVIS 2 OR 3 VIEWS, LEFT (CPT=73502)  Comment: X-ray left hip and pelvis      IMPRESSION:  -Status post bilateral hip arthroplasties, without definite evidence of acute hardware complication or dislocation.    -Questionable acute nondisplaced periprosthetic fracture at the intertrochanteric proximal left femur.  Evaluation limited due to osseous demineralization.    ------------------------------------------------------------  Time: 04/05 0419  Comment: Case d/w Dr. Barlow accepts admission on behalf of Dr. Lopez who is pt PMD. Case d.w Dr. Tijerina with ortho accepts consult will see on consult.   ------------------------------------------------------------  Time: 04/05 0420  Value: CT PELVIS (CPT=72192)  Comment: CT pelvis without  contrast      IMPRESSION:  -Evaluation partially limited due to beam hardening artifact and motion artifact.    -Within limitations, there is cortical irregularity and discontinuity demonstrated at the proximal left femur adjacent to the left hip prosthesis suspicious for a comminuted acute nondisplaced fracture.    -No acute displaced fracture or dislocation.  -Generalized osseous demineralization.  -Symmetric appearance of the sacroiliac joints.  -Moderate degenerative changes of the inferior lumbar spine.              Procedures:  Procedures        Disposition and Plan     Clinical Impression:  1. Periprosthetic hip fracture, initial encounter    2. Left hip pain    3. Fall, initial encounter        Disposition:  Admit      Medications Prescribed:  Current Discharge Medication List          Hospital Problems       Present on Admission  Date Reviewed: 1/29/2024            ICD-10-CM Noted POA    * (Principal) Left hip pain M25.552 4/5/2024 Unknown        This note may have been created using voice dictation technology and may include inadvertent errors.      Sandy Horner DO  Attending Physician   Emergency Medicine

## 2024-04-05 NOTE — ED QUICK NOTES
Rounding Completed    Plan of Care reviewed. Waiting for CT results> DC vs admit.  Elimination needs assessed.  Patient resting in bed, speaking in full sentences. Pt on VS monitor for frequent VS assessments. No new requests at this time. Pt states pain relief from pain meds.     Bed is locked and in lowest position. Call light within reach.

## 2024-04-06 ENCOUNTER — APPOINTMENT (OUTPATIENT)
Dept: MRI IMAGING | Facility: HOSPITAL | Age: 89
End: 2024-04-06
Attending: STUDENT IN AN ORGANIZED HEALTH CARE EDUCATION/TRAINING PROGRAM
Payer: MEDICARE

## 2024-04-06 LAB
ANION GAP SERPL CALC-SCNC: 8 MMOL/L (ref 0–18)
BASOPHILS # BLD AUTO: 0.01 X10(3) UL (ref 0–0.2)
BASOPHILS NFR BLD AUTO: 0.1 %
BUN BLD-MCNC: 18 MG/DL (ref 9–23)
BUN/CREAT SERPL: 22 (ref 10–20)
CALCIUM BLD-MCNC: 8.9 MG/DL (ref 8.7–10.4)
CHLORIDE SERPL-SCNC: 99 MMOL/L (ref 98–112)
CO2 SERPL-SCNC: 23 MMOL/L (ref 21–32)
CREAT BLD-MCNC: 0.82 MG/DL
DEPRECATED RDW RBC AUTO: 44.6 FL (ref 35.1–46.3)
EGFRCR SERPLBLD CKD-EPI 2021: 66 ML/MIN/1.73M2 (ref 60–?)
EOSINOPHIL # BLD AUTO: 0.01 X10(3) UL (ref 0–0.7)
EOSINOPHIL NFR BLD AUTO: 0.1 %
ERYTHROCYTE [DISTWIDTH] IN BLOOD BY AUTOMATED COUNT: 14.1 % (ref 11–15)
GLUCOSE BLD-MCNC: 95 MG/DL (ref 70–99)
HCT VFR BLD AUTO: 28.4 %
HGB BLD-MCNC: 10.1 G/DL
IMM GRANULOCYTES # BLD AUTO: 0.05 X10(3) UL (ref 0–1)
IMM GRANULOCYTES NFR BLD: 0.5 %
LYMPHOCYTES # BLD AUTO: 0.89 X10(3) UL (ref 1–4)
LYMPHOCYTES NFR BLD AUTO: 8.8 %
MCH RBC QN AUTO: 30.9 PG (ref 26–34)
MCHC RBC AUTO-ENTMCNC: 35.6 G/DL (ref 31–37)
MCV RBC AUTO: 86.9 FL
MONOCYTES # BLD AUTO: 1.42 X10(3) UL (ref 0.1–1)
MONOCYTES NFR BLD AUTO: 14 %
NEUTROPHILS # BLD AUTO: 7.78 X10 (3) UL (ref 1.5–7.7)
NEUTROPHILS # BLD AUTO: 7.78 X10(3) UL (ref 1.5–7.7)
NEUTROPHILS NFR BLD AUTO: 76.5 %
OSMOLALITY SERPL CALC.SUM OF ELEC: 272 MOSM/KG (ref 275–295)
PLATELET # BLD AUTO: 247 10(3)UL (ref 150–450)
POTASSIUM SERPL-SCNC: 4.2 MMOL/L (ref 3.5–5.1)
RBC # BLD AUTO: 3.27 X10(6)UL
SODIUM SERPL-SCNC: 130 MMOL/L (ref 136–145)
WBC # BLD AUTO: 10.2 X10(3) UL (ref 4–11)

## 2024-04-06 PROCEDURE — 99232 SBSQ HOSP IP/OBS MODERATE 35: CPT | Performed by: INTERNAL MEDICINE

## 2024-04-06 PROCEDURE — 73721 MRI JNT OF LWR EXTRE W/O DYE: CPT | Performed by: STUDENT IN AN ORGANIZED HEALTH CARE EDUCATION/TRAINING PROGRAM

## 2024-04-06 RX ORDER — HYDROCODONE BITARTRATE AND ACETAMINOPHEN 5; 325 MG/1; MG/1
1 TABLET ORAL EVERY 4 HOURS PRN
Status: DISCONTINUED | OUTPATIENT
Start: 2024-04-06 | End: 2024-04-12

## 2024-04-06 NOTE — PLAN OF CARE
Problem: Patient Centered Care  Goal: Patient preferences are identified and integrated in the patient's plan of care  Description: Interventions:  - What would you like us to know as we care for you? From [facility]  - Provide timely, complete, and accurate information to patient/family  - Incorporate patient and family knowledge, values, beliefs, and cultural backgrounds into the planning and delivery of care  - Encourage patient/family to participate in care and decision-making at the level they choose  - Honor patient and family perspectives and choices  Outcome: Progressing     Problem: Patient/Family Goals  Goal: Patient/Family Long Term Goal  Description: Patient's Long Term Goal: be able to go home    Interventions:  - IVF  - administer pain medication as needed  - PT/OT  - monitor vital signs  - See additional Care Plan goals for specific interventions  Outcome: Progressing  Goal: Patient/Family Short Term Goal  Description: Patient's Short Term Goal: relieve the pain    Interventions:   - administer pain medication as needed  - PT/OT on consult  - turn and reposition in bed  - See additional Care Plan goals for specific interventions  Outcome: Progressing     Problem: PAIN - ADULT  Goal: Verbalizes/displays adequate comfort level or patient's stated pain goal  Description: INTERVENTIONS:  - Encourage pt to monitor pain and request assistance  - Assess pain using appropriate pain scale  - Administer analgesics based on type and severity of pain and evaluate response  - Implement non-pharmacological measures as appropriate and evaluate response  - Consider cultural and social influences on pain and pain management  - Manage/alleviate anxiety  - Utilize distraction and/or relaxation techniques  - Monitor for opioid side effects  - Notify MD/LIP if interventions unsuccessful or patient reports new pain  - Anticipate increased pain with activity and pre-medicate as appropriate  Outcome: Progressing      Problem: SAFETY ADULT - FALL  Goal: Free from fall injury  Description: INTERVENTIONS:  - Assess pt frequently for physical needs  - Identify cognitive and physical deficits and behaviors that affect risk of falls.  - [place] fall precautions as indicated by assessment.  - Educate pt/family on patient safety including physical limitations  - Instruct pt to call for assistance with activity based on assessment  - Modify environment to reduce risk of injury  - Provide assistive devices as appropriate  - Consider OT/PT consult to assist with strengthening/mobility  - Encourage toileting schedule  Outcome: Progressing     Problem: Impaired Functional Mobility  Goal: Achieve highest/safest level of mobility/gait  Description: Interventions:  - Assess patient's functional ability and stability  - Promote increasing activity/tolerance for mobility and gait  - Educate and engage patient/family in tolerated activity level and precautions    Outcome: Progressing     Problem: Impaired Activities of Daily Living  Goal: Achieve highest/safest level of independence in self care  Description: Interventions:  - Assess ability and encourage patient to participate in ADLs to maximize function  - Promote sitting position while performing ADLs such as feeding, grooming, and bathing  - Educate and encourage patient/family in tolerated functional activity level and precautions during self-care    Outcome: Progressing   Pain controlled with morphine/ norco. Unable to tolerate PT/OT this morning. Vital signs stable. Turned and repositioned in bed. Continue to monitor. IVF. Bed on low and locked position, call light within reach.

## 2024-04-06 NOTE — PLAN OF CARE
Dr Schulz notified via Perfect serve regarding pt elevated BP and PO pain med, response, he will address soon.

## 2024-04-06 NOTE — PHYSICAL THERAPY NOTE
PHYSICAL THERAPY EVALUATION - INPATIENT     Room Number: 106/106-A  Evaluation Date: 4/6/2024  Type of Evaluation: Initial   Physician Order: PT Eval and Treat    Presenting Problem: mechanical fall with periprosthetic hip fracture of L femur     Reason for Therapy: Mobility Dysfunction and Discharge Planning    PHYSICAL THERAPY ASSESSMENT   Patient is a 89 year old female admitted 4/5/2024 for mechanical fall with periprosthetic hip fracture of L femur.  Prior to admission, patient's baseline is independent with ADLs and functional mobility with RW.  Patient is currently functioning below baseline with bed mobility, transfers, gait, maintaining seated position, standing prolonged periods, and performing household tasks.  Patient is requiring maximum assist x 2 as a result of the following impairments: decreased functional strength, pain, impaired static and dynamic sitting and standing balance, medical status, and decreased compliance/participation.  Physical Therapy will continue to follow for duration of hospitalization.    Patient will benefit from continued skilled PT Services to promote return to prior level of function and safety with continuous assistance and gradual rehabilitative therapy .    PLAN  PT Treatment Plan: Bed mobility;Body mechanics;Energy conservation;Patient education;Gait training;Strengthening;Transfer training;Balance training  Rehab Potential : Fair  Frequency (Obs): 3-5x/week    PHYSICAL THERAPY MEDICAL/SOCIAL HISTORY     Problem List  Principal Problem:    Periprosthetic hip fracture, initial encounter  Active Problems:    Left hip pain    Fall, initial encounter    HOME SITUATION  Home Situation  Type of Home: Independent living facility  Home Layout: One level;Elevator  Stairs to Enter : 0  Lives With: Alone  Patient Owned Equipment: Rolling walker  Patient Regularly Uses: Glasses     Prior Level of [place]: independent with ADLs and ambulates with RW     SUBJECTIVE  \"I've lived  a good life. I think I'm ready for Hospice\"    PHYSICAL THERAPY EXAMINATION   OBJECTIVE  Precautions: Bed/chair alarm  Fall Risk: High fall risk    WEIGHT BEARING RESTRICTION  Weight Bearing Restriction: L lower extremity  L Lower Extremity: Weight Bearing as Tolerated    PAIN ASSESSMENT  Rating: Unable to rate  Location: left hip/LLE  Management Techniques: Activity promotion;Body mechanics;Breathing techniques;Repositioning    COGNITION  Overall Cognitive Status:  WFL - within functional limits    RANGE OF MOTION AND STRENGTH ASSESSMENT  Upper extremity ROM and strength are within functional limits.  Lower extremity ROM is within functional limits.  Lower extremity strength is within functional limits.    BALANCE  Static Sitting: Fair +  Dynamic Sitting: Fair  Static Standing: Not tested  Dynamic Standing: Not tested    AM-PAC '6-Clicks' INPATIENT SHORT FORM - BASIC MOBILITY  How much difficulty does the patient currently have...  Patient Difficulty: Turning over in bed (including adjusting bedclothes, sheets and blankets)?: A Lot   Patient Difficulty: Sitting down on and standing up from a chair with arms (e.g., wheelchair, bedside commode, etc.): Unable   Patient Difficulty: Moving from lying on back to sitting on the side of the bed?: A Lot   How much help from another person does the patient currently need...   Help from Another: Moving to and from a bed to a chair (including a wheelchair)?: Total   Help from Another: Need to walk in hospital room?: Total   Help from Another: Climbing 3-5 steps with a railing?: Total     AM-PAC Score:  Raw Score: 8   Approx Degree of Impairment: 86.62%   Standardized Score (AM-PAC Scale): 28.58   CMS Modifier (G-Code): CM    FUNCTIONAL ABILITY STATUS  Functional Mobility/Gait Assessment  Gait Assistance: Not tested  Supine to Sit: maximum assist x 2  Sit to Supine: maximum assist x 2     Exercise/Education Provided:  Bed mobility  Body mechanics  Energy  conservation  Functional activity tolerated  Posture    Additional Information: RN approved PT eval. PT received resting in bed. Introduced self and role. Educated on  benefits of oob mobility and PT plan of care, goals for today. Pt reluctant but agreeable to attempt; c/o generalized pain all over body. Max A x 2 to transition from supine to sitting at EOB. Initially with posterior lean requiring min for static sitting balance, but then eventually able to sit up unsupported with SBA for short period of time. Tolerated sitting at EOB up to 5 minutes only. Pt declining further activity at this time due to pain. Max A x 2 to return to supine and boost upward in bed. Pt expressed several times throughout session that she had a good life and is ready to go, wants Hospice services and does not want to go to rehab. Medical team made aware of the above. Pt was left resting in bed with bed alarm on, SCDs applied, needs within reach, handoff to RN complete.      The patient's Approx Degree of Impairment: 86.62% has been calculated based on documentation in the [facility] '6 clicks' Inpatient Basic Mobility Short Form.  Research supports that patients with this level of impairment may benefit from LTAC however anticipate pt would benefit from a rehab facility.  Final disposition will be made by interdisciplinary medical team.    Patient End of Session: In bed;Needs met;Call light within reach;RN aware of session/findings;All patient questions and concerns addressed;SCDs in place;Alarm set;Discussed recommendations with /    CURRENT GOALS  Goals to be met by: 4/13/24  Patient Goal Patient's self-stated goal is: none   Goal #1 Patient is able to demonstrate supine - sit EOB @ level: minimum assistance     Goal #1   Current Status    Goal #2 Patient is able to demonstrate transfers Sit to/from Stand at assistance level: minimum assistance with walker - rolling     Goal #2  Current Status    Goal #3 Patient is  able to ambulate 25 feet with assist device: walker - rolling at assistance level: minimum assistance   Goal #3   Current Status        Goal #4   Current Status    Goal #5 Patient to demonstrate independence with home activity/exercise instructions provided to patient in preparation for discharge.   Goal #5   Current Status    Goal #6    Goal #6  Current Status      Patient Evaluation Complexity Level:  History Moderate - 1 or 2 personal factors and/or co-morbidities   Examination of body systems Moderate - addressing a total of 3 or more elements   Clinical Presentation  Moderate - Evolving   Clinical Decision Making  Moderate Complexity     Therapeutic Activity:  10 minutes

## 2024-04-06 NOTE — OCCUPATIONAL THERAPY NOTE
OCCUPATIONAL THERAPY EVALUATION - INPATIENT     Room Number: 106/106-A  Evaluation Date: 4/6/2024  Type of Evaluation: Initial  Presenting Problem: L hip fracture    Physician Order: IP Consult to Occupational Therapy  Reason for Therapy: ADL/IADL Dysfunction and Discharge Planning    OCCUPATIONAL THERAPY ASSESSMENT   Patient is a 89 year old female admitted 4/5/2024 for L hip fracture- WBAT.  Prior to admission, patient's baseline is independent with ADLs and functional mobility via stand pivot.  Patient is currently functioning below baseline with toileting, bathing, upper body dressing, lower body dressing, grooming, eating, bed mobility, transfers, dynamic sitting balance, static standing balance, dynamic standing balance, functional standing tolerance, energy conservation strategies, and performing household tasks.  Patient is requiring moderate assist and maximum assist as a result of the following impairments: decreased functional strength, decreased endurance, pain, impaired static and dynamic sitting and standing balance, and decreased compliance/participation. Occupational Therapy will continue to follow for duration of hospitalization.    Patient will benefit from continued skilled OT Services to promote return to prior level of function and safety with continuous assistance and gradual rehabilitative therapy     PLAN  OT Treatment Plan: Balance activities;IADL training;ADL training;Energy conservation/work simplification techniques;Functional transfer training;UE strengthening/ROM;Endurance training;Patient/Family education;Patient/Family training;Equipment eval/education;Compensatory technique education  OT Device Recommendations: TBD    OCCUPATIONAL THERAPY MEDICAL/SOCIAL HISTORY   Problem List   Principal Problem:    Periprosthetic hip fracture, initial encounter  Active Problems:    Left hip pain    Fall, initial encounter    HOME SITUATION  Type of Home: Independent living facility  Home Layout: One  level; Elevator  Lives With: Alone  Toilet and Equipment: Comfort height toilet; Grab bar  Shower/Tub and Equipment: Walk-in shower; Grab bar; Shower chair  Other Equipment: None  Occupation/Status: retired  Hand Dominance: Right  Drives: No  Patient Regularly Uses: Glasses    Stairs in Home: none  Assistive Device(s) Used: electric scooter     Prior Level of [place]: Prior to admission, patient was independent with all ADLs and IADLs. Patient lives in a IFL. Patient was not driving and currently does not work. Patient used electric scooter for mobility. At home patient currently owns shower chair, RTS, and grab bars.      SUBJECTIVE  \"I don't really want to do therapy to get better. I have lived 96 years. I am ready to go.\" (Care team made aware)    OCCUPATIONAL THERAPY EXAMINATION   OBJECTIVE  Precautions: Bed/chair alarm  Fall Risk: High fall risk    WEIGHT BEARING RESTRICTION  L Lower Extremity: Weight Bearing as Tolerated    PAIN ASSESSMENT  Rating: 10  Location: L hip  Management Techniques: Activity promotion; Body mechanics; Relaxation; Repositioning    COGNITION  Overall Cognitive Status:  WFL - within functional limits    RANGE OF MOTION   Upper extremity ROM is within functional limits     STRENGTH ASSESSMENT  Upper extremity strength is within functional limits     ACTIVITIES OF DAILY LIVING ASSESSMENT  AM-PAC ‘6-Clicks’ Inpatient Daily Activity Short Form  How much help from another person does the patient currently need…  -   Putting on and taking off regular lower body clothing?: A Lot  -   Bathing (including washing, rinsing, drying)?: A Lot  -   Toileting, which includes using toilet, bedpan or urinal? : A Lot  -   Putting on and taking off regular upper body clothing?: A Little  -   Taking care of personal grooming such as brushing teeth?: A Little  -   Eating meals?: A Little    AM-PAC Score:  Score: 15  Approx Degree of Impairment: 56.46%  Standardized Score (AM-PAC Scale): 34.69  CMS  Modifier (G-Code): CK    BED MOBILITY  Supine to Sit : Maximum Assist (x2)  Sit to Supine (OT): Maximum Assist (x2)    BALANCE ASSESSMENT  Static Sitting: Contact Guard Assist  Sitting Bilateral: Minimal Assist    FUNCTIONAL ADL ASSESSMENT  Eating: Stand-by Assist  Grooming Seated: Stand-by Assist  Bathing Seated: Maximum Assist  UB Dressing Seated: Minimal Assist  LB Dressing Seated: Maximum Assist  Toileting Seated: Maximum Assist    THERAPEUTIC EXERCISE     Skilled Therapy Provided: Patient supine in bed and initially reports that she is in too much pain for therapy, but after explaining our role, patient agreeable to sit EOB. Max A x2 to complete supine<>sit EOB. Min-CGA for sitting balance. Patient only able to tolerate sitting x2 minutes prior to requesting to lay back down. Patient declining any further OOB activity. Patient reports that she feels that her time has come and feels as though hospice is a good fit for her recovery. This was communicated to care team. Education provided on importance of OOB activity and patient verbalizes understanding.    EDUCATION PROVIDED  Patient: Role of Occupational Therapy; Plan of Care; Discharge Recommendations; Adaptive Equipment Recommendations; DME Recommendations; Functional Transfer Techniques; Fall Prevention; Weight Bear Status; Posture/Positioning; Edema Reduction; Energy Conservation; Compensatory ADL Techniques; Proper Body Mechanics  Patient's Response to Education: Verbalized Understanding; Returned Demonstration    The patient's Approx Degree of Impairment: 56.46% has been calculated based on documentation in the [facility] '6 clicks' Inpatient Daily Activity Short Form.  Research supports that patients with this level of impairment may benefit from subacute rehab.  Final disposition will be made by interdisciplinary medical team.    Patient End of Session: Needs met;Call light within reach;RN aware of session/findings;All patient questions and concerns  addressed;Alarm set;In bed    OT Goals  Patient self-stated goal is: to go on hospice     Patient will complete LE dressing with min A  Comment:     Patient will complete toilet transfer with min A  Comment:     Patient will complete self care task at sink level with mod I   Comment:    Patient will complete bed mobility with mod I  Comment:         Goals  on: 24  Frequency: 3-5x/week    Patient Evaluation Complexity Level:   Occupational Profile/Medical History MODERATE - Expanded review of history including review of medical or therapy record   Specific performance deficits impacting engagement in ADL/IADL MODERATE  3 - 5 performance deficits   Client Assessment/Performance Deficits MODERATE - Comorbidities and min to mod modifications of tasks    Clinical Decision Making MODERATE - Analysis of occupational profile, detailed assessments, several treatment options    Overall Complexity MODERATE     Self-Care Home Management: 10 minutes    Alexandria Rodriguez OTR/L  [facility]  [id number]

## 2024-04-06 NOTE — PROGRESS NOTES
[facility]  part of [facility]    Progress Note      Assessment and Plan:   1.  Periprosthetic hip fracture status post fall    Recommendations:  1.  MRI of the hip left  2.  Physical therapy and Occupational Therapy    2.  Depressive state-Lexapro    3.  DVT prophylaxis-Lovenox    4.  Hypertension-acceptable control    5.  Left shoulder pain status post hemarthrosis aspiration        Subjective:   Elba Malik is a(n) 89 year old female who is resisting participation in physical therapy    Objective:   Blood pressure 155/66, pulse 91, temperature 97.9 °F (36.6 °C), temperature source Oral, resp. rate 20, height 5' 5\" (1.651 m), weight 153 lb 11.2 oz (69.7 kg), SpO2 94%, not currently breastfeeding.    Physical Exam alert elderly female  HEENT examination is unremarkable with pupils equal round and reactive to light and accommodation.   Neck without adenopathy, thyromegaly, JVD nor bruit.   Lungs clear to auscultation and percussion.  Cardiac regular rate and rhythm no murmur.   Abdomen nontender, without hepatosplenomegaly and no mass appreciable.   Extremities without clubbing cyanosis nor edema.  With mild left hip discomfort to movement.  Neurologic grossly intact with symmetric tone and strength and reflex.  Skin without gross abnormality     Results:     Lab Results   Component Value Date    WBC 10.2 04/06/2024    HGB 10.1 04/06/2024    HCT 28.4 04/06/2024    .0 04/06/2024    CREATSERUM 0.82 04/06/2024    BUN 18 04/06/2024     04/06/2024    K 4.2 04/06/2024    CL 99 04/06/2024    CO2 23.0 04/06/2024    GLU 95 04/06/2024    CA 8.9 04/06/2024       Sharan Lopez MD  Medical Director, Critical Care, [facility]  Medical Director, [facility]  Pager: [phone]

## 2024-04-07 PROCEDURE — 99232 SBSQ HOSP IP/OBS MODERATE 35: CPT | Performed by: INTERNAL MEDICINE

## 2024-04-07 NOTE — PROGRESS NOTES
[facility]  part of [facility]     Progress Note    Elba Malik Patient Status:  Inpatient    1928 MRN [id number]   Location [facility] 1W Attending Liane Loyd, DO   Hosp Day # 2 PCP Sharan Lopez MD       Subjective:   Patient seen and examined.  Resting in bed.  Pain overall slightly improved over the last 24 hours.    Objective:   Blood pressure 130/59, pulse 80, temperature 98.4 °F (36.9 °C), temperature source Axillary, resp. rate 18, height 5' 5\" (1.651 m), weight 156 lb 14.4 oz (71.2 kg), SpO2 93%, not currently breastfeeding.  Intake/Output:   Last 3 shifts: I/O last 3 completed shifts:  In: 2567 [P.O.:360; I.V.:2207]  Out: 1450 [Urine:1450]   This shift: No intake/output data recorded.     Vent Settings:      Hemodynamic parameters (last 24 hours):      Scheduled Meds:   Current Facility-Administered Medications   Medication Dose Route Frequency    HYDROcodone-acetaminophen (Norco) 5-325 MG per tab 1 tablet  1 tablet Oral Q4H PRN    amLODIPine (Norvasc) tab 5 mg  5 mg Oral Daily    fluticasone propionate (Flonase) 50 MCG/ACT nasal suspension 2 spray  2 spray Nasal Daily    levothyroxine (Synthroid) tab 50 mcg  50 mcg Oral Before breakfast    metoprolol tartrate (Lopressor) partial tab 12.5 mg  12.5 mg Oral 2x Daily(Beta Blocker)    zolpidem (Ambien) tab 10 mg  10 mg Oral Nightly PRN    ondansetron (Zofran) 4 MG/2ML injection 4 mg  4 mg Intravenous Q6H PRN    sodium chloride 0.9% infusion   Intravenous Continuous    enoxaparin (Lovenox) 30 MG/0.3ML SUBQ injection 30 mg  30 mg Subcutaneous Daily    morphINE PF 4 MG/ML injection 1 mg  1 mg Intravenous Q2H PRN    Or    morphINE PF 4 MG/ML injection 2 mg  2 mg Intravenous Q2H PRN    Or    morphINE PF 4 MG/ML injection 4 mg  4 mg Intravenous Q2H PRN    polyethylene glycol (PEG 3350) (Miralax) 17 g oral packet 17 g  17 g Oral Daily PRN    sennosides (Senokot) tab 17.2 mg  17.2 mg Oral Nightly PRN    bisacodyl  (Dulcolax) 10 MG rectal suppository 10 mg  10 mg Rectal Daily PRN    fleet enema (Fleet) 7-19 GM/118ML rectal enema 133 mL  1 enema Rectal Once PRN    sodium chloride 0.45% infusion   Intravenous Continuous       Continuous Infusions:    sodium chloride 75 mL/hr at 04/06/24 0154    sodium chloride         Physical Exam  Constitutional: no acute distress  Eyes: PERRL  ENT: nares pateint  Neck: supple, no JVD  Cardio: RRR, S1 S2  Respiratory: clear to auscultation bilaterally, no wheezing, rales, rhonchi, crackles  GI: abdomen soft, non tender, active bowel sounds, no organomegaly  Extremities: no clubbing, cyanosis, edema  Neurologic: no gross motor deficits  Skin: warm, dry      Results:     Lab Results   Component Value Date    WBC 10.2 04/06/2024    HGB 10.1 04/06/2024    HCT 28.4 04/06/2024    .0 04/06/2024    CREATSERUM 0.82 04/06/2024    BUN 18 04/06/2024     04/06/2024    K 4.2 04/06/2024    CL 99 04/06/2024    CO2 23.0 04/06/2024    GLU 95 04/06/2024    CA 8.9 04/06/2024       MRI HIPS, LEFT (CPT=73721)    Result Date: 4/7/2024  CONCLUSION:  1. Bilateral total hip arthroplasties with redemonstration of an acute to subacute mildly displaced periprosthetic fracture involving the greater trochanter of the left femur. 2. Moderate left gluteus medius muscle strain with moderate posttraumatic blood products extending into the left trochanteric bursa.  There is also mild strain involving the left hip adductor muscles. 3. Partial-thickness right hamstring origin tendon tear with associated mild ischial bursitis. 4. Mild left hamstring origin tendinosis.   A preliminary report was issued by the [facility] teleradiology service. There are no major discrepancies.  [facility]remote  Dictated by (CST): Donovan Garrett MD on 4/07/2024 at 5:49 AM     Finalized by (CST): Donovan Garrett MD on 4/07/2024 at 6:02 AM                 Assessment   1.  Status post mechanical fall  2.  Periprosthetic left hip  fracture  3.  Chronic kidney disease  4.  Anemia  5.  Chronic pain  6.  Arthritis  7.  Hyponatremia     Plan   -Patient presents after mechanical fall.  Concern for periprosthetic left hip fracture.  -Evaluated by orthopedic surgery.  Stable fracture with no orthopedic surgical intervention recommended at this time  -MRI with evidence of bilateral total hip arthroplasty with redemonstration of acute to subacute mildly displaced periprosthetic fracture seen within the greater trochanter of left femur.  -PT/OT  -Pain control  -Gentle hydration  -Resume home medications  -DVT prophylaxis: Donnell Loyd DO  Pulmonary Critical Care Medicine  [facility]

## 2024-04-07 NOTE — CM/SW NOTE
04/07/24 1700   CM/SW Referral Data   Referral Source    Reason for Referral Discharge planning   Informant Patient   Medical Hx   Does patient have an established PCP? Yes  (Pepe Lopez)   Patient Info   Patient's Current Mental Status at Time of Assessment Alert;Oriented   Patient's Home Environment Independent Living   Number of Levels in Home   ([facility])   Patient lives with Alone   Patient Status Prior to Admission   Independent with ADLs and Mobility No   Pt. requires assistance with Driving;Finances;Meals   Discharge Needs   Anticipated D/C needs Subacute rehab   Services Requested   Submitted to [facility] Yes   PASRR Level 1 Submitted Yes   Choice of Post-Acute Provider   Informed patient of right to choose their preferred provider Yes     Pt discussed during nursing rounds. Dx right periprosthetic hip fx, no sx scheduled. From [facility], independent w/walker and motorized chair at baseline. Anticipated therapy need: Gradual Rehabilitative Therapy. [facility] review pending. [facility] referrals sent in [facility]. List of accepting facilities will be needed for choice if approved.    PASRR level 1 screen completed and uploaded to aidin referral.     Plan: [facility] pending [facility] review, facility choice, ins auth, and medical clearance.    / to remain available for support and/or discharge planning.     TIA Salamanca    [phone]

## 2024-04-08 LAB
ANION GAP SERPL CALC-SCNC: 7 MMOL/L (ref 0–18)
BASOPHILS # BLD AUTO: 0.02 X10(3) UL (ref 0–0.2)
BASOPHILS NFR BLD AUTO: 0.2 %
BUN BLD-MCNC: 28 MG/DL (ref 9–23)
BUN/CREAT SERPL: 27.7 (ref 10–20)
CALCIUM BLD-MCNC: 8.4 MG/DL (ref 8.7–10.4)
CHLORIDE SERPL-SCNC: 101 MMOL/L (ref 98–112)
CO2 SERPL-SCNC: 20 MMOL/L (ref 21–32)
CREAT BLD-MCNC: 1.01 MG/DL
DEPRECATED RDW RBC AUTO: 46.1 FL (ref 35.1–46.3)
EGFRCR SERPLBLD CKD-EPI 2021: 51 ML/MIN/1.73M2 (ref 60–?)
EOSINOPHIL # BLD AUTO: 0.04 X10(3) UL (ref 0–0.7)
EOSINOPHIL NFR BLD AUTO: 0.4 %
ERYTHROCYTE [DISTWIDTH] IN BLOOD BY AUTOMATED COUNT: 14.3 % (ref 11–15)
GLUCOSE BLD-MCNC: 91 MG/DL (ref 70–99)
HCT VFR BLD AUTO: 24 %
HGB BLD-MCNC: 8.1 G/DL
IMM GRANULOCYTES # BLD AUTO: 0.07 X10(3) UL (ref 0–1)
IMM GRANULOCYTES NFR BLD: 0.6 %
LYMPHOCYTES # BLD AUTO: 1.04 X10(3) UL (ref 1–4)
LYMPHOCYTES NFR BLD AUTO: 9.2 %
MCH RBC QN AUTO: 30 PG (ref 26–34)
MCHC RBC AUTO-ENTMCNC: 33.8 G/DL (ref 31–37)
MCV RBC AUTO: 88.9 FL
MONOCYTES # BLD AUTO: 1.79 X10(3) UL (ref 0.1–1)
MONOCYTES NFR BLD AUTO: 15.9 %
NEUTROPHILS # BLD AUTO: 8.32 X10 (3) UL (ref 1.5–7.7)
NEUTROPHILS # BLD AUTO: 8.32 X10(3) UL (ref 1.5–7.7)
NEUTROPHILS NFR BLD AUTO: 73.7 %
OSMOLALITY SERPL CALC.SUM OF ELEC: 271 MOSM/KG (ref 275–295)
PLATELET # BLD AUTO: 203 10(3)UL (ref 150–450)
POTASSIUM SERPL-SCNC: 4.5 MMOL/L (ref 3.5–5.1)
RBC # BLD AUTO: 2.7 X10(6)UL
SODIUM SERPL-SCNC: 128 MMOL/L (ref 136–145)
WBC # BLD AUTO: 11.3 X10(3) UL (ref 4–11)

## 2024-04-08 PROCEDURE — 99232 SBSQ HOSP IP/OBS MODERATE 35: CPT | Performed by: INTERNAL MEDICINE

## 2024-04-08 NOTE — PLAN OF CARE
Problem: Patient Centered Care  Goal: Patient preferences are identified and integrated in the patient's plan of care  Description: Interventions:  - What would you like us to know as we care for you? From [facility]  - Provide timely, complete, and accurate information to patient/family  - Incorporate patient and family knowledge, values, beliefs, and cultural backgrounds into the planning and delivery of care  - Encourage patient/family to participate in care and decision-making at the level they choose  - Honor patient and family perspectives and choices  Outcome: Progressing     Problem: Patient/Family Goals  Goal: Patient/Family Long Term Goal  Description: Patient's Long Term Goal: to feel better    Interventions:  - IVF  - monitor vital signs  -PT/OT  - See additional Care Plan goals for specific interventions  Outcome: Progressing  Goal: Patient/Family Short Term Goal  Description: Patient's Short Term Goal: no pain  Interventions:   - administer pain medication as needed  - monitor vital signs  - monitor response to pain medication  - See additional Care Plan goals for specific interventions  Outcome: Progressing     Problem: PAIN - ADULT  Goal: Verbalizes/displays adequate comfort level or patient's stated pain goal  Description: INTERVENTIONS:  - Encourage pt to monitor pain and request assistance  - Assess pain using appropriate pain scale  - Administer analgesics based on type and severity of pain and evaluate response  - Implement non-pharmacological measures as appropriate and evaluate response  - Consider cultural and social influences on pain and pain management  - Manage/alleviate anxiety  - Utilize distraction and/or relaxation techniques  - Monitor for opioid side effects  - Notify MD/LIP if interventions unsuccessful or patient reports new pain  - Anticipate increased pain with activity and pre-medicate as appropriate  Outcome: Progressing     Problem: SAFETY ADULT - FALL  Goal: Free  from fall injury  Description: INTERVENTIONS:  - Assess pt frequently for physical needs  - Identify cognitive and physical deficits and behaviors that affect risk of falls.  - [place] fall precautions as indicated by assessment.  - Educate pt/family on patient safety including physical limitations  - Instruct pt to call for assistance with activity based on assessment  - Modify environment to reduce risk of injury  - Provide assistive devices as appropriate  - Consider OT/PT consult to assist with strengthening/mobility  - Encourage toileting schedule  Outcome: Progressing     Problem: Impaired Functional Mobility  Goal: Achieve highest/safest level of mobility/gait  Description: Interventions:  - Assess patient's functional ability and stability  - Promote increasing activity/tolerance for mobility and gait  - Educate and engage patient/family in tolerated activity level and precautions    Outcome: Progressing     Problem: Impaired Activities of Daily Living  Goal: Achieve highest/safest level of independence in self care  Description: Interventions:  - Assess ability and encourage patient to participate in ADLs to maximize function  - Promote sitting position while performing ADLs such as feeding, grooming, and bathing  - Educate and encourage patient/family in tolerated functional activity level and precautions during self-care    Outcome: Progressing   Pain controlled with norco. Vital signs stable. Turned and reposition every 2 hours. No urine noted, bladder scan and straight catheterization done per protocol. Endorsed to Ashli NICOLE, bed on low and locked position, call light within reach.

## 2024-04-08 NOTE — CM/SW NOTE
Patient most likely will be ready for dc on Tuesday.    Per patient and daughter, plan is as follows:    [facility] currently does not have a bed.  This is their first choice.  CM will follow-up on Tuesday to see if anything opens.    Second choice is [facility].  [place] reserved in [place].  Preference is for a private bed at [facility].    PLAN:  [facility] at DC once medically cleared.  CM has a first choice and a second choice.    / to remain available for support and/or discharge planning.      Margarita MCCOYA BSN RN CRRN   RN Case Manager  [phone]

## 2024-04-08 NOTE — CONGREGATE LIVING REVIEW
Critical access hospital Living Authorization    The [facility] Review Committee has reviewed this case and the patient IS APPROVED for discharge to a facility for Short Term Skilled once the following procedure is followed:     - The physician discharge instructions (contained within the NAVID note for SNF) must inlcude the below appropriate and approved COVID instructions to the facility    For questions regarding CLRC approval process, please contact the CM assigned to the case.  For questions regarding RN discharge workflow, please contact the unit Clinical Leader.

## 2024-04-08 NOTE — PROGRESS NOTES
[facility]  part of [facility]    Progress Note      Assessment and Plan:   1.  Periprosthetic hip fracture status post fall-patient has improved mood and will be better cooperative with physical therapy.    Recommendations:  1.  MRI redemonstrated the acute to subacute mildly displaced periprosthetic fracture involving the greater trochanter on the left with associated muscle strain  2.  Physical therapy and Occupational Therapy  3.  Discharge planning-patient wants [facility]  4.  As per orthopedics.    2.  Depressive state-Lexapro    3.  DVT prophylaxis-Lovenox    4.  Hypertension-acceptable control    5.  Left shoulder pain status post hemarthrosis aspiration        Subjective:   Elba Malik is a(n) 89 year old female who is resisting participation in physical therapy    Objective:   Blood pressure 144/61, pulse 89, temperature 97.5 °F (36.4 °C), temperature source Oral, resp. rate 18, height 5' 5\" (1.651 m), weight 156 lb 14.4 oz (71.2 kg), SpO2 93%, not currently breastfeeding.    Physical Exam alert elderly female  HEENT examination is unremarkable with pupils equal round and reactive to light and accommodation.   Neck without adenopathy, thyromegaly, JVD nor bruit.   Lungs clear to auscultation and percussion.  Cardiac regular rate and rhythm no murmur.   Abdomen nontender, without hepatosplenomegaly and no mass appreciable.   Extremities without clubbing cyanosis nor edema.  With mild left hip discomfort to movement.  Neurologic grossly intact with symmetric tone and strength and reflex.  Skin without gross abnormality     Results:     Lab Results   Component Value Date    WBC 11.3 04/08/2024    HGB 8.1 04/08/2024    HCT 24.0 04/08/2024    .0 04/08/2024    CREATSERUM 1.01 04/08/2024    BUN 28 04/08/2024     04/08/2024    K 4.5 04/08/2024     04/08/2024    CO2 20.0 04/08/2024    GLU 91 04/08/2024    CA 8.4 04/08/2024     MRI of the hips-1. Bilateral total hip  arthroplasties with redemonstration of an acute to subacute mildly displaced periprosthetic fracture involving the greater trochanter of the left femur.   2. Moderate left gluteus medius muscle strain with moderate posttraumatic blood products extending into the left trochanteric bursa.  There is also mild strain involving the left hip adductor muscles.   3. Partial-thickness right hamstring origin tendon tear with associated mild ischial bursitis.   4. Mild left hamstring origin tendinosis.     Sharan Lopez MD  Medical Director, Critical Care, [facility]  Medical Director, [facility]  Pager: [phone]

## 2024-04-08 NOTE — PLAN OF CARE
Problem: Patient Centered Care  Goal: Patient preferences are identified and integrated in the patient's plan of care  Description: Interventions:  - What would you like us to know as we care for you? From [facility]  - Provide timely, complete, and accurate information to patient/family  - Incorporate patient and family knowledge, values, beliefs, and cultural backgrounds into the planning and delivery of care  - Encourage patient/family to participate in care and decision-making at the level they choose  - Honor patient and family perspectives and choices  Outcome: Progressing     Problem: Patient/Family Goals  Goal: Patient/Family Long Term Goal  Description: Patient's Long Term Goal: Pain management    Interventions:  - See additional Care Plan goals for specific interventions  Outcome: Progressing  Goal: Patient/Family Short Term Goal  Description: Patient's Short Term Goal: return to the nursing home    Interventions:   - See additional Care Plan goals for specific interventions  Outcome: Progressing     Problem: PAIN - ADULT  Goal: Verbalizes/displays adequate comfort level or patient's stated pain goal  Description: INTERVENTIONS:  - Encourage pt to monitor pain and request assistance  - Assess pain using appropriate pain scale  - Administer analgesics based on type and severity of pain and evaluate response  - Implement non-pharmacological measures as appropriate and evaluate response  - Consider cultural and social influences on pain and pain management  - Manage/alleviate anxiety  - Utilize distraction and/or relaxation techniques  - Monitor for opioid side effects  - Notify MD/LIP if interventions unsuccessful or patient reports new pain  - Anticipate increased pain with activity and pre-medicate as appropriate  Outcome: Progressing     Problem: SAFETY ADULT - FALL  Goal: Free from fall injury  Description: INTERVENTIONS:  - Assess pt frequently for physical needs  - Identify cognitive and  physical deficits and behaviors that affect risk of falls.  - [place] fall precautions as indicated by assessment.  - Educate pt/family on patient safety including physical limitations  - Instruct pt to call for assistance with activity based on assessment  - Modify environment to reduce risk of injury  - Provide assistive devices as appropriate  - Consider OT/PT consult to assist with strengthening/mobility  - Encourage toileting schedule  Outcome: Progressing     Problem: Impaired Functional Mobility  Goal: Achieve highest/safest level of mobility/gait  Description: Interventions:  - Assess patient's functional ability and stability  - Promote increasing activity/tolerance for mobility and gait  - Educate and engage patient/family in tolerated activity level and precautions    Outcome: Progressing     Problem: Impaired Activities of Daily Living  Goal: Achieve highest/safest level of independence in self care  Description: Interventions:  - Assess ability and encourage patient to participate in ADLs to maximize function  - Promote sitting position while performing ADLs such as feeding, grooming, and bathing  - Educate and encourage patient/family in tolerated functional activity level and precautions during self-care    Outcome: Progressing

## 2024-04-09 ENCOUNTER — APPOINTMENT (OUTPATIENT)
Dept: GENERAL RADIOLOGY | Facility: HOSPITAL | Age: 89
End: 2024-04-09
Attending: INTERNAL MEDICINE
Payer: MEDICARE

## 2024-04-09 LAB
ANION GAP SERPL CALC-SCNC: 4 MMOL/L (ref 0–18)
BASOPHILS # BLD AUTO: 0.02 X10(3) UL (ref 0–0.2)
BASOPHILS NFR BLD AUTO: 0.2 %
BNP SERPL-MCNC: 1046 PG/ML
BUN BLD-MCNC: 23 MG/DL (ref 9–23)
BUN/CREAT SERPL: 26.4 (ref 10–20)
CALCIUM BLD-MCNC: 8.7 MG/DL (ref 8.7–10.4)
CHLORIDE SERPL-SCNC: 104 MMOL/L (ref 98–112)
CO2 SERPL-SCNC: 20 MMOL/L (ref 21–32)
CREAT BLD-MCNC: 0.87 MG/DL
DEPRECATED RDW RBC AUTO: 46.2 FL (ref 35.1–46.3)
EGFRCR SERPLBLD CKD-EPI 2021: 61 ML/MIN/1.73M2 (ref 60–?)
EOSINOPHIL # BLD AUTO: 0.02 X10(3) UL (ref 0–0.7)
EOSINOPHIL NFR BLD AUTO: 0.2 %
ERYTHROCYTE [DISTWIDTH] IN BLOOD BY AUTOMATED COUNT: 14 % (ref 11–15)
GLUCOSE BLD-MCNC: 104 MG/DL (ref 70–99)
HCT VFR BLD AUTO: 25.4 %
HGB BLD-MCNC: 8.3 G/DL
IMM GRANULOCYTES # BLD AUTO: 0.1 X10(3) UL (ref 0–1)
IMM GRANULOCYTES NFR BLD: 0.8 %
LYMPHOCYTES # BLD AUTO: 1.1 X10(3) UL (ref 1–4)
LYMPHOCYTES NFR BLD AUTO: 8.3 %
MCH RBC QN AUTO: 29.2 PG (ref 26–34)
MCHC RBC AUTO-ENTMCNC: 32.7 G/DL (ref 31–37)
MCV RBC AUTO: 89.4 FL
MONOCYTES # BLD AUTO: 1.85 X10(3) UL (ref 0.1–1)
MONOCYTES NFR BLD AUTO: 14 %
NEUTROPHILS # BLD AUTO: 10.15 X10 (3) UL (ref 1.5–7.7)
NEUTROPHILS # BLD AUTO: 10.15 X10(3) UL (ref 1.5–7.7)
NEUTROPHILS NFR BLD AUTO: 76.5 %
OSMOLALITY SERPL CALC.SUM OF ELEC: 270 MOSM/KG (ref 275–295)
PLATELET # BLD AUTO: 284 10(3)UL (ref 150–450)
POTASSIUM SERPL-SCNC: 4.6 MMOL/L (ref 3.5–5.1)
RBC # BLD AUTO: 2.84 X10(6)UL
SODIUM SERPL-SCNC: 128 MMOL/L (ref 136–145)
URATE SERPL-MCNC: 6.5 MG/DL
WBC # BLD AUTO: 13.2 X10(3) UL (ref 4–11)

## 2024-04-09 PROCEDURE — 71045 X-RAY EXAM CHEST 1 VIEW: CPT | Performed by: INTERNAL MEDICINE

## 2024-04-09 PROCEDURE — 99232 SBSQ HOSP IP/OBS MODERATE 35: CPT | Performed by: INTERNAL MEDICINE

## 2024-04-09 RX ORDER — FUROSEMIDE 10 MG/ML
20 INJECTION INTRAMUSCULAR; INTRAVENOUS ONCE
Status: COMPLETED | OUTPATIENT
Start: 2024-04-09 | End: 2024-04-09

## 2024-04-09 RX ORDER — FUROSEMIDE 10 MG/ML
40 INJECTION INTRAMUSCULAR; INTRAVENOUS
Status: DISCONTINUED | OUTPATIENT
Start: 2024-04-09 | End: 2024-04-11

## 2024-04-09 NOTE — CM/SW NOTE
Elba is not ready for dc today.    [facility] notified to keep on their pending list in case a bed opens.  Was notified of still no available bed for [facility].    OB was reserved in [facility] on 2/8/24.They are aware of the preference for a private bed.  If a private bed is not available, they are will to admit to a semi-private room then to a private when one is available.  Daughter is aware of this from her tour to [facility] on 4/8/24.    PLAN:  [facility] at [place] once medically cleared.    / to remain available for support and/or discharge planning.      Margarita MCCOYA BSN RN CRRN   RN Case Manager  [phone]

## 2024-04-09 NOTE — PHYSICAL THERAPY NOTE
PHYSICAL THERAPY TREATMENT NOTE - INPATIENT     Room Number: 106/106-A       Presenting Problem: mechanical fall with periprosthetic hip fracture of L femur  Co-Morbidities : bilateral hip replacements, HTN, arthritis    Problem List  Principal Problem:    Periprosthetic hip fracture, initial encounter  Active Problems:    Left hip pain    Fall, initial encounter      PHYSICAL THERAPY ASSESSMENT   Patient demonstrates fair progress this session, goals  remain in progress.    Patient continues to function below baseline with bed mobility, transfers, gait, standing prolonged periods, and performing household tasks.  Contributing factors to remaining limitations include decreased functional strength, pain, decreased muscular endurance, and increased O2 needs from baseline.  Next session anticipate patient to progress bed mobility and transfers.  Physical Therapy will continue to follow patient for duration of hospitalization.    Patient continues to benefit from continued skilled PT services: to promote return to prior level of function and safety with continuous assistance and gradual rehabilitative therapy .    PLAN  PT Treatment Plan: Bed mobility;Body mechanics;Patient education;Family education;Gait training;Strengthening;Transfer training;Balance training  Frequency (Obs): 5x/week    SUBJECTIVE  Anytime I move it, it hurts    OBJECTIVE  Precautions: Bed/chair alarm    WEIGHT BEARING RESTRICTION           L Lower Extremity: Weight Bearing as Tolerated    PAIN ASSESSMENT   Rating: 10  Location: L hip  Management Techniques: Activity promotion;Body mechanics;Breathing techniques;Repositioning    BALANCE  Static Sitting: Fair -  Dynamic Sitting: Poor  Static Standing: Not tested  Dynamic Standing: Not tested    ACTIVITY TOLERANCE  Pulse: 105  Heart Rate Source: Monitor     BP: 107/67  BP Location: Left arm  BP Method: Automatic  Patient Position: Lying     O2 WALK  Oxygen Therapy  SPO2% on Oxygen at Rest: 94  At rest  oxygen flow (liters per minute): 2    AM-PAC '6-Clicks' INPATIENT SHORT FORM - BASIC MOBILITY  How much difficulty does the patient currently have...  Patient Difficulty: Turning over in bed (including adjusting bedclothes, sheets and blankets)?: A Lot   Patient Difficulty: Sitting down on and standing up from a chair with arms (e.g., wheelchair, bedside commode, etc.): Unable   Patient Difficulty: Moving from lying on back to sitting on the side of the bed?: A Lot   How much help from another person does the patient currently need...   Help from Another: Moving to and from a bed to a chair (including a wheelchair)?: Total   Help from Another: Need to walk in hospital room?: Total   Help from Another: Climbing 3-5 steps with a railing?: Total     AM-PAC Score:  Raw Score: 8   Approx Degree of Impairment: 86.62%   Standardized Score (AM-PAC Scale): 28.58   CMS Modifier (G-Code): CM    FUNCTIONAL ABILITY STATUS  Functional Mobility/Gait Assessment  Gait Assistance: Not tested  Rolling: maximum assist  Supine to Sit: maximum assist  Sit to Supine: maximum assist  Sit to Stand: maximum assist and squat stand--unable to fully stand    Additional information: Pt ok to be seen per COREY Guzman. Pt willing to participate, but limited by pain. Pt able to perform ankle pumps and mini heel slides on LLE without significant pain. Rehab tech present for mobility, under direction of PT. Pt maxAx2 for sup to sit, and pt was participatory. Pt sat EOB with supv. Able to perform ankle pumps in sitting. Pt sat x5 mins, cued for breathing pattern. Pt willing to attempt sit>stand with RW. Pt maxAx2 for sit to stand, but unable to fully elevate buttocks off bed. Pt requested to return to sit d/t pain and inability to stand at this time. Pt maxAx2 to return to supine, positioned comfortably at end of session. Pt stated, \"I feel encouraged\" at end of session. Will continue to follow pt and progress mobility.    The patient's Approx Degree of  Impairment: 86.62% has been calculated based on documentation in the [facility] '6 clicks' Inpatient Daily Activity Short Form.  Research supports that patients with this level of impairment may benefit from LTC, rec gradual rehab as pt is performing below baseline and has rehab potential.  Final disposition will be made by interdisciplinary medical team.    THERAPEUTIC EXERCISES  Lower Extremity Ankle pumps  Heel slides     Position Supine       Patient End of Session: In bed;Needs met;Call light within reach;RN aware of session/findings;All patient questions and concerns addressed;SCDs in place;Alarm set;Discussed recommendations with /    CURRENT GOALS   Goals to be met by: 4/13/24  Patient Goal Patient's self-stated goal is: none   Goal #1 Patient is able to demonstrate supine - sit EOB @ level: minimum assistance      Goal #1   Current Status  maxAx2   Goal #2 Patient is able to demonstrate transfers Sit to/from Stand at assistance level: minimum assistance with walker - rolling      Goal #2  Current Status  attempted, unable   Goal #3 Patient is able to ambulate 25 feet with assist device: walker - rolling at assistance level: minimum assistance   Goal #3   Current Status  NT         Goal #4   Current Status     Goal #5 Patient to demonstrate independence with home activity/exercise instructions provided to patient in preparation for discharge.   Goal #5   Current Status  in progress   Goal #6     Goal #6  Current Status        Therapeutic Activity: 25 minutes

## 2024-04-09 NOTE — PLAN OF CARE
Problem: Patient Centered Care  Goal: Patient preferences are identified and integrated in the patient's plan of care  Description: Interventions:  - What would you like us to know as we care for you? From [facility]  - Provide timely, complete, and accurate information to patient/family  - Incorporate patient and family knowledge, values, beliefs, and cultural backgrounds into the planning and delivery of care  - Encourage patient/family to participate in care and decision-making at the level they choose  - Honor patient and family perspectives and choices  Outcome: Progressing     Problem: Patient/Family Goals  Goal: Patient/Family Long Term Goal  Description: Patient's Long Term Goal:     Interventions:  -   - See additional Care Plan goals for specific interventions  Outcome: Progressing  Goal: Patient/Family Short Term Goal  Description: Patient's Short Term Goal:     Interventions:   -   - See additional Care Plan goals for specific interventions  Outcome: Progressing     Problem: PAIN - ADULT  Goal: Verbalizes/displays adequate comfort level or patient's stated pain goal  Description: INTERVENTIONS:  - Encourage pt to monitor pain and request assistance  - Assess pain using appropriate pain scale  - Administer analgesics based on type and severity of pain and evaluate response  - Implement non-pharmacological measures as appropriate and evaluate response  - Consider cultural and social influences on pain and pain management  - Manage/alleviate anxiety  - Utilize distraction and/or relaxation techniques  - Monitor for opioid side effects  - Notify MD/LIP if interventions unsuccessful or patient reports new pain  - Anticipate increased pain with activity and pre-medicate as appropriate  Outcome: Progressing     Problem: SAFETY ADULT - FALL  Goal: Free from fall injury  Description: INTERVENTIONS:  - Assess pt frequently for physical needs  - Identify cognitive and physical deficits and behaviors that  affect risk of falls.  - [place] fall precautions as indicated by assessment.  - Educate pt/family on patient safety including physical limitations  - Instruct pt to call for assistance with activity based on assessment  - Modify environment to reduce risk of injury  - Provide assistive devices as appropriate  - Consider OT/PT consult to assist with strengthening/mobility  - Encourage toileting schedule  Outcome: Progressing     Problem: Impaired Functional Mobility  Goal: Achieve highest/safest level of mobility/gait  Description: Interventions:  - Assess patient's functional ability and stability  - Promote increasing activity/tolerance for mobility and gait  - Educate and engage patient/family in tolerated activity level and precautions    Outcome: Progressing     Problem: Impaired Activities of Daily Living  Goal: Achieve highest/safest level of independence in self care  Description: Interventions:  - Assess ability and encourage patient to participate in ADLs to maximize function  - Promote sitting position while performing ADLs such as feeding, grooming, and bathing  - Educate and encourage patient/family in tolerated functional activity level and precautions during self-care  Outcome: Progressing   Patient has fracture, no surgery necessary. Patient was SOB overnight and O2 was 88% on Room air. Placed on 2L nasal canula and patient reports SOB improved. O2 sat 95%. Possible discharge today. VSS. Will continue to monitor patient.

## 2024-04-09 NOTE — PLAN OF CARE
Pt with left leg pain, has fx no surgery necessary. Pain controlled with [place]. Lopes Catheter in place. IVF infusing. Plan for patient to discharge to rehab tomorrow. Call light within reach and bed in low position.  Problem: Patient Centered Care  Goal: Patient preferences are identified and integrated in the patient's plan of care  Description: Interventions:  - What would you like us to know as we care for you? From [facility]  - Provide timely, complete, and accurate information to patient/family  - Incorporate patient and family knowledge, values, beliefs, and cultural backgrounds into the planning and delivery of care  - Encourage patient/family to participate in care and decision-making at the level they choose  - Honor patient and family perspectives and choices  Outcome: Progressing     Problem: Patient/Family Goals  Goal: Patient/Family Long Term Goal  Description: Patient's Long Term Goal:     Interventions:  -   - See additional Care Plan goals for specific interventions  Outcome: Progressing  Goal: Patient/Family Short Term Goal  Description: Patient's Short Term Goal:     Interventions:   -   - See additional Care Plan goals for specific interventions  Outcome: Progressing     Problem: PAIN - ADULT  Goal: Verbalizes/displays adequate comfort level or patient's stated pain goal  Description: INTERVENTIONS:  - Encourage pt to monitor pain and request assistance  - Assess pain using appropriate pain scale  - Administer analgesics based on type and severity of pain and evaluate response  - Implement non-pharmacological measures as appropriate and evaluate response  - Consider cultural and social influences on pain and pain management  - Manage/alleviate anxiety  - Utilize distraction and/or relaxation techniques  - Monitor for opioid side effects  - Notify MD/LIP if interventions unsuccessful or patient reports new pain  - Anticipate increased pain with activity and pre-medicate as  appropriate  Outcome: Progressing     Problem: SAFETY ADULT - FALL  Goal: Free from fall injury  Description: INTERVENTIONS:  - Assess pt frequently for physical needs  - Identify cognitive and physical deficits and behaviors that affect risk of falls.  - [place] fall precautions as indicated by assessment.  - Educate pt/family on patient safety including physical limitations  - Instruct pt to call for assistance with activity based on assessment  - Modify environment to reduce risk of injury  - Provide assistive devices as appropriate  - Consider OT/PT consult to assist with strengthening/mobility  - Encourage toileting schedule  Outcome: Progressing     Problem: Impaired Functional Mobility  Goal: Achieve highest/safest level of mobility/gait  Description: Interventions:  - Assess patient's functional ability and stability  - Promote increasing activity/tolerance for mobility and gait  - Educate and engage patient/family in tolerated activity level and precautions    Outcome: Progressing     Problem: Impaired Functional Mobility  Goal: Achieve highest/safest level of mobility/gait  Description: Interventions:  - Assess patient's functional ability and stability  - Promote increasing activity/tolerance for mobility and gait  - Educate and engage patient/family in tolerated activity level and precautions    Outcome: Progressing     Problem: Impaired Activities of Daily Living  Goal: Achieve highest/safest level of independence in self care  Description: Interventions:  - Assess ability and encourage patient to participate in ADLs to maximize function  - Promote sitting position while performing ADLs such as feeding, grooming, and bathing  - Educate and encourage patient/family in tolerated functional activity level and precautions during self-care    Outcome: Progressing

## 2024-04-09 NOTE — CONSULTS
Cardiology Consult Note    Elba Malik Patient Status:  Inpatient    1928 MRN [id number]   Location [facility] 1W Attending Liane Loyd, DO   Hosp Day # 4 PCP Sharan Lopez MD     89-year-old female presents after a fall which is described as mechanical however found to have pulmonary edema on exam therefore cardiology consulted.    Has had SOB and orthopnea for the past 24 hours, LE edema is also new.      ED work up  Creatinine normal, metabolic acidosis nongap  Sodium 128  BNP 1000, chest x-ray with pleural effusions and mild pulmonary edema  Hemoglobin 8.3 baseline 10-11      Assessment:    Heart failure: Most likely diastolic however no recent ejection fraction assessment  Periprosthetic hip fracture, no surgical intervention recommended  Cardiac risk factors: Hypertension, age      Plan:  Agree with diuresis, continue IV Lasix twice daily  Follow sodium closely  Follow-up echocardiogram  Further recommendations to follow      Level of care: C5    Brad Jeff MD  Interventional Cardiology  [facility]    --------------------------------------------------------------------------------------------------------------------------------  ROS 10 systems reviewed, pertinent findings above.  ROS    History:  Past Medical History:   Diagnosis Date    Arthritis     Asthma (HCC)     Back problem     multiple spinal injections    Cataract     bilateral cataract surgery    Cataracts, bilateral     cataracts, PC IOL  OD Dr. Tate,  PC IOL Dr. Concepcion in [place], OS    Colon polyp 2006    small polyps    Disorder of thyroid     Essential hypertension     Hearing impairment     High blood pressure     Osteoarthritis     Other and unspecified hyperlipidemia     Other ill-defined conditions(799.89)     Left wrist trauma, surgical repair    Posterior capsule opacification     OS, YAG laser     Ptosis of right eyelid     Ptosis RUL    Shortness of breath      Visual impairment      Past Surgical History:   Procedure Laterality Date    CATARACT      cataract extraction    CATARACT Left 1997    Phaco w/PC IOL    CATARACT Right 1998    Phaco w/ PC IOL    CATARACT EXTRACTION W/  INTRAOCULAR LENS IMPLANT Left 1997    PC IOL/ Dr. Pichardo    CATARACT EXTRACTION W/  INTRAOCULAR LENS IMPLANT Right 1998    PC IOL / Dr. Concepcion    CHOLECYSTECTOMY      COLONOSCOPY      ERCP,DIAGNOSTIC  10/16/2018    Choledocholithiasis, dilated intrahepatic and extrahepatic biliary tree    HIP REPLACEMENT SURGERY  97/0 per NG    HYSTERECTOMY      Partial    LAPAROSCOPIC CHOLECYSTECTOMY  05/27/2016    OTHER SURGICAL HISTORY      Left wrist trauma, surgical repair    OTHER SURGICAL HISTORY  05/27/2016    Laparoscopy with bilateral salpingo-oophorectomy    SYNVISC, INTRA-ARTICULAR Bilateral 2013    Synvisc injection bilateral knees    TOTAL HIP REPLACEMENT      bilateral hip replacements    YAG CAPSULOTOMY - OS - LEFT EYE Left 1998    Dr. Concepcion     Family History   Problem Relation Age of Onset    Ear Problems Mother         hearing loss    Other (Other) Father         emphysema    Diabetes Neg     Glaucoma Neg     Macular degeneration Neg       reports that she has never smoked. She has never used smokeless tobacco. She reports that she does not drink alcohol and does not use drugs.    Objective:   Temp: 97.9 °F (36.6 °C)  Pulse: 102  Resp: 18  BP: 177/73    Intake/Output:     Intake/Output Summary (Last 24 hours) at 4/9/2024 1513  Last data filed at 4/9/2024 1429  Gross per 24 hour   Intake 2489 ml   Output 2450 ml   Net 39 ml       Physical Exam:     General: NAD  HEENT: Normocephalic, anicteric sclera, neck supple.  Neck: No JVD, carotids 2+, no bruits.  Cardiac: Regular rate and rhythm. S1, S2 normal. No murmur, pericardial rub, S3.  Lungs: Clear without wheezes, rales, rhonchi or dullness.  Normal excursions and effort.  Abdomen: Soft, non-tender. BS-present.  Extremities: Without clubbing,  cyanosis or edema.  Peripheral pulses are 2+.  Neurologic: Non-focal  Skin: Warm and dry.       Brad Jeff MD  4/9/2024  3:13 PM

## 2024-04-09 NOTE — PROGRESS NOTES
[facility]  part of Merg[facility] with [facility]    Progress Note      Assessment and Plan:   1.  Periprosthetic hip fracture status post fall-patient has improved mood and will be better cooperative with physical therapy.    Recommendations:  1.  MRI redemonstrated the acute to subacute mildly displaced periprosthetic fracture involving the greater trochanter on the left with associated muscle strain  2.  Physical therapy and Occupational Therapy  3.  Discharge planning-patient wants [facility]  4.  As per orthopedics.    2.  Depressive state-Lexapro    3.  DVT prophylaxis-Lovenox    4.  Hypertension-acceptable control    5.  Left shoulder pain status post hemarthrosis aspiration    6.  Dyspnea-the patient has marked elevation of the BNP with pulmonary edema radiographically and now has oxygen requirements.    Recommendations: Echocardiography and will ask cardiology opinion.  Lasix today and will stop IV fluid.    7.  Ankle discomfort-mild edema.  Uric acid level is normal..        Subjective:   Elba Malik is a(n) 89 year old female who is now agreeing to cooperate with physical therapy.  However, she developed pulmonary edema overnight.  Objective:   Blood pressure (!) 177/73, pulse 102, temperature 97.9 °F (36.6 °C), temperature source Oral, resp. rate 18, height 5' 5\" (1.651 m), weight 167 lb 4.8 oz (75.9 kg), SpO2 96%, not currently breastfeeding.    Physical Exam alert elderly female  HEENT examination is unremarkable with pupils equal round and reactive to light and accommodation.   Neck without adenopathy, thyromegaly, JVD nor bruit.   Lungs clear to auscultation and percussion.  Cardiac regular rate and rhythm no murmur.   Abdomen nontender, without hepatosplenomegaly and no mass appreciable.   Extremities without clubbing cyanosis nor edema.  With mild left hip discomfort to movement.  Neurologic grossly intact with symmetric tone and strength and reflex.  Skin without gross abnormality      Results:     Lab Results   Component Value Date    WBC 13.2 04/09/2024    HGB 8.3 04/09/2024    HCT 25.4 04/09/2024    .0 04/09/2024    CREATSERUM 0.87 04/09/2024    BUN 23 04/09/2024     04/09/2024    K 4.6 04/09/2024     04/09/2024    CO2 20.0 04/09/2024     04/09/2024    CA 8.7 04/09/2024     MRI of the hips-1. Bilateral total hip arthroplasties with redemonstration of an acute to subacute mildly displaced periprosthetic fracture involving the greater trochanter of the left femur.   2. Moderate left gluteus medius muscle strain with moderate posttraumatic blood products extending into the left trochanteric bursa.  There is also mild strain involving the left hip adductor muscles.   3. Partial-thickness right hamstring origin tendon tear with associated mild ischial bursitis.   4. Mild left hamstring origin tendinosis.     Sharan Lopez MD  Medical Director, Critical Care, [facility]  Medical Director, [facility]  Pager: [phone]

## 2024-04-10 ENCOUNTER — APPOINTMENT (OUTPATIENT)
Dept: CV DIAGNOSTICS | Facility: HOSPITAL | Age: 89
End: 2024-04-10
Attending: INTERNAL MEDICINE
Payer: MEDICARE

## 2024-04-10 LAB
ANION GAP SERPL CALC-SCNC: 6 MMOL/L (ref 0–18)
BASOPHILS # BLD AUTO: 0.02 X10(3) UL (ref 0–0.2)
BASOPHILS NFR BLD AUTO: 0.2 %
BUN BLD-MCNC: 24 MG/DL (ref 9–23)
BUN/CREAT SERPL: 25.5 (ref 10–20)
CALCIUM BLD-MCNC: 8.8 MG/DL (ref 8.7–10.4)
CHLORIDE SERPL-SCNC: 100 MMOL/L (ref 98–112)
CO2 SERPL-SCNC: 25 MMOL/L (ref 21–32)
CREAT BLD-MCNC: 0.94 MG/DL
DEPRECATED RDW RBC AUTO: 44.1 FL (ref 35.1–46.3)
EGFRCR SERPLBLD CKD-EPI 2021: 56 ML/MIN/1.73M2 (ref 60–?)
EOSINOPHIL # BLD AUTO: 0.02 X10(3) UL (ref 0–0.7)
EOSINOPHIL NFR BLD AUTO: 0.2 %
ERYTHROCYTE [DISTWIDTH] IN BLOOD BY AUTOMATED COUNT: 14.1 % (ref 11–15)
GLUCOSE BLD-MCNC: 107 MG/DL (ref 70–99)
HCT VFR BLD AUTO: 24.7 %
HGB BLD-MCNC: 8.3 G/DL
IMM GRANULOCYTES # BLD AUTO: 0.1 X10(3) UL (ref 0–1)
IMM GRANULOCYTES NFR BLD: 0.8 %
LYMPHOCYTES # BLD AUTO: 0.97 X10(3) UL (ref 1–4)
LYMPHOCYTES NFR BLD AUTO: 8.1 %
MCH RBC QN AUTO: 28.9 PG (ref 26–34)
MCHC RBC AUTO-ENTMCNC: 33.6 G/DL (ref 31–37)
MCV RBC AUTO: 86.1 FL
MONOCYTES # BLD AUTO: 2.31 X10(3) UL (ref 0.1–1)
MONOCYTES NFR BLD AUTO: 19.3 %
NEUTROPHILS # BLD AUTO: 8.53 X10 (3) UL (ref 1.5–7.7)
NEUTROPHILS # BLD AUTO: 8.53 X10(3) UL (ref 1.5–7.7)
NEUTROPHILS NFR BLD AUTO: 71.4 %
OSMOLALITY SERPL CALC.SUM OF ELEC: 277 MOSM/KG (ref 275–295)
PLATELET # BLD AUTO: 315 10(3)UL (ref 150–450)
POTASSIUM SERPL-SCNC: 3.4 MMOL/L (ref 3.5–5.1)
RBC # BLD AUTO: 2.87 X10(6)UL
SODIUM SERPL-SCNC: 131 MMOL/L (ref 136–145)
WBC # BLD AUTO: 12 X10(3) UL (ref 4–11)

## 2024-04-10 PROCEDURE — 99232 SBSQ HOSP IP/OBS MODERATE 35: CPT | Performed by: INTERNAL MEDICINE

## 2024-04-10 PROCEDURE — 93306 TTE W/DOPPLER COMPLETE: CPT | Performed by: INTERNAL MEDICINE

## 2024-04-10 RX ORDER — POTASSIUM CHLORIDE 1.5 G/1.58G
40 POWDER, FOR SOLUTION ORAL ONCE
Status: COMPLETED | OUTPATIENT
Start: 2024-04-10 | End: 2024-04-10

## 2024-04-10 RX ORDER — FLUTICASONE PROPIONATE 50 MCG
1 SPRAY, SUSPENSION (ML) NASAL 2 TIMES DAILY
Status: DISCONTINUED | OUTPATIENT
Start: 2024-04-11 | End: 2024-04-12

## 2024-04-10 RX ORDER — DOCUSATE SODIUM 100 MG/1
100 CAPSULE, LIQUID FILLED ORAL 2 TIMES DAILY
Status: DISCONTINUED | OUTPATIENT
Start: 2024-04-10 | End: 2024-04-12

## 2024-04-10 RX ORDER — DOCUSATE SODIUM 100 MG/1
100 CAPSULE, LIQUID FILLED ORAL 2 TIMES DAILY
Status: CANCELLED | OUTPATIENT
Start: 2024-04-11

## 2024-04-10 NOTE — PLAN OF CARE
Up to chair with PT/OT, sit-to-stand lift used to get pt back to bed. Daughter at bedside updated on plan of care. Lopes care done. All safety measures in place.     Problem: Patient Centered Care  Goal: Patient preferences are identified and integrated in the patient's plan of care  Description: Interventions:  - What would you like us to know as we care for you? From [facility]  - Provide timely, complete, and accurate information to patient/family  - Incorporate patient and family knowledge, values, beliefs, and cultural backgrounds into the planning and delivery of care  - Encourage patient/family to participate in care and decision-making at the level they choose  - Honor patient and family perspectives and choices  Outcome: Progressing     Problem: Patient/Family Goals  Goal: Patient/Family Long Term Goal  Description: Patient's Long Term Goal: get back to walking    Interventions:  - PT/OT  -discharge planning   - See additional Care Plan goals for specific interventions  Outcome: Progressing  Goal: Patient/Family Short Term Goal  Description: Patient's Short Term Goal: sit in chair    Interventions:   - pt/ot  -lift assist   -increase activity   - See additional Care Plan goals for specific interventions  Outcome: Progressing     Problem: PAIN - ADULT  Goal: Verbalizes/displays adequate comfort level or patient's stated pain goal  Description: INTERVENTIONS:  - Encourage pt to monitor pain and request assistance  - Assess pain using appropriate pain scale  - Administer analgesics based on type and severity of pain and evaluate response  - Implement non-pharmacological measures as appropriate and evaluate response  - Consider cultural and social influences on pain and pain management  - Manage/alleviate anxiety  - Utilize distraction and/or relaxation techniques  - Monitor for opioid side effects  - Notify MD/LIP if interventions unsuccessful or patient reports new pain  - Anticipate increased pain with  activity and pre-medicate as appropriate  Outcome: Progressing     Problem: SAFETY ADULT - FALL  Goal: Free from fall injury  Description: INTERVENTIONS:  - Assess pt frequently for physical needs  - Identify cognitive and physical deficits and behaviors that affect risk of falls.  - [place] fall precautions as indicated by assessment.  - Educate pt/family on patient safety including physical limitations  - Instruct pt to call for assistance with activity based on assessment  - Modify environment to reduce risk of injury  - Provide assistive devices as appropriate  - Consider OT/PT consult to assist with strengthening/mobility  - Encourage toileting schedule  Outcome: Progressing     Problem: Impaired Functional Mobility  Goal: Achieve highest/safest level of mobility/gait  Description: Interventions:  - Assess patient's functional ability and stability  - Promote increasing activity/tolerance for mobility and gait  - Educate and engage patient/family in tolerated activity level and precautions    Outcome: Progressing     Problem: Impaired Activities of Daily Living  Goal: Achieve highest/safest level of independence in self care  Description: Interventions:  - Assess ability and encourage patient to participate in ADLs to maximize function  - Promote sitting position while performing ADLs such as feeding, grooming, and bathing  - Educate and encourage patient/family in tolerated functional activity level and precautions during self-care    Outcome: Progressing

## 2024-04-10 NOTE — PHYSICAL THERAPY NOTE
PHYSICAL THERAPY HIP TREATMENT NOTE - INPATIENT    Room Number: 106/106-A            Presenting Problem: mechanical fall with periprosthetic hip fracture of L femur  Co-Morbidities : bilateral hip replacements, HTN, arthritis    Problem List  Principal Problem:    Periprosthetic hip fracture, initial encounter  Active Problems:    Left hip pain    Fall, initial encounter      PHYSICAL THERAPY ASSESSMENT   Patient demonstrates fair progress this session, goals  remain in progress.    Patient continues to function below baseline with bed mobility, transfers, and gait.  Contributing factors to remaining limitations include decreased functional strength, pain, and limited   ROM.  Next session anticipate patient to progress bed mobility, transfers, and gait.  Physical Therapy will continue to follow patient for duration of hospitalization.    Patient continues to benefit from continued skilled PT services: to promote return to prior level of function and safety with continuous assistance and gradual rehabilitative therapy .    PLAN  PT Treatment Plan: Bed mobility;Patient education;Family education;Gait training;Transfer training  Frequency (Obs): 5x/week    SUBJECTIVE  \"It hurts\"    OBJECTIVE  Precautions: Limb alert - left    WEIGHT BEARING RESTRICTION           L Lower Extremity: Weight Bearing as Tolerated    PAIN ASSESSMENT   Ratin  Location: pain on plantar aspect of feet  Management Techniques: Activity promotion;Body mechanics;Breathing techniques;Repositioning    BALANCE  Static Sitting: Fair -  Dynamic Sitting: Fair -  Static Standing: Poor  Dynamic Standing: Poor -  AM-PAC '6-Clicks' INPATIENT SHORT FORM - BASIC MOBILITY  How much difficulty does the patient currently have...  Patient Difficulty: Turning over in bed (including adjusting bedclothes, sheets and blankets)?: A Lot   Patient Difficulty: Sitting down on and standing up from a chair with arms (e.g., wheelchair, bedside commode, etc.): A Lot    Patient Difficulty: Moving from lying on back to sitting on the side of the bed?: A Lot   How much help from another person does the patient currently need...   Help from Another: Moving to and from a bed to a chair (including a wheelchair)?: A Lot   Help from Another: Need to walk in hospital room?: Total   Help from Another: Climbing 3-5 steps with a railing?: Total     AM-PAC Score:  Raw Score: 10   Approx Degree of Impairment: 76.75%   Standardized Score (AM-PAC Scale): 32.29   CMS Modifier (G-Code): CL    FUNCTIONAL ABILITY STATUS  Functional Mobility/Gait Assessment  Gait Assistance: Other (Comment) (SPT)  Rolling: moderate assist  Supine to Sit: maximum assist  Sit to Supine: maximum assist  Sit to Stand: maximum assist  Static standing: mod A x40sec  Bed to chair: Max A    The patient's Approx Degree of Impairment: 76.75% has been calculated based on documentation in the [facility] '6 clicks' Inpatient Basic Mobility Short Form.  Research supports that patients with this level of impairment may benefit from LTC. Final disposition will be made by interdisciplinary medical team. Patient End of Session: Up in chair;With  staff;Needs met;Call light within reach;RN aware of session/findings;Other (Comment) (JAN left in room for transfer chair to bed)    CURRENT GOALS   Goals to be met by: 4/13/24  Patient Goal Patient's self-stated goal is: none   Goal #1 Patient is able to demonstrate supine - sit EOB @ level: minimum assistance      Goal #1   Current Status  maxAx2   Goal #2 Patient is able to demonstrate transfers Sit to/from Stand at assistance level: minimum assistance with walker - rolling      Goal #2  Current Status  attempted, unable   Goal #3 Patient is able to ambulate 25 feet with assist device: walker - rolling at assistance level: minimum assistance   Goal #3   Current Status  NT         Goal #4   Current Status     Goal #5 Patient to demonstrate independence with home activity/exercise  instructions provided to patient in preparation for discharge.   Goal #5   Current Status  in progress   Goal #6     Goal #6  Current Status        Therapeutic Activity: 25 minutes  Therapeutic Exercise: 14 minutes

## 2024-04-10 NOTE — PROGRESS NOTES
[facility] - CARDIOLOGY PROGRESS NOTE      Elba Malik Patient Status:  Inpatient    1928 MRN [id number]   Location [facility] 1W Attending Liane Loyd, DO   Hosp Day # 5 PCP Sharan Lopez MD         Assessment and Plan:     Periprosthetic hip fracture,   Treatment per PCP and Ortho.  Plan for medical therapy at this time    Acute heart failure fall, initial encounter  Patient with acute diastolic heart failure.  Echo revealed EF 50% with mild MR and AI and a left pleural effusion.  Although EF on the low side of normal would not pursue ischemic evaluation unless symptoms refractory.  Continue diuresis.    Hypertension  Continue amlodipine    Anemia treatment per primary    Low-sodium stable at this point with diuresis will follow  Subjective:   Elba Malik is a(n) 89 year old female is breathing better today    Objective:   Blood pressure 158/74, pulse 98, temperature 98.3 °F (36.8 °C), temperature source Oral, resp. rate 18, height 165.1 cm (5' 5\"), weight 165 lb 3.2 oz (74.9 kg), SpO2 93%, not currently breastfeeding.  Intake/Output:        Last 24 hours:   Intake/Output Summary (Last 24 hours) at 4/10/2024 1103  Last data filed at 4/10/2024 0600  Gross per 24 hour   Intake 620 ml   Output 2950 ml   Net -2330 ml      This shift: No intake/output data recorded.    Exam  Gen: Comfortable, in no acute distress,    Psych.alert and oriented x3  HEENT: atraumatic, normal conjunctiva, moist mucosa  Neck:supple,no JVD, no bruits  Lungs: Decreased at the base otherwise clear to ascultation, normal respiratory effort  Cardiac: regular rate and rhythm, nl S1,S2, no pathologic murmur  Abd: Abdomen soft, nontender, nondistended,  bowel sounds present  Ext:  no clubbing, no cyanosis,no edema  Neuro: no focal deficits  Skin: no rashes or lesions        Scheduled Meds:    furosemide  40 mg Intravenous BID (Diuretic)    amLODIPine  5  mg Oral Daily    fluticasone propionate  2 spray Nasal Daily    levothyroxine  50 mcg Oral Before breakfast    metoprolol tartrate  12.5 mg Oral 2x Daily(Beta Blocker)    enoxaparin  30 mg Subcutaneous Daily       Results:     Lab Results   Component Value Date    WBC 12.0 (H) 04/10/2024    HGB 8.3 (L) 04/10/2024    HCT 24.7 (L) 04/10/2024    .0 04/10/2024    CREATSERUM 0.94 04/10/2024    BUN 24 (H) 04/10/2024     (L) 04/10/2024    K 3.4 (L) 04/10/2024     04/10/2024    CO2 25.0 04/10/2024     (H) 04/10/2024    CA 8.8 04/10/2024    ALB 3.4 (L) 10/17/2018    ALKPHO 186 (H) 10/17/2018    BILT 0.7 10/17/2018    TP 5.9 10/17/2018     (H) 10/17/2018     (H) 10/17/2018    PTT 33.0 04/05/2024    INR 0.96 04/05/2024    TSH 1.430 06/05/2023    DDIMER 0.63 01/10/2023     (H) 06/06/2016       XR CHEST AP PORTABLE  (CPT=71045)    Result Date: 4/9/2024  CONCLUSION:   No significant change when compared to 04/05/2024.  Redemonstrated small left greater than right pleural effusions with associated bibasilar airspace opacities, which likely reflect atelectasis.  No significant change in the pulmonary edema.    Dictated by (CST): Jarret Das MD on 4/09/2024 at 7:53 AM     Finalized by (CST): Jarret Das MD on 4/09/2024 at 7:56 AM                 Mathew Coffey MD  [facility]  4/10/2024

## 2024-04-10 NOTE — OCCUPATIONAL THERAPY NOTE
OCCUPATIONAL THERAPY TREATMENT NOTE - INPATIENT    Room Number: 106/106-A     Presenting Problem: L hip fracture     Problem List  Principal Problem:    Periprosthetic hip fracture, initial encounter  Active Problems:    Left hip pain    Fall, initial encounter      OCCUPATIONAL THERAPY ASSESSMENT   Patient demonstrates good  progress this session, goals remain in progress.    Patient continues to function below baseline with BADLs and functional mobility.   Contributing factors to remaining limitations include decreased functional strength, decreased functional reach, decreased endurance, pain, and decreased muscular endurance.  Next session anticipate patient to progress activity tolerance, strength and balance.  Occupational Therapy will continue to follow patient for duration of hospitalization.    Patient continues to benefit from continued skilled OT services: to promote return to prior level of function and safety with continuous assistance and gradual rehabilitative therapy .     PLAN  OT Treatment Plan: Balance activities;IADL training;ADL training;Energy conservation/work simplification techniques;Functional transfer training;UE strengthening/ROM;Endurance training;Patient/Family education;Patient/Family training;Equipment eval/education;Compensatory technique education  OT Device Recommendations: TBD    SUBJECTIVE  \"I want to try otherwise I won't get better\"    OBJECTIVE  Precautions: Limb alert - left    WEIGHT BEARING RESTRICTION  L Lower Extremity: Weight Bearing as Tolerated    PAIN ASSESSMENT  Rating: -- (severe)  Location: LT hip/LE, soles of NORA feet  Management Techniques: Activity promotion; Body mechanics; Relaxation; Repositioning    ACTIVITY TOLERANCE  Good with pacing and rest breaks. Pt demo SOB with activity.    ACTIVITIES OF DAILY LIVING ASSESSMENT  AM-PAC ‘6-Clicks’ Inpatient Daily Activity Short Form  How much help from another person does the patient currently need…  -   Putting on and  taking off regular lower body clothing?: A Lot  -   Bathing (including washing, rinsing, drying)?: A Lot  -   Toileting, which includes using toilet, bedpan or urinal? : A Lot  -   Putting on and taking off regular upper body clothing?: A Lot  -   Taking care of personal grooming such as brushing teeth?: A Little  -   Eating meals?: None    AM-PAC Score:  Score: 15  Approx Degree of Impairment: 56.46%  Standardized Score (AM-PAC Scale): 34.69  CMS Modifier (G-Code): CK    FUNCTIONAL TRANSFER ASSESSMENT  Sit to Stand: Chair  Chair: Maximum Assist    Bed to chair Xfer (high to low): Max A squat pivot without AD  Sit to stand: Max A from bed side chair  Provide MOd A supported standing with R/W x~40 seconds for self care  BED MOBILITY  Supine to sit: Max A  Tolerates ~7 minutes unsupported EOB with supervision    BALANCE ASSESSMENT  Static Sitting: Supervision  Sitting Bilateral: Minimal Assist    FUNCTIONAL ADL ASSESSMENT  Feeding: I  Grooming: set up in sitting  LE self care: Max A  Toileting: total A; incontinent of loose stool upon standing    Skilled Therapy Provided: Pt received in bed, no family present. Pt is very pleasant and motivated. Chief c/o is painful NORA soles of feet with WB. Pt demo limited NORA shd flexion, with pt reporting chronic shd pain. Spoke to nurse and PCT upon completion of session- aware of pt performance and of recommendations for mechanical lift back to bed- Merry left at bed side.         EDUCATION PROVIDED  Patient: Role of Occupational Therapy; Plan of Care; Discharge Recommendations; Functional Transfer Techniques; Posture/Positioning; Compensatory ADL Techniques  Patient's Response to Education: Verbalized Understanding; Requires Further Education    The patient's Approx Degree of Impairment: 56.46% has been calculated based on documentation in the [facility] '6 clicks' Inpatient Daily Activity Short Form.  Research supports that patients with this level of impairment may benefit from  ALEXA.  Final disposition will be made by interdisciplinary medical team.    Patient End of Session: Up in chair;With  staff;Needs met;Call light within reach;RN aware of session/findings;All patient questions and concerns addressed    OT Goals:    Patient self-stated goal is: to stand and walk     Patient will complete LE dressing with min A using LHAE as indicated  Comment:     Patient will complete toilet transfer with min A  Comment:     Patient will tolerate supported standing x2-3 minutes with CA for participation in self care  Comment:    Patient will complete bed mobility with Min A  Comment:         Goals  on: 24  Frequency: 3-5x/week    Self-Care Home Management: 30 minutes

## 2024-04-11 ENCOUNTER — APPOINTMENT (OUTPATIENT)
Dept: GENERAL RADIOLOGY | Facility: HOSPITAL | Age: 89
End: 2024-04-11
Attending: INTERNAL MEDICINE
Payer: MEDICARE

## 2024-04-11 LAB
ANION GAP SERPL CALC-SCNC: 8 MMOL/L (ref 0–18)
ATRIAL RATE: 107 BPM
BASOPHILS # BLD AUTO: 0.04 X10(3) UL (ref 0–0.2)
BASOPHILS NFR BLD AUTO: 0.4 %
BUN BLD-MCNC: 35 MG/DL (ref 9–23)
BUN/CREAT SERPL: 30.7 (ref 10–20)
CALCIUM BLD-MCNC: 8.8 MG/DL (ref 8.7–10.4)
CHLORIDE SERPL-SCNC: 97 MMOL/L (ref 98–112)
CO2 SERPL-SCNC: 26 MMOL/L (ref 21–32)
CREAT BLD-MCNC: 1.14 MG/DL
DEPRECATED RDW RBC AUTO: 45.4 FL (ref 35.1–46.3)
EGFRCR SERPLBLD CKD-EPI 2021: 44 ML/MIN/1.73M2 (ref 60–?)
EOSINOPHIL # BLD AUTO: 0.07 X10(3) UL (ref 0–0.7)
EOSINOPHIL NFR BLD AUTO: 0.6 %
ERYTHROCYTE [DISTWIDTH] IN BLOOD BY AUTOMATED COUNT: 14 % (ref 11–15)
GLUCOSE BLD-MCNC: 93 MG/DL (ref 70–99)
HCT VFR BLD AUTO: 24.4 %
HGB BLD-MCNC: 8.4 G/DL
IMM GRANULOCYTES # BLD AUTO: 0.1 X10(3) UL (ref 0–1)
IMM GRANULOCYTES NFR BLD: 0.9 %
LYMPHOCYTES # BLD AUTO: 1.58 X10(3) UL (ref 1–4)
LYMPHOCYTES NFR BLD AUTO: 14.2 %
MCH RBC QN AUTO: 30.2 PG (ref 26–34)
MCHC RBC AUTO-ENTMCNC: 34.4 G/DL (ref 31–37)
MCV RBC AUTO: 87.8 FL
MONOCYTES # BLD AUTO: 2.3 X10(3) UL (ref 0.1–1)
MONOCYTES NFR BLD AUTO: 20.7 %
NEUTROPHILS # BLD AUTO: 7.04 X10 (3) UL (ref 1.5–7.7)
NEUTROPHILS # BLD AUTO: 7.04 X10(3) UL (ref 1.5–7.7)
NEUTROPHILS NFR BLD AUTO: 63.2 %
OSMOLALITY SERPL CALC.SUM OF ELEC: 280 MOSM/KG (ref 275–295)
P AXIS: 110 DEGREES
P-R INTERVAL: 198 MS
PLATELET # BLD AUTO: 322 10(3)UL (ref 150–450)
POTASSIUM SERPL-SCNC: 3.5 MMOL/L (ref 3.5–5.1)
POTASSIUM SERPL-SCNC: 3.5 MMOL/L (ref 3.5–5.1)
Q-T INTERVAL: 338 MS
QRS DURATION: 80 MS
QTC CALCULATION (BEZET): 451 MS
R AXIS: 27 DEGREES
RBC # BLD AUTO: 2.78 X10(6)UL
SODIUM SERPL-SCNC: 131 MMOL/L (ref 136–145)
T AXIS: 42 DEGREES
VENTRICULAR RATE: 107 BPM
WBC # BLD AUTO: 11.1 X10(3) UL (ref 4–11)

## 2024-04-11 PROCEDURE — 99232 SBSQ HOSP IP/OBS MODERATE 35: CPT | Performed by: INTERNAL MEDICINE

## 2024-04-11 PROCEDURE — 71045 X-RAY EXAM CHEST 1 VIEW: CPT | Performed by: INTERNAL MEDICINE

## 2024-04-11 RX ORDER — FUROSEMIDE 40 MG/1
40 TABLET ORAL DAILY
Status: DISCONTINUED | OUTPATIENT
Start: 2024-04-11 | End: 2024-04-12

## 2024-04-11 NOTE — PROGRESS NOTES
Progress Note  Elba Malik Patient Status:  Inpatient    1928 MRN [id number]   Location [facility] 1W Attending Liane Loyd, DO   Hosp Day # 6 PCP Sharan Lopez MD     SUBJECTIVE:    NINA, patient just left the floor for a biopsy procedure.     VITALS:  /74 (BP Location: Left arm)   Pulse 80   Temp 97.9 °F (36.6 °C) (Oral)   Resp 18   Ht 5' 5\" (1.651 m)   Wt 139 lb 12.4 oz (63.4 kg)   SpO2 93%   BMI 23.26 kg/m²     INTAKE/OUTPUT:    Intake/Output Summary (Last 24 hours) at 2024 1252  Last data filed at 2024 0919  Gross per 24 hour   Intake 840 ml   Output 2100 ml   Net -1260 ml     Last 3 Weights   24 1037 139 lb 12.4 oz (63.4 kg)   24 0524 (P) 154 lb 14.4 oz (70.3 kg)   04/10/24 0825 165 lb 3.2 oz (74.9 kg)   24 0417 167 lb 4.8 oz (75.9 kg)   24 1402 156 lb 14.4 oz (71.2 kg)   24 0600 153 lb 11.2 oz (69.7 kg)   24 0100 145 lb (65.8 kg)   24 0735 153 lb (69.4 kg)   24 0756 146 lb (66.2 kg)   24 2209 145 lb 1 oz (65.8 kg)     LABS:  Recent Labs   Lab 24  0733 04/10/24  0651 24  0624   * 107* 93   BUN 23 24* 35*   CREATSERUM 0.87 0.94 1.14*   EGFRCR 61 56* 44*   CA 8.7 8.8 8.8   * 131* 131*   K 4.6 3.4* 3.5  3.5    100 97*   CO2 20.0* 25.0 26.0     Recent Labs   Lab 24  0733 04/10/24  0651 24  0624   RBC 2.84* 2.87* 2.78*   HGB 8.3* 8.3* 8.4*   HCT 25.4* 24.7* 24.4*   MCV 89.4 86.1 87.8   MCH 29.2 28.9 30.2   MCHC 32.7 33.6 34.4   RDW 14.0 14.1 14.0   NEPRELIM 10.15* 8.53* 7.04   WBC 13.2* 12.0* 11.1*   .0 315.0 322.0     No results for input(s): \"TROP\", \"CK\" in the last 168 hours.    DIAGNOSTICS:    TELEMETRY: SB/SR    ECHO 4/10/2024:  Conclusions:   1. Left ventricle: The cavity size was normal. Wall thickness was normal.      Systolic function was mildly reduced. The estimated ejection fraction was      50%, by visual assessment. Although no diagnostic  regional wall motion      abnormality was identified, this possibility cannot be completely      excluded on the basis of this study. Unable to assess LV diastolic      function due to heart rhythm.   2. Right ventricle: Systolic function was reduced.   3. Aortic valve: There was mild regurgitation.   4. Mitral valve: There was mild regurgitation.   5. Pericardium, extracardiac: There was a left pleural effusion.   Impressions:  No previous study was available for comparison.     ROS: NINA    PHYSICAL EXAM: NINA    MEDICATIONS:   furosemide  40 mg Oral Daily    fluticasone propionate  1 spray Nasal BID    docusate sodium  100 mg Oral BID    amLODIPine  5 mg Oral Daily    levothyroxine  50 mcg Oral Before breakfast    metoprolol tartrate  12.5 mg Oral 2x Daily(Beta Blocker)    enoxaparin  30 mg Subcutaneous Daily     ASSESSMENT:    S/p Fall, Periprosthetic Hip Fracture   - PT/OT    Acute on Chronic HFpeEF  - BNP 1,046, CXR with small, L>R pleural effusions   - IV Lasix BID, has diuresed well, Cr now uptrending, Lasix reduced to daily per pulm   - Na 131    HTN- Reasonably controlled in the setting of acute pain   Chronic Anemia- Stable     PLAN:  - Patient just left the floor for a biopsy procedure per RN. Cardiology will see her tomorrow to re-assess in the mean time, Agree with decreasing Lasix to daily, check BMP in morning and likely to switch to orals then discharge to [facility]    Plan of care discussed with patient and RN.     Ev Ortiz, KIMBERLY  4/11/2024  12:52 PM  [phone] ([place])  [phone] ([place])

## 2024-04-11 NOTE — PROGRESS NOTES
[facility]  part of [facility]    Progress Note      Assessment and Plan:   1.  Periprosthetic hip fracture status post fall-patient has improved mood and will be better cooperative with physical therapy.    Recommendations:  1.  MRI redemonstrated the acute to subacute mildly displaced periprosthetic fracture involving the greater trochanter on the left with associated muscle strain  2.  Physical therapy and Occupational Therapy  3.  Discharge planning-patient wants [facility]-hopefully will get out by Friday and there apparently is a bed available for Friday.  Discharge delayed due to development of pulmonary edema.  4.  As per orthopedics.    2.  Depressive state-Lexapro    3.  DVT prophylaxis-Lovenox    4.  Hypertension-acceptable control    5.  Left shoulder pain status post hemarthrosis aspiration    6.  Dyspnea-the patient has marked elevation of the BNP with pulmonary edema radiographically and now has oxygen requirements.  I appreciate cardiology input.  The patient yet has dyspnea and crackles and I believe she would benefit from ongoing aggressive diuresis.  Echocardiography demonstrates an ejection fraction of 50% and I strongly suspect diastolic dysfunction associate with a longstanding hypertension is the main culprit.    Recommendations: Ongoing diuresis and will repeat x-ray in the morning.    7.  Ankle discomfort-mild edema.  Uric acid level is normal..        Subjective:   Elba Malik is a(n) 89 year old female who is now agreeing to cooperate with physical therapy.  However, she developed pulmonary edema stabilizing clinically but still needs aggressive diuresis.  Objective:   Blood pressure 154/63, pulse 108, temperature 98 °F (36.7 °C), temperature source Oral, resp. rate 18, height 5' 5\" (1.651 m), weight 165 lb 3.2 oz (74.9 kg), SpO2 93%, not currently breastfeeding.    Physical Exam alert elderly female  HEENT examination is unremarkable with pupils equal round  and reactive to light and accommodation.   Neck without adenopathy, thyromegaly, JVD nor bruit.   Lungs diminished with extensive basilar crackles to auscultation and percussion.  Cardiac regular rate and rhythm no murmur.   Abdomen nontender, without hepatosplenomegaly and no mass appreciable.   Extremities without clubbing cyanosis nor edema.  With mild left hip discomfort to movement.  Neurologic grossly intact with symmetric tone and strength and reflex.  Skin without gross abnormality     Results:     Lab Results   Component Value Date    WBC 12.0 04/10/2024    HGB 8.3 04/10/2024    HCT 24.7 04/10/2024    .0 04/10/2024    CREATSERUM 0.94 04/10/2024    BUN 24 04/10/2024     04/10/2024    K 3.4 04/10/2024     04/10/2024    CO2 25.0 04/10/2024     04/10/2024    CA 8.8 04/10/2024     MRI of the hips-1. Bilateral total hip arthroplasties with redemonstration of an acute to subacute mildly displaced periprosthetic fracture involving the greater trochanter of the left femur.   2. Moderate left gluteus medius muscle strain with moderate posttraumatic blood products extending into the left trochanteric bursa.  There is also mild strain involving the left hip adductor muscles.   3. Partial-thickness right hamstring origin tendon tear with associated mild ischial bursitis.   4. Mild left hamstring origin tendinosis.    Chest x-ray-pulmonary edema    Sharan Lopez MD  Medical Director, Critical Care, [facility]  Medical Director, [facility]  Pager: [phone]

## 2024-04-11 NOTE — CM/SW NOTE
Pt is not medically stable for dc. MAE received for ALEXA placement at [facility]. Kelsey Jameson at  confirmed bed may be available tomorrow but she will not be able to confirm until tomorrow morning. MD made aware that [facility] may not have bed availability on 4/12, and that patient may need to dc [facility]. Pt's daughter also made aware that CM will not be able to confirm bed availability until tomorrow morning.    Plan: [facility] vs [facility] pending [facility] bed availability and medical clearance.    Mathew King, MIGUELINAN    [phone]

## 2024-04-11 NOTE — PROGRESS NOTES
[facility]  part of [facility]    Progress Note      Assessment and Plan:   1.  Periprosthetic hip fracture status post fall-patient has improved mood and will be better cooperative with physical therapy.    Recommendations:  1.  MRI redemonstrated the acute to subacute mildly displaced periprosthetic fracture involving the greater trochanter on the left with associated muscle strain  2.  Physical therapy and Occupational Therapy  3.  Discharge planning-patient wants [facility]-hopefully will get out by Friday and there apparently is a bed available for Friday.  Discharge delayed due to development of pulmonary edema.  4.  As per orthopedics.    2.  Depressive state-Lexapro    3.  DVT prophylaxis-Lovenox    4.  Hypertension-acceptable control    5.  Left shoulder pain status post hemarthrosis aspiration    6.  Dyspnea-the patient has marked elevation of the BNP with pulmonary edema radiographically and now has oxygen requirements.  I appreciate cardiology input.  The patient yet has dyspnea and crackles and I believe she would benefit from ongoing aggressive diuresis.  Echocardiography demonstrates an ejection fraction of 50% and I strongly suspect diastolic dysfunction associate with a longstanding hypertension is the main culprit.    The renal function is now worsening today.  Will decrease the Lasix to 40 mg orally in the morning just once daily as opposed to IV and twice daily which she is on currently.  Will repeat BMP in the morning.    Recommendations: Will decrease Lasix to 40 mg orally in the morning.    7.  Ankle discomfort-mild edema.  Uric acid level is normal.        Subjective:   Elba Malik is a(n) 89 year old female who is cooperating with physical therapy.  However, she developed pulmonary edema.  Objective:   Blood pressure 140/74, pulse 80, temperature 97.9 °F (36.6 °C), temperature source Oral, resp. rate 18, height 5' 5\" (1.651 m), weight (P) 154 lb 14.4 oz (70.3  kg), SpO2 93%, not currently breastfeeding.    Physical Exam alert elderly female  HEENT examination is unremarkable with pupils equal round and reactive to light and accommodation.   Neck without adenopathy, thyromegaly, JVD nor bruit.   Lungs diminished with extensive basilar crackles to auscultation and percussion.  Cardiac regular rate and rhythm no murmur.   Abdomen nontender, without hepatosplenomegaly and no mass appreciable.   Extremities without clubbing cyanosis nor edema.  With mild left hip discomfort to movement.  Neurologic grossly intact with symmetric tone and strength and reflex.  Skin without gross abnormality     Results:     Lab Results   Component Value Date    WBC 11.1 04/11/2024    HGB 8.4 04/11/2024    HCT 24.4 04/11/2024    .0 04/11/2024    CREATSERUM 1.14 04/11/2024    BUN 35 04/11/2024     04/11/2024    K 3.5 04/11/2024    K 3.5 04/11/2024    CL 97 04/11/2024    CO2 26.0 04/11/2024    GLU 93 04/11/2024    CA 8.8 04/11/2024     MRI of the hips-1. Bilateral total hip arthroplasties with redemonstration of an acute to subacute mildly displaced periprosthetic fracture involving the greater trochanter of the left femur.   2. Moderate left gluteus medius muscle strain with moderate posttraumatic blood products extending into the left trochanteric bursa.  There is also mild strain involving the left hip adductor muscles.   3. Partial-thickness right hamstring origin tendon tear with associated mild ischial bursitis.   4. Mild left hamstring origin tendinosis.    Chest x-ray-pulmonary edema    Sharan Lopez MD  Medical Director, Critical Care, [facility]  Medical Director, [facility]  Pager: [phone]

## 2024-04-11 NOTE — PHYSICAL THERAPY NOTE
PHYSICAL THERAPY TREATMENT NOTE - INPATIENT     Room Number: 106/106-A       Presenting Problem: mechanical fall with periprosthetic hip fracture of L femur  Co-Morbidities : bilateral hip replacements, HTN, arthritis    Problem List  Principal Problem:    Periprosthetic hip fracture, initial encounter  Active Problems:    Left hip pain    Fall, initial encounter      PHYSICAL THERAPY ASSESSMENT   Patient demonstrates fair progress this session, goals  remain in progress.    Patient continues to function below baseline with bed mobility, transfers, and gait.  Contributing factors to remaining limitations include decreased functional strength, pain, and medical status.  Next session anticipate patient to progress bed mobility, transfers, and gait.  Physical Therapy will continue to follow patient for duration of hospitalization.    Patient continues to benefit from continued skilled PT services: to promote return to prior level of function and safety with continuous assistance and gradual rehabilitative therapy .    PLAN  PT Treatment Plan: Bed mobility;Body mechanics;Patient education;Gait training;Transfer training;Balance training  Frequency (Obs): 5x/week    SUBJECTIVE  \"I guess this is pretty good\"    OBJECTIVE  Precautions: Limb alert - left    WEIGHT BEARING RESTRICTION           L Lower Extremity: Weight Bearing as Tolerated    PAIN ASSESSMENT   Rating: Unable to rate  Location: L hip  Management Techniques: Body mechanics;Activity promotion;Repositioning    BALANCE  Static Sitting: Fair +  Dynamic Sitting: Fair  Static Standing: Poor  Dynamic Standing: Poor -    AM-PAC '6-Clicks' INPATIENT SHORT FORM - BASIC MOBILITY  How much difficulty does the patient currently have...  Patient Difficulty: Turning over in bed (including adjusting bedclothes, sheets and blankets)?: A Lot   Patient Difficulty: Sitting down on and standing up from a chair with arms (e.g., wheelchair, bedside commode, etc.): A Lot   Patient  Difficulty: Moving from lying on back to sitting on the side of the bed?: A Lot   How much help from another person does the patient currently need...   Help from Another: Moving to and from a bed to a chair (including a wheelchair)?: A Lot   Help from Another: Need to walk in hospital room?: Total   Help from Another: Climbing 3-5 steps with a railing?: Total     AM-PAC Score:  Raw Score: 10   Approx Degree of Impairment: 76.75%   Standardized Score (AM-PAC Scale): 32.29   CMS Modifier (G-Code): CL    FUNCTIONAL ABILITY STATUS  Functional Mobility/Gait Assessment  Gait Assistance: Not tested  Rolling:  not tested   Supine to Sit: maximum assist and assist of 2  Sit to Supine:  not tested   Sit to Stand: maximum assist and assist of 2    Additional information: Patient on room air. Patient with max A x 2 for mobility during the session, able to stand on standing scale with max A x 1 for support. Then able to stand pivot transfer to bedside chair with max A x 2. Patient agreeable to stand from bedside chair for stefan care twice, max A x 2 to stand for about 30 seconds. Patient with max A x 1 to remain standing upright, knees did begin to buckle while standing and then returned to bedside chair with max A x 1. The patient's Approx Degree of Impairment: 76.75% has been calculated based on documentation in the [facility] '6 clicks' Inpatient Daily Activity Short Form.  Research supports that patients with this level of impairment may benefit from long term care. Patient received semi-fowlers in bed, agreeable to physical therapy. Education with patient provided verbally on physical therapy plan of care and physiological benefits of out of bed mobility. Patient with good carryover during session. Anticipated therapy needs remain appropriate based on the patient's performance, personal factors, and remaining functional impairments. Final disposition will be made by interdisciplinary medical team.    Patient End of Session: Up  in chair;Needs met;Call light within reach;RN aware of session/findings;All patient questions and concerns addressed;Family present    CURRENT GOALS   Goals to be met by: 4/13/24  Patient Goal Patient's self-stated goal is: none   Goal #1 Patient is able to demonstrate supine - sit EOB @ level: minimum assistance      Goal #1   Current Status Max A x 2    Goal #2 Patient is able to demonstrate transfers Sit to/from Stand at assistance level: minimum assistance with walker - rolling      Goal #2  Current Status Max A x 2    Goal #3 Patient is able to ambulate 25 feet with assist device: walker - rolling at assistance level: minimum assistance   Goal #3   Current Status Not tested          Goal #4   Current Status     Goal #5 Patient to demonstrate independence with home activity/exercise instructions provided to patient in preparation for discharge.   Goal #5   Current Status In progress   Goal #6     Goal #6  Current Status       Therapeutic Activity: 13 minutes  Therapeutic Exercise: 10 minutes

## 2024-04-11 NOTE — PLAN OF CARE
Problem: Patient Centered Care  Goal: Patient preferences are identified and integrated in the patient's plan of care  Description: Interventions:  - What would you like us to know as we care for you? From [facility]  - Provide timely, complete, and accurate information to patient/family  - Incorporate patient and family knowledge, values, beliefs, and cultural backgrounds into the planning and delivery of care  - Encourage patient/family to participate in care and decision-making at the level they choose  - Honor patient and family perspectives and choices  Outcome: Progressing     Problem: PAIN - ADULT  Goal: Verbalizes/displays adequate comfort level or patient's stated pain goal  Description: INTERVENTIONS:  - Encourage pt to monitor pain and request assistance  - Assess pain using appropriate pain scale  - Administer analgesics based on type and severity of pain and evaluate response  - Implement non-pharmacological measures as appropriate and evaluate response  - Consider cultural and social influences on pain and pain management  - Manage/alleviate anxiety  - Utilize distraction and/or relaxation techniques  - Monitor for opioid side effects  - Notify MD/LIP if interventions unsuccessful or patient reports new pain  - Anticipate increased pain with activity and pre-medicate as appropriate  Outcome: Progressing     Problem: SAFETY ADULT - FALL  Goal: Free from fall injury  Description: INTERVENTIONS:  - Assess pt frequently for physical needs  - Identify cognitive and physical deficits and behaviors that affect risk of falls.  - [facility] fall precautions as indicated by assessment.  - Educate pt/family on patient safety including physical limitations  - Instruct pt to call for assistance with activity based on assessment  - Modify environment to reduce risk of injury  - Provide assistive devices as appropriate  - Consider OT/PT consult to assist with strengthening/mobility  - Encourage toileting  schedule  Outcome: Progressing     Problem: Impaired Functional Mobility  Goal: Achieve highest/safest level of mobility/gait  Description: Interventions:  - Assess patient's functional ability and stability  - Promote increasing activity/tolerance for mobility and gait  - Educate and engage patient/family in tolerated activity level and precautions  - Recommend use of sit-stand lift for transfers  Outcome: Not Progressing     Problem: Impaired Activities of Daily Living  Goal: Achieve highest/safest level of independence in self care  Description: Interventions:  - Assess ability and encourage patient to participate in ADLs to maximize function  - Promote sitting position while performing ADLs such as feeding, grooming, and bathing  - Educate and encourage patient/family in tolerated functional activity level and precautions during self-care  - Encourage patient to incorporate impaired side during daily activities to promote function  Outcome: Not Progressing

## 2024-04-11 NOTE — OCCUPATIONAL THERAPY NOTE
OCCUPATIONAL THERAPY TREATMENT NOTE - INPATIENT    Room Number: 106/106-A  Presenting Problem: L hip fracture     Problem List  Principal Problem:    Periprosthetic hip fracture, initial encounter  Active Problems:    Left hip pain    Fall, initial encounter      OCCUPATIONAL THERAPY ASSESSMENT   Patient demonstrates fair progress this session, goals remain in progress.    Patient continues to function below baseline with adls and functional mobility.   Contributing factors to remaining limitations include decreased functional strength, decreased endurance, and pain.  Next session anticipate patient to progress lower body dressing, bed mobility, transfers, and static standing balance.  Occupational Therapy will continue to follow patient for duration of hospitalization.    Patient continues to benefit from continued skilled OT services: to promote return to prior level of function and safety with continuous assistance and gradual rehabilitative therapy .     PLAN  OT Treatment Plan: Compensatory technique education;Patient/Family training;Patient/Family education;Endurance training;Functional transfer training;ADL training;Balance activities  OT Device Recommendations: TBD    SUBJECTIVE  \"I need to use the walker because my back gives out\"    OBJECTIVE  Precautions: Limb alert - left    WEIGHT BEARING RESTRICTION  L Lower Extremity: Weight Bearing as Tolerated    PAIN ASSESSMENT  Rating: -- (pt did not rate pain)  Location: -- (Lle)  Management Techniques: Relaxation; Repositioning; Activity promotion    ACTIVITY TOLERANCE  good    O2 SATURATIONS  Activity on room air    ACTIVITIES OF DAILY LIVING ASSESSMENT  AM-PAC ‘6-Clicks’ Inpatient Daily Activity Short Form  How much help from another person does the patient currently need…  -   Putting on and taking off regular lower body clothing?: A Lot  -   Bathing (including washing, rinsing, drying)?: A Lot  -   Toileting, which includes using toilet, bedpan or urinal? :  A Lot  -   Putting on and taking off regular upper body clothing?: A Lot  -   Taking care of personal grooming such as brushing teeth?: A Little  -   Eating meals?: None    AM-PAC Score:  Score: 15  Approx Degree of Impairment: 56.46%  Standardized Score (AM-PAC Scale): 34.69  CMS Modifier (G-Code): CK    FUNCTIONAL TRANSFER ASSESSMENT  Sit to Stand: Chair  Chair: Maximum Assist    BED MOBILITY  Supine to Sit : Maximum Assist  Sit to Supine (OT): Maximum Assist (x2)    BALANCE ASSESSMENT  Static Sitting: Supervision  Sitting Bilateral: Minimal Assist    FUNCTIONAL ADL ASSESSMENT  Eating: independent  Grooming: setup assist  UB Dressing: min assist  LB Dressing: max assist  Toileting: max assist    Skilled Therapy Provided:RN contacted prior to start of care. Treatment coordinated w/ PT. Pt agreeable to participation in therapy. Gait belt used during dynamic activity. Pt received in bed, alert and oriented;daughter at bedside.Pt currently requires max a x 2 to transfer supine<>sit at eob. Pt maintained unsupported sitting w/ supervision. Pt required max a x 2 for sit<>stand from bed/chair x 3 attempts. Pt incontinent of bowel w/ standing and required max a for toileting. Pt required max a to maintain static standing w/ rw to complete stefan care. Standing tolerance limited to 30 sec each. Pt currently requires max a to manage le dressing tasks.    At end of session pt remaining up in chair w/ all needs in reach;daughter at bedside. RN aware of pt's status and performance in therapy     EDUCATION PROVIDED  Patient: Plan of Care; Functional Transfer Techniques; Fall Prevention; Posture/Positioning  Patient's Response to Education: Verbalized Understanding    The patient's Approx Degree of Impairment: 56.46% has been calculated based on documentation in the [facility] '6 clicks' Inpatient Daily Activity Short Form.  Research supports that patients with this level of impairment may benefit from IRF.  Final disposition will be  made by interdisciplinary medical team.    Patient End of Session: Up in chair;Needs met;Call light within reach;RN aware of session/findings;All patient questions and concerns addressed;Family present    OT Goals:     Patient will complete LE dressing with min A using LHAE as indicated  Comment: na    Patient will complete toilet transfer with min A  Comment: pt required max a    Patient will tolerate supported standing x2-3 minutes with CA for participation in self care  Comment:pt required max a to maintain static standing w/ rw < ! min    Patient will complete bed mobility with Min A  Comment:pt required max a x 2         Goals  on: 24  Frequency: 3x/week    Self-Care Home Management: 10 minutes  Therapeutic Activity: 13 minutes

## 2024-04-12 ENCOUNTER — EXTERNAL FACILITY (OUTPATIENT)
Dept: INTERNAL MEDICINE CLINIC | Facility: CLINIC | Age: 89
End: 2024-04-12

## 2024-04-12 VITALS
HEART RATE: 100 BPM | DIASTOLIC BLOOD PRESSURE: 57 MMHG | OXYGEN SATURATION: 92 % | TEMPERATURE: 98 F | BODY MASS INDEX: 26.35 KG/M2 | WEIGHT: 158.13 LBS | HEIGHT: 65 IN | SYSTOLIC BLOOD PRESSURE: 134 MMHG | RESPIRATION RATE: 20 BRPM

## 2024-04-12 DIAGNOSIS — E03.9 HYPOTHYROIDISM, UNSPECIFIED TYPE: ICD-10-CM

## 2024-04-12 DIAGNOSIS — I50.31 ACUTE DIASTOLIC CONGESTIVE HEART FAILURE (HCC): ICD-10-CM

## 2024-04-12 DIAGNOSIS — M54.42 CHRONIC LEFT-SIDED LOW BACK PAIN WITH LEFT-SIDED SCIATICA: ICD-10-CM

## 2024-04-12 DIAGNOSIS — M97.8XXA PERIPROSTHETIC HIP FRACTURE, INITIAL ENCOUNTER: Primary | ICD-10-CM

## 2024-04-12 DIAGNOSIS — G89.29 CHRONIC LEFT-SIDED LOW BACK PAIN WITH LEFT-SIDED SCIATICA: ICD-10-CM

## 2024-04-12 DIAGNOSIS — I10 ESSENTIAL HYPERTENSION: ICD-10-CM

## 2024-04-12 DIAGNOSIS — F51.01 PRIMARY INSOMNIA: ICD-10-CM
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
## 2024-04-12 DIAGNOSIS — D50.9 IRON DEFICIENCY ANEMIA, UNSPECIFIED IRON DEFICIENCY ANEMIA TYPE: ICD-10-CM

## 2024-04-12 DIAGNOSIS — Z91.09 ENVIRONMENTAL ALLERGIES: ICD-10-CM

## 2024-04-12 DIAGNOSIS — Z96.649 PERIPROSTHETIC HIP FRACTURE, INITIAL ENCOUNTER: Primary | ICD-10-CM

## 2024-04-12 DIAGNOSIS — M25.012 HEMARTHROSIS OF LEFT SHOULDER: ICD-10-CM

## 2024-04-12 PROBLEM — M25.552 LEFT HIP PAIN: Status: RESOLVED | Noted: 2024-04-05 | Resolved: 2024-04-12

## 2024-04-12 PROBLEM — M25.552 LEFT HIP PAIN: Status: RESOLVED | Noted: 2024-01-01 | Resolved: 2024-01-01

## 2024-04-12 PROBLEM — R11.0 NAUSEA: Status: RESOLVED | Noted: 2018-10-02 | Resolved: 2024-04-12

## 2024-04-12 PROBLEM — M25.512 ACUTE PAIN OF LEFT SHOULDER: Status: RESOLVED | Noted: 2024-01-01 | Resolved: 2024-01-01

## 2024-04-12 PROBLEM — R73.9 HYPERGLYCEMIA: Status: RESOLVED | Noted: 2024-01-12 | Resolved: 2024-04-12

## 2024-04-12 PROBLEM — E87.20 METABOLIC ACIDOSIS: Status: RESOLVED | Noted: 2024-01-12 | Resolved: 2024-04-12

## 2024-04-12 PROBLEM — R73.9 HYPERGLYCEMIA: Status: RESOLVED | Noted: 2024-01-01 | Resolved: 2024-01-01

## 2024-04-12 PROBLEM — M25.512 ACUTE PAIN OF LEFT SHOULDER: Status: RESOLVED | Noted: 2024-01-12 | Resolved: 2024-04-12

## 2024-04-12 PROBLEM — R11.0 NAUSEA: Status: RESOLVED | Noted: 2018-10-02 | Resolved: 2024-01-01

## 2024-04-12 PROBLEM — R79.89 AZOTEMIA: Status: RESOLVED | Noted: 2024-01-01 | Resolved: 2024-01-01

## 2024-04-12 PROBLEM — E87.1 HYPONATREMIA: Status: RESOLVED | Noted: 2024-01-12 | Resolved: 2024-04-12

## 2024-04-12 PROBLEM — R79.89 AZOTEMIA: Status: RESOLVED | Noted: 2024-01-12 | Resolved: 2024-04-12

## 2024-04-12 PROBLEM — E87.20 METABOLIC ACIDOSIS: Status: RESOLVED | Noted: 2024-01-01 | Resolved: 2024-01-01

## 2024-04-12 PROBLEM — E87.1 HYPONATREMIA: Status: RESOLVED | Noted: 2024-01-01 | Resolved: 2024-01-01

## 2024-04-12 LAB
ANION GAP SERPL CALC-SCNC: 5 MMOL/L (ref 0–18)
BASOPHILS # BLD AUTO: 0.03 X10(3) UL (ref 0–0.2)
BASOPHILS NFR BLD AUTO: 0.3 %
BUN BLD-MCNC: 43 MG/DL (ref 9–23)
BUN/CREAT SERPL: 38.1 (ref 10–20)
CALCIUM BLD-MCNC: 8.7 MG/DL (ref 8.7–10.4)
CHLORIDE SERPL-SCNC: 99 MMOL/L (ref 98–112)
CO2 SERPL-SCNC: 27 MMOL/L (ref 21–32)
CREAT BLD-MCNC: 1.13 MG/DL
DEPRECATED RDW RBC AUTO: 45.7 FL (ref 35.1–46.3)
EGFRCR SERPLBLD CKD-EPI 2021: 45 ML/MIN/1.73M2 (ref 60–?)
EOSINOPHIL # BLD AUTO: 0.04 X10(3) UL (ref 0–0.7)
EOSINOPHIL NFR BLD AUTO: 0.4 %
ERYTHROCYTE [DISTWIDTH] IN BLOOD BY AUTOMATED COUNT: 14.2 % (ref 11–15)
GLUCOSE BLD-MCNC: 97 MG/DL (ref 70–99)
HCT VFR BLD AUTO: 23.3 %
HGB BLD-MCNC: 7.9 G/DL
IMM GRANULOCYTES # BLD AUTO: 0.11 X10(3) UL (ref 0–1)
IMM GRANULOCYTES NFR BLD: 1.1 %
LYMPHOCYTES # BLD AUTO: 0.99 X10(3) UL (ref 1–4)
LYMPHOCYTES NFR BLD AUTO: 10.2 %
MCH RBC QN AUTO: 29.7 PG (ref 26–34)
MCHC RBC AUTO-ENTMCNC: 33.9 G/DL (ref 31–37)
MCV RBC AUTO: 87.6 FL
MONOCYTES # BLD AUTO: 1.85 X10(3) UL (ref 0.1–1)
MONOCYTES NFR BLD AUTO: 19.1 %
NEUTROPHILS # BLD AUTO: 6.68 X10 (3) UL (ref 1.5–7.7)
NEUTROPHILS # BLD AUTO: 6.68 X10(3) UL (ref 1.5–7.7)
NEUTROPHILS NFR BLD AUTO: 68.9 %
OSMOLALITY SERPL CALC.SUM OF ELEC: 283 MOSM/KG (ref 275–295)
PLATELET # BLD AUTO: 343 10(3)UL (ref 150–450)
POTASSIUM SERPL-SCNC: 3.4 MMOL/L (ref 3.5–5.1)
RBC # BLD AUTO: 2.66 X10(6)UL
SODIUM SERPL-SCNC: 131 MMOL/L (ref 136–145)
WBC # BLD AUTO: 9.7 X10(3) UL (ref 4–11)

## 2024-04-12 PROCEDURE — 99238 HOSP IP/OBS DSCHRG MGMT 30/<: CPT | Performed by: INTERNAL MEDICINE

## 2024-04-12 RX ORDER — ONDANSETRON 2 MG/ML
4 INJECTION INTRAMUSCULAR; INTRAVENOUS EVERY 6 HOURS PRN
Status: ON HOLD | COMMUNITY
Start: 2024-04-12

## 2024-04-12 RX ORDER — POTASSIUM CHLORIDE 20 MEQ/1
40 TABLET, EXTENDED RELEASE ORAL ONCE
Status: COMPLETED | OUTPATIENT
Start: 2024-04-12 | End: 2024-04-12

## 2024-04-12 RX ORDER — HYDROCODONE BITARTRATE AND ACETAMINOPHEN 5; 325 MG/1; MG/1
1 TABLET ORAL EVERY 4 HOURS PRN
Status: ON HOLD | COMMUNITY
Start: 2024-04-12

## 2024-04-12 RX ORDER — ZOLPIDEM TARTRATE 10 MG/1
10 TABLET ORAL NIGHTLY PRN
Status: ON HOLD | COMMUNITY
Start: 2024-04-12

## 2024-04-12 RX ORDER — ENEMA 19; 7 G/133ML; G/133ML
1 ENEMA RECTAL ONCE AS NEEDED
Status: ON HOLD | COMMUNITY
Start: 2024-04-12 | End: 2024-04-18

## 2024-04-12 RX ORDER — FUROSEMIDE 40 MG/1
40 TABLET ORAL DAILY
Status: ON HOLD | COMMUNITY
Start: 2024-04-13

## 2024-04-12 RX ORDER — POLYETHYLENE GLYCOL 3350 17 G/17G
17 POWDER, FOR SOLUTION ORAL DAILY PRN
Status: ON HOLD | COMMUNITY
Start: 2024-04-12

## 2024-04-12 RX ORDER — ENOXAPARIN SODIUM 100 MG/ML
30 INJECTION SUBCUTANEOUS DAILY
Status: ON HOLD | COMMUNITY
Start: 2024-04-13

## 2024-04-12 RX ORDER — POTASSIUM CHLORIDE 750 MG/1
10 TABLET, EXTENDED RELEASE ORAL DAILY
Qty: 90 TABLET | Refills: 0 | Status: ON HOLD | OUTPATIENT
Start: 2024-04-12

## 2024-04-12 NOTE — PLAN OF CARE
Problem: Patient Centered Care  Goal: Patient preferences are identified and integrated in the patient's plan of care  Description: Interventions:  - What would you like us to know as we care for you? From [facility]  - Provide timely, complete, and accurate information to patient/family  - Incorporate patient and family knowledge, values, beliefs, and cultural backgrounds into the planning and delivery of care  - Encourage patient/family to participate in care and decision-making at the level they choose  - Honor patient and family perspectives and choices  Outcome: Progressing     Problem: PAIN - ADULT  Goal: Verbalizes/displays adequate comfort level or patient's stated pain goal  Description: INTERVENTIONS:  - Encourage pt to monitor pain and request assistance  - Assess pain using appropriate pain scale  - Administer analgesics based on type and severity of pain and evaluate response  - Implement non-pharmacological measures as appropriate and evaluate response  - Consider cultural and social influences on pain and pain management  - Manage/alleviate anxiety  - Utilize distraction and/or relaxation techniques  - Monitor for opioid side effects  - Notify MD/LIP if interventions unsuccessful or patient reports new pain  - Anticipate increased pain with activity and pre-medicate as appropriate  Outcome: Progressing     Problem: SAFETY ADULT - FALL  Goal: Free from fall injury  Description: INTERVENTIONS:  - Assess pt frequently for physical needs  - Identify cognitive and physical deficits and behaviors that affect risk of falls.  - [facility] fall precautions as indicated by assessment.  - Educate pt/family on patient safety including physical limitations  - Instruct pt to call for assistance with activity based on assessment  - Modify environment to reduce risk of injury  - Provide assistive devices as appropriate  - Consider OT/PT consult to assist with strengthening/mobility  - Encourage toileting  schedule  Outcome: Progressing     Problem: Impaired Functional Mobility  Goal: Achieve highest/safest level of mobility/gait  Description: Interventions:  - Assess patient's functional ability and stability  - Promote increasing activity/tolerance for mobility and gait  - Educate and engage patient/family in tolerated activity level and precautions  - Recommend use of total lift for transfers  Outcome: Progressing     Problem: Impaired Activities of Daily Living  Goal: Achieve highest/safest level of independence in self care  Description: Interventions:  - Assess ability and encourage patient to participate in ADLs to maximize function  - Promote sitting position while performing ADLs such as feeding, grooming, and bathing  - Educate and encourage patient/family in tolerated functional activity level and precautions during self-care  - Encourage patient to incorporate impaired side during daily activities to promote function  Outcome: Progressing

## 2024-04-12 NOTE — PROGRESS NOTES
Progress Note  Elba Malik Patient Status:  Inpatient    1928 MRN [id number]   Location [facility] 1W Attending Liane Loyd, DO   Hosp Day # 7 PCP Sharan Lopez MD     Subjective:  Pt stated she doesn't have  any chest pain or SOB. But has lot of pain to the left hip    Objective:  /57 (BP Location: Left arm)   Pulse 95   Temp 98.4 °F (36.9 °C) (Oral)   Resp 20   Ht 5' 5\" (1.651 m)   Wt 158 lb 1.6 oz (71.7 kg)   SpO2 92%   BMI 26.31 kg/m²     Telemetry: NSR      Intake/Output:    Intake/Output Summary (Last 24 hours) at 2024 1400  Last data filed at 2024 1302  Gross per 24 hour   Intake 640 ml   Output 1925 ml   Net -1285 ml       Last 3 Weights   24 0505 158 lb 1.6 oz (71.7 kg)   24 1037 139 lb 12.4 oz (63.4 kg)   24 0524 154 lb 14.4 oz (70.3 kg)   04/10/24 0825 165 lb 3.2 oz (74.9 kg)   24 0417 167 lb 4.8 oz (75.9 kg)   24 1402 156 lb 14.4 oz (71.2 kg)   24 0600 153 lb 11.2 oz (69.7 kg)   24 0100 145 lb (65.8 kg)   24 0735 153 lb (69.4 kg)   24 0756 146 lb (66.2 kg)   24 2209 145 lb 1 oz (65.8 kg)       Labs:  Recent Labs   Lab 04/10/24  0651 24  0624 24  0649   * 93 97   BUN 24* 35* 43*   CREATSERUM 0.94 1.14* 1.13*   EGFRCR 56* 44* 45*   CA 8.8 8.8 8.7   * 131* 131*   K 3.4* 3.5  3.5 3.4*    97* 99   CO2 25.0 26.0 27.0     Recent Labs   Lab 04/10/24  0651 24  0624 24  0649   RBC 2.87* 2.78* 2.66*   HGB 8.3* 8.4* 7.9*   HCT 24.7* 24.4* 23.3*   MCV 86.1 87.8 87.6   MCH 28.9 30.2 29.7   MCHC 33.6 34.4 33.9   RDW 14.1 14.0 14.2   NEPRELIM 8.53* 7.04 6.68   WBC 12.0* 11.1* 9.7   .0 322.0 343.0         No results for input(s): \"TROP\", \"TROPHS\", \"CK\" in the last 168 hours.  Lab Results   Component Value Date    INR 0.96 2024    INR 1.0 10/11/2018       Diagnostics:     Review of Systems   Respiratory: Negative.     Cardiovascular: Negative.           Physical Exam:    Physical Exam  Vitals reviewed.   Constitutional:       General: She is not in acute distress.  Cardiovascular:      Rate and Rhythm: Normal rate and regular rhythm.      Heart sounds: No murmur heard.  Pulmonary:      Effort: Pulmonary effort is normal.      Breath sounds: No wheezing, rhonchi or rales.   Abdominal:      Palpations: Abdomen is soft.      Tenderness: There is no abdominal tenderness.   Musculoskeletal:      Comments: Mild edema to BLE   Neurological:      Mental Status: She is alert and oriented to person, place, and time.   Psychiatric:         Mood and Affect: Mood normal.         Medications:   furosemide  40 mg Oral Daily    fluticasone propionate  1 spray Nasal BID    docusate sodium  100 mg Oral BID    amLODIPine  5 mg Oral Daily    levothyroxine  50 mcg Oral Before breakfast    metoprolol tartrate  12.5 mg Oral 2x Daily(Beta Blocker)    enoxaparin  30 mg Subcutaneous Daily         Assessment/Plan:    S/p Fall, Periprosthetic Hip Fracture   - PT/OT     Acute on Chronic HFpEF, EF 50%  - BNP 1,046, CXR with small, L>R pleural effusions   - IV Lasix BID, has diuresed well, Cr stable  - Na 131, K 3.4  - potassium replaced per protocol     HTN- Reasonably controlled in the setting of acute pain   Chronic Anemia- Stable      PLAN:  - on oral lasix  with good urine output  - will start her on potassium chloride 10mEq daily while on lasix   - follow up with cardiology as OP in 2 weeks  - recheck BMP in  3 days.       KIMBERLY Espinosa  [facility] [place]  4/12/2024  2:01 PM

## 2024-04-12 NOTE — DISCHARGE SUMMARY
Discharge Summary    Date of Admission: 4/5/2024    Date of Discharge: 4/12/24    Hospital Course:   The patient was admitted to the hospital with a periprosthetic hip fracture.  She was evaluated by orthopedics and the decision was made not to proceed to surgery.  Unfortunately, she developed pulmonary edema resulting in significant respiratory impairment with radiographic changes and oxygen requirement.  Cardiology was consulted.  Medications were adjusted including diuretic.  The patient had significant anemia.  She received DVT prophylaxis.  Her blood pressure was controlled with medication.  MRI of the hip demonstrated acute to subacute mildly displaced periprosthetic fracture involving the greater trochanter.  Echocardiography revealed ejection fraction 50% and diastolic dysfunction was suspected.  She developed ankle swelling and discomfort and uric acid level was normal.  She progressed very slowly with physical therapy and the decision was made to admit directly to subacute rehabilitation.  She wanted to go to [facility] but unfortunately beds have been tight.  If the patient cannot get into [facility] today, will discharge to [facility] rehab center.    Discharge Medications:     Medication List        START taking these medications      enoxaparin 30 MG/0.3ML Sosy  Commonly known as: Lovenox  Start taking on: April 13, 2024     fleet enema 7-19 GM/118ML Enem     furosemide 40 MG Tabs  Commonly known as: Lasix  Start taking on: April 13, 2024     HYDROcodone-acetaminophen 5-325 MG Tabs  Commonly known as: Norco     ondansetron 4 MG/2ML Soln  Commonly known as: Zofran     Polyethylene Glycol 3350 17 g Pack  Commonly known as: MIRALAX     Sennosides 17.2 MG Tabs            CHANGE how you take these medications      zolpidem 10 MG Tabs  Commonly known as: Ambien  What changed:   additional instructions  Another medication with the same name was removed. Continue taking this medication, and follow the directions  you see here.            CONTINUE taking these medications      fluticasone propionate 50 MCG/ACT Susp  Commonly known as: Flonase  USE 2 SPRAYS IN EACH NOSTRIL DAILY     levothyroxine 50 MCG Tabs  Commonly known as: Synthroid  Take 1 tablet (50 mcg total) by mouth before breakfast.     metoprolol tartrate 25 MG Tabs  Commonly known as: Lopressor  Take 0.5 tablets (12.5 mg total) by mouth 2x Daily(Beta Blocker).     Misc. Devices Misc  20% salicylic acid with 2% 5-fluorouracil.  Apply to warts daily as instructed            STOP taking these medications      amLODIPine 5 MG Tabs  Commonly known as: Norvasc     celecoxib 200 MG Caps  Commonly known as: CeleBREX     traMADol 50 MG Tabs  Commonly known as: Ultram     triamterene-hydroCHLOROthiazide 37.5-25 MG Caps  Commonly known as: Dyazide              Discharge Plans:  Will be discharged to rehab, either [facility] or [facility]    Discharge Diagnosis:  Fall with periprosthetic hip fracture, congestive heart failure, anemia, hypertension, lower extremity edema, shoulder pain, depression    Sharan Lopez MD  Medical Director, Critical Care, [facility]  Medical Director, [facility]  Pager: [phone]

## 2024-04-12 NOTE — PLAN OF CARE
Problem: Patient Centered Care  Goal: Patient preferences are identified and integrated in the patient's plan of care  Description: Interventions:  - What would you like us to know as we care for you? From [facility]  - Provide timely, complete, and accurate information to patient/family  - Incorporate patient and family knowledge, values, beliefs, and cultural backgrounds into the planning and delivery of care  - Encourage patient/family to participate in care and decision-making at the level they choose  - Honor patient and family perspectives and choices  Outcome: Progressing     Problem: Patient/Family Goals  Goal: Patient/Family Long Term Goal  Description: Patient's Long Term Goal: go home    Interventions:  - pain medication as needed  - monitor vital signs  - monitor lab results  - See additional Care Plan goals for specific interventions  Outcome: Progressing  Goal: Patient/Family Short Term Goal  Description: Patient's Short Term Goal: feel better    Interventions:   - monitor vital signs  - monitor lab results  - pain medication as needed  - See additional Care Plan goals for specific interventions  Outcome: Progressing     Problem: PAIN - ADULT  Goal: Verbalizes/displays adequate comfort level or patient's stated pain goal  Description: INTERVENTIONS:  - Encourage pt to monitor pain and request assistance  - Assess pain using appropriate pain scale  - Administer analgesics based on type and severity of pain and evaluate response  - Implement non-pharmacological measures as appropriate and evaluate response  - Consider cultural and social influences on pain and pain management  - Manage/alleviate anxiety  - Utilize distraction and/or relaxation techniques  - Monitor for opioid side effects  - Notify MD/LIP if interventions unsuccessful or patient reports new pain  - Anticipate increased pain with activity and pre-medicate as appropriate  Outcome: Progressing     Problem: SAFETY ADULT - FALL  Goal:  Free from fall injury  Description: INTERVENTIONS:  - Assess pt frequently for physical needs  - Identify cognitive and physical deficits and behaviors that affect risk of falls.  - [place] fall precautions as indicated by assessment.  - Educate pt/family on patient safety including physical limitations  - Instruct pt to call for assistance with activity based on assessment  - Modify environment to reduce risk of injury  - Provide assistive devices as appropriate  - Consider OT/PT consult to assist with strengthening/mobility  - Encourage toileting schedule  Outcome: Progressing     Problem: Impaired Functional Mobility  Goal: Achieve highest/safest level of mobility/gait  Description: Interventions:  - Assess patient's functional ability and stability  - Promote increasing activity/tolerance for mobility and gait  - Educate and engage patient/family in tolerated activity level and precautions    Outcome: Progressing     Problem: Impaired Activities of Daily Living  Goal: Achieve highest/safest level of independence in self care  Description: Interventions:  - Assess ability and encourage patient to participate in ADLs to maximize function  - Promote sitting position while performing ADLs such as feeding, grooming, and bathing  - Educate and encourage patient/family in tolerated functional activity level and precautions during self-care    Outcome: Progressing

## 2024-04-13 NOTE — PROGRESS NOTES
History and physical    [facility] [place] note transcribed by Dr. Jeff Damico    Admit date: 4/12/2024     Date seen: 4/12/2024    Chief complaint: Left periprosthetic hip fracture status post fall and CHF    HPI: Elba Malik is a 89 year old female who has been admitted to [facility] stable condition after being discharged from the hospital.  Patient seen and examined by me, medications continued as recommended by the discharging hospital physician.  Patient was recently admitted to the hospital, discharged her to [facility] after falling at home, injuring the left hip, she was brought to the emergency room, seen and evaluated, noted to have periprosthetic fracture, was admitted in stable condition.  There she saw orthopedic surgery, who decided against surgery, recommended therapy, subacute rehab, pain control, she underwent a short hospital stay, which was complicated by volume overload, suspected diastolic CHF, resolved with diuretic adjustments.  She stabilized improved and now was discharged here to [facility] in stable condition.  She was seen and examined by me, seems stable, daughter at bedside she is from [place], she seemed to have well-controlled pain, we did discuss the medication list, she is normally followed as an outpatient by Dr. brown    Wt Readings from Last 3 Encounters:   04/12/24 158 lb 1.6 oz (71.7 kg)   04/05/24 153 lb (69.4 kg)   01/12/24 146 lb (66.2 kg)      Past Medical History:    Arthritis    Asthma (HCC)    Back problem    multiple spinal injections    Cataract    bilateral cataract surgery    Cataracts, bilateral    cataracts, PC IOL 1997 OD Dr. Tate,  PC IOL Dr. Concepcion in [place], OS    Colon polyp    small polyps    Disorder of thyroid    Essential hypertension    Hearing impairment    High blood pressure    Osteoarthritis    Other and unspecified hyperlipidemia    Other ill-defined conditions(759.89)    Left wrist trauma, surgical repair     Posterior capsule opacification    OS, YAG laser 2014    Ptosis of right eyelid    Ptosis RUL    Shortness of breath    Visual impairment      Past Surgical History:   Procedure Laterality Date    Cataract      cataract extraction    Cataract Left 1997    Phaco w/PC IOL    Cataract Right 1998    Phaco w/ PC IOL    Cataract extraction w/  intraocular lens implant Left 1997    PC IOL/ Dr. Pichardo    Cataract extraction w/  intraocular lens implant Right 1998    PC IOL / Dr. Concepcion    Cholecystectomy      Colonoscopy      Ercp,diagnostic  10/16/2018    Choledocholithiasis, dilated intrahepatic and extrahepatic biliary tree    Hip replacement surgery  97/0 per NG    Hysterectomy      Partial    Laparoscopic cholecystectomy  05/27/2016    Other surgical history      Left wrist trauma, surgical repair    Other surgical history  05/27/2016    Laparoscopy with bilateral salpingo-oophorectomy    Synvisc, intra-articular Bilateral 2013    Synvisc injection bilateral knees    Total hip replacement      bilateral hip replacements    Yag capsulotomy - os - left eye Left 1998    Dr. Concepcion      Family History   Problem Relation Age of Onset    Ear Problems Mother         hearing loss    Other (Other) Father         emphysema    Diabetes Neg     Glaucoma Neg     Macular degeneration Neg       Social History:  Social History     Socioeconomic History    Marital status:    Occupational History    Occupation: homemaker   Tobacco Use    Smoking status: Never    Smokeless tobacco: Never   Vaping Use    Vaping status: Never Used   Substance and Sexual Activity    Alcohol use: No     Alcohol/week: 0.0 standard drinks of alcohol    Drug use: No    Sexual activity: Never   Other Topics Concern    Caffeine Concern Yes     Comment: coffee, 1 cup daily    Grew up on a farm No    History of tanning No    Outdoor occupation No    Pt has a pacemaker No    Pt has a defibrillator No    Breast feeding No    Reaction to local anesthetic No      Social Determinants of Health     Financial Resource Strain: Low Risk  (1/16/2024)    Financial Resource Strain     Difficulty of Paying Living Expenses: Not hard at all     Med Affordability: No   Food Insecurity: No Food Insecurity (4/5/2024)    Food Insecurity     Food Insecurity: Never true   Transportation Needs: No Transportation Needs (4/5/2024)    Transportation Needs     Lack of Transportation: No   Housing Stability: Low Risk  (4/5/2024)    Housing Stability     Housing Instability: No         Current medications: See chart  Current Outpatient Medications   Medication Sig Dispense Refill    [START ON 4/13/2024] enoxaparin 30 MG/0.3ML Injection Solution Prefilled Syringe Inject 0.3 mL (30 mg total) into the skin daily.      fleet enema 7-19 GM/118ML Rectal Enema Place 133 mL rectally once as needed.      [START ON 4/13/2024] furosemide 40 MG Oral Tab Take 1 tablet (40 mg total) by mouth daily.      HYDROcodone-acetaminophen 5-325 MG Oral Tab Take 1 tablet by mouth every 4 (four) hours as needed.      ondansetron 4 MG/2ML Injection Solution Inject 2 mL (4 mg total) into the vein every 6 (six) hours as needed.      polyethylene glycol, PEG 3350, 17 g Oral Powd Pack Take 17 g by mouth daily as needed (If no bowel movement in last 24 hours).      sennosides 17.2 MG Oral Tab Take 1 tablet (17.2 mg total) by mouth nightly as needed (constipation, as needed if no bowel movement that day).      zolpidem 10 MG Oral Tab Take 1 tablet (10 mg total) by mouth nightly as needed for Sleep.      potassium chloride 10 MEQ Oral Tab CR Take 1 tablet (10 mEq total) by mouth daily. While on lasix 90 tablet 0    levothyroxine 50 MCG Oral Tab Take 1 tablet (50 mcg total) by mouth before breakfast. 90 tablet 3    metoprolol tartrate 25 MG Oral Tab Take 0.5 tablets (12.5 mg total) by mouth 2x Daily(Beta Blocker). 60 tablet 5    Misc. Devices Does not apply Misc 20% salicylic acid with 2% 5-fluorouracil.  Apply to warts daily as  instructed 1 each 2    FLUTICASONE PROPIONATE 50 MCG/ACT Nasal Suspension USE 2 SPRAYS IN EACH NOSTRIL DAILY 16 g 5       REVIEW OF SYSTEMS:  Constitutional: Negative for Chills, fatigue, fever, malaise, weight gain and weight loss.  ENMT: Negative for Nasal drainage and sinus pressure.  Eyes: Negative for Vision changes.  Respiratory: Negative for Cough, dyspnea and wheezing.  Cardio: Negative Chest pain and irregular heartbeat/palpitations.  GI: Negative for Abdominal pain, constipation, diarrhea, heartburn, nausea and vomiting.  : Negative for Dysuria and urinary frequency.  Endocrine: Negative for Cold intolerance and heat intolerance.  Neuro: Negative for Gait disturbance and memory impairment.  Psych: Negative for Anxiety and depression.  Integumentary: Negative for Hives and rash.  MS: Negative muscle weakness.  Positive for joint pain  Hema/Lymph: Negative Easy bleeding and easy bruising.  Allergic/Immuno: Negative Environmental allergies and food allergies.    EXAM:   There were no vitals taken for this visit.  There is no height or weight on file to calculate BMI.   Vital signs: See point click care charting for updated details  Gen. exam: Alert and awake, baseline mental status noted, in no acute distress   HEENT: Pupils equal and reactive to light and accommodation, moist mucous membranes  Neck exam:  Supple.  Normal thyroid trachea midline, no JVD  Heart exam: Regular rate and rhythm 2/6 AI murmurs no S3 no S4   Lung exam: No rales no rhonchi no wheezes, clear lung fields  Abdominal exam: Soft, nontender, nondistended, positive bowel sounds are normoactive  Extremities exam: no clubbing, no cyanosis, 1/4 bilateral lower extremity edema, bilateral lower extremity venous stasis  Skin exam: No obvious wounds, no rashes  Neurological exam: Cranial nerves II through XII intact, no gross deficits  Musculoskeletal exam: Moderate bilateral generalized hand arthritis appreciated, no obvious deformity, left hip  with pain to palpation in a greater trochanteric area, minimal movement causes pain    Labs/imaging: See chart    DVT prophylaxis: with proximal    Ambulatory status: Per hospital discharge recommendations, specialist involvement/recommendations and physical therapy evaluation    ASSESSMENT AND PLAN:   Elba Malik is a 89 year old female seen at [facility] [place] with the followin. Periprosthetic hip fracture, initial encounter  Physical therapy, occupational therapy, physiatry to evaluate and treat, further orders pending clinical course, anticoagulation per hospital recommendations, pain control with Norco, bowel prophylaxis    2. Fall, initial encounter  Fall precautions, continue current physical therapy    3. Essential hypertension  Stable continue current monitoring management, home medications serial vital sign management    4. Chronic left-sided low back pain with left-sided sciatica  Stable continue current monitoring management, pain control, this may complicate her [facility] stay    5. Acute diastolic congestive heart failure (HCC)  Stable continue current monitor management, will offer in-house cardiology continue with current cardiac medication regimen, CHF protocol    6. Hemarthrosis of left shoulder  Status post intervention, stable continue current pain control, physiatry to see    7. Iron deficiency anemia, unspecified iron deficiency anemia type  Stable continue current monitoring management    8. Primary insomnia  Stable continue current monitoring management, Ambien on order    9. Hypothyroidism, unspecified type  Stable continue current monitor management continue home medications    10. Environmental allergies  Stable continue current monitoring management, home medications      CODE STATUS:   Code Status: DNAR/Selective Treatment    admit condition: Stable    Over 60 minutes spent in direct patient contact reviewing and creating admission orders, obtaining history,  evaluating patient, discussing treatment options, family/staff communication, review of available labs and radiology reports, completing documentation, and coordinating care      Jeff Anthony D'Amico, DO  4/12/2024  10:38 PM

## 2024-04-15 ENCOUNTER — EXTERNAL FACILITY (OUTPATIENT)
Dept: INTERNAL MEDICINE CLINIC | Facility: CLINIC | Age: 89
End: 2024-04-15

## 2024-04-15 DIAGNOSIS — M97.8XXA PERIPROSTHETIC HIP FRACTURE, INITIAL ENCOUNTER: Primary | ICD-10-CM

## 2024-04-15 DIAGNOSIS — I10 ESSENTIAL HYPERTENSION: ICD-10-CM

## 2024-04-15 DIAGNOSIS — M54.42 CHRONIC LEFT-SIDED LOW BACK PAIN WITH LEFT-SIDED SCIATICA: ICD-10-CM

## 2024-04-15 DIAGNOSIS — G89.29 CHRONIC LEFT-SIDED LOW BACK PAIN WITH LEFT-SIDED SCIATICA: ICD-10-CM
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
## 2024-04-15 DIAGNOSIS — Z96.649 PERIPROSTHETIC HIP FRACTURE, INITIAL ENCOUNTER: Primary | ICD-10-CM

## 2024-04-15 DIAGNOSIS — L98.9 SKIN LESION OF RIGHT LOWER EXTREMITY: ICD-10-CM

## 2024-04-15 PROCEDURE — 99309 SBSQ NF CARE MODERATE MDM 30: CPT | Performed by: INTERNAL MEDICINE

## 2024-04-16 ENCOUNTER — EXTERNAL FACILITY (OUTPATIENT)
Dept: INTERNAL MEDICINE CLINIC | Facility: CLINIC | Age: 89
End: 2024-04-16

## 2024-04-16 DIAGNOSIS — Z96.649 PERIPROSTHETIC HIP FRACTURE, INITIAL ENCOUNTER: Primary | ICD-10-CM
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
## 2024-04-16 DIAGNOSIS — M97.8XXA PERIPROSTHETIC HIP FRACTURE, INITIAL ENCOUNTER: Primary | ICD-10-CM

## 2024-04-16 DIAGNOSIS — L98.9 SKIN LESION OF RIGHT LOWER EXTREMITY: ICD-10-CM

## 2024-04-16 DIAGNOSIS — D72.829 LEUKOCYTOSIS, UNSPECIFIED TYPE: ICD-10-CM

## 2024-04-16 PROCEDURE — 99309 SBSQ NF CARE MODERATE MDM 30: CPT | Performed by: INTERNAL MEDICINE

## 2024-04-16 NOTE — PROGRESS NOTES
[facility] [place] note transcribed by Dr. Jeff Damico  .  Date seen: 4/15/2024  .  Active: Patient seen and examined for left periprosthetic fracture, fall, hypertension, history of low back pain. Patient seems stable doing well, claims they try toget her up to the side of the bed did her physical evaluation over the weekend, and she seems to be stable, she has a right hip type skin lesion is dressed, wound therapy is aware. It is in a nonpressure area. She is taking the pain medication, bowels are moving, her granddaughter is at bedside, and she seems to be stable and doing well. She is looking forward to therapy getting started, does think she will be able to handle it from a physical perspective.  .  EXAM:  Vital signs: See point click care charting for updated details  Gen. exam: Alert and awake, baseline mental status noted, in no acute distress  HEENT:Pupils equal and reactive to light and accommodation, moist mucous membranes  Neck exam: Supple. Normal thyroid trachea midline, no JVD  Heart exam: Regular rate and rhythm 2/6 AI murmurs no S3 no S4  Lung exam: No rales no rhonchi no wheezes, clear lung fields  Abdominal exam: Soft, nontender, nondistended, positive bowel sounds are normoactive  Extremities exam: no clubbing, no cyanosis,1/4 bilateral lower extremity edema, bilateral lower extremity venous stasis  Skin exam: Hip with V shaped type stage I-2 lesion looks to be healing dressed, no signs of infection, no, drainage  Neurological exam: Cranial nerves II through XII intact, nogross deficits  Musculoskeletal exam: Moderate bilateral generalized hand arthritis appreciated, no obvious deformity, left hip with pain to palpation in a greater trochanteric area, minimal movement causes pain  .  Labs/imaging: See chart  DVT prophylaxis: with proximal  Ambulatory status: Per hospital discharge recommendations, specialist involvement/recommendations and physical therapy evaluation  .  ASSESSMENT AND  PLAN:  Elba Malik is a 89 year old female seen at [facility] [place] withthe followin. Periprosthetic hip fracture, initial encounter  Physical therapy, occupational therapy, physiatry to evaluate and treat, further orders pending clinical course, anticoagulation per hospital recommendations, pain control with Norco, bowel prophylaxis  2. Fall, initial encounter  Fall precautions, continue current physical therapy  3. Essential hypertension  Stable continue current monitoring management, home medications serial vital sign management  4. Chronic left-sided low back pain with left-sided sciatica  Stable continue current monitoring management, pain control, this may complicate her [facility] stay  5. Right sided skin lesion/ulcer: Nonpressure area, continue current monitor management, wound care for full evaluation. Treatment options  .  stable problem list:  Acute diastolic congestive heart failure (HCC)  Hemarthrosis of left shoulder  Iron deficiency anemia, unspecified iron deficiency anemia type  prmary insomnia  Hypothyroidism, unspecified type  Environmental allergies   PAST MEDICAL HISTORY:  COPD exacerbation     CVA (cerebrovascular accident)     H/O tinnitus     [place] (hard of hearing)     Hyperlipidemia     Iron deficiency anemia     Venous insufficiency

## 2024-04-18 ENCOUNTER — APPOINTMENT (OUTPATIENT)
Dept: GENERAL RADIOLOGY | Facility: HOSPITAL | Age: 89
End: 2024-04-18
Attending: EMERGENCY MEDICINE
Payer: MEDICARE

## 2024-04-18 ENCOUNTER — HOSPITAL ENCOUNTER (INPATIENT)
Facility: HOSPITAL | Age: 89
LOS: 5 days | Discharge: NURSING, REHAB OR LONG-TERM CARE | End: 2024-04-23
Attending: EMERGENCY MEDICINE | Admitting: INTERNAL MEDICINE
Payer: MEDICARE

## 2024-04-18 DIAGNOSIS — F51.04 PSYCHOPHYSIOLOGICAL INSOMNIA: ICD-10-CM

## 2024-04-18 DIAGNOSIS — I50.9 ACUTE ON CHRONIC CONGESTIVE HEART FAILURE, UNSPECIFIED HEART FAILURE TYPE (HCC): Primary | ICD-10-CM

## 2024-04-18 DIAGNOSIS — R52 PAIN: ICD-10-CM

## 2024-04-18 DIAGNOSIS — J18.9 COMMUNITY ACQUIRED PNEUMONIA, UNSPECIFIED LATERALITY: ICD-10-CM

## 2024-04-18 DIAGNOSIS — Z51.5 PALLIATIVE CARE ENCOUNTER: ICD-10-CM

## 2024-04-18 LAB
ALBUMIN SERPL-MCNC: 3.6 G/DL (ref 3.2–4.8)
ALBUMIN/GLOB SERPL: 1.3 {RATIO} (ref 1–2)
ALP LIVER SERPL-CCNC: 101 U/L
ALT SERPL-CCNC: 46 U/L
ANION GAP SERPL CALC-SCNC: 9 MMOL/L (ref 0–18)
AST SERPL-CCNC: 54 U/L (ref ?–34)
ATRIAL RATE: 110 BPM
BASOPHILS # BLD AUTO: 0.08 X10(3) UL (ref 0–0.2)
BASOPHILS NFR BLD AUTO: 0.6 %
BILIRUB SERPL-MCNC: 0.4 MG/DL (ref 0.2–0.9)
BILIRUB UR QL: NEGATIVE
BNP SERPL-MCNC: 1162 PG/ML
BUN BLD-MCNC: 34 MG/DL (ref 9–23)
BUN/CREAT SERPL: 36.2 (ref 10–20)
C DIFF TOX B STL QL: NEGATIVE
CALCIUM BLD-MCNC: 9.2 MG/DL (ref 8.7–10.4)
CHLORIDE SERPL-SCNC: 96 MMOL/L (ref 98–112)
CO2 SERPL-SCNC: 29 MMOL/L (ref 21–32)
COLOR UR: YELLOW
CREAT BLD-MCNC: 0.94 MG/DL
DEPRECATED RDW RBC AUTO: 45.1 FL (ref 35.1–46.3)
EGFRCR SERPLBLD CKD-EPI 2021: 56 ML/MIN/1.73M2 (ref 60–?)
EOSINOPHIL # BLD AUTO: 0.01 X10(3) UL (ref 0–0.7)
EOSINOPHIL NFR BLD AUTO: 0.1 %
ERYTHROCYTE [DISTWIDTH] IN BLOOD BY AUTOMATED COUNT: 14.1 % (ref 11–15)
GLOBULIN PLAS-MCNC: 2.7 G/DL (ref 2.8–4.4)
GLUCOSE BLD-MCNC: 106 MG/DL (ref 70–99)
GLUCOSE BLDC GLUCOMTR-MCNC: 107 MG/DL (ref 70–99)
GLUCOSE UR-MCNC: NORMAL MG/DL
HCT VFR BLD AUTO: 28.1 %
HGB BLD-MCNC: 9.5 G/DL
IMM GRANULOCYTES # BLD AUTO: 0.2 X10(3) UL (ref 0–1)
IMM GRANULOCYTES NFR BLD: 1.4 %
LACTATE SERPL-SCNC: 1.2 MMOL/L (ref 0.5–2)
LEUKOCYTE ESTERASE UR QL STRIP.AUTO: 500
LYMPHOCYTES # BLD AUTO: 1.16 X10(3) UL (ref 1–4)
LYMPHOCYTES NFR BLD AUTO: 8.3 %
MCH RBC QN AUTO: 29.4 PG (ref 26–34)
MCHC RBC AUTO-ENTMCNC: 33.8 G/DL (ref 31–37)
MCV RBC AUTO: 87 FL
MONOCYTES # BLD AUTO: 1.47 X10(3) UL (ref 0.1–1)
MONOCYTES NFR BLD AUTO: 10.6 %
MRSA DNA SPEC QL NAA+PROBE: NEGATIVE
NEUTROPHILS # BLD AUTO: 11 X10 (3) UL (ref 1.5–7.7)
NEUTROPHILS # BLD AUTO: 11 X10(3) UL (ref 1.5–7.7)
NEUTROPHILS NFR BLD AUTO: 79 %
NITRITE UR QL STRIP.AUTO: NEGATIVE
OSMOLALITY SERPL CALC.SUM OF ELEC: 286 MOSM/KG (ref 275–295)
P AXIS: 98 DEGREES
P-R INTERVAL: 182 MS
PH UR: 6 [PH] (ref 5–8)
PLATELET # BLD AUTO: 821 10(3)UL (ref 150–450)
POTASSIUM SERPL-SCNC: 3.4 MMOL/L (ref 3.5–5.1)
PROT SERPL-MCNC: 6.3 G/DL (ref 5.7–8.2)
PROT UR-MCNC: 70 MG/DL
Q-T INTERVAL: 352 MS
QRS DURATION: 88 MS
QTC CALCULATION (BEZET): 476 MS
R AXIS: 18 DEGREES
RBC # BLD AUTO: 3.23 X10(6)UL
SARS-COV-2 RNA RESP QL NAA+PROBE: NOT DETECTED
SODIUM SERPL-SCNC: 134 MMOL/L (ref 136–145)
SP GR UR STRIP: 1.01 (ref 1–1.03)
T AXIS: 42 DEGREES
UROBILINOGEN UR STRIP-ACNC: NORMAL
VENTRICULAR RATE: 110 BPM
WBC # BLD AUTO: 13.9 X10(3) UL (ref 4–11)
WBC #/AREA URNS AUTO: >50 /HPF
WBC CLUMPS UR QL AUTO: PRESENT /HPF

## 2024-04-18 PROCEDURE — 99223 1ST HOSP IP/OBS HIGH 75: CPT | Performed by: INTERNAL MEDICINE

## 2024-04-18 PROCEDURE — 71045 X-RAY EXAM CHEST 1 VIEW: CPT | Performed by: EMERGENCY MEDICINE

## 2024-04-18 RX ORDER — POTASSIUM CHLORIDE 20 MEQ/1
40 TABLET, EXTENDED RELEASE ORAL ONCE
Status: COMPLETED | OUTPATIENT
Start: 2024-04-18 | End: 2024-04-18

## 2024-04-18 RX ORDER — HYDROCODONE BITARTRATE AND ACETAMINOPHEN 5; 325 MG/1; MG/1
1 TABLET ORAL EVERY 8 HOURS PRN
Status: DISCONTINUED | OUTPATIENT
Start: 2024-04-18 | End: 2024-04-22

## 2024-04-18 RX ORDER — AZITHROMYCIN 250 MG/1
500 TABLET, FILM COATED ORAL EVERY 24 HOURS
Status: DISCONTINUED | OUTPATIENT
Start: 2024-04-19 | End: 2024-04-21

## 2024-04-18 RX ORDER — LISINOPRIL 10 MG/1
10 TABLET ORAL DAILY
Status: DISCONTINUED | OUTPATIENT
Start: 2024-04-18 | End: 2024-04-21

## 2024-04-18 RX ORDER — FUROSEMIDE 10 MG/ML
40 INJECTION INTRAMUSCULAR; INTRAVENOUS ONCE
Status: COMPLETED | OUTPATIENT
Start: 2024-04-18 | End: 2024-04-18

## 2024-04-18 RX ORDER — PHENAZOPYRIDINE HYDROCHLORIDE 200 MG/1
200 TABLET, FILM COATED ORAL ONCE
Status: COMPLETED | OUTPATIENT
Start: 2024-04-18 | End: 2024-04-18

## 2024-04-18 RX ORDER — FUROSEMIDE 10 MG/ML
40 INJECTION INTRAMUSCULAR; INTRAVENOUS
Status: DISCONTINUED | OUTPATIENT
Start: 2024-04-18 | End: 2024-04-21

## 2024-04-18 RX ORDER — ZOLPIDEM TARTRATE 5 MG/1
10 TABLET ORAL NIGHTLY PRN
Status: DISCONTINUED | OUTPATIENT
Start: 2024-04-18 | End: 2024-04-21

## 2024-04-18 RX ORDER — ENOXAPARIN SODIUM 100 MG/ML
30 INJECTION SUBCUTANEOUS DAILY
Status: DISCONTINUED | OUTPATIENT
Start: 2024-04-18 | End: 2024-04-23

## 2024-04-18 RX ORDER — FLUTICASONE PROPIONATE 50 MCG
2 SPRAY, SUSPENSION (ML) NASAL DAILY
Status: DISCONTINUED | OUTPATIENT
Start: 2024-04-18 | End: 2024-04-23

## 2024-04-18 NOTE — ED PROVIDER NOTES
Patient Seen in: [facility] Emergency Department      History     Chief Complaint   Patient presents with    Difficulty Breathing     Stated Complaint: sob    Subjective:   HPI    89-year-old female presents for evaluation for shortness of breath.  Patient reports that she has had increasing shortness of breath for the past few days.  She does report a cough as well as some congestion.  She does have lower extremity swelling and states that it does appear to be a bit worse.  She denies any chest pain.  She does report dysuria.    Objective:   Past Medical History:    Arthritis    Asthma (HCC)    Back problem    multiple spinal injections    Cataract    bilateral cataract surgery    Cataracts, bilateral    cataracts, PC IOL 1997 OD Dr. Tate,  PC IOL Dr. Concepcion in [place], OS    Colon polyp    small polyps    Disorder of thyroid    Essential hypertension    Hearing impairment    High blood pressure    Osteoarthritis    Other and unspecified hyperlipidemia    Other ill-defined conditions(799.89)    Left wrist trauma, surgical repair    Posterior capsule opacification    OS, YAG laser 2014    Ptosis of right eyelid    Ptosis RUL    Shortness of breath    Visual impairment              Past Surgical History:   Procedure Laterality Date    Cataract      cataract extraction    Cataract Left 1997    Phaco w/PC IOL    Cataract Right 1998    Phaco w/ PC IOL    Cataract extraction w/  intraocular lens implant Left 1997    PC IOL/ Dr. Pichardo    Cataract extraction w/  intraocular lens implant Right 1998    PC IOL / Dr. Concepcion    Cholecystectomy      Colonoscopy      Ercp,diagnostic  10/16/2018    Choledocholithiasis, dilated intrahepatic and extrahepatic biliary tree    Hip replacement surgery  97/0 per NG    Hysterectomy      Partial    Laparoscopic cholecystectomy  05/27/2016    Other surgical history      Left wrist trauma, surgical repair    Other surgical history  05/27/2016    Laparoscopy with bilateral  salpingo-oophorectomy    Synvisc, intra-articular Bilateral 2013    Synvisc injection bilateral knees    Total hip replacement      bilateral hip replacements    Yag capsulotomy - os - left eye Left 1998    Dr. Concepcion                Social History     Socioeconomic History    Marital status:    Occupational History    Occupation: homemaker   Tobacco Use    Smoking status: Never    Smokeless tobacco: Never   Vaping Use    Vaping status: Never Used   Substance and Sexual Activity    Alcohol use: No     Alcohol/week: 0.0 standard drinks of alcohol    Drug use: No    Sexual activity: Never   Other Topics Concern    Caffeine Concern Yes     Comment: coffee, 1 cup daily    Grew up on a farm No    History of tanning No    Outdoor occupation No    Pt has a pacemaker No    Pt has a defibrillator No    Breast feeding No    Reaction to local anesthetic No     Social Determinants of Health     Financial Resource Strain: Low Risk  (1/16/2024)    Financial Resource Strain     Difficulty of Paying Living Expenses: Not hard at all     Med Affordability: No   Food Insecurity: No Food Insecurity (4/18/2024)    Food Insecurity     Food Insecurity: Never true   Transportation Needs: No Transportation Needs (4/18/2024)    Transportation Needs     Lack of Transportation: No   Housing Stability: Low Risk  (4/18/2024)    Housing Stability     Housing Instability: No              Review of Systems    Positive for stated complaint: sob  Other systems are as noted in HPI.  Constitutional and vital signs reviewed.      All other systems reviewed and negative except as noted above.    Physical Exam     ED Triage Vitals   BP 04/18/24 0739 (!) 168/88   Pulse 04/18/24 0739 109   Resp 04/18/24 0739 26   Temp 04/18/24 0745 98.6 °F (37 °C)   Temp src 04/18/24 1104 Oral   SpO2 04/18/24 0739 96 %   O2 Device 04/18/24 0739 Nasal cannula       Current:BP (!) 175/90 (BP Location: Right arm)   Pulse 109   Temp 98.5 °F (36.9 °C) (Oral)   Resp 18    Wt 71.7 kg   SpO2 98%   BMI 26.31 kg/m²         Physical Exam  Vitals and nursing note reviewed.   Constitutional:       Appearance: Normal appearance.   HENT:      Head: Normocephalic and atraumatic.   Cardiovascular:      Rate and Rhythm: Regular rhythm. Tachycardia present.      Pulses: Normal pulses.           Radial pulses are 2+ on the right side and 2+ on the left side.   Pulmonary:      Effort: Pulmonary effort is normal.      Breath sounds: Rhonchi present.   Abdominal:      Tenderness: There is no abdominal tenderness. There is no guarding or rebound.   Musculoskeletal:         General: Normal range of motion.      Cervical back: Normal range of motion.      Right lower le+ Pitting Edema present.      Left lower le+ Pitting Edema present.   Skin:     General: Skin is warm and dry.   Neurological:      General: No focal deficit present.      Mental Status: She is alert.               ED Course     Labs Reviewed   COMP METABOLIC PANEL (14) - Abnormal; Notable for the following components:       Result Value    Glucose 106 (*)     Sodium 134 (*)     Potassium 3.4 (*)     Chloride 96 (*)     BUN 34 (*)     BUN/CREA Ratio 36.2 (*)     eGFR-Cr 56 (*)     AST 54 (*)     Globulin  2.7 (*)     All other components within normal limits   URINALYSIS WITH CULTURE REFLEX - Abnormal; Notable for the following components:    Clarity Urine Ex.Turbid (*)     Ketones Urine Trace (*)     Blood Urine 1+ (*)     Protein Urine 70 (*)     Leukocyte Esterase Urine 500 (*)     WBC Urine >50 (*)     RBC Urine 3-5 (*)     Bacteria Urine 3+ (*)     Squamous Epi. Cells Few (*)     WBC Clump Present (*)     All other components within normal limits   BNP (B TYPE NATRIURETIC PEPTIDE) - Abnormal; Notable for the following components:    Beta Natriuretic Peptide 1,162 (*)     All other components within normal limits   POCT GLUCOSE - Abnormal; Notable for the following components:    POC Glucose  107 (*)     All other  components within normal limits   CBC W/ DIFFERENTIAL - Abnormal; Notable for the following components:    WBC 13.9 (*)     RBC 3.23 (*)     HGB 9.5 (*)     HCT 28.1 (*)     .0 (*)     Neutrophil Absolute Prelim 11.00 (*)     Neutrophil Absolute 11.00 (*)     Monocyte Absolute 1.47 (*)     All other components within normal limits   LACTIC ACID, PLASMA - Normal   SARS-COV-2 BY PCR (GENEXPERT) - Normal   ED/MRSA SCREEN BY PCR-CC - Normal   CBC WITH DIFFERENTIAL WITH PLATELET    Narrative:     The following orders were created for panel order CBC With Differential With Platelet.  Procedure                               Abnormality         Status                     ---------                               -----------         ------                     CBC W/ DIFFERENTIAL[[id number]]          Abnormal            Final result                 Please view results for these tests on the individual orders.   MD BLOOD SMEAR CONSULT   RAINBOW DRAW LAVENDER   RAINBOW DRAW LIGHT GREEN   BLOOD CULTURE   BLOOD CULTURE   URINE CULTURE, ROUTINE   C. DIFFICILE(TOXIGENIC)PCR     EKG    Rate, intervals and axes as noted on EKG Report.  Rate: 110  Rhythm: Sinus Rhythm  Reading: no stemi, interpreted by myself              ED Course as of 04/18/24 1126  ------------------------------------------------------------  Time: 04/18 0900  Value: XR CHEST AP PORTABLE  (CPT=71045)  Comment: Per my independent interpretation, patient's CXR demonstrates probable pneumonia.                [facility]        Admission disposition: 4/18/2024  9:45 AM                                        Medical Decision Making  Differential diagnosis includes but is not limited to CHF, pneumonia, viral syndrome, etc    CBC shows a leukocytosis.  Chemistry is unremarkable.  Lactic acid is normal.  Blood cultures obtained.  At this time she does not meet severe sepsis or septic shock criteria.  Imaging concerning for pneumonia, clinically she appears more volume  overloaded and is given some Lasix with cardiology on consult.  She is started on Rocephin and azithromycin for possible community-acquired pneumonia. She received IV lasix.    Rhythm Strip: Rate 100 sinus The cardiac monitor was ordered secondary to the patient's congestive heart failure.     Complicating factors: The patient  has a past medical history of Arthritis, Asthma (HCC), Back problem, Cataract, Cataracts, bilateral, Colon polyp (03/24/2006), Disorder of thyroid, Essential hypertension, Hearing impairment, High blood pressure, Osteoarthritis, Other and unspecified hyperlipidemia, Other ill-defined conditions(799.89), Posterior capsule opacification, Ptosis of right eyelid (2014), Shortness of breath, and Visual impairment. and  has a past surgical history that includes hip replacement surgery (97/0 per NG); synvisc, intra-articular (Bilateral, 2013); hysterectomy; Yag Capsulotomy - OS - Left Eye (Left, 1998); Cataract extraction w/  intraocular lens implant (Left, 1997); Cataract extraction w/  intraocular lens implant (Right, 1998); colonoscopy; laparoscopic cholecystectomy (05/27/2016); other surgical history; other surgical history (05/27/2016); cholecystectomy; cataract; cataract (Left, 1997); cataract (Right, 1998); total hip replacement; and ercp,diagnostic (10/16/2018). that contribute to the medical complexity of this ED evaluation.     Medical Record Review: I personally reviewed available prior medical records for any recent pertinent discharge summaries, testing, and procedures, and reviewed those reports.        Problems Addressed:  Acute on chronic congestive heart failure, unspecified heart failure type (HCC): acute illness or injury with systemic symptoms  Community acquired pneumonia, unspecified laterality: acute illness or injury with systemic symptoms    Amount and/or Complexity of Data Reviewed  External Data Reviewed: notes.     Details: Patient's recent discharge summary reviewed.   Patient was admitted from April 5 April 12 with a periprosthetic hip fracture that she is not a surgical candidate for repair and developed pulmonary edema.  She has an EF of 50%.  Labs: ordered. Decision-making details documented in ED Course.  Radiology: ordered and independent interpretation performed. Decision-making details documented in ED Course.  ECG/medicine tests: ordered and independent interpretation performed. Decision-making details documented in ED Course.  Discussion of management or test interpretation with external provider(s): Discussed with Dr. Schulz who agrees with abx and admission.  Dr. Dupree accepts cards consult.    Risk  Decision regarding hospitalization.        Disposition and Plan     Clinical Impression:  1. Acute on chronic congestive heart failure, unspecified heart failure type (HCC)    2. Community acquired pneumonia, unspecified laterality         Disposition:  Admit  4/18/2024  9:45 am    Follow-up:  No follow-up provider specified.  We recommend that you schedule follow up care with a primary care provider within the next three months to obtain basic health screening including reassessment of your blood pressure.      Medications Prescribed:  Current Discharge Medication List                            Hospital Problems       Present on Admission  Date Reviewed: 4/12/2024            ICD-10-CM Noted POA    * (Principal) Acute on chronic congestive heart failure, unspecified heart failure type (HCC) I50.9 4/18/2024 Unknown    Community acquired pneumonia, unspecified laterality J18.9 4/18/2024 Unknown

## 2024-04-18 NOTE — PLAN OF CARE
Patient is alert and oriented times four. Patient is on two liter of oxygen. Patient is on IV antibiotics. Patient received IV Lasix in the emergency room. All safety measures are in place and call light is within reach.    Problem: Patient Centered Care  Goal: Patient preferences are identified and integrated in the patient's plan of care  Description: Interventions:  - What would you like us to know as we care for you? Patient is no oxygen at baseline  - Provide timely, complete, and accurate information to patient/family  - Incorporate patient and family knowledge, values, beliefs, and cultural backgrounds into the planning and delivery of care  - Encourage patient/family to participate in care and decision-making at the level they choose  - Honor patient and family perspectives and choices  Outcome: Progressing     Problem: Patient/Family Goals  Goal: Patient/Family Long Term Goal  Description: Patient's Long Term Goal: To discharge    Interventions:  - Medication management  - See additional Care Plan goals for specific interventions  Outcome: Progressing  Goal: Patient/Family Short Term Goal  Description: Patient's Short Term Goal: To feel better    Interventions:   - Decrease oxygen demands  - See additional Care Plan goals for specific interventions  Outcome: Progressing

## 2024-04-18 NOTE — H&P
[facility]  part of [facility]    History & Physical    Elba Malik Patient Status:  Inpatient    1928 MRN [id number]   Location [facility] 3W/SW Attending Sharan Lopez MD   Hosp Day # 0 PCP Sharan Lopez MD     Date of Admission:  2024  History of Present Illness:   Patient is a 89-year-old female who presents with chief complaint of some worsening dyspnea over the last several days.  Some minimal cough nonproductive in nature.  Denies fevers or chills.  Does admit to some lower extremity edema.  Similar discharged on 2024 with periprosthetic hip fracture status post fall.  Denies significant hip pain.  Resting comfortably otherwise.  Admits to some dysuria.    Past Medical History  Past Medical History:    Arthritis    Asthma (HCC)    Back problem    multiple spinal injections    Cataract    bilateral cataract surgery    Cataracts, bilateral    cataracts, PC IOL  OD Dr. Tate,  PC IOL Dr. Concepcion in [place], OS    Colon polyp    small polyps    Disorder of thyroid    Essential hypertension    Hearing impairment    High blood pressure    Osteoarthritis    Other and unspecified hyperlipidemia    Other ill-defined conditions(799.89)    Left wrist trauma, surgical repair    Posterior capsule opacification    OS, YAG laser 2014    Ptosis of right eyelid    Ptosis RUL    Shortness of breath    Visual impairment       Past Surgical History  Past Surgical History:   Procedure Laterality Date    Cataract      cataract extraction    Cataract Left     Phaco w/PC IOL    Cataract Right     Phaco w/ PC IOL    Cataract extraction w/  intraocular lens implant Left     PC IOL/ Dr. Pichardo    Cataract extraction w/  intraocular lens implant Right     PC IOL / Dr. Concepcion    Cholecystectomy      Colonoscopy      Ercp,diagnostic  10/16/2018    Choledocholithiasis, dilated intrahepatic and extrahepatic biliary tree    Hip replacement surgery  97/0 per NG     Hysterectomy      Partial    Laparoscopic cholecystectomy  05/27/2016    Other surgical history      Left wrist trauma, surgical repair    Other surgical history  05/27/2016    Laparoscopy with bilateral salpingo-oophorectomy    Synvisc, intra-articular Bilateral 2013    Synvisc injection bilateral knees    Total hip replacement      bilateral hip replacements    Yag capsulotomy - os - left eye Left 1998    Dr. Concepcion       Family History  Family History   Problem Relation Age of Onset    Ear Problems Mother         hearing loss    Other (Other) Father         emphysema    Diabetes Neg     Glaucoma Neg     Macular degeneration Neg        Social History  Social History     Socioeconomic History    Marital status:    Occupational History    Occupation: homemaker   Tobacco Use    Smoking status: Never    Smokeless tobacco: Never   Vaping Use    Vaping status: Never Used   Substance and Sexual Activity    Alcohol use: No     Alcohol/week: 0.0 standard drinks of alcohol    Drug use: No    Sexual activity: Never   Other Topics Concern    Caffeine Concern Yes     Comment: coffee, 1 cup daily    Grew up on a farm No    History of tanning No    Outdoor occupation No    Pt has a pacemaker No    Pt has a defibrillator No    Breast feeding No    Reaction to local anesthetic No           Current Medications:  Current Facility-Administered Medications   Medication Dose Route Frequency    azithromycin (Zithromax) 500 mg in sodium chloride 0.9% 250mL IVPB premix  500 mg Intravenous Once    phenazopyridine (Pyridum) tab 200 mg  200 mg Oral Once    furosemide (Lasix) 10 mg/mL injection 40 mg  40 mg Intravenous BID (Diuretic)    lisinopril (Zestril) tab 10 mg  10 mg Oral Daily    [START ON 4/19/2024] cefTRIAXone (Rocephin) 1 g in D5W 100 mL IVPB-ADD  1 g Intravenous Q24H    azithromycin (Zithromax) tab 500 mg  500 mg Oral Daily    fluticasone propionate (Flonase) 50 MCG/ACT nasal suspension 2 spray  2 spray Nasal Daily     enoxaparin (Lovenox) 30 MG/0.3ML SUBQ injection 30 mg  30 mg Subcutaneous Daily    potassium chloride (K-Dur) tab 40 mEq  40 mEq Oral Once     Medications Prior to Admission   Medication Sig    enoxaparin 30 MG/0.3ML Injection Solution Prefilled Syringe Inject 0.3 mL (30 mg total) into the skin daily.    furosemide 40 MG Oral Tab Take 1 tablet (40 mg total) by mouth daily.    HYDROcodone-acetaminophen 5-325 MG Oral Tab Take 1 tablet by mouth every 4 (four) hours as needed.    ondansetron 4 MG/2ML Injection Solution Inject 2 mL (4 mg total) into the vein every 6 (six) hours as needed.    polyethylene glycol, PEG 3350, 17 g Oral Powd Pack Take 17 g by mouth daily as needed (If no bowel movement in last 24 hours).    sennosides 17.2 MG Oral Tab Take 1 tablet (17.2 mg total) by mouth nightly as needed (constipation, as needed if no bowel movement that day).    zolpidem 10 MG Oral Tab Take 1 tablet (10 mg total) by mouth nightly as needed for Sleep.    levothyroxine 50 MCG Oral Tab Take 1 tablet (50 mcg total) by mouth before breakfast.    metoprolol tartrate 25 MG Oral Tab Take 0.5 tablets (12.5 mg total) by mouth 2x Daily(Beta Blocker).    FLUTICASONE PROPIONATE 50 MCG/ACT Nasal Suspension USE 2 SPRAYS IN EACH NOSTRIL DAILY    potassium chloride 10 MEQ Oral Tab CR Take 1 tablet (10 mEq total) by mouth daily. While on lasix (Patient not taking: Reported on 4/18/2024)    Misc. Devices Does not apply Misc 20% salicylic acid with 2% 5-fluorouracil.  Apply to warts daily as instructed (Patient not taking: Reported on 4/18/2024)       Allergies  No Known Allergies    Review of Systems:   Constitutional: denies fevers, chills, weakness, fatigue, recent illness  HEENT: denies headache, sore throat, vision loss  Cardio: denies chest pain, chest pressure, palpitations  Respiratory: dyspnea, occasional cough, denies wheezing, hemoptysis   GI: denies nausea, vomiting, abdominal pain  : Dysuria  Musculoskeletal: denies  arthralgia, myalgia  Integumentary: denies rash, itching  Neurological: denies syncope, weakness, dizziness,   Psychiatric: denies depression, anxiety  Hematologic: denies bruising    Physical Exam:   Blood pressure (!) 175/90, pulse 103, temperature 98.5 °F (36.9 °C), temperature source Oral, resp. rate 18, weight 158 lb 1.6 oz (71.7 kg), SpO2 98%, not currently breastfeeding.    Constitutional: no acute distress  HEENT: PERRL  Neck: neck supple, no JVD  Cardio: RRR, S1 S2  Respiratory: Faint basilar crackles  GI: abdomen soft, non tender, active bowel sounds, no organomegaly  Extremities: no clubbing, cyanosis, edema  Neurologic: no gross motor deficits  Skin: warm, dry    Results:   Laboratory Data  Lab Results   Component Value Date    WBC 13.9 (H) 04/18/2024    HGB 9.5 (L) 04/18/2024    HCT 28.1 (L) 04/18/2024    .0 (H) 04/18/2024    CREATSERUM 0.94 04/18/2024    BUN 34 (H) 04/18/2024     (L) 04/18/2024    K 3.4 (L) 04/18/2024    CL 96 (L) 04/18/2024    CO2 29.0 04/18/2024     (H) 04/18/2024    CA 9.2 04/18/2024    ALB 3.6 04/18/2024    ALKPHO 101 04/18/2024    TP 6.3 04/18/2024    AST 54 (H) 04/18/2024    ALT 46 04/18/2024    PTT 33.0 04/05/2024    INR 0.96 04/05/2024    PTP 13.3 04/05/2024    TSH 1.430 06/05/2023    DDIMER 0.63 01/10/2023     (H) 06/06/2016         Imaging  XR CHEST AP PORTABLE  (CPT=71045)    Result Date: 4/18/2024  PROCEDURE: XR CHEST AP PORTABLE  (CPT=71045) TIME: 807 hours.   COMPARISON: [facility], XR CHEST AP PORTABLE (CPT=71045), 4/11/2024, 2:18 PM.  [facility], XR CHEST AP PORTABLE (CPT=71045), 4/09/2024, 7:33 AM.  [facility], XR CHEST AP PORTABLE (CPT=71045), 4/05/2024, 5:20 AM.  INDICATIONS: Shortness of breath and chest pain since this morning. No injury.  TECHNIQUE:   Single view.   FINDINGS:  CARDIAC/MEDIAST: Stable art and mediastinum.  No vascular congestion.  LUNGS/PLEURA:  Small left pleural effusion  mild opacity left lung base unchanged.  Linear opacity right base probably atelectasis.  No pneumothorax. OTHER:  Osteopenia.  Advanced degenerative change both shoulders.  Degenerative change thoracic spine.         CONCLUSION:   Stable small left pleural effusion with mild adjacent left basilar opacity which may reflect atelectasis.  Pneumonia excluded    Dictated by (CST): Jessee Colunga MD on 4/18/2024 at 8:28 AM     Finalized by (CST): Jessee Colunga MD on 4/18/2024 at 8:29 AM          XR CHEST AP PORTABLE  (CPT=71045)    Result Date: 4/11/2024  PROCEDURE: XR CHEST AP PORTABLE  (CPT=71045) TIME: 14:19.   COMPARISON: [facility], XR CHEST AP PORTABLE (CPT=71045), 4/05/2024, 5:20 AM.  [facility], CT ABDOMEN + PELVIS (CPT=74176), 10/04/2018, 2:18 PM.  [facility], XR CHEST AP PORTABLE (CPT=71045), 4/09/2024, 7:33 AM.  INDICATIONS: Shortness of breath.  History of hypertension.  TECHNIQUE:   Single view.   FINDINGS:  CARDIAC/VASC: The cardiac silhouette is not enlarged.  The thoracic aorta is again tortuous with atherosclerotic calcification.  No central pulmonary venous congestion.  MEDIAST/TAWNYA:   The pulmonary arterial contours are again prominent bilaterally. LUNGS/PLEURA: There are small pleural effusions bilaterally, slightly improved on the left.  Consolidation and volume loss is again seen in both lower lobes, left greater than right.  There has been development of mild horizontal linear opacity in the mid right lung.  No pneumothorax. BONES: The osseous structures are unchanged. OTHER: Negative.          CONCLUSION:  1. Mild CHF/fluid overload with slight improvement in a small left pleural effusion and a grossly stable small right pleural effusion. 2. Passive atelectasis at both lung bases, slightly improved on the left.  New discoid atelectasis in the mid right lung.   [facility]  Dictated by (CST): Donovan Garrett MD on 4/11/2024 at 4:06 PM      Finalized by (CST): Donovan Garrett MD on 2024 at 4:09 PM          CARD ECHO 2D DOPPLER (CPT=93306)    Result Date: 4/10/2024  Transthoracic Echocardiogram Name:Elba Malik Date: 04/10/2024 :  1928 Ht:  (65in)  BP: 158 / 74 MRN:  [id number]    Age:  89years    Wt:  (165lb) HR: 96bpm Loc:  [facility]       Gndr: F          BSA: 1.82m^2 Sonographer: Cy Ordering:    Sharan Lopez Consulting:  Jasmeet Crowder ---------------------------------------------------------------------------- History/Indications:   Risk factors:  Shortness of Breath. Orthopnea. Pulmonary Edema. ---------------------------------------------------------------------------- Procedure information:  A transthoracic complete 2D study was performed. Additional evaluation included M-mode, complete spectral Doppler, and color Doppler.  Patient status:  Inpatient.  Location:  Bedside.    No prior study was available for comparison.    This was a routine study. Transthoracic echocardiography for diagnosis and ventricular function evaluation. Image quality was adequate. The study was technically limited due to restricted patient mobility and patient sitting up. ECG rhythm:   Atrial fibrillation ---------------------------------------------------------------------------- Conclusions: 1. Left ventricle: The cavity size was normal. Wall thickness was normal.    Systolic function was mildly reduced. The estimated ejection fraction was    50%, by visual assessment. Although no diagnostic regional wall motion    abnormality was identified, this possibility cannot be completely    excluded on the basis of this study. Unable to assess LV diastolic    function due to heart rhythm. 2. Right ventricle: Systolic function was reduced. 3. Aortic valve: There was mild regurgitation. 4. Mitral valve: There was mild regurgitation. 5. Pericardium, extracardiac: There was a left pleural effusion. Impressions:  No previous study was available for  comparison. * ---------------------------------------------------------------------------- * Findings: Left ventricle:  The cavity size was normal. Wall thickness was normal. Systolic function was mildly reduced. The estimated ejection fraction was 50%, by visual assessment. Although no diagnostic regional wall motion abnormality was identified, this possibility cannot be completely excluded on the basis of this study. Unable to assess LV diastolic function due to heart rhythm. Left atrium:  The left atrial volume was normal. Right ventricle:  The cavity size was normal. Systolic function was reduced. Right atrium:  Well visualized. The atrium was normal in size. Mitral valve:  The leaflets were mildly thickened and mildly calcified. Leaflet separation was normal.  Doppler:  Transvalvular velocity was within the normal range. There was no evidence for stenosis. There was mild regurgitation.    The valve area by pressure half-time was 6.29cm^2. The valve area index by pressure half-time was 3.45cm^2/m^2. Aortic valve:   The valve was trileaflet. The leaflets were mildly thickened and mildly calcified. Cusp separation was normal.  Doppler:  Transvalvular velocity was within the normal range. There was no evidence for stenosis. There was mild regurgitation.    The peak systolic gradient was 11mm Hg. Tricuspid valve:  The valve is structurally normal. Leaflet separation was normal.  Doppler:  Transvalvular velocity was within the normal range. There was no evidence for stenosis. There was no significant regurgitation. Pulmonic valve:   The valve is structurally normal. Cusp separation was normal.  Doppler:  Transvalvular velocity was within the normal range. There was no evidence for stenosis. There was no significant regurgitation. Pericardium:   There was no pericardial effusion. Pleura:  There was a left pleural effusion. Aorta: Aortic root: The aortic root was normal. Ascending aorta: The ascending aorta was normal.  Pulmonary arteries: Systolic pressure could not be accurately estimated. ---------------------------------------------------------------------------- Measurements  Left ventricle                  Value          Ref  IVS thickness, ED, PLAX         0.9   cm       0.6 - 0.9  LV ID, ED, PLAX                 4.8   cm       3.8 - 5.2  LV ID, ES, PLAX             (H) 3.7   cm       2.2 - 3.5  LV PW thickness, ED, PLAX       0.8   cm       0.6 - 0.9  IVS/LV PW ratio, ED, PLAX       1.13           ---------  LV PW/LV ID ratio, ED, PLAX     0.17           ---------  LV ejection fraction        (L) 46    %        54 - 74  Stroke volume/bsa, 2D           32    ml/m^2   ---------  LVOT                            Value          Ref  LVOT ID                         2.1   cm       ---------  LVOT peak velocity, S           0.83  m/sec    ---------  LVOT VTI, S                     18.0  cm       ---------  LVOT peak gradient, S           3     mm Hg    ---------  LVOT mean gradient, S           2     mm Hg    ---------  Stroke volume (SV), LVOT DP     62    ml       ---------  Stroke index (SV/bsa), LVOT     34    ml/m^2   ---------  DP  Aortic valve                    Value          Ref  Aortic valve peak velocity,     1.62  m/sec    ---------  S  Aortic peak gradient, S         11    mm Hg    ---------  Velocity ratio, peak,           0.49           ---------  LVOT/AV  Aortic root                     Value          Ref  Aortic root ID                  3.2   cm       2.5 - 4.0  Ascending aorta                 Value          Ref  Ascending aorta ID              3.5   cm       1.9 - 3.5  Left atrium                     Value          Ref  LA volume/bsa, S                26    ml/m^2   16 - 34  LA volume, ES, 1-p A4C          38    ml       22 - 52  LA volume, ES, 1-p A2C          39    ml       22 - 52  LA volume, ES, A/L              47    ml       ---------  LA volume/bsa, ES, A/L          26    ml/m^2   16 - 34  Mitral valve                     Value          Ref  Mitral E-wave peak velocity     1.08  m/sec    ---------  Mitral deceleration time        118   ms       ---------  Mitral pressure half-time       35    ms       ---------  Mitral peak gradient, D         5     mm Hg    ---------  Mitral valve area, PHT, DP      6.29  cm^2     ---------  Mitral valve area/bsa, PHT,     3.45  cm^2/m^2 ---------  DP  Right atrium                    Value          Ref  RA ID, S-I, ES, A4C         (H) 5.7   cm       3.4 - 5.3  RA ID/bsa, S-I, ES, A4C         3.1   cm/m^2   1.9 - 3.1  RA area, ES, A4C                16    cm^2     10 - 18  RA volume, ES, 1-p A4C          36    ml       ---------  RA volume/bsa, ES, 1-p A4C      20    ml/m^2   9 - 33  Systemic veins                  Value          Ref  Estimated CVP                   3     mm Hg    ---------  Inferior vena cava              Value          Ref  ID                              2.0   cm       <=2.1  Right ventricle                 Value          Ref  TAPSE, 2D                   (L) 1.57  cm       >=1.70  TAPSE, MM                   (L) 1.57  cm       >=1.70  RV s', lateral                  9.5   cm/sec   >=9.5 Legend: (L)  and  (H)  fortino values outside specified reference range. ---------------------------------------------------------------------------- Prepared and electronically signed by Suleman Huerta 04/10/2024 10:31     XR CHEST AP PORTABLE  (CPT=71045)    Result Date: 4/9/2024  PROCEDURE: XR CHEST AP PORTABLE  (CPT=71045) TIME: 7:33.   COMPARISON: [facility], XR CHEST AP PORTABLE (CPT=71045), 4/05/2024, 5:20 AM.  [facility], XR CHEST PA + LAT CHEST (CPT=71046), 1/10/2023, 1:14 PM.  [facility], XR CHEST PA + LAT CHEST (KQU=[id number]), 10/02/2018, 2:00 PM.  INDICATIONS: Shortness of breath  TECHNIQUE:   Single view.   FINDINGS:  CARDIAC/VASC: The cardiomediastinal silhouette is unchanged in size.  There is atherosclerotic  calcification of the tortuous thoracic aorta. MEDIAST/TAWNYA:   The mediastinum and hilum are unchanged. LUNGS/PLEURA: No significant change in the small left greater than right pleural effusions.  There are small patchy airspace opacities in the left greater than right lower lobes.  There is unchanged pulmonary edema demonstrated by pulmonary vascular congestion and bilateral perihilar fullness. BONES: There is severe bilateral glenohumeral joint osteoarthrosis with associated deformities involving the humeral heads.  Bilateral acromioclavicular joint degenerative change.  Multilevel degenerative changes of the thoracic spine.  The bones are demineralized. OTHER: Negative.          CONCLUSION:   No significant change when compared to 04/05/2024.  Redemonstrated small left greater than right pleural effusions with associated bibasilar airspace opacities, which likely reflect atelectasis.  No significant change in the pulmonary edema.    Dictated by (CST): Jarret Das MD on 4/09/2024 at 7:53 AM     Finalized by (CST): Jarret Das MD on 4/09/2024 at 7:56 AM          MRI HIPS, LEFT (CPT=73721)    Result Date: 4/7/2024  PROCEDURE: MRI HIPS, LEFT (CPT=73721)  COMPARISON: [facility], CT ABDOMEN + PELVIS (CPT=74176), 10/04/2018, 2:18 PM.  [facility], CT PELVIS (CPT=72192), 4/05/2024, 2:38 AM.  [facility], XR HIP W OR WO PELVIS 2 OR 3 VIEWS, LEFT (CPT=73502), 4/05/2024, 1:05 AM.  INDICATIONS: Acute on chronic left hip pain post mechanical fall.  History of bilateral total hip arthroplasties with a periprosthetic fracture involving the greater trochanter of the left femur noted on prior studies.  TECHNIQUE: A comprehensive examination was performed utilizing a variety of imaging planes and imaging parameters to optimize visualization of suspected pathology.  Images were performed without contrast.  FINDINGS: HIP JOINT:   Postoperative changes are again noted from  previous bilateral total hip arthroplasties.  Resulting metallic susceptibility artifact limits assessment of the surrounding structures.  However, there is redemonstration of a mildly displaced fracture involving the greater trochanter of the left femur.  There is associated marrow edema.  OTHER BONES:  Severe multilevel spondylosis is seen throughout the visualized lumbar spine.  There is stable mild sacroiliac joint osteoarthritis bilaterally.  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  There is otherwise no marrow signal abnormality or osseous malalignment. EFFUSIONS: None.  No synovitis or loose bodies. BURSAE: No evidence of iliopsoas bursitis.  There is mild T2 hyperintense fluid in the right trochanteric bursa.  There is a moderate amount of T2 hyperintense/T1 hyperintense fluid in the left trochanteric bursa, likely relating to posttraumatic blood products. TENDONS: There is a partial thickness tear at the right hamstring tendinous origin with a small resulting T2 hyperintense fluid collection.  There is mild left hamstring origin tendinosis.  Normal gluteal and iliopsoas tendons.  Normal tendinous origins of the quadriceps and adductor muscle groups. MUSCLES: There is moderate left gluteus medius muscle edema.  There is mild edema involving the left adductor muscle group.  There is severe atrophy of the left gluteus minimus muscles with mild involvement of the remaining gluteus muscles bilaterally, all likely postoperative in nature. OTHER: Negative.         CONCLUSION:  1. Bilateral total hip arthroplasties with redemonstration of an acute to subacute mildly displaced periprosthetic fracture involving the greater trochanter of the left femur. 2. Moderate left gluteus medius muscle strain with moderate posttraumatic blood products extending into the left trochanteric bursa.  There is also mild strain involving the left hip adductor muscles. 3. Partial-thickness right hamstring origin tendon  tear with associated mild ischial bursitis. 4. Mild left hamstring origin tendinosis.   A preliminary report was issued by the [facility] teleradiology service. There are no major discrepancies.  [facility]remote  Dictated by (CST): Donovan Garrett MD on 4/07/2024 at 5:49 AM     Finalized by (CST): Donovan Garrett MD on 4/07/2024 at 6:02 AM          XR CHEST AP PORTABLE  (CPT=71045)    Result Date: 4/5/2024  PROCEDURE: XR CHEST AP PORTABLE  (CPT=71045) TIME: 0523 hours.   COMPARISON: [facility], XR CHEST PA + LAT CHEST (CPT=71046), 1/10/2023, 1:14 PM.  [facility], XR CHEST PA + LAT CHEST (ZFQ=[id number]), 10/02/2018, 2:00 PM.  INDICATIONS: Shortness of breath and left hip pain.  TECHNIQUE:   Single view.   FINDINGS:  CARDIAC/VASC: Heart is normal size.  Aortic atherosclerosis. MEDIAST/TAWNYA:   No visible mass or adenopathy. LUNGS/PLEURA: Small bilateral pleural effusions.  Pulmonary edema.  No pneumothorax.  Mild background scarring. BONES: No fracture or visible bony lesion.  Severe degenerative joint disease of the shoulders .  Thoracic spondylosis. OTHER: Negative.          CONCLUSION:   Pulmonary edema and small bilateral pleural effusions.  Superimposed consolidations are not excluded.    Preliminary report was given by [facility].  There are no clinically significant discrepancies.    [facility]remote    Dictated by (CST): Jame Napier MD on 4/05/2024 at 11:32 AM     Finalized by (CST): Jame Napier MD on 4/05/2024 at 11:34 AM          CT PELVIS (CPT=72192)    Result Date: 4/5/2024  PROCEDURE: CT PELVIS (CPT=72192)  COMPARISON: [facility], CT ABDOMEN + PELVIS (CPT=74176), 10/04/2018, 2:18 PM.  [facility], CT ABDOMEN PELVIS WO CON, 5/04/2016, 10:45 AM.  INDICATIONS: XR with poss nondisplaced periprosthetic fracture L femur  TECHNIQUE:   CT images were obtained without intravenous contrast.  Automated exposure control  for dose reduction was used. Adjustment of the mA and/or kV was done based on the patient's size. Use of iterative reconstruction technique for dose reduction was used.  Dose information is transmitted to the ACR (American College of Radiology) NRDR (National Radiology Data Registry) which includes the Dose Index Registry.   FINDINGS:  RETROPERITONEUM:   No mass or enlarged adenopathy.  AORTA/VASCULAR:   Extensive calcified atherosclerosis. PERITONEUM: No free fluid or air. GI TRACT/MESENTERY:    No visible mass, obstruction, or bowel wall thickening. URINARY BLADDER: Unremarkable. REPRODUCTIVE ORGANS: The uterus is not seen.  There is no adnexal mass. ABDOMINAL WALL:   No acute abnormality. BONES:  There are bilateral hip arthroplasties.  There is a minimally displaced comminuted fracture adjacent to the left hip prosthesis at the greater trochanter.          CONCLUSION: Minimally displaced comminuted fracture adjacent to the left hip prosthesis at the greater trochanter. Additional chronic or incidental findings are described in the body of this report.  Preliminary report was given by [employer].  There are no clinically significant discrepancies.    elm-remote    Dictated by (CST): Jame Napier MD on 4/05/2024 at 10:03 AM     Finalized by (CST): Jame Napier MD on 4/05/2024 at 10:06 AM          CT BRAIN OR HEAD (83138)    Result Date: 4/5/2024  PROCEDURE: CT BRAIN OR HEAD (CPT=70450)  COMPARISON: None.  INDICATIONS: L hip pain  TECHNIQUE: CT images were obtained without contrast material.  Automated exposure control for dose reduction was used.  Dose information is transmitted to the ACR (American College of Radiology) NRDR (National Radiology Data Registry) which includes the Dose Index Registry.  FINDINGS:  VENTRICLES: No hydrocephalus.  EXTRA-AXIAL: No extraaxial hemorrhage.  No midline shift or herniation.  PARENCHYMA: No CT evidence of acute or subacute infarct.  No mass.  Parenchymal volume is  normal.  Gray-white matter differentiation is maintained.  SOFT TISSUES: No acute abnormality. BONES: No acute fracture. SINUSES: Expansile density in the left maxillary sinus extending into the left nasal passages. ORBITS: No acute abnormality. MASTOIDS: Clear. EACS: Clear.          CONCLUSION:   No acute intracranial abnormality.  No acute intracranial hemorrhage.  No calvarial fracture.  Mild chronic microvascular ischemic changes.  Expansile density in the left maxillary sinus extending into the left nasal passage, probably a polyp.    Preliminary report was given by [facility].  There are no clinically significant discrepancies.    elm-remote  Dictated by (CST): Jame Napier MD on 4/05/2024 at 9:36 AM     Finalized by (CST): Jame Napier MD on 4/05/2024 at 9:37 AM          CT SPINE CERVICAL (CPT=72125)    Result Date: 4/5/2024  PROCEDURE: CT SPINE CERVICAL (CPT=72125)  COMPARISON: [facility], CT SHOULDER LEFT (CPT=73200), 1/12/2024, 8:11 AM.  INDICATIONS: L hip pain  TECHNIQUE:   Multi-planar CT images were obtained without intravenous contrast material.  Automated exposure control for dose reduction was used. Adjustment of the mA and/or kV was done based on the patient's size. Use of iterative reconstruction technique for dose reduction was used.  Dose information is transmitted to the ACR (American College of Radiology) NRDR (National Radiology Data Registry) which includes the Dose Index Registry.   FINDINGS:  BONES: No acute fracture. The odontoid process is intact.  Marked osteopenia.  Osseous degenerative changes. ALIGNMENT: Anterolisthesis of C3 on C4 measures 2 mm. Anterolisthesis of C4 on C5 measures 2 mm. DISCS/JOINTS: Moderate to advanced multilevel disc height loss.  Moderate facet arthropathy. SOFT TISSUES: No acute abnormality. VESSELS: Calcified atherosclerosis. AIRWAYS AND LUNGS: Apical pleural parenchymal scarring is seen.  Airways are clear. SKULL BASE: Normal foramen  magnum with no Chiari Malformation.  Polyp or mucous retention cyst in the left maxillary sinus.  SPINAL CANAL: Limited noncontrast evaluation.  Suspected spinal canal stenosis at C2-3 through C6-7 due to disc osteophyte complexes.  Suspected multilevel foraminal stenosis.          CONCLUSION:   1. No acute fracture of the cervical spine.  Marked osteopenia.  2. Minimal anterolisthesis of C3 on C4 and C4 on C5 is favored degenerative in etiology.  Overall advanced spondylosis with suspected multilevel spinal canal and foraminal stenosis.  This would be better evaluated with MRI.  3. Polyp or mucous retention cyst in the left maxillary sinus.    Preliminary report was given by [facility].  There are no clinically significant discrepancies.    elm-remote    Dictated by (CST): Jame Napier MD on 4/05/2024 at 9:30 AM     Finalized by (CST): Jame Napier MD on 4/05/2024 at 9:36 AM          XR HIP W OR WO PELVIS 2 OR 3 VIEWS, LEFT (CPT=73502)    Result Date: 4/5/2024  PROCEDURE: XR HIP W OR WO PELVIS 2 OR 3 VIEWS, LEFT (CPT=73502)  COMPARISON: [facility], CT PELVIS (CPT=72192), 4/05/2024, 2:38 AM.  [facility] 2nd Floor, X HIP UNILATERAL 1 VIEW LT, 3/10/2015, 2:22 PM.  INDICATIONS: Post fall with left hip pain.  TECHNIQUE: 3 views were obtained.    FINDINGS/CONCLUSION:          Bilateral hip arthroplasties are seen.  No evidence of hardware complication.  Question minimally displaced fracture of the left greater trochanter.  Consider further evaluation with CT of the pelvis.  Osteopenia.  Soft tissues are unremarkable aside from vascular calcifications.    Preliminary report was given by [facility].  There are no clinically significant discrepancies.    elm-remote   Dictated by (CST): Jame Napier MD on 4/05/2024 at 9:13 AM     Finalized by (CST): Jame Napier MD on 4/05/2024 at 9:19 AM            Assessment   1.  Dyspnea  2.  Leukocytosis  3.  Recent periprosthetic  hip fracture  4.  Hypertension  5.  Anemia  6.  Chronic pain  7.  Arthritis  8.  Hyponatremia    Plan   -Presents with some increased dyspnea over the last several days with some lower extremity edema.  Recently discharged on 4/11/2024 after periprosthetic hip fracture.  -Chest x-ray on presentation with small left pleural effusion left basilar opacity cannot rule out atelectasis versus pneumonia  -Empiric IV antibiotic therapy at this time.  -Urine cultures pending  -Will check procalcitonin  -Wean oxygen as tolerated  -Resume home medications  -Discussed with family  -Reviewed vitals, labs and imaging    Liane Loyd DO  Pulmonary Critical Care Medicine  [facility]  4/18/2024  12:07 PM

## 2024-04-18 NOTE — ED QUICK NOTES
Orders for admission, patient is aware of plan and ready to go upstairs. Any questions, please call ED RN Brisa at extension [phone].     Patient Covid vaccination status: Fully vaccinated     COVID Test Ordered in ED: SARS-CoV-2 by PCR (GENEXPERT)    COVID Suspicion at Admission: Neg    Running Infusions:  see MAR    Mental Status/LOC at time of transport: A & O x 4    Other pertinent information:   CIWA score: N/A   NIH score:  N/A

## 2024-04-18 NOTE — ED INITIAL ASSESSMENT (HPI)
Pt arrived via medics to rm 26 for complaint of sob, per medics +fever with a history of heart failure, pt denies cough and denies chest pain, pt usually does not use oxygen however 02 sats at the NH was 90% placed on 2L and increased to 96%, states she has a femur fracture and unable to do surgery and is at rehab at [facility]

## 2024-04-18 NOTE — CONSULTS
[facility] - CARDIOLOGY CONSULT NOTE    Elba Malik Patient Status:  Emergency    1928 MRN [id number]   Location [facility] EMERGENCY DEPARTMENT Attending Jean Marie Graves   Hosp Day # 0 PCP Sharan Lopez MD     Date of Admission:  2024  Date of Consult:  2024  I was asked by Jean Marie Graves to provide recommendations for evaluation and management of cardiac issues.  Impression:     89-year-old female with increasing shortness of breath and hypoxia.  Recent hip fracture.    Recommendations:  1.  Shortness of breath  Multifactorial appears congested on exam status post azithromycin BNP level 1100.  Start Lasix 40 mg IV twice a day preserved EF on recent echocardiogram 50% no need to repeat.  Pulmonary to evaluate for possible pneumonia.  No infiltrate on chest x-ray however elevated white count.   2. periprosthetic hip fracture  Management as per primary team.  3.  Hypertension  Add lisinopril 10 mg daily.  4.  Tachycardia  Likely reactive in the setting of hypoxia and possible infection.      L5 consult, will follow.    Reason for Consultation:   Shortness of breath, fever      History of Present Illness:   Patient is a 89 year old female who was admitted to the hospital for shortness of breath.  Patient was discharged on .  Was admitted for periprosthetic hip fracture medical management was recommended by orthopedics.  Was found to have fluid congestion EF 50% on echocardiogram diastolic dysfunction.  Started on IV diuretics and transferred to subacute rehab.  Starting yesterday became more short of breath.  Denies any cough.  Also noticed ankle swelling.  Also chills and fever.  Brought into the hospital.  No orthopnea no diaphoresis no chest pain.  No palpitations.      Cardiac tests:    Past Medical History  Past Medical History:    Arthritis    Asthma (HCC)    Back problem    multiple spinal injections    Cataract    bilateral cataract  surgery    Cataracts, bilateral    cataracts, PC IOL 1997 OD Dr. Tate,  PC IOL Dr. Concepcion in [place], OS    Colon polyp    small polyps    Disorder of thyroid    Essential hypertension    Hearing impairment    High blood pressure    Osteoarthritis    Other and unspecified hyperlipidemia    Other ill-defined conditions(799.89)    Left wrist trauma, surgical repair    Posterior capsule opacification    OS, YAG laser 2014    Ptosis of right eyelid    Ptosis RUL    Shortness of breath    Visual impairment       Past Surgical History  Past Surgical History:   Procedure Laterality Date    Cataract      cataract extraction    Cataract Left 1997    Phaco w/PC IOL    Cataract Right 1998    Phaco w/ PC IOL    Cataract extraction w/  intraocular lens implant Left 1997    PC IOL/ Dr. Pichardo    Cataract extraction w/  intraocular lens implant Right 1998    PC IOL / Dr. Concepcion    Cholecystectomy      Colonoscopy      Ercp,diagnostic  10/16/2018    Choledocholithiasis, dilated intrahepatic and extrahepatic biliary tree    Hip replacement surgery  97/0 per NG    Hysterectomy      Partial    Laparoscopic cholecystectomy  05/27/2016    Other surgical history      Left wrist trauma, surgical repair    Other surgical history  05/27/2016    Laparoscopy with bilateral salpingo-oophorectomy    Synvisc, intra-articular Bilateral 2013    Synvisc injection bilateral knees    Total hip replacement      bilateral hip replacements    Yag capsulotomy - os - left eye Left 1998    Dr. Concepcion       Family History  Family History   Problem Relation Age of Onset    Ear Problems Mother         hearing loss    Other (Other) Father         emphysema    Diabetes Neg     Glaucoma Neg     Macular degeneration Neg      Denies smoking or drug use.  Social History  Pediatric History   Patient Parents    Not on file     Other Topics Concern     Service Not Asked    Blood Transfusions Not Asked    Caffeine Concern Yes     Comment: coffee, 1 cup daily     Occupational Exposure Not Asked    Hobby Hazards Not Asked    Sleep Concern Not Asked    Stress Concern Not Asked    Weight Concern Not Asked    Special Diet Not Asked    Back Care Not Asked    Exercise Not Asked    Bike Helmet Not Asked    Seat Belt Not Asked    Self-Exams Not Asked    Grew up on a farm No    History of tanning No    Outdoor occupation No    Pt has a pacemaker No    Pt has a defibrillator No    Breast feeding No    Reaction to local anesthetic No    Left Handed Not Asked    Right Handed Not Asked    Currently spends a great deal of time in the sun Not Asked    Past Sunlamp Treatments for Acne Not Asked    Hx of Spending Great Deal of Time in Sun Not Asked    Bad sunburns in the past Not Asked    Tanning Salons in the Past Not Asked    Hx of Radiation Treatments Not Asked    Regular use of sun block Not Asked   Social History Narrative    Not on file           Current Medications:  Current Facility-Administered Medications   Medication Dose Route Frequency    azithromycin (Zithromax) 500 mg in sodium chloride 0.9% 250mL IVPB premix  500 mg Intravenous Once    phenazopyridine (Pyridum) tab 200 mg  200 mg Oral Once     (Not in a hospital admission)      Allergies  No Known Allergies    Review of Systems:   10 pt ROS performed, separate from HPI  Review of Systems:  GENERAL: + fevers, +chills, sweats  HEENT: no visual or hearing changes  SKIN: denies any unusual skin lesions or rashes  RESPIRATORY:  shortness of breath at rest.  CARDIOVASCULAR: no active chest pain, no claudication  GI: denies abdominal pain and denies heartburn  : no dysuria or hematuria  NEURO: denies headaches, focal weaknesses or paresthesias  All other systems reviewed and negative.  fatigue is present  All other systems were reviewed are negative  Physical Exam:   Blood pressure (!) 162/82, pulse 98, temperature 98.6 °F (37 °C), resp. rate 20, SpO2 98%, not currently breastfeeding.    Scheduled Meds:    azithromycin  500 mg  Intravenous Once    phenazopyridine  200 mg Oral Once         Physical Exam:    General: Alert and oriented. No apparent distress. No respiratory or constitutional distress.  Frail elderly female.  HEENT: Normocephalic, anicteric sclera, neck supple  Neck: No JVD, carotids 2+, supple  Cardiac: irregular rhythm no pathologic murmur.  Lungs: Clear with normal effort.  Normal excursions and effort.  Abdomen: Soft, non-tender. BS-present.  Extremities: Without clubbing, cyanosis.  Peripheral pulsespresent.  Neurologic: Alert and oriented, normal affect. Motor ok.  Skin: Warm and dry.     Results:     Laboratory Data:  Lab Results   Component Value Date    WBC 13.9 (H) 04/18/2024    HGB 9.5 (L) 04/18/2024    HCT 28.1 (L) 04/18/2024    .0 (H) 04/18/2024    CREATSERUM 0.94 04/18/2024    BUN 34 (H) 04/18/2024     (L) 04/18/2024    K 3.4 (L) 04/18/2024    CL 96 (L) 04/18/2024    CO2 29.0 04/18/2024     (H) 04/18/2024    CA 9.2 04/18/2024    ALB 3.6 04/18/2024    ALKPHO 101 04/18/2024    TP 6.3 04/18/2024    AST 54 (H) 04/18/2024    ALT 46 04/18/2024    PTT 33.0 04/05/2024    INR 0.96 04/05/2024    PTP 13.3 04/05/2024    TSH 1.430 06/05/2023    DDIMER 0.63 01/10/2023     (H) 06/06/2016         Thank you for allowing me to participate in the care of your patient.    Santana Dupree MD  [facility]  Interventional Cardiology  4/18/2024

## 2024-04-19 PROBLEM — R52 PAIN: Status: ACTIVE | Noted: 2024-01-01

## 2024-04-19 PROBLEM — Z71.89 GOALS OF CARE, COUNSELING/DISCUSSION: Status: ACTIVE | Noted: 2024-04-19

## 2024-04-19 PROBLEM — Z71.89 GOALS OF CARE, COUNSELING/DISCUSSION: Status: ACTIVE | Noted: 2024-01-01

## 2024-04-19 PROBLEM — Z51.5 PALLIATIVE CARE ENCOUNTER: Status: ACTIVE | Noted: 2024-04-19

## 2024-04-19 PROBLEM — R52 PAIN: Status: ACTIVE | Noted: 2024-04-19

## 2024-04-19 PROBLEM — Z51.5 PALLIATIVE CARE ENCOUNTER: Status: ACTIVE | Noted: 2024-01-01

## 2024-04-19 LAB
ANION GAP SERPL CALC-SCNC: 8 MMOL/L (ref 0–18)
BASOPHILS # BLD AUTO: 0.08 X10(3) UL (ref 0–0.2)
BASOPHILS NFR BLD AUTO: 0.8 %
BUN BLD-MCNC: 35 MG/DL (ref 9–23)
BUN/CREAT SERPL: 32.7 (ref 10–20)
CALCIUM BLD-MCNC: 8.5 MG/DL (ref 8.7–10.4)
CHLORIDE SERPL-SCNC: 97 MMOL/L (ref 98–112)
CO2 SERPL-SCNC: 29 MMOL/L (ref 21–32)
CREAT BLD-MCNC: 1.07 MG/DL
DEPRECATED RDW RBC AUTO: 46.1 FL (ref 35.1–46.3)
EGFRCR SERPLBLD CKD-EPI 2021: 48 ML/MIN/1.73M2 (ref 60–?)
EOSINOPHIL # BLD AUTO: 0.07 X10(3) UL (ref 0–0.7)
EOSINOPHIL NFR BLD AUTO: 0.7 %
ERYTHROCYTE [DISTWIDTH] IN BLOOD BY AUTOMATED COUNT: 14.1 % (ref 11–15)
GLUCOSE BLD-MCNC: 92 MG/DL (ref 70–99)
HCT VFR BLD AUTO: 24.9 %
HGB BLD-MCNC: 8.4 G/DL
IMM GRANULOCYTES # BLD AUTO: 0.15 X10(3) UL (ref 0–1)
IMM GRANULOCYTES NFR BLD: 1.5 %
LYMPHOCYTES # BLD AUTO: 1.4 X10(3) UL (ref 1–4)
LYMPHOCYTES NFR BLD AUTO: 14.3 %
MCH RBC QN AUTO: 29.8 PG (ref 26–34)
MCHC RBC AUTO-ENTMCNC: 33.7 G/DL (ref 31–37)
MCV RBC AUTO: 88.3 FL
MONOCYTES # BLD AUTO: 1.19 X10(3) UL (ref 0.1–1)
MONOCYTES NFR BLD AUTO: 12.2 %
NEUTROPHILS # BLD AUTO: 6.89 X10 (3) UL (ref 1.5–7.7)
NEUTROPHILS # BLD AUTO: 6.89 X10(3) UL (ref 1.5–7.7)
NEUTROPHILS NFR BLD AUTO: 70.5 %
OSMOLALITY SERPL CALC.SUM OF ELEC: 286 MOSM/KG (ref 275–295)
PLATELET # BLD AUTO: 756 10(3)UL (ref 150–450)
POTASSIUM SERPL-SCNC: 3.3 MMOL/L (ref 3.5–5.1)
POTASSIUM SERPL-SCNC: 3.3 MMOL/L (ref 3.5–5.1)
RBC # BLD AUTO: 2.82 X10(6)UL
SODIUM SERPL-SCNC: 134 MMOL/L (ref 136–145)
WBC # BLD AUTO: 9.8 X10(3) UL (ref 4–11)

## 2024-04-19 PROCEDURE — 99232 SBSQ HOSP IP/OBS MODERATE 35: CPT | Performed by: INTERNAL MEDICINE

## 2024-04-19 PROCEDURE — 99222 1ST HOSP IP/OBS MODERATE 55: CPT | Performed by: NURSE PRACTITIONER

## 2024-04-19 RX ORDER — POTASSIUM CHLORIDE 20 MEQ/1
40 TABLET, EXTENDED RELEASE ORAL ONCE
Status: COMPLETED | OUTPATIENT
Start: 2024-04-19 | End: 2024-04-19

## 2024-04-19 RX ORDER — ACETAMINOPHEN 325 MG/1
650 TABLET ORAL 2 TIMES DAILY
Status: DISCONTINUED | OUTPATIENT
Start: 2024-04-19 | End: 2024-04-23

## 2024-04-19 RX ORDER — METOPROLOL SUCCINATE 25 MG/1
25 TABLET, EXTENDED RELEASE ORAL
Status: DISCONTINUED | OUTPATIENT
Start: 2024-04-19 | End: 2024-04-21

## 2024-04-19 RX ORDER — GABAPENTIN 100 MG/1
100 CAPSULE ORAL NIGHTLY
Status: DISCONTINUED | OUTPATIENT
Start: 2024-04-19 | End: 2024-04-23

## 2024-04-19 NOTE — CM/SW NOTE
Department  notified of request for Palliative , aidin referrals started. Assigned CM/SW to follow up with pt/family on further discharge planning.   Linda Ohara, DSC

## 2024-04-19 NOTE — PHYSICAL THERAPY NOTE
PHYSICAL THERAPY EVALUATION - INPATIENT     Room Number: 336/336-A  Evaluation Date: 4/19/2024  Type of Evaluation: Initial   Physician Order: PT Eval and Treat    Presenting Problem: CHF     Reason for Therapy: Mobility Dysfunction and Discharge Planning    PHYSICAL THERAPY ASSESSMENT   Patient is a 89 year old female admitted 4/18/2024 for CHF.  Prior to admission, patient's baseline is assist with ADL's and transfers with Carmen lift.  Patient is currently functioning below baseline with bed mobility, transfers, and gait.  Patient is requiring maximum assist as a result of the following impairments: decreased functional strength and medical status.  Physical Therapy will continue to follow for duration of hospitalization.    Patient will benefit from continued skilled PT Services to promote return to prior level of function and safety with continuous assistance and gradual rehabilitative therapy .    PLAN  PT Treatment Plan: Bed mobility;Body mechanics;Patient education;Gait training;Balance training;Transfer training;Strengthening  Rehab Potential : Good  Frequency (Obs): 5x/week    PHYSICAL THERAPY MEDICAL/SOCIAL HISTORY   Problem List  Principal Problem:    Acute on chronic congestive heart failure, unspecified heart failure type (HCC)  Active Problems:    Community acquired pneumonia, unspecified laterality    Palliative care encounter    Pain    Goals of care, counseling/discussion    HOME SITUATION  Home Situation  Type of Home: Skilled nursing facility  Home Layout: One level  Stairs to Enter : 0  Railing: No  Stairs to Bedroom: 0  Railing: No  Lives With: Staff 24 hours  Drives: No  Patient Owned Equipment: Rolling walker  Patient Regularly Uses: None     SUBJECTIVE  \"I know I have to stop saying I can't\"    PHYSICAL THERAPY EXAMINATION   OBJECTIVE  Precautions: Limb alert - left  Fall Risk: High fall risk    WEIGHT BEARING RESTRICTION  Weight Bearing Restriction: L lower extremity           L Lower  Extremity: Weight Bearing as Tolerated    PAIN ASSESSMENT  Ratin          COGNITION  Overall Cognitive Status:  WFL - within functional limits    RANGE OF MOTION AND STRENGTH ASSESSMENT  Lower extremity ROM is within functional limits  BLE WNL  Lower extremity strength is within functional limits  BLE WNL    BALANCE  Static Sitting: Fair +  Dynamic Sitting: Fair  Static Standing: Poor +  Dynamic Standing: Poor    AM-PAC '6-Clicks' INPATIENT SHORT FORM - BASIC MOBILITY  How much difficulty does the patient currently have...  Patient Difficulty: Turning over in bed (including adjusting bedclothes, sheets and blankets)?: A Lot   Patient Difficulty: Sitting down on and standing up from a chair with arms (e.g., wheelchair, bedside commode, etc.): A Lot   Patient Difficulty: Moving from lying on back to sitting on the side of the bed?: A Lot   How much help from another person does the patient currently need...   Help from Another: Moving to and from a bed to a chair (including a wheelchair)?: A Lot   Help from Another: Need to walk in hospital room?: Total   Help from Another: Climbing 3-5 steps with a railing?: Total     AM-PAC Score:  Raw Score: 10   Approx Degree of Impairment: 76.75%   Standardized Score (AM-PAC Scale): 32.29   CMS Modifier (G-Code): CL    FUNCTIONAL ABILITY STATUS  Functional Mobility/Gait Assessment  Gait Assistance: Not tested  Rolling:  not tested   Supine to Sit: maximum assist  Sit to Supine:  not tested   Sit to Stand: maximum assist    Exercise/Education Provided:  Bed mobility  Body mechanics  Transfer training    The patient's Approx Degree of Impairment: 76.75% has been calculated based on documentation in the [facility] '6 clicks' Inpatient Basic Mobility Short Form.  Research supports that patients with this level of impairment may benefit from long term care, however patient would benefit from rehab facility. Patient received semi-fowlers in bed, agreeable to physical therapy evaluation.  Education with patient provided verbally on physical therapy plan of care and physiological benefits of out of bed mobility. Next session anticipate to progress bed mobility, transfers, and gait. Oxygen sat 94% and heart rate 109, blood pressure 151/63. Patient able to stand from bedside chair with max A x 1, able to scoot to edge of bedside chair, stand for about 15 seconds and then 30 seconds.     Patient history and/or personal factors that may impact the plan of care include history of falls, limited functional status at baseline, and has history of recent hospitalization. Based on the physical therapy examination of the noted systems and functional activity/participation limitations, the patient presentation is evolving given the patient presents with surgical precautions/limitations, demonstrates worsening of previously stable condition, and demonstrates worsening of co-morbidities. Final disposition will be made by interdisciplinary medical team.    Patient End of Session: Up in chair;Needs met;Call light within reach;RN aware of session/findings;All patient questions and concerns addressed;Alarm set;Family present    CURRENT GOALS  Goals to be met by: 5/10/24  Patient Goal Patient's self-stated goal is: to go home   Goal #1 Patient is able to demonstrate supine - sit EOB @ level: supervision     Goal #1   Current Status    Goal #2 Patient is able to demonstrate transfers Sit to/from Stand at assistance level: minimum assistance with walker - rolling     Goal #2  Current Status    Goal #3 Patient is able to ambulate 10 feet with assist device: walker - rolling at assistance level: minimum assistance   Goal #3   Current Status    Goal #4    Goal #4   Current Status    Goal #5 Patient to demonstrate independence with home activity/exercise instructions provided to patient in preparation for discharge.   Goal #5   Current Status    Goal #6    Goal #6  Current Status      Patient Evaluation Complexity  Level:  History Moderate - 1 or 2 personal factors and/or co-morbidities   Examination of body systems Moderate - addressing a total of 3 or more elements   Clinical Presentation  Moderate - Evolving   Clinical Decision Making  Moderate Complexity     Therapeutic Activity:  13 minutes  Therapeutic Exercise: 10 minutes

## 2024-04-19 NOTE — PROGRESS NOTES
Cardiology Progress Note        Elba Malik Patient Status:  Inpatient    1928 MRN [id number]   Location [facility] 3W/SW Attending Sharan Lopez MD   Hosp Day # 1 PCP Sharan Lopez MD     Subjective:  On RA this morning.  Stable BP and HR.    Medications:   furosemide  40 mg Intravenous BID (Diuretic)    lisinopril  10 mg Oral Daily    cefTRIAXone  1 g Intravenous Q24H    azithromycin  500 mg Oral Q24H    fluticasone propionate  2 spray Nasal Daily    enoxaparin  30 mg Subcutaneous Daily       Continuous Infusions:        Allergies:  No Known Allergies      Intake/Output:    Intake/Output Summary (Last 24 hours) at 2024 0741  Last data filed at 2024 0616  Gross per 24 hour   Intake 967 ml   Output 900 ml   Net 67 ml           Last 3 Weights   24 1107 158 lb 1.6 oz (71.7 kg)   24 0505 158 lb 1.6 oz (71.7 kg)   24 1037 139 lb 12.4 oz (63.4 kg)   24 0524 154 lb 14.4 oz (70.3 kg)   04/10/24 0825 165 lb 3.2 oz (74.9 kg)   24 0417 167 lb 4.8 oz (75.9 kg)   24 1402 156 lb 14.4 oz (71.2 kg)   24 0600 153 lb 11.2 oz (69.7 kg)   24 0100 145 lb (65.8 kg)   24 0735 153 lb (69.4 kg)            Physical Exam:    Temp:  [98.5 °F (36.9 °C)-98.6 °F (37 °C)] 98.5 °F (36.9 °C)  Pulse:  [] 100  Resp:  [16-24] 16  BP: (136-175)/(56-96) 139/56  SpO2:  [94 %-98 %] 94 %    General: No apparent distress  HEENT: No focal deficits.  Neck: supple. JVP normal  Cardiac: Regular rhythm, S1, S2 normal,  rub or gallop.  No murmur.  Lungs: CTA  Abdomen: Soft, non-tender.   Extremities: No edema  Neurologic: no focal deficits  Skin: Warm and dry.     Telemetry: sinus    EKG:      Echo:      Cardiac Cath:      Labs:  HEM:  Recent Labs   Lab 24  0741 24  0656   WBC 13.9* 9.8   HGB 9.5* 8.4*   .0* 756.0*       Chem:  Recent Labs   Lab 24  0741   *   K 3.4*   CL 96*   CO2 29.0   BUN 34*   CREATSERUM 0.94   CA 9.2    *       Recent Labs   Lab 04/18/24  0741   ALT 46   AST 54*   ALB 3.6       No results for input(s): \"TROP\", \"CK\" in the last 168 hours.    No results for input(s): \"PTP\", \"INR\" in the last 168 hours.              Impression:  Dyspnea - multifactorial with element of acute diastolic CHF.  On RA.  Lasix 40 mg IVP BID.  BP stable, BMP pending.  Echo with EF 50%.  Advanced age, debilitated in setting of fall with periprosthetic hip fracture.  HTN  Anemia           Plan:  Await BMP.  Continue IV lasix if stable.  Add Toprol 25 mg daily.        Sven Villagran MD  4/19/2024  7:41 AM  L3

## 2024-04-19 NOTE — PLAN OF CARE
Cardiology Update    BMP reviewed, slight bump in creatinine, otherwise stable. With elevated BNP, continue BID lasix, monitor BMP daily.       Urvashi Benito, APRN  04/19/24  8:51 AM  [phone] [place]  [phone] [place]

## 2024-04-19 NOTE — PLAN OF CARE
Pt complains of right hip pain controlled with prn pain meds. Pt is nonambulatory since fracture. VS stable, daughter stayed overnight and an update provided. Plan is to continue IV lasix and IV abx. PTOT to see patient today. Call light within reach, fall precautions in place.     Problem: Patient Centered Care  Goal: Patient preferences are identified and integrated in the patient's plan of care  Description: Interventions:  - What would you like us to know as we care for you?   - Provide timely, complete, and accurate information to patient/family  - Incorporate patient and family knowledge, values, beliefs, and cultural backgrounds into the planning and delivery of care  - Encourage patient/family to participate in care and decision-making at the level they choose  - Honor patient and family perspectives and choices  Outcome: Progressing     Problem: Patient/Family Goals  Goal: Patient/Family Long Term Goal  Description: Patient's Long Term Goal:   Interventions:  -  See additional Care Plan goals for specific interventions  Outcome: Progressing  Goal: Patient/Family Short Term Goal  Description: Patient's Short Term Goal:   Interventions: -   - See additional Care Plan goals for specific interventions  Outcome: Progressing     Problem: CARDIOVASCULAR - ADULT  Goal: Maintains optimal cardiac output and hemodynamic stability  Description: INTERVENTIONS:  - Monitor vital signs, rhythm, and trends  - Monitor for bleeding, hypotension and signs of decreased cardiac output  - Evaluate effectiveness of vasoactive medications to optimize hemodynamic stability  - Monitor arterial and/or venous puncture sites for bleeding and/or hematoma  - Assess quality of pulses, skin color and temperature  - Assess for signs of decreased coronary artery perfusion - ex. Angina  - Evaluate fluid balance, assess for edema, trend weights  Outcome: Progressing  Goal: Absence of cardiac arrhythmias or at baseline  Description:  INTERVENTIONS:  - Continuous cardiac monitoring, monitor vital signs, obtain 12 lead EKG if indicated  - Evaluate effectiveness of antiarrhythmic and heart rate control medications as ordered  - Initiate emergency measures for life threatening arrhythmias  - Monitor electrolytes and administer replacement therapy as ordered  Outcome: Progressing     Problem: SAFETY ADULT - FALL  Goal: Free from fall injury  Description: INTERVENTIONS:  - Assess pt frequently for physical needs  - Identify cognitive and physical deficits and behaviors that affect risk of falls.  - Mongo fall precautions as indicated by assessment.  - Educate pt/family on patient safety including physical limitations  - Instruct pt to call for assistance with activity based on assessment  - Modify environment to reduce risk of injury  - Provide assistive devices as appropriate  - Consider OT/PT consult to assist with strengthening/mobility  - Encourage toileting schedule  Outcome: Progressing     Problem: SKIN/TISSUE INTEGRITY - ADULT  Goal: Skin integrity remains intact  Description: INTERVENTIONS  - Assess and document risk factors for pressure ulcer development  - Assess and document skin integrity  - Monitor for areas of redness and/or skin breakdown  - Initiate interventions, skin care algorithm/standards of care as needed  Outcome: Progressing

## 2024-04-19 NOTE — CM/SW NOTE
Anticipated therapy need: Gradual Rehabilitative Therapy    Psychiatric approves.     CM met with patient and granddaughter at bedside. Patient confirmed she was admitted from [facility]. Originally from [place] at [facility].     Patient would like to discharge to a different [facility] facility. List of accepting facilities provided to patient to review. Patient requesting [facility]- at this time they do not have a bed. Patient not medically ready for discharge today. Anticipate another day or 2 of IV diuretics.     Message sent to [facility] to see if a bed may open up soon. CM discussed with patient/granddaughter to have second option as [facility] may not have a bed when she is medically ready for discharge. Granddaughter likely to tour [facility] [place].     Patient/family aware to provide choice as soon as able.     MDO received for community palliative care. Referral sent in aidin. CPC will need to be finalized once [facility] facility is chosen to be sure agency is credentialed at the facility, patient aware.    Plan: [facility] pending choice and medical clearance. *Need to finalize CPC once choice is made    Angy Daniels, RN, BSN

## 2024-04-19 NOTE — PROGRESS NOTES
[facility]  part of [facility]     Progress Note    Elba Malik Patient Status:  Inpatient    1928 MRN [id number]   Location [facility] 3W/SW Attending Sharan Lopez MD   Hosp Day # 1 PCP Sharan Lopez MD       Subjective:   Patient seen and examined.  Diarrhea resolved at this time.  Overall states she feels improved.  Denies significant dyspnea at rest.    Objective:   Blood pressure 143/61, pulse 107, temperature 98.5 °F (36.9 °C), temperature source Oral, resp. rate 16, weight 158 lb 1.6 oz (71.7 kg), SpO2 91%, not currently breastfeeding.  Intake/Output:   Last 3 shifts: I/O last 3 completed shifts:  In: 967 [P.O.:857; I.V.:10; IV PIGGYBACK:100]  Out: 900 [Urine:900]   This shift: I/O this shift:  In: 640 [P.O.:640]  Out: -      Vent Settings:      Hemodynamic parameters (last 24 hours):      Scheduled Meds:   Current Facility-Administered Medications   Medication Dose Route Frequency    metoprolol succinate ER (Toprol XL) 24 hr tab 25 mg  25 mg Oral Daily Beta Blocker    lidocaine-menthol 4-1 % patch 1 patch  1 patch Transdermal Daily    lidocaine-menthol 4-1 % patch 1 patch  1 patch Transdermal Daily    lidocaine-menthol 4-1 % patch 1 patch  1 patch Transdermal Daily    acetaminophen (Tylenol) tab 650 mg  650 mg Oral BID    gabapentin (Neurontin) cap 100 mg  100 mg Oral Nightly    furosemide (Lasix) 10 mg/mL injection 40 mg  40 mg Intravenous BID (Diuretic)    lisinopril (Zestril) tab 10 mg  10 mg Oral Daily    cefTRIAXone (Rocephin) 1 g in D5W 100 mL IVPB-ADD  1 g Intravenous Q24H    azithromycin (Zithromax) tab 500 mg  500 mg Oral Q24H    fluticasone propionate (Flonase) 50 MCG/ACT nasal suspension 2 spray  2 spray Nasal Daily    enoxaparin (Lovenox) 30 MG/0.3ML SUBQ injection 30 mg  30 mg Subcutaneous Daily    HYDROcodone-acetaminophen (Norco) 5-325 MG per tab 1 tablet  1 tablet Oral Q8H PRN    zolpidem (Ambien) tab 10 mg  10 mg Oral Nightly PRN        Continuous Infusions:     Physical Exam  Constitutional: no acute distress  Eyes: PERRL  ENT: nares pateint  Neck: supple, no JVD  Cardio: RRR, S1 S2  Respiratory: clear to auscultation bilaterally, no wheezing, rales, rhonchi, crackles  GI: abdomen soft, non tender, active bowel sounds, no organomegaly  Extremities: no clubbing, cyanosis, edema  Neurologic: no gross motor deficits  Skin: warm, dry      Results:     Lab Results   Component Value Date    WBC 9.8 04/19/2024    HGB 8.4 04/19/2024    HCT 24.9 04/19/2024    .0 04/19/2024    CREATSERUM 1.07 04/19/2024    BUN 35 04/19/2024     04/19/2024    K 3.3 04/19/2024    K 3.3 04/19/2024    CL 97 04/19/2024    CO2 29.0 04/19/2024    GLU 92 04/19/2024    CA 8.5 04/19/2024       XR CHEST AP PORTABLE  (CPT=71045)    Result Date: 4/18/2024  CONCLUSION:   Stable small left pleural effusion with mild adjacent left basilar opacity which may reflect atelectasis.  Pneumonia excluded    Dictated by (CST): Jessee Colunga MD on 4/18/2024 at 8:28 AM     Finalized by (CST): Jessee Colunga MD on 4/18/2024 at 8:29 AM           EKG 12 Lead    Result Date: 4/18/2024  Sinus tachycardia with Premature atrial complexes and Premature ventricular complexes or Fusion complexes Otherwise normal ECG When compared with ECG of 10-APR-2024 18:30, Fusion complexes are now Present Premature ventricular complexes are now Present Confirmed by GABE FRANCOIS ELMER ([id number]) on 4/18/2024 5:53:01 PM     Assessment   1.  Dyspnea  2.  Leukocytosis  3.  Recent periprosthetic hip fracture  4.  Hypertension  5.  Anemia  6.  Chronic pain  7.  Arthritis  8.  Hyponatremia     Plan   -Presents with some increased dyspnea over the last several days with some lower extremity edema.  Recently discharged on 4/11/2024 after periprosthetic hip fracture.  -Chest x-ray on presentation with small left pleural effusion left basilar opacity cannot rule out atelectasis versus pneumonia  -Empiric IV  antibiotic therapy at this time.  Cultures with no growth to date.  Urine culture with gram-negative rods.  Will adjust antibiotic therapy after final cultures revealed.  -Wean oxygen as tolerated  -Diarrhea resolved, C. difficile negative  -PT/OT  -Discussed with family.  Requested palliative care consultation for further goals of care.  Ongoing discussions with palliative care.  Patient DNR/DNI    Liane Loyd DO  Pulmonary Critical Care Medicine  [facility]

## 2024-04-19 NOTE — OCCUPATIONAL THERAPY NOTE
OCCUPATIONAL THERAPY EVALUATION - INPATIENT     Room Number: 336/336-A  Evaluation Date: 4/19/2024  Type of Evaluation: Initial       Physician Order: IP Consult to Occupational Therapy  Reason for Therapy: ADL/IADL Dysfunction and Discharge Planning    OCCUPATIONAL THERAPY ASSESSMENT     Patient is a 89 year old female admitted 4/18/2024 for CHF.  Prior to admission, pt was receiving therapy services at [facility] following femur fx; WBAT  Patient is currently requiring up to max A for ADLs overall along with max A for supine to sit, CGA for sitting at EOB, max A for STS, and max A for functional transfer. Pt tolerated about 15-30 seconds of standing in supported standing.  Pt has the following impairments: decreased functional strength, decreased functional reach, decreased endurance, pain, impaired standing balance, decreased muscular endurance, and medical status.    RN cleared pt for participation in OT session, which was completed in collaboration with PT. Upon arrival, pt was supine in bed and agreeable to activity. Daughter and granddaughter present during session. Gait belt used. Pt was left in chair and alarm was activated. Call light and all needs left in reach. Handoff given to RN.    Education provided  Educated pt about role of OT and hospital therapy process as well as proper safety techniques, exercises, and body mechanics while standing. Pt verbalized/demonstrated fair carryover.    Patient will benefit from continued skilled OT Services to promote return to prior level of function and safety with continuous assistance and gradual rehabilitative therapy     PLAN  OT Treatment Plan: Balance activities;Energy conservation/work simplification techniques;Continued evaluation;Compensatory technique education;Fine motor coordination activities;Neuromuscluar reeducation;Equipment eval/education;Patient/Family training;Patient/Family education;Cognitive reorientation;Endurance training;UE  strengthening/ROM;Functional transfer training;Visual perceptual training;IADL training;ADL training      OCCUPATIONAL THERAPY MEDICAL/SOCIAL HISTORY     Problem List  Principal Problem:    Acute on chronic congestive heart failure, unspecified heart failure type (HCC)  Active Problems:    Community acquired pneumonia, unspecified laterality    Palliative care encounter    Pain    Goals of care, counseling/discussion      Past Medical History  Past Medical History:    Arthritis    Asthma (HCC)    Back problem    multiple spinal injections    Cataract    bilateral cataract surgery    Cataracts, bilateral    cataracts, PC IOL 1997 OD Dr. Tate,  PC IOL Dr. Concepcion in [place], OS    Colon polyp    small polyps    Disorder of thyroid    Essential hypertension    Hearing impairment    High blood pressure    Osteoarthritis    Other and unspecified hyperlipidemia    Other ill-defined conditions(799.89)    Left wrist trauma, surgical repair    Posterior capsule opacification    OS, YAG laser 2014    Ptosis of right eyelid    Ptosis RUL    Shortness of breath    Visual impairment       Past Surgical History  Past Surgical History:   Procedure Laterality Date    Cataract      cataract extraction    Cataract Left 1997    Phaco w/PC IOL    Cataract Right 1998    Phaco w/ PC IOL    Cataract extraction w/  intraocular lens implant Left 1997    PC IOL/ Dr. Pichardo    Cataract extraction w/  intraocular lens implant Right 1998    PC IOL / Dr. Concepcion    Cholecystectomy      Colonoscopy      Ercp,diagnostic  10/16/2018    Choledocholithiasis, dilated intrahepatic and extrahepatic biliary tree    Hip replacement surgery  97/0 per NG    Hysterectomy      Partial    Laparoscopic cholecystectomy  05/27/2016    Other surgical history      Left wrist trauma, surgical repair    Other surgical history  05/27/2016    Laparoscopy with bilateral salpingo-oophorectomy    Synvisc, intra-articular Bilateral 2013    Synvisc injection bilateral knees     Total hip replacement      bilateral hip replacements    Yag capsulotomy - os - left eye Left 1998    Dr. Concepcion       HOME SITUATION  Type of Home: Skilled nursing facility  Home Layout: One level  Lives With: Staff 24 hours                      Drives: No  Patient Regularly Uses: None    SUBJECTIVE  \"I feel like I am falling.\"    OCCUPATIONAL THERAPY EXAMINATION      OBJECTIVE  Precautions: Limb alert - left  Fall Risk: High fall risk    PAIN ASSESSMENT  Rating: Unable to rate  Location: LLE      RANGE OF MOTION   Upper extremity ROM is within functional limits     STRENGTH ASSESSMENT  Upper extremity strength is within functional limits     COORDINATION  Gross Motor: WFL   Fine Motor: WFL     ACTIVITIES OF DAILY LIVING ASSESSMENT  AM-PAC ‘6-Clicks’ Inpatient Daily Activity Short Form  How much help from another person does the patient currently need…  -   Putting on and taking off regular lower body clothing?: A Lot  -   Bathing (including washing, rinsing, drying)?: A Lot  -   Toileting, which includes using toilet, bedpan or urinal? : A Lot  -   Putting on and taking off regular upper body clothing?: A Little  -   Taking care of personal grooming such as brushing teeth?: A Little  -   Eating meals?: None    AM-PAC Score:  Score: 16  Approx Degree of Impairment: 53.32%  Standardized Score (AM-PAC Scale): 35.96  CMS Modifier (G-Code): CK      BED MOBILITY  Supine to Sit : Maximum Assist      FUNCTIONAL TRANSFER ASSESSMENT  Supine to Sit : Maximum Assist     Sit to stand: max A  Toilet Transfer: max A  Chair Transfer: max A    FUNCTIONAL ADL ASSESSMENT  Overall max A    The patient's Approx Degree of Impairment: 53.32% has been calculated based on documentation in the [facility] '6 clicks' Inpatient Daily Activity Short Form.  Research supports that patients with this level of impairment may benefit from [facility]. Final disposition will be made by interdisciplinary medical team.    OT Goals  Patients self stated goal  is: to go home     Patient will complete functional transfer with min A  Comment:     Patient will complete toileting with mod A  Comment:     Patient will tolerate standing for 1 minutes in prep for adls with min A   Comment:    Patient will complete item retrieval with min A  Comment:          Goals  on:   Frequency: 3-5x/wk    Patient Evaluation Complexity Level:   Occupational Profile/Medical History MODERATE - Expanded review of history including review of medical or therapy record   Specific performance deficits impacting engagement in ADL/IADL MODERATE  3 - 5 performance deficits   Client Assessment/Performance Deficits MODERATE - Comorbidities and min to mod modifications of tasks    Clinical Decision Making MODERATE - Analysis of occupational profile, detailed assessments, several treatment options    Overall Complexity MODERATE     OT Session Time: 20 minutes  Self-Care Home Management: 10 minutes           Fátima Mccullough OT  [facility]  Inpatient Rehabilitation  Occupational Therapy  [phone]

## 2024-04-19 NOTE — CONSULTS
[facility]  part of [facility]  Palliative Care Initial Consult Note    Elba Malik Patient Status:  Inpatient    1928 MRN [id number]   Location [facility] 3W/SW Attending Sharan Lopez MD   Hosp Day # 1 PCP Sharan Lopez MD     Date of Consult: 2024  Patient seen at: [facility] In[facility]    The  Cures Act makes medical notes like these available to patients in the interest of transparency. Please be advised this is a medical document. Medical documents are intended to carry relevant information, facts as evident, and the clinical opinion of the practitioner. The medical note is intended as peer to peer communication and may appear blunt or direct. It is written in medical language and may contain abbreviations or verbiage that are unfamiliar.     Reason for Consultation: Consult ordered by:: Dr Loyd for evaluation of Palliative Care needs and goals of care discussion.    Subjective     History of Present Illness: Elba Malik is a 89 year old female with history of right total hip replacement with recent fall resulting in right femur fracture/periprosthetic hip fracture, CHF, arthritis, anemia, chronic pain, neuropathy and hypothyroidism who was admitted on 2024 from the [facility] with increasing SOB, fever and cough. Work up in our hospital revealed Cap pneumonia, UTI and acute on chronic heart failure.  History was obtained from Epic, the pt and family who were present at the bedside..      This is the 3rd hospitalization in the past 4 months.    Today is day #1 of hospitalization.     When I entered the room, the patient was in the bed she is awake, oriented X3 and very pleasant. She is on RA, C/O  bilateral shoulder arthritis, low back pain, bilateral knee pain and right hip pain with movement. Pt states she experiences peripheral neuropathy in lower extremities, and has arthritis  pt states at rest she has no pain, takes  the Norco 5/325 as needed for the pain which helps relieve pain and takes the Norco prior to physical therapy. Pt states she uses biofreeze as needed for pain which also helps with pain management, she denies SOB at rest she is on RA. Pt  denies any difficulty chewing or swallowing, states she might have lost some weight. Pt denies any N/V/D. Denies any depression or anxiety symptoms. Pts dtr Kenn and granddaughter Lily are present at bedside.     Palliative Care symptom needs assessed:   Pertinent items are noted in HPI/below    Fatigue:  Yes  Dyspnea: denies   Current O2 therapy: RA  Cough: denies  Pain Present: denies pain at rest. + pain with movement  Non-verbal signs of pain present: NO  Appetite: good  Constipation: denies  Diarrhea: denies  Nausea/Vomiting: denies  Depression: denies  Anxiety: denies    Palliative Care Social History:   Marital Status:   Children: 2 dtrs  Living Situation Prior to Admit: pt was at the [facility] for rehab after last hospitalization, she was [email or web address] prior to that.   Is Patient Confused: No  Occupational History: retired     Substance History:  Smoking history: Non smoker  Hx of Substance Use/Abuse: denies    Spiritual Assessment:   [employer] [place] [place]  Family did not request  visit at this time.     Past Medical History/Past Surgical History: obtained from [facility]  Past Medical History:    Arthritis    Asthma (HCC)    Back problem    multiple spinal injections    Cataract    bilateral cataract surgery    Cataracts, bilateral    cataracts, PC IOL 1997 OD Dr. Tate,  PC IOL Dr. Concepcion in [place], OS    Colon polyp    small polyps    Disorder of thyroid    Essential hypertension    Hearing impairment    High blood pressure    Osteoarthritis    Other and unspecified hyperlipidemia    Other ill-defined conditions(799.89)    Left wrist trauma, surgical repair    Posterior capsule opacification    OS, YAG laser 2014     Ptosis of right eyelid    Ptosis RUL    Shortness of breath    Visual impairment     Past Surgical History:   Procedure Laterality Date    Cataract      cataract extraction    Cataract Left 1997    Phaco w/PC IOL    Cataract Right 1998    Phaco w/ PC IOL    Cataract extraction w/  intraocular lens implant Left 1997    PC IOL/ Dr. Pichardo    Cataract extraction w/  intraocular lens implant Right 1998    PC IOL / Dr. Concepcion    Cholecystectomy      Colonoscopy      Ercp,diagnostic  10/16/2018    Choledocholithiasis, dilated intrahepatic and extrahepatic biliary tree    Hip replacement surgery  97/0 per NG    Hysterectomy      Partial    Laparoscopic cholecystectomy  05/27/2016    Other surgical history      Left wrist trauma, surgical repair    Other surgical history  05/27/2016    Laparoscopy with bilateral salpingo-oophorectomy    Synvisc, intra-articular Bilateral 2013    Synvisc injection bilateral knees    Total hip replacement      bilateral hip replacements    Yag capsulotomy - os - left eye Left 1998    Dr. Concepcion       Nutritional Status:  BMI:26 Weight: 158lbs  Weight changes: states she has had weight loss unsure how much.   Current Appetite: good  Dysphagia: denies    Family History: obtained from [facility]  Family History   Problem Relation Age of Onset    Ear Problems Mother         hearing loss    Other (Other) Father         emphysema    Diabetes Neg     Glaucoma Neg     Macular degeneration Neg        Allergies:  No Known Allergies    Medications:     Current Facility-Administered Medications:     metoprolol succinate ER (Toprol XL) 24 hr tab 25 mg, 25 mg, Oral, Daily Beta Blocker    lidocaine-menthol 4-1 % patch 1 patch, 1 patch, Transdermal, Daily    lidocaine-menthol 4-1 % patch 1 patch, 1 patch, Transdermal, Daily    lidocaine-menthol 4-1 % patch 1 patch, 1 patch, Transdermal, Daily    acetaminophen (Tylenol) tab 650 mg, 650 mg, Oral, BID    gabapentin (Neurontin) cap 100 mg, 100 mg, Oral, Nightly     furosemide (Lasix) 10 mg/mL injection 40 mg, 40 mg, Intravenous, BID (Diuretic)    lisinopril (Zestril) tab 10 mg, 10 mg, Oral, Daily    cefTRIAXone (Rocephin) 1 g in D5W 100 mL IVPB-ADD, 1 g, Intravenous, Q24H    azithromycin (Zithromax) tab 500 mg, 500 mg, Oral, Q24H    fluticasone propionate (Flonase) 50 MCG/ACT nasal suspension 2 spray, 2 spray, Nasal, Daily    enoxaparin (Lovenox) 30 MG/0.3ML SUBQ injection 30 mg, 30 mg, Subcutaneous, Daily    HYDROcodone-acetaminophen (Norco) 5-325 MG per tab 1 tablet, 1 tablet, Oral, Q8H PRN    zolpidem (Ambien) tab 10 mg, 10 mg, Oral, Nightly PRN    Functional Status History:  Falls: Yes  ADLs: pt spends most of her time in bed at the [facility], she needs assistance to get up into the chair, she requires assistance with all ADL's, she is cognitively intact on exam.     Palliative Performance Scale:   Prior to admission: (pt/family reported) 40 %  Observed during hospitalization: 40 %  % Ambulation Activity Level Self-Care Intake Consciousness   100 Full  Normal  No Disease Full Normal Full   90 Full  Normal  Some Disease Full Normal Full   80 Full  Normal w/effort  Some Disease Full Normal or reduced Full   70 Reduced  Can't Perform Job  Some Disease Full Normal or reduced Full   60 Reduced  Can't Perform Hobby   Significant Disease Occ Assist Normal or reduced Full or confused   50 Mainly sit/lie Can't do any work  Extensive Disease Partial Assist Normal or reduced Full or confused   40 Mainly in bed Can't do any work  Extensive Disease Mainly Assist Normal or reduced Full or confused   30 Bed Bound Can't do any work  Extensive Disease Max Assist  Total Care Reduced  Drowsy/confused   20 Bed Bound Can't do any work  Extensive Disease Max Assist  Total Care Minimal  Drowsy/confused   10 Bed Bound Can't do any work  Extensive Disease Max Assist  Total Care Mouth Care  Drowsy/confused   0 Death        Objective      Vital Signs:  Blood pressure 143/61, pulse 107, temperature 98.5  °F (36.9 °C), temperature source Oral, resp. rate 16, weight 158 lb 1.6 oz (71.7 kg), SpO2 91%, not currently breastfeeding.  Body mass index is 26.31 kg/m².  Present Level of pain: denies pain on exam  Non-verbal signs of pain present: No    General: pt is in the bed awake and alert very pleasant, oriented X4 and in no apparent distress.  HEENT: pt has own dentition, oral MM dry  Cardiac: Regular rate and rhythm, S1, S2 normal, no murmur, rub or gallop.  Lungs: Clear to diminished without wheezes, rales, rhonchi or dullness.  Normal excursions and effort. on RA  Abdomen: Soft, non-tender, + bowel sounds, no rebound or guarding  Extremities: Without clubbing, cyanosis. + lower extremity edema trace  Neurologic: Alert and oriented X3, normal affect. Very pleasant  Skin: Warm and dry.  Pt has numbness and tingling bilateral lower extremities which is chronic    Hematology:  Lab Results   Component Value Date    WBC 9.8 04/19/2024    HGB 8.4 (L) 04/19/2024    HCT 24.9 (L) 04/19/2024    .0 (H) 04/19/2024       Coags:  Lab Results   Component Value Date    INR 0.96 04/05/2024    PTT 33.0 04/05/2024       Chemistry:  Lab Results   Component Value Date    CREATSERUM 1.07 (H) 04/19/2024    BUN 35 (H) 04/19/2024     (L) 04/19/2024    K 3.3 (L) 04/19/2024    K 3.3 (L) 04/19/2024    CL 97 (L) 04/19/2024    CO2 29.0 04/19/2024    GLU 92 04/19/2024    CA 8.5 (L) 04/19/2024    ALB 3.6 04/18/2024    ALKPHO 101 04/18/2024    BILT 0.4 04/18/2024    TP 6.3 04/18/2024    AST 54 (H) 04/18/2024    ALT 46 04/18/2024    DDIMER 0.63 01/10/2023     (H) 06/06/2016       Imaging:  XR CHEST AP PORTABLE  (CPT=71045)    Result Date: 4/18/2024  CONCLUSION:   Stable small left pleural effusion with mild adjacent left basilar opacity which may reflect atelectasis.  Pneumonia excluded    Dictated by (CST): Jessee Colunga MD on 4/18/2024 at 8:28 AM     Finalized by (CST): Jessee Colunga MD on 4/18/2024 at 8:29 AM              Summary of Discussion      I discussed reason for palliative care consultation with the pt, dtr Kenn and granddaughter Lily who were present at the bedside.     I differentiated the palliative treatment-focus model versus the hospice comfort-focused philosophy of care. I informed the patient/family that having palliative care support does not limit medical treatment options or decisions to those who wish to continue curative or restorative medical therapies. I discussed the benefits of palliative care to include assistance with arising symptom management needs, an extra layer of support, to ensure GOC are respected throughout healthcare continuum, and assist with transition to hospice care when appropriate.      Outpatient/Community Palliative Care Services:  Usually visit once per 4 weeks  Focus on GOC and symptom management   Palliative Care criteria:  Not altered by prognosis   Does not limit curative or restorative therapies      Outpatient Hospice services:  24/7 phone triage services   RN visit one or more times per week depending on need  Home health aid to assist in ADLs/hygiene   Hospice criteria:  Less than six-month prognosis   Must forego most life-prolonging  measures/treatments   Focus solely on comfort   Must sign onto hospice benefit with agency     Prognostic awareness/understanding:   Pt has been mainly in the bed since she fell and fractured her right femur. Pt was discharged to the [facility] where she has been becoming increasingly weaker and unable to ambulate, the staff have been getting her into the chair with use of standing lift.  We discussed the disease trajectory of advanced age, chronic CHF, pneumonia, increasing decline and inability to ambulate which is forcing pt to be more bed bound.  I believe the greatest limitation to QOL is her inability to no longer ambulate and pain    Hopes/goals:   Elba expresses she would like to be able to take care of herself and be able to transfer  on her own. Elba would like to eventually return to Assisted Living facility  I discussed with Elba due to periprosthetic femur fracture she may be looking at anew normal in regards to her independence.   We discussed possible effects of being more bed bound and risk for further decline.   Elba wants to remain hopeful that she will be able to progress somewhat.   We dicussed CPC vs hospice philosophy. Elba would like to start with CPC with transition to hospice when she is ready.     I provided emotional support to the pt and her faraz family who are coping as best they can at this time.     Advance Care Planning counseling and discussion:   I confirmed that patient's HCPOA is her dtr Kenn Wiggins and dt Maribell Us.     I confirmed POLST form on file with expressed wishes of DNAR/DNI with selective treatment and continued medical support.     Assessment and Recommendations         Acute on chronic congestive heart failure    Community acquired pneumonia    UTI    H/o right hip replacement surgery X2 with recent fall resulting in right periprosthetic hip/femur fracture    Anemia    Chronic arthritis-bilateral shoulders-bilateral knees    Chronic low back pain  -schedule Tylenol 650mg twice daily  -lidocaine patches to low back and bilateral knees  -continue with Norco 5/325 as needed for pain      Peripheral neuropathy  -add gabapentin 100mg nightly-pt has been on this in the past, she is not sure why she is no longer taking.       Goals of care counseling  -see above for details  -Pt is DNAR/DNI with selective treatment  -Agreeable to palliative care following  -Dispo: ALEXA with CPC. SW to help with dc planning.   -family did not request   -pt and family are aware of hospice option when pt is ready  -ongoing GOC discussions will be needed over time.  -Provided emotional support to pt/family who are coping as best they can at this time    Advance care planning  -see above for details  -Pt's dtrs Kenn Anderson  [phone] and Maribell Us [phone] are HCP's  Other successors are granddaughter Nahomy Green [phone] and Lily Jesusquardt [phone]    Palliative Performance Scale 40%    Discussed today's visit with Brigitte Odonnell RN and Angy VICK    Palliative Care Follow Up: Palliative care team will follow up on Monday if pt is still here.  Feel free to contact our team with any questions or concerns.    Thank you for allowing Palliative Care services to participate in the care of Elba Malik.    A total of 55 minutes were spent on this consult, which included all of the following: chart review, direct face to face contact, history taking, physical examination, counseling and coordinating care, and documentation.     Suzanne Mercado, APRCARTER [id number]  4/19/2024  11:50AM  Palliative Care Services

## 2024-04-19 NOTE — CONGREGATE LIVING REVIEW
[facility] Living Authorization    The [facility] Review Committee has reviewed this case and the patient IS APPROVED for discharge to a facility for Short Term Skilled once the following procedure is followed:     - The physician discharge instructions (contained within the NAVID note for SNF) must inlcude the below appropriate and approved COVID instructions to the facility    For questions regarding CLRC approval process, please contact the CM assigned to the case.  For questions regarding RN discharge workflow, please contact the unit Clinical Leader.

## 2024-04-19 NOTE — PROGRESS NOTES
[facility] [place] note transcribed by Dr. Jeff Damico  .  Date seen: 4/16/2024  .  Active: Patient seen and examined for left periprosthetic fracture, fall, hypertension, history of low back pain. Patient seen and examined seems stable, lab work reviewed, she seems to be doing well, had some more activity during the day, still having difficulty completing physical therapy activities, she claims she is still very weak, but she is giving it full effort. She had some lab work displaying mild elevatedwhite count, reactive platelets, low hemoglobin that looks comparable to hospital numbers, mildly low sodium, otherwise nothing glaringly abnormal. Numbers were reviewed by the nurse practitioner, who has reordered some alternative lab work for the nearfuture.  .  EXAM:  Vital signs: See point click care charting for updated details  Gen. exam: Alert and awake, baseline mental status noted, in no acute distress  HEENT:Pupils equal and reactive to light and accommodation, moist mucous membranes  Neck exam: Supple. Normal thyroid trachea midline, no JVD  Heart exam: Regular rate and rhythm 2/6 AI murmurs no S3 no S4  Lung exam: No rales no rhonchi no wheezes, clear lung fields  Abdominal exam: Soft, nontender, nondistended, positive bowel sounds are normoactive  Extremities exam: no clubbing, no cyanosis,1/4 bilateral lower extremity edema, bilateral lower extremity venous stasis  Skin exam: Hip with V shaped type stage I-2 lesion looks to be healing dressed, no signs of infection, no, drainage  Neurological exam: Cranial nerves II through XII intact, nogross deficits  Musculoskeletal exam: Moderate bilateral generalized hand arthritis appreciated, no obvious deformity, left hip with pain to palpation in a greater trochanteric area, minimal movement causes pain  .  Labs/imaging: See chart  DVT prophylaxis: with proximal  Ambulatory status: Per hospital discharge recommendations, specialist involvement/recommendations  and physical therapy evaluation  .  ASSESSMENT AND PLAN:  Elba Malik is a 89 year old female seen at [facility] [place] withthe followin. Periprosthetic hip fracture, initial encounter  Physical therapy, occupational therapy, physiatry to evaluate and treat, further orders pending clinical course, anticoagulation per hospital recommendations, pain control with Norco, bowel prophylaxis  2. Fall, initial encounter  Fall precautions, continue current physical therapy  3. Right sided skin lesion/ulcer: Nonpressure area, continue current monitor management, wound care for full evaluation. Treatment options  4.Leukocytosis: Mild, with reactive platelets, expected, will follow-up with nurse practitioner recommended inflammatory markers, and watch for signs and symptoms, I do not believe this patient is actively infected. Serial lab work monitoring.  .  stable problem list:  Chronic left-sided low back pain with left-sided sciatica  Essential hypertension  Acute diastolic congestive heart failure (HCC)  Hemarthrosis of left shoulder  Iron deficiency anemia, unspecified iron deficiency anemia type  prmary insomnia  Hypothyroidism, unspecified type  Environmental allergies

## 2024-04-20 ENCOUNTER — APPOINTMENT (OUTPATIENT)
Dept: GENERAL RADIOLOGY | Facility: HOSPITAL | Age: 89
End: 2024-04-20
Attending: INTERNAL MEDICINE
Payer: MEDICARE

## 2024-04-20 LAB
ANION GAP SERPL CALC-SCNC: 5 MMOL/L (ref 0–18)
BASOPHILS # BLD AUTO: 0.06 X10(3) UL (ref 0–0.2)
BASOPHILS NFR BLD AUTO: 0.7 %
BUN BLD-MCNC: 37 MG/DL (ref 9–23)
BUN/CREAT SERPL: 34.9 (ref 10–20)
CALCIUM BLD-MCNC: 8.8 MG/DL (ref 8.7–10.4)
CHLORIDE SERPL-SCNC: 100 MMOL/L (ref 98–112)
CO2 SERPL-SCNC: 32 MMOL/L (ref 21–32)
CREAT BLD-MCNC: 1.06 MG/DL
DEPRECATED RDW RBC AUTO: 49.1 FL (ref 35.1–46.3)
EGFRCR SERPLBLD CKD-EPI 2021: 48 ML/MIN/1.73M2 (ref 60–?)
EOSINOPHIL # BLD AUTO: 0.08 X10(3) UL (ref 0–0.7)
EOSINOPHIL NFR BLD AUTO: 1 %
ERYTHROCYTE [DISTWIDTH] IN BLOOD BY AUTOMATED COUNT: 14.6 % (ref 11–15)
GLUCOSE BLD-MCNC: 90 MG/DL (ref 70–99)
HCT VFR BLD AUTO: 26.6 %
HGB BLD-MCNC: 8.5 G/DL
IMM GRANULOCYTES # BLD AUTO: 0.13 X10(3) UL (ref 0–1)
IMM GRANULOCYTES NFR BLD: 1.6 %
LYMPHOCYTES # BLD AUTO: 1.23 X10(3) UL (ref 1–4)
LYMPHOCYTES NFR BLD AUTO: 14.7 %
MCH RBC QN AUTO: 29.1 PG (ref 26–34)
MCHC RBC AUTO-ENTMCNC: 32 G/DL (ref 31–37)
MCV RBC AUTO: 91.1 FL
MONOCYTES # BLD AUTO: 1.03 X10(3) UL (ref 0.1–1)
MONOCYTES NFR BLD AUTO: 12.3 %
NEUTROPHILS # BLD AUTO: 5.82 X10 (3) UL (ref 1.5–7.7)
NEUTROPHILS # BLD AUTO: 5.82 X10(3) UL (ref 1.5–7.7)
NEUTROPHILS NFR BLD AUTO: 69.7 %
OSMOLALITY SERPL CALC.SUM OF ELEC: 292 MOSM/KG (ref 275–295)
PLATELET # BLD AUTO: 787 10(3)UL (ref 150–450)
POTASSIUM SERPL-SCNC: 3.6 MMOL/L (ref 3.5–5.1)
POTASSIUM SERPL-SCNC: 3.6 MMOL/L (ref 3.5–5.1)
RBC # BLD AUTO: 2.92 X10(6)UL
SODIUM SERPL-SCNC: 137 MMOL/L (ref 136–145)
WBC # BLD AUTO: 8.4 X10(3) UL (ref 4–11)

## 2024-04-20 PROCEDURE — 71045 X-RAY EXAM CHEST 1 VIEW: CPT | Performed by: INTERNAL MEDICINE

## 2024-04-20 PROCEDURE — 99232 SBSQ HOSP IP/OBS MODERATE 35: CPT | Performed by: INTERNAL MEDICINE

## 2024-04-20 NOTE — PHYSICAL THERAPY NOTE
PHYSICAL THERAPY TREATMENT NOTE - INPATIENT     Room Number: 336/336-A       Presenting Problem: CHF       Problem List  Principal Problem:    Acute on chronic congestive heart failure, unspecified heart failure type (HCC)  Active Problems:    Community acquired pneumonia, unspecified laterality    Palliative care encounter    Pain    Goals of care, counseling/discussion      PHYSICAL THERAPY ASSESSMENT   Patient demonstrates limited progress this session, goals  remain in progress.    Patient continues to function below baseline with bed mobility, transfers, gait, and standing prolonged periods.  Contributing factors to remaining limitations include decreased functional strength, decreased endurance/aerobic capacity, pain, impaired static and dynamic balance, and decreased muscular endurance.  Next session anticipate patient to progress bed mobility, transfers, gait, stair negotiation, and standing prolonged periods.  Physical Therapy will continue to follow patient for duration of hospitalization.    Patient continues to benefit from continued skilled PT services: to promote return to prior level of function and safety with continuous assistance and gradual rehabilitative therapy .    PLAN  PT Treatment Plan: Bed mobility;Body mechanics;Coordination;Endurance;Patient education;Gait training;Strengthening;Transfer training;Balance training  Frequency (Obs): 5x/week    SUBJECTIVE  Pt was agreeable to therapy session.         OBJECTIVE  Precautions: Limb alert - left    WEIGHT BEARING RESTRICTION           L Lower Extremity: Weight Bearing as Tolerated    PAIN ASSESSMENT   Rating: 3  Location: L LE  Management Techniques: Activity promotion;Body mechanics;Relaxation;Repositioning    BALANCE  Static Sitting: Fair  Dynamic Sitting: Fair -  Static Standing: Poor -  Dynamic Standing: Poor -    ACTIVITY TOLERANCE                          O2 WALK       AM-PAC '6-Clicks' INPATIENT SHORT FORM - BASIC MOBILITY  How much  difficulty does the patient currently have...  Patient Difficulty: Turning over in bed (including adjusting bedclothes, sheets and blankets)?: A Lot   Patient Difficulty: Sitting down on and standing up from a chair with arms (e.g., wheelchair, bedside commode, etc.): A Lot   Patient Difficulty: Moving from lying on back to sitting on the side of the bed?: A Lot   How much help from another person does the patient currently need...   Help from Another: Moving to and from a bed to a chair (including a wheelchair)?: A Lot   Help from Another: Need to walk in hospital room?: Total   Help from Another: Climbing 3-5 steps with a railing?: Total     AM-PAC Score:  Raw Score: 10   Approx Degree of Impairment: 76.75%   Standardized Score (AM-PAC Scale): 32.29   CMS Modifier (G-Code): CL    FUNCTIONAL ABILITY STATUS  Functional Mobility/Gait Assessment  Gait Assistance: Maximum assistance  Distance (ft): SPT  Assistive Device: Other (Comment) (holding onto the therapist)  Pattern: Shuffle  Rolling: maximum assist  Supine to Sit: maximum assist  Sit to Supine:  NT  Sit to Stand: maximum assist    Additional information: Pt is received in the bd and was cleared for therapy session. Pt is max A with bed mobility and to transfer to the EOB. Pt required extra time to perform. Pt reported that her L LE was having increased pain with activity. Pt required assist with her B LE's and her upper trunk to sit up. Pt sat EOB for about 20 minutes and denied any dizziness and light headedness. Pt was able to sit unsupported. Pt then was max A with sit<>stand transfers while holding therapist and performed a SPT also max A for balance and safety. Pt was able to take a few shuffling steps to the chair. Pt was able to stand also with max A for about 30 sec. Pt then was repositioned and left in the chair with all needs within reach and alarm system activated. Reported to the RN on the status of the pt.     The patient's Approx Degree of  Impairment: 76.75% has been calculated based on documentation in the [facility] '6 clicks' Inpatient Daily Activity Short Form.  Research supports that patients with this level of impairment may benefit from Rehab.  Final disposition will be made by interdisciplinary medical team.    THERAPEUTIC EXERCISES  Lower Extremity LAQ     Position Sitting       Patient End of Session: Up in chair;Needs met;Call light within reach;RN aware of session/findings;All patient questions and concerns addressed;Alarm set    CURRENT GOALS   Goals to be met by: 5/10/24  Patient Goal Patient's self-stated goal is: to go home   Goal #1 Patient is able to demonstrate supine - sit EOB @ level: supervision     Goal #1   Current Status Max A   Goal #2 Patient is able to demonstrate transfers Sit to/from Stand at assistance level: minimum assistance with walker - rolling     Goal #2  Current Status Max a holding onto the therapist   Goal #3 Patient is able to ambulate 10 feet with assist device: walker - rolling at assistance level: minimum assistance   Goal #3   Current Status SPT max A holding onto the therapist   Goal #4    Goal #4   Current Status    Goal #5 Patient to demonstrate independence with home activity/exercise instructions provided to patient in preparation for discharge.   Goal #5   Current Status IN PROGRESS   Goal #6    Goal #6  Current Status        Therapeutic Activity: 30 minutes

## 2024-04-20 NOTE — PROGRESS NOTES
[facility] Cardiology Progress Note    Elba Malik Patient Status:  Inpatient    1928 MRN [id number]   Location [facility] 3W/SW Attending Sharan Lopez MD   Hosp Day # 2 PCP Sharan Lopez MD     Subjective:  Denies cp, sob, Mild coe but isn't able to move much    Objective:  /56 (BP Location: Right arm)   Pulse 90   Temp 98 °F (36.7 °C) (Oral)   Resp 18   Wt 145 lb 4.8 oz (65.9 kg)   SpO2 94%   BMI 24.18 kg/m²     Telemetry: NSR       Intake/Output:    Intake/Output Summary (Last 24 hours) at 2024 1104  Last data filed at 2024 1008  Gross per 24 hour   Intake 800 ml   Output 2150 ml   Net -1350 ml       Last 3 Weights   24 0558 145 lb 4.8 oz (65.9 kg)   24 1107 158 lb 1.6 oz (71.7 kg)   24 0505 158 lb 1.6 oz (71.7 kg)   24 1037 139 lb 12.4 oz (63.4 kg)   24 0524 154 lb 14.4 oz (70.3 kg)   04/10/24 0825 165 lb 3.2 oz (74.9 kg)   24 0417 167 lb 4.8 oz (75.9 kg)   24 1402 156 lb 14.4 oz (71.2 kg)   24 0600 153 lb 11.2 oz (69.7 kg)   24 0100 145 lb (65.8 kg)   24 0735 153 lb (69.4 kg)       Labs:  Recent Labs   Lab 24  0741 24  0656 24  0644   * 92 90   BUN 34* 35* 37*   CREATSERUM 0.94 1.07* 1.06*   EGFRCR 56* 48* 48*   CA 9.2 8.5* 8.8   * 134* 137   K 3.4* 3.3*  3.3* 3.6  3.6   CL 96* 97* 100   CO2 29.0 29.0 32.0     Recent Labs   Lab 24  0741 24  0656 24  0644   RBC 3.23* 2.82* 2.92*   HGB 9.5* 8.4* 8.5*   HCT 28.1* 24.9* 26.6*   MCV 87.0 88.3 91.1   MCH 29.4 29.8 29.1   MCHC 33.8 33.7 32.0   RDW 14.1 14.1 14.6   NEPRELIM 11.00* 6.89 5.82   WBC 13.9* 9.8 8.4   .0* 756.0* 787.0*         No results for input(s): \"TROP\", \"TROPHS\", \"CK\" in the last 168 hours.    Diagnostics:   4/10/24 Echo  1. Left ventricle: The cavity size was normal. Wall thickness was normal.      Systolic function was mildly reduced. The estimated ejection fraction was       50%, by visual assessment. Although no diagnostic regional wall motion      abnormality was identified, this possibility cannot be completely      excluded on the basis of this study. Unable to assess LV diastolic      function due to heart rhythm.   2. Right ventricle: Systolic function was reduced.   3. Aortic valve: There was mild regurgitation.   4. Mitral valve: There was mild regurgitation.   5. Pericardium, extracardiac: There was a left pleural effusion.   Impressions:  No previous study was available for comparison.       Review of Systems   Respiratory:  Positive for shortness of breath.    Cardiovascular:  Positive for leg swelling. Negative for chest pain, palpitations and orthopnea.     Physical Exam:    General: Alert and oriented x 3. No apparent distress.   HEENT: Normocephalic, anicteric sclera, neck supple, no thyromegaly or adenopathy.  Neck: No JVD, carotids 2+, no bruits.  Cardiac: Regular rate & rhythm. S1, S2 normal. No murmur, pericardial rub, S3, or extra cardiac sounds.  Lungs: Clear without wheezes, rales, rhonchi or dullness.  Normal excursions and effort.  Abdomen: Soft, non-tender. No organosplenomegally, mass or rebound, BS-present.  Extremities: Without clubbing or cyanosis.  +Mild ble edema   Neurologic: Alert and oriented, normal affect. No focal defects  Skin: Warm and dry.       Medications:   metoprolol succinate ER  25 mg Oral Daily Beta Blocker    lidocaine-menthol  1 patch Transdermal Daily    lidocaine-menthol  1 patch Transdermal Daily    lidocaine-menthol  1 patch Transdermal Daily    acetaminophen  650 mg Oral BID    gabapentin  100 mg Oral Nightly    furosemide  40 mg Intravenous BID (Diuretic)    lisinopril  10 mg Oral Daily    cefTRIAXone  1 g Intravenous Q24H    azithromycin  500 mg Oral Q24H    fluticasone propionate  2 spray Nasal Daily    enoxaparin  30 mg Subcutaneous Daily         Assessment:    Dyspnea   - Multifactorial in the setting of diastolic HF, poss PNA    - Empiric antibiotics     Acute HFpEF, EF 50%   - BNP > 1000. CXR w/ small left pleural effusion   - Echo w/ EF 50%, mild AI, mild MR, left pleural effusion   - On iv lasix & toprol xl     Periprosthetic hip fracture s/p fall     HTN   - Stable     Anemia   - Per PMD     CKD  - stable     Plan:    Weaned off of supplemental 02. LE edema improving   Scr stable. Continue IVP lasix . Compression stockings   Monitor accurate I/o,daily wts & renal fx closely   Blood pressure stable . Continue toprol xl & lisinopril   Palliative care consulted yesterday     YOANA Nath  4/20/2024  11:04 AM  Ph [phone] ([place])  Ph [phone] ([place])        L3  Seen and examined bedside. Agree with the present management, will follow with you.    Continue IV Lasix 40 mg twice a day.  Creatinine stable.  Palliative care consult pending.  Diarrhea has resolved C. difficile negative.  Thank you for allowing me to participate in the care of your patient, feel free to contact me if you have any questions.    Santana Dupree MD  [facility]  Interventional Cardiology.  [phone]

## 2024-04-20 NOTE — PLAN OF CARE
Pt. A/Ox4, on RA, VSS. Pt slept well, PRN ambien given. No acute events. Pt educated on call light use, within reach. Safety measures in place.     Problem: Patient Centered Care  Goal: Patient preferences are identified and integrated in the patient's plan of care  Description: Interventions:  - What would you like us to know as we care for you? From [facility]  - Provide timely, complete, and accurate information to patient/family  - Incorporate patient and family knowledge, values, beliefs, and cultural backgrounds into the planning and delivery of care  - Encourage patient/family to participate in care and decision-making at the level they choose  - Honor patient and family perspectives and choices  Outcome: Progressing     Problem: CARDIOVASCULAR - ADULT  Goal: Maintains optimal cardiac output and hemodynamic stability  Description: INTERVENTIONS:  - Monitor vital signs, rhythm, and trends  - Monitor for bleeding, hypotension and signs of decreased cardiac output  - Evaluate effectiveness of vasoactive medications to optimize hemodynamic stability  - Monitor arterial and/or venous puncture sites for bleeding and/or hematoma  - Assess quality of pulses, skin color and temperature  - Assess for signs of decreased coronary artery perfusion - ex. Angina  - Evaluate fluid balance, assess for edema, trend weights  Outcome: Progressing  Goal: Absence of cardiac arrhythmias or at baseline  Description: INTERVENTIONS:  - Continuous cardiac monitoring, monitor vital signs, obtain 12 lead EKG if indicated  - Evaluate effectiveness of antiarrhythmic and heart rate control medications as ordered  - Initiate emergency measures for life threatening arrhythmias  - Monitor electrolytes and administer replacement therapy as ordered  Outcome: Progressing     Problem: SAFETY ADULT - FALL  Goal: Free from fall injury  Description: INTERVENTIONS:  - Assess pt frequently for physical needs  - Identify cognitive and physical  deficits and behaviors that affect risk of falls.  - [place] fall precautions as indicated by assessment.  - Educate pt/family on patient safety including physical limitations  - Instruct pt to call for assistance with activity based on assessment  - Modify environment to reduce risk of injury  - Provide assistive devices as appropriate  - Consider OT/PT consult to assist with strengthening/mobility  - Encourage toileting schedule  Outcome: Progressing     Problem: SKIN/TISSUE INTEGRITY - ADULT  Goal: Skin integrity remains intact  Description: INTERVENTIONS  - Assess and document risk factors for pressure ulcer development  - Assess and document skin integrity  - Monitor for areas of redness and/or skin breakdown  - Initiate interventions, skin care algorithm/standards of care as needed  Outcome: Progressing

## 2024-04-20 NOTE — PROGRESS NOTES
[facility]  part of [facility]     Progress Note    Elba Malik Patient Status:  Inpatient    1928 MRN [id number]   Location [facility] 3W/SW Attending Sharan Lopez MD   Hosp Day # 2 PCP Sharan Lopez MD       Subjective:   Patient seen and examined.  Diarrhea resolved at this time.  Overall states she feels improved.  Denies significant dyspnea at rest.  Mild pain.    Objective:   Blood pressure 142/56, pulse 90, temperature 98 °F (36.7 °C), temperature source Oral, resp. rate 18, weight 145 lb 4.8 oz (65.9 kg), SpO2 94%, not currently breastfeeding.  Intake/Output:   Last 3 shifts: I/O last 3 completed shifts:  In: 1250 [P.O.:1230; I.V.:20]  Out: 1550 [Urine:1550]   This shift: I/O this shift:  In: 400 [P.O.:400]  Out: 800 [Urine:800]     Vent Settings:      Hemodynamic parameters (last 24 hours):      Scheduled Meds:   Current Facility-Administered Medications   Medication Dose Route Frequency    metoprolol succinate ER (Toprol XL) 24 hr tab 25 mg  25 mg Oral Daily Beta Blocker    lidocaine-menthol 4-1 % patch 1 patch  1 patch Transdermal Daily    lidocaine-menthol 4-1 % patch 1 patch  1 patch Transdermal Daily    lidocaine-menthol 4-1 % patch 1 patch  1 patch Transdermal Daily    acetaminophen (Tylenol) tab 650 mg  650 mg Oral BID    gabapentin (Neurontin) cap 100 mg  100 mg Oral Nightly    furosemide (Lasix) 10 mg/mL injection 40 mg  40 mg Intravenous BID (Diuretic)    lisinopril (Zestril) tab 10 mg  10 mg Oral Daily    cefTRIAXone (Rocephin) 1 g in D5W 100 mL IVPB-ADD  1 g Intravenous Q24H    azithromycin (Zithromax) tab 500 mg  500 mg Oral Q24H    fluticasone propionate (Flonase) 50 MCG/ACT nasal suspension 2 spray  2 spray Nasal Daily    enoxaparin (Lovenox) 30 MG/0.3ML SUBQ injection 30 mg  30 mg Subcutaneous Daily    HYDROcodone-acetaminophen (Norco) 5-325 MG per tab 1 tablet  1 tablet Oral Q8H PRN    zolpidem (Ambien) tab 10 mg  10 mg Oral Nightly PRN        Continuous Infusions:     Physical Exam  Constitutional: no acute distress  Eyes: PERRL  ENT: nares pateint  Neck: supple, no JVD  Cardio: RRR, S1 S2  Respiratory: clear to auscultation bilaterally, no wheezing, rales, rhonchi, crackles  GI: abdomen soft, non tender, active bowel sounds, no organomegaly  Extremities: no clubbing, cyanosis, edema  Neurologic: no gross motor deficits  Skin: warm, dry      Results:     Lab Results   Component Value Date    WBC 8.4 04/20/2024    HGB 8.5 04/20/2024    HCT 26.6 04/20/2024    .0 04/20/2024    CREATSERUM 1.06 04/20/2024    BUN 37 04/20/2024     04/20/2024    K 3.6 04/20/2024    K 3.6 04/20/2024     04/20/2024    CO2 32.0 04/20/2024    GLU 90 04/20/2024    CA 8.8 04/20/2024       XR CHEST AP PORTABLE  (CPT=71045)    Result Date: 4/20/2024  CONCLUSION:   No significant change when compared to 04/18/2024.    Dictated by (CST): Jarret Das MD on 4/20/2024 at 9:07 AM     Finalized by (CST): Jarret Das MD on 4/20/2024 at 9:11 AM                 Assessment   1.  Dyspnea  2.  Leukocytosis  3.  Recent periprosthetic hip fracture  4.  Hypertension  5.  Anemia  6.  Chronic pain  7.  Arthritis  8.  Hyponatremia     Plan   -Presents with some increased dyspnea over the last several days with some lower extremity edema.  Recently discharged on 4/11/2024 after periprosthetic hip fracture.  -Chest x-ray on presentation with small left pleural effusion left basilar opacity cannot rule out atelectasis versus pneumonia  -Empiric IV antibiotic therapy at this time.  Cultures with no growth to date.  Urine culture with gram-negative rods.  Will adjust antibiotic therapy after final cultures revealed.  -Wean oxygen as tolerated  -Diarrhea resolved, C. difficile negative  -PT/OT  -Discussed with family.  Requested palliative care consultation for further goals of care.  Ongoing discussions with palliative care.  Patient DNR/DNI    Liane Loyd DO  Pulmonary  Critical Care Medicine  [facility]

## 2024-04-21 LAB
ANION GAP SERPL CALC-SCNC: 6 MMOL/L (ref 0–18)
BUN BLD-MCNC: 37 MG/DL (ref 9–23)
BUN/CREAT SERPL: 34.9 (ref 10–20)
CALCIUM BLD-MCNC: 9 MG/DL (ref 8.7–10.4)
CHLORIDE SERPL-SCNC: 101 MMOL/L (ref 98–112)
CO2 SERPL-SCNC: 31 MMOL/L (ref 21–32)
CREAT BLD-MCNC: 1.06 MG/DL
EGFRCR SERPLBLD CKD-EPI 2021: 48 ML/MIN/1.73M2 (ref 60–?)
GLUCOSE BLD-MCNC: 90 MG/DL (ref 70–99)
OSMOLALITY SERPL CALC.SUM OF ELEC: 294 MOSM/KG (ref 275–295)
POTASSIUM SERPL-SCNC: 3.8 MMOL/L (ref 3.5–5.1)
SODIUM SERPL-SCNC: 138 MMOL/L (ref 136–145)

## 2024-04-21 PROCEDURE — 99232 SBSQ HOSP IP/OBS MODERATE 35: CPT | Performed by: INTERNAL MEDICINE

## 2024-04-21 RX ORDER — FUROSEMIDE 40 MG/1
40 TABLET ORAL DAILY
Status: DISCONTINUED | OUTPATIENT
Start: 2024-04-22 | End: 2024-04-23

## 2024-04-21 RX ORDER — CIPROFLOXACIN 2 MG/ML
400 INJECTION, SOLUTION INTRAVENOUS EVERY 12 HOURS
Status: DISCONTINUED | OUTPATIENT
Start: 2024-04-21 | End: 2024-04-21 | Stop reason: ALTCHOICE

## 2024-04-21 RX ORDER — LISINOPRIL 20 MG/1
20 TABLET ORAL DAILY
Status: DISCONTINUED | OUTPATIENT
Start: 2024-04-22 | End: 2024-04-21

## 2024-04-21 RX ORDER — LISINOPRIL 10 MG/1
10 TABLET ORAL DAILY
Status: DISCONTINUED | OUTPATIENT
Start: 2024-04-22 | End: 2024-04-21

## 2024-04-21 RX ORDER — LISINOPRIL 20 MG/1
20 TABLET ORAL DAILY
Status: DISCONTINUED | OUTPATIENT
Start: 2024-04-22 | End: 2024-04-23

## 2024-04-21 RX ORDER — ZOLPIDEM TARTRATE 5 MG/1
5 TABLET ORAL NIGHTLY PRN
Status: DISCONTINUED | OUTPATIENT
Start: 2024-04-21 | End: 2024-04-23

## 2024-04-21 NOTE — PLAN OF CARE
Patient is A&Ox4, room air, max assist lift to chair.  IV lasix switched to PO starting tomorrow. Call light within reach and fall precautions.     Problem: Patient Centered Care  Goal: Patient preferences are identified and integrated in the patient's plan of care  Description: Interventions:  - What would you like us to know as we care for you? From assisted living fail[facility]  - Provide timely, complete, and accurate information to patient/family  - Incorporate patient and family knowledge, values, beliefs, and cultural backgrounds into the planning and delivery of care  - Encourage patient/family to participate in care and decision-making at the level they choose  - Honor patient and family perspectives and choices  Outcome: Progressing     Problem: Patient/Family Goals  Goal: Patient/Family Long Term Goal  Description: Patient's Long Term Goal: to go back to facility    Interventions:  - follow medical regimen  - See additional Care Plan goals for specific interventions  Outcome: Progressing  Goal: Patient/Family Short Term Goal  Description: Patient's Short Term Goal: to not be SOB    Interventions:   - follow medical regimen  - See additional Care Plan goals for specific interventions  Outcome: Progressing     Problem: CARDIOVASCULAR - ADULT  Goal: Maintains optimal cardiac output and hemodynamic stability  Description: INTERVENTIONS:  - Monitor vital signs, rhythm, and trends  - Monitor for bleeding, hypotension and signs of decreased cardiac output  - Evaluate effectiveness of vasoactive medications to optimize hemodynamic stability  - Monitor arterial and/or venous puncture sites for bleeding and/or hematoma  - Assess quality of pulses, skin color and temperature  - Assess for signs of decreased coronary artery perfusion - ex. Angina  - Evaluate fluid balance, assess for edema, trend weights  Outcome: Progressing  Goal: Absence of cardiac arrhythmias or at baseline  Description: INTERVENTIONS:  - Continuous  cardiac monitoring, monitor vital signs, obtain 12 lead EKG if indicated  - Evaluate effectiveness of antiarrhythmic and heart rate control medications as ordered  - Initiate emergency measures for life threatening arrhythmias  - Monitor electrolytes and administer replacement therapy as ordered  Outcome: Progressing     Problem: SAFETY ADULT - FALL  Goal: Free from fall injury  Description: INTERVENTIONS:  - Assess pt frequently for physical needs  - Identify cognitive and physical deficits and behaviors that affect risk of falls.  - [place] fall precautions as indicated by assessment.  - Educate pt/family on patient safety including physical limitations  - Instruct pt to call for assistance with activity based on assessment  - Modify environment to reduce risk of injury  - Provide assistive devices as appropriate  - Consider OT/PT consult to assist with strengthening/mobility  - Encourage toileting schedule  Outcome: Progressing     Problem: SKIN/TISSUE INTEGRITY - ADULT  Goal: Skin integrity remains intact  Description: INTERVENTIONS  - Assess and document risk factors for pressure ulcer development  - Assess and document skin integrity  - Monitor for areas of redness and/or skin breakdown  - Initiate interventions, skin care algorithm/standards of care as needed  Outcome: Progressing

## 2024-04-21 NOTE — PROGRESS NOTES
[facility]  part of [facility]     Progress Note    Elba Malik Patient Status:  Inpatient    1928 MRN [id number]   Location [facility] 3W/SW Attending Sharan Lopez MD   Hosp Day # 3 PCP Sharan Lopez MD       Subjective:   Patient seen and examined.  Diarrhea resolved at this time.  Overall states she feels improved.  Denies significant dyspnea at rest.  Mild pain.    Objective:   Blood pressure 152/87, pulse 94, temperature 97.8 °F (36.6 °C), temperature source Oral, resp. rate 18, weight 144 lb 14.4 oz (65.7 kg), SpO2 97%, not currently breastfeeding.  Intake/Output:   Last 3 shifts: I/O last 3 completed shifts:  In: 1020 [P.O.:1000; I.V.:20]  Out: 3200 [Urine:3200]   This shift: I/O this shift:  In: 200 [P.O.:200]  Out: -      Vent Settings:      Hemodynamic parameters (last 24 hours):      Scheduled Meds:   Current Facility-Administered Medications   Medication Dose Route Frequency    metoprolol succinate ER (Toprol XL) 24 hr tab 25 mg  25 mg Oral Daily Beta Blocker    lidocaine-menthol 4-1 % patch 1 patch  1 patch Transdermal Daily    lidocaine-menthol 4-1 % patch 1 patch  1 patch Transdermal Daily    lidocaine-menthol 4-1 % patch 1 patch  1 patch Transdermal Daily    acetaminophen (Tylenol) tab 650 mg  650 mg Oral BID    gabapentin (Neurontin) cap 100 mg  100 mg Oral Nightly    furosemide (Lasix) 10 mg/mL injection 40 mg  40 mg Intravenous BID (Diuretic)    lisinopril (Zestril) tab 10 mg  10 mg Oral Daily    cefTRIAXone (Rocephin) 1 g in D5W 100 mL IVPB-ADD  1 g Intravenous Q24H    fluticasone propionate (Flonase) 50 MCG/ACT nasal suspension 2 spray  2 spray Nasal Daily    enoxaparin (Lovenox) 30 MG/0.3ML SUBQ injection 30 mg  30 mg Subcutaneous Daily    HYDROcodone-acetaminophen (Norco) 5-325 MG per tab 1 tablet  1 tablet Oral Q8H PRN    zolpidem (Ambien) tab 10 mg  10 mg Oral Nightly PRN       Continuous Infusions:     Physical Exam  Constitutional: no  acute distress  Eyes: PERRL  ENT: nares pateint  Neck: supple, no JVD  Cardio: RRR, S1 S2  Respiratory: clear to auscultation bilaterally, no wheezing, rales, rhonchi, crackles  GI: abdomen soft, non tender, active bowel sounds, no organomegaly  Extremities: no clubbing, cyanosis, edema  Neurologic: no gross motor deficits  Skin: warm, dry      Results:     Lab Results   Component Value Date    CREATSERUM 1.06 04/21/2024    BUN 37 04/21/2024     04/21/2024    K 3.8 04/21/2024     04/21/2024    CO2 31.0 04/21/2024    GLU 90 04/21/2024    CA 9.0 04/21/2024       XR CHEST AP PORTABLE  (CPT=71045)    Result Date: 4/20/2024  CONCLUSION:   No significant change when compared to 04/18/2024.    Dictated by (CST): Jarret Das MD on 4/20/2024 at 9:07 AM     Finalized by (CST): Jarret Das MD on 4/20/2024 at 9:11 AM                 Assessment   1.  Dyspnea  2.  Klebsiella UTI  3.  Recent periprosthetic hip fracture  4.  Hypertension  5.  Anemia  6.  Chronic pain  7.  Arthritis  8.  Hyponatremia     Plan   -Presents with some increased dyspnea over the last several days with some lower extremity edema.  Recently discharged on 4/11/2024 after periprosthetic hip fracture.  -Chest x-ray on presentation with small left pleural effusion left basilar opacity cannot rule out atelectasis versus pneumonia  -Urine culture positive for Klebsiella UTI.  Will transition antibiotics to ceftriaxone.  -Wean oxygen as tolerated  -Diarrhea resolved, C. difficile negative  -PT/OT  -DVT prophylaxis: Lovenox  -Discussed with family.  Requested palliative care consultation for further goals of care.  Ongoing discussions with palliative care.  Patient DNR/DNI    Liane Loyd DO  Pulmonary Critical Care Medicine  [facility]

## 2024-04-21 NOTE — PLAN OF CARE
Patient is alert and oriented times four. Patient is on IV antibiotics. Patient is on IV lasix. Patient has a left hip fracture. Patient will discharge to a new rehab facility once medically cleared. All safety measures are in place and call light is within reach.    Problem: Patient Centered Care  Goal: Patient preferences are identified and integrated in the patient's plan of care  Description: Interventions:  - What would you like us to know as we care for you? Patient has a left hip fracture.   - Provide timely, complete, and accurate information to patient/family  - Incorporate patient and family knowledge, values, beliefs, and cultural backgrounds into the planning and delivery of care  - Encourage patient/family to participate in care and decision-making at the level they choose  - Honor patient and family perspectives and choices  Outcome: Progressing     Problem: Patient/Family Goals  Goal: Patient/Family Long Term Goal  Description: Patient's Long Term Goal: To discharge    Interventions:  - Diurese  - See additional Care Plan goals for specific interventions  Outcome: Progressing  Goal: Patient/Family Short Term Goal  Description: Patient's Short Term Goal: To feel better    Interventions:   - Pain management  - See additional Care Plan goals for specific interventions  Outcome: Progressing     Problem: CARDIOVASCULAR - ADULT  Goal: Maintains optimal cardiac output and hemodynamic stability  Description: INTERVENTIONS:  - Monitor vital signs, rhythm, and trends  - Monitor for bleeding, hypotension and signs of decreased cardiac output  - Evaluate effectiveness of vasoactive medications to optimize hemodynamic stability  - Monitor arterial and/or venous puncture sites for bleeding and/or hematoma  - Assess quality of pulses, skin color and temperature  - Assess for signs of decreased coronary artery perfusion - ex. Angina  - Evaluate fluid balance, assess for edema, trend weights  Outcome:  Progressing  Goal: Absence of cardiac arrhythmias or at baseline  Description: INTERVENTIONS:  - Continuous cardiac monitoring, monitor vital signs, obtain 12 lead EKG if indicated  - Evaluate effectiveness of antiarrhythmic and heart rate control medications as ordered  - Initiate emergency measures for life threatening arrhythmias  - Monitor electrolytes and administer replacement therapy as ordered  Outcome: Progressing     Problem: SAFETY ADULT - FALL  Goal: Free from fall injury  Description: INTERVENTIONS:  - Assess pt frequently for physical needs  - Identify cognitive and physical deficits and behaviors that affect risk of falls.  - [place] fall precautions as indicated by assessment.  - Educate pt/family on patient safety including physical limitations  - Instruct pt to call for assistance with activity based on assessment  - Modify environment to reduce risk of injury  - Provide assistive devices as appropriate  - Consider OT/PT consult to assist with strengthening/mobility  - Encourage toileting schedule  Outcome: Progressing     Problem: SKIN/TISSUE INTEGRITY - ADULT  Goal: Skin integrity remains intact  Description: INTERVENTIONS  - Assess and document risk factors for pressure ulcer development  - Assess and document skin integrity  - Monitor for areas of redness and/or skin breakdown  - Initiate interventions, skin care algorithm/standards of care as needed  Outcome: Progressing

## 2024-04-21 NOTE — PLAN OF CARE
Pt. A/Ox4, on RA, VSS. . No acute events. Pt educated on call light use, within reach. Safety measures in place.     Problem: Patient Centered Care  Goal: Patient preferences are identified and integrated in the patient's plan of care  Description: Interventions:  - What would you like us to know as we care for you? I got my nails done today  - Provide timely, complete, and accurate information to patient/family  - Incorporate patient and family knowledge, values, beliefs, and cultural backgrounds into the planning and delivery of care  - Encourage patient/family to participate in care and decision-making at the level they choose  - Honor patient and family perspectives and choices  Outcome: Progressing    Problem: CARDIOVASCULAR - ADULT  Goal: Maintains optimal cardiac output and hemodynamic stability  Description: INTERVENTIONS:  - Monitor vital signs, rhythm, and trends  - Monitor for bleeding, hypotension and signs of decreased cardiac output  - Evaluate effectiveness of vasoactive medications to optimize hemodynamic stability  - Monitor arterial and/or venous puncture sites for bleeding and/or hematoma  - Assess quality of pulses, skin color and temperature  - Assess for signs of decreased coronary artery perfusion - ex. Angina  - Evaluate fluid balance, assess for edema, trend weights  Outcome: Progressing  Goal: Absence of cardiac arrhythmias or at baseline  Description: INTERVENTIONS:  - Continuous cardiac monitoring, monitor vital signs, obtain 12 lead EKG if indicated  - Evaluate effectiveness of antiarrhythmic and heart rate control medications as ordered  - Initiate emergency measures for life threatening arrhythmias  - Monitor electrolytes and administer replacement therapy as ordered  Outcome: Progressing     Problem: SAFETY ADULT - FALL  Goal: Free from fall injury  Description: INTERVENTIONS:  - Assess pt frequently for physical needs  - Identify cognitive and physical deficits and behaviors that  affect risk of falls.  - [place] fall precautions as indicated by assessment.  - Educate pt/family on patient safety including physical limitations  - Instruct pt to call for assistance with activity based on assessment  - Modify environment to reduce risk of injury  - Provide assistive devices as appropriate  - Consider OT/PT consult to assist with strengthening/mobility  - Encourage toileting schedule  Outcome: Progressing     Problem: SKIN/TISSUE INTEGRITY - ADULT  Goal: Skin integrity remains intact  Description: INTERVENTIONS  - Assess and document risk factors for pressure ulcer development  - Assess and document skin integrity  - Monitor for areas of redness and/or skin breakdown  - Initiate interventions, skin care algorithm/standards of care as needed  Outcome: Progressing

## 2024-04-21 NOTE — PROGRESS NOTES
[facility] Cardiology Progress Note    Elba Malik Patient Status:  Inpatient    1928 MRN [id number]   Location [facility] 3W/SW Attending Sharan Lopez MD   Hosp Day # 3 PCP Sharan Lopez MD     Subjective:  Denies cp, sob, Mild coe      Objective:  /87 (BP Location: Right arm)   Pulse 94   Temp 97.8 °F (36.6 °C) (Oral)   Resp 18   Wt 144 lb 14.4 oz (65.7 kg)   SpO2 97%   BMI 24.11 kg/m²     Telemetry: NSR       Intake/Output:    Intake/Output Summary (Last 24 hours) at 2024 1312  Last data filed at 2024 0921  Gross per 24 hour   Intake 660 ml   Output 1050 ml   Net -390 ml       Last 3 Weights   24 0533 144 lb 14.4 oz (65.7 kg)   24 0558 145 lb 4.8 oz (65.9 kg)   24 1107 158 lb 1.6 oz (71.7 kg)   24 0505 158 lb 1.6 oz (71.7 kg)   24 1037 139 lb 12.4 oz (63.4 kg)   24 0524 154 lb 14.4 oz (70.3 kg)   04/10/24 0825 165 lb 3.2 oz (74.9 kg)   24 0417 167 lb 4.8 oz (75.9 kg)   24 1402 156 lb 14.4 oz (71.2 kg)   24 0600 153 lb 11.2 oz (69.7 kg)   24 0100 145 lb (65.8 kg)   24 0735 153 lb (69.4 kg)       Labs:  Recent Labs   Lab 24  0656 24  0644 24  0649   GLU 92 90 90   BUN 35* 37* 37*   CREATSERUM 1.07* 1.06* 1.06*   EGFRCR 48* 48* 48*   CA 8.5* 8.8 9.0   * 137 138   K 3.3*  3.3* 3.6  3.6 3.8   CL 97* 100 101   CO2 29.0 32.0 31.0     Recent Labs   Lab 24  0741 24  0656 24  0644   RBC 3.23* 2.82* 2.92*   HGB 9.5* 8.4* 8.5*   HCT 28.1* 24.9* 26.6*   MCV 87.0 88.3 91.1   MCH 29.4 29.8 29.1   MCHC 33.8 33.7 32.0   RDW 14.1 14.1 14.6   NEPRELIM 11.00* 6.89 5.82   WBC 13.9* 9.8 8.4   .0* 756.0* 787.0*         No results for input(s): \"TROP\", \"TROPHS\", \"CK\" in the last 168 hours.    Diagnostics:   4/10/24 Echo  1. Left ventricle: The cavity size was normal. Wall thickness was normal.      Systolic function was mildly reduced. The estimated ejection  fraction was      50%, by visual assessment. Although no diagnostic regional wall motion      abnormality was identified, this possibility cannot be completely      excluded on the basis of this study. Unable to assess LV diastolic      function due to heart rhythm.   2. Right ventricle: Systolic function was reduced.   3. Aortic valve: There was mild regurgitation.   4. Mitral valve: There was mild regurgitation.   5. Pericardium, extracardiac: There was a left pleural effusion.   Impressions:  No previous study was available for comparison.       Review of Systems   Respiratory: Negative.     Cardiovascular:  Positive for leg swelling. Negative for chest pain, palpitations and orthopnea.     Physical Exam:    General: Alert and oriented x 3. No apparent distress.   HEENT: Normocephalic, anicteric sclera, neck supple, no thyromegaly or adenopathy.  Neck: No JVD, carotids 2+, no bruits.  Cardiac: Regular rate & rhythm. S1, S2 normal. No murmur, pericardial rub, S3, or extra cardiac sounds.  Lungs: Clear without wheezes, rales, rhonchi or dullness.  Normal excursions and effort.  Abdomen: Soft, non-tender. No organosplenomegally, mass or rebound, BS-present.  Extremities: Without clubbing or cyanosis.  +Trace ble edema   Neurologic: Alert and oriented, normal affect. No focal defects  Skin: Warm and dry.       Medications:   metoprolol succinate ER  25 mg Oral Daily Beta Blocker    lidocaine-menthol  1 patch Transdermal Daily    lidocaine-menthol  1 patch Transdermal Daily    lidocaine-menthol  1 patch Transdermal Daily    acetaminophen  650 mg Oral BID    gabapentin  100 mg Oral Nightly    furosemide  40 mg Intravenous BID (Diuretic)    lisinopril  10 mg Oral Daily    cefTRIAXone  1 g Intravenous Q24H    fluticasone propionate  2 spray Nasal Daily    enoxaparin  30 mg Subcutaneous Daily         Assessment:    Dyspnea   - Multifactorial in the setting of diastolic HF, poss PNA   - Empiric antibiotics   - Resolved      Acute HFpEF, EF 50%   - BNP > 1000. CXR w/ small left pleural effusion   - Echo w/ EF 50%, mild AI, mild MR, left pleural effusion   - On iv lasix & toprol xl     Periprosthetic hip fracture s/p fall     HTN   - Above goal     Anemia   - Per PMD     CKD  - stable     Plan:    Weaned off of supplemental 02. LE edema nearly resolved   Net neg approx 1L over 24h. Scr stable   Switch IV lasix to 40mg po daily & assess response   Compression stockings to BLE   Monitor accurate I/o,daily wts & renal fx closely   Blood pressure above goal . Continue Lisinopril. Switch toprol xl to lopressor 25mg bid     YOANA Nath  4/21/2024  1:15 PM  Ph [phone] ([place])  Ph [phone] ([place])      L3  Seen and examined bedside. Agree with the present management, will follow with you.    Net neg approx 1L over 24h. Scr stable   Switch IV lasix to 40mg po daily & assess response   Blood pressure is elevated switch Lopressor to tartrate 25 mg twice a day increase lisinopril to 20 mg daily  Thank you for allowing me to participate in the care of your patient, feel free to contact me if you have any questions.    Santana Dupree MD  [facility]  Interventional Cardiology.  [phone]

## 2024-04-22 LAB
ANION GAP SERPL CALC-SCNC: 6 MMOL/L (ref 0–18)
BASOPHILS # BLD AUTO: 0.06 X10(3) UL (ref 0–0.2)
BASOPHILS NFR BLD AUTO: 0.8 %
BUN BLD-MCNC: 31 MG/DL (ref 9–23)
BUN/CREAT SERPL: 31 (ref 10–20)
CALCIUM BLD-MCNC: 9 MG/DL (ref 8.7–10.4)
CHLORIDE SERPL-SCNC: 102 MMOL/L (ref 98–112)
CO2 SERPL-SCNC: 31 MMOL/L (ref 21–32)
CREAT BLD-MCNC: 1 MG/DL
DEPRECATED HBV CORE AB SER IA-ACNC: 121.2 NG/ML
DEPRECATED RDW RBC AUTO: 47.8 FL (ref 35.1–46.3)
EGFRCR SERPLBLD CKD-EPI 2021: 52 ML/MIN/1.73M2 (ref 60–?)
EOSINOPHIL # BLD AUTO: 0.12 X10(3) UL (ref 0–0.7)
EOSINOPHIL NFR BLD AUTO: 1.5 %
ERYTHROCYTE [DISTWIDTH] IN BLOOD BY AUTOMATED COUNT: 14.5 % (ref 11–15)
GLUCOSE BLD-MCNC: 93 MG/DL (ref 70–99)
HCT VFR BLD AUTO: 25.8 %
HGB BLD-MCNC: 8.3 G/DL
IMM GRANULOCYTES # BLD AUTO: 0.16 X10(3) UL (ref 0–1)
IMM GRANULOCYTES NFR BLD: 2 %
IRON SATN MFR SERPL: 17 %
IRON SERPL-MCNC: 39 UG/DL
LYMPHOCYTES # BLD AUTO: 1.64 X10(3) UL (ref 1–4)
LYMPHOCYTES NFR BLD AUTO: 20.7 %
MCH RBC QN AUTO: 29.2 PG (ref 26–34)
MCHC RBC AUTO-ENTMCNC: 32.2 G/DL (ref 31–37)
MCV RBC AUTO: 90.8 FL
MONOCYTES # BLD AUTO: 1.03 X10(3) UL (ref 0.1–1)
MONOCYTES NFR BLD AUTO: 13 %
NEUTROPHILS # BLD AUTO: 4.92 X10 (3) UL (ref 1.5–7.7)
NEUTROPHILS # BLD AUTO: 4.92 X10(3) UL (ref 1.5–7.7)
NEUTROPHILS NFR BLD AUTO: 62 %
OSMOLALITY SERPL CALC.SUM OF ELEC: 294 MOSM/KG (ref 275–295)
PLATELET # BLD AUTO: 811 10(3)UL (ref 150–450)
POTASSIUM SERPL-SCNC: 3.9 MMOL/L (ref 3.5–5.1)
RBC # BLD AUTO: 2.84 X10(6)UL
SODIUM SERPL-SCNC: 139 MMOL/L (ref 136–145)
TIBC SERPL-MCNC: 234 UG/DL (ref 250–425)
TRANSFERRIN SERPL-MCNC: 157 MG/DL (ref 250–380)
WBC # BLD AUTO: 7.9 X10(3) UL (ref 4–11)

## 2024-04-22 PROCEDURE — 99231 SBSQ HOSP IP/OBS SF/LOW 25: CPT | Performed by: NURSE PRACTITIONER

## 2024-04-22 PROCEDURE — 99232 SBSQ HOSP IP/OBS MODERATE 35: CPT | Performed by: INTERNAL MEDICINE

## 2024-04-22 RX ORDER — HYDROCODONE BITARTRATE AND ACETAMINOPHEN 5; 325 MG/1; MG/1
1 TABLET ORAL EVERY 6 HOURS PRN
Status: DISCONTINUED | OUTPATIENT
Start: 2024-04-22 | End: 2024-04-23

## 2024-04-22 NOTE — PROGRESS NOTES
Residential Palliative Liaison received palliative referral for community PC services. Spoke with Kenn maldonado to discuss Residential PC services. Residential PC services discussed and is agreeable to our PC services upon DC to facility.      Deb Camp  Residential Palliative Liaison  [phone]

## 2024-04-22 NOTE — PALLIATIVE CARE NOTE
[facility]  part of [facility]  Palliative Care Progress Note    Elba Malik Patient Status:  Inpatient    1928 MRN [id number]   Location [facility] 3W/SW Attending Sharan Lopez MD   Hosp Day # 4 PCP Sharan Lopez MD     The  Cures Act makes medical notes like these available to patients in the interest of transparency. Please be advised this is a medical document. Medical documents are intended to carry relevant information, facts as evident, and the clinical opinion of the practitioner. The medical note is intended as peer to peer communication and may appear blunt or direct. It is written in medical language and may contain abbreviations or verbiage that are unfamiliar.     Subjective     When I entered the room, the patient was in the bed she is awake and alert, oriented X4, very pleasant. Pts dtr Kenn is present at the bedside, Claritza VICK is at the bedside discussing ALEXA placement options.      Symptom assessment: pt c/o pain in her shoulders, bilateral kness and hip with movement, states no pain at rest.     Review of pertinent medication requirements in past 24 hours:   On scheduled Tylenol, Lidocaine patches, gabapentin and Norco as needed for pain. Pt has not taken any Norco doses for 48 hours    Palliative Care symptom needs assessed:   Pertinent items are noted in HPI/below    Fatigue:  Yes  Dyspnea: denies   Current O2 therapy: RA  Cough: denies  Pain Present: denies pain at rest. + pain with movement  Non-verbal signs of pain present: NO  Appetite: good  Constipation: denies  Diarrhea: denies  Nausea/Vomiting: denies  Depression: denies  Anxiety: denies    Allergies:  No Known Allergies    Medications:     Current Facility-Administered Medications:     HYDROcodone-acetaminophen (Norco) 5-325 MG per tab 1 tablet, 1 tablet, Oral, Q6H PRN    furosemide (Lasix) tab 40 mg, 40 mg, Oral, Daily    metoprolol tartrate (Lopressor) tab 25 mg, 25 mg,  Oral, 2x Daily(Beta Blocker)    zolpidem (Ambien) tab 5 mg, 5 mg, Oral, Nightly PRN    lisinopril (Prinivil; Zestril) tab 20 mg, 20 mg, Oral, Daily    lidocaine-menthol 4-1 % patch 1 patch, 1 patch, Transdermal, Daily    lidocaine-menthol 4-1 % patch 1 patch, 1 patch, Transdermal, Daily    lidocaine-menthol 4-1 % patch 1 patch, 1 patch, Transdermal, Daily    acetaminophen (Tylenol) tab 650 mg, 650 mg, Oral, BID    gabapentin (Neurontin) cap 100 mg, 100 mg, Oral, Nightly    fluticasone propionate (Flonase) 50 MCG/ACT nasal suspension 2 spray, 2 spray, Nasal, Daily    enoxaparin (Lovenox) 30 MG/0.3ML SUBQ injection 30 mg, 30 mg, Subcutaneous, Daily    Objective     Vital Signs:  Blood pressure 144/68, pulse 84, temperature 98.6 °F (37 °C), temperature source Oral, resp. rate 18, weight 146 lb (66.2 kg), SpO2 94%, not currently breastfeeding.  Body mass index is 24.3 kg/m².  Present Level of pain: no pain at rest.   Non-verbal signs of pain present: No    Physical Exam:  General: pt is in the bed awake and alert very pleasant, oriented X4 and in no apparent distress.  HEENT: pt has own dentition, oral MM dry  Cardiac: Regular rate and rhythm, S1, S2 normal, no murmur, rub or gallop.  Lungs: Clear to diminished without wheezes, rales, rhonchi or dullness.  Normal excursions and effort. on RA  Abdomen: Soft, non-tender, + bowel sounds, no rebound or guarding, + BM  Extremities: Without clubbing, cyanosis. + lower extremity edema trace  Neurologic: Alert and oriented X3, normal affect. Very pleasant  Skin: Warm and dry.  Pt has numbness and tingling bilateral lower extremities which is chronic     Prior to admission Palliative performance scale PPSv2 (%): 40    Hematology:  Lab Results   Component Value Date    WBC 7.9 04/22/2024    HGB 8.3 (L) 04/22/2024    HCT 25.8 (L) 04/22/2024    .0 (H) 04/22/2024       Coags:  Lab Results   Component Value Date    INR 0.96 04/05/2024    PTT 33.0 04/05/2024        Chemistry:  Lab Results   Component Value Date    CREATSERUM 1.00 04/22/2024    BUN 31 (H) 04/22/2024     04/22/2024    K 3.9 04/22/2024     04/22/2024    CO2 31.0 04/22/2024    GLU 93 04/22/2024    CA 9.0 04/22/2024    ALB 3.6 04/18/2024    ALKPHO 101 04/18/2024    BILT 0.4 04/18/2024    TP 6.3 04/18/2024    AST 54 (H) 04/18/2024    ALT 46 04/18/2024    DDIMER 0.63 01/10/2023     (H) 06/06/2016       Imaging:  No results found.      Summary of Discussion    4/22/24 followed up with pt and dtr Kenn who is present at the bedside. Claritza VICK at the bedside discussing with family ALEXA placement and bed availabilities. Pt states she would like to get moving more with PT. She states she would like to sit up in the chair, she doesn't want to stay in bed to long as she states she does not want to get weaker. She takes Norco before PT she did not get PT over the weekend so she did not take any Norco. Pt feels that the scheduled Tylenol, lidocaine patches and the gabapentin may be helping with pain relief. Appetite has been good, + BM  Will set up CPC at discharge. Answered any questions they had and provided emotional support.     Assessment and Recommendation       Acute on chronic congestive heart failure    Community acquired pneumonia    UTI    H/o right hip replacement surgery X2 with recent fall resulting in right periprosthetic hip/femur fracture    Anemia    Chronic arthritis-bilateral shoulders-bilateral knees      Chronic low back pain  -schedule Tylenol 650mg twice daily  -lidocaine patches to low back and bilateral knees  -continue with Norco 5/325 every 6 hours as needed for pain       Peripheral neuropathy  -continue with gabapentin 100mg nightly      Goals of care counseling  -see above for details  -Pt is DNAR/DNI with selective treatment  -Agreeable to palliative care following  -Dispo: ALEXA with CPC. SW to help with dc planning.   -family did not request   -pt and family are  aware of hospice option when pt is ready  -ongoing GOC discussions will be needed over time.  -Provided emotional support to pt/family who are coping as best they can at this time     Advance care planning  -see above for details  -Pt's dtrs Kenn Prabhakarclarisse [phone] and Maribell Us [phone] are HCPOA's  Other successors are granddaughter Nahomy Green [phone] and Lily Suggs [phone]     Palliative Performance Scale 40%    Palliative Care Follow Up: Palliative care team will continue to follow until a plan is in place. Feel free to contact our team with any questions or concerns.    Thank you for allowing Palliative Care services to participate in the care of Elba Malik.    A total of 25 minutes were spent on this follow-up, which included all of the following: chart review, direct face to face contact, history taking, physical examination, counseling and coordinating care, and documentation.     Suzanne Mercado, [id number]  4/22/2024  11:56AM  Palliative Care Services

## 2024-04-22 NOTE — PLAN OF CARE
VSS overnight, scheduled tylenol and gabapentin given for mild pain. Ambien given for sleep. Plan IV --> PO lasix today. ALEXA placement pending choice and med clearance.    Problem: Patient Centered Care  Goal: Patient preferences are identified and integrated in the patient's plan of care  Description: Interventions:  - What would you like us to know as we care for you? From assisted living facility  - Provide timely, complete, and accurate information to patient/family  - Incorporate patient and family knowledge, values, beliefs, and cultural backgrounds into the planning and delivery of care  - Encourage patient/family to participate in care and decision-making at the level they choose  - Honor patient and family perspectives and choices  Outcome: Progressing     Problem: Patient/Family Goals  Goal: Patient/Family Long Term Goal  Description: Patient's Long Term Goal: to go back to facility    Interventions:  - follow medical regimen  - See additional Care Plan goals for specific interventions  Outcome: Progressing  Goal: Patient/Family Short Term Goal  Description: Patient's Short Term Goal: to not be SOB    Interventions:   - follow medical regimen  - See additional Care Plan goals for specific interventions  Outcome: Progressing     Problem: CARDIOVASCULAR - ADULT  Goal: Maintains optimal cardiac output and hemodynamic stability  Description: INTERVENTIONS:  - Monitor vital signs, rhythm, and trends  - Monitor for bleeding, hypotension and signs of decreased cardiac output  - Evaluate effectiveness of vasoactive medications to optimize hemodynamic stability  - Monitor arterial and/or venous puncture sites for bleeding and/or hematoma  - Assess quality of pulses, skin color and temperature  - Assess for signs of decreased coronary artery perfusion - ex. Angina  - Evaluate fluid balance, assess for edema, trend weights  Outcome: Progressing  Goal: Absence of cardiac arrhythmias or at baseline  Description:  INTERVENTIONS:  - Continuous cardiac monitoring, monitor vital signs, obtain 12 lead EKG if indicated  - Evaluate effectiveness of antiarrhythmic and heart rate control medications as ordered  - Initiate emergency measures for life threatening arrhythmias  - Monitor electrolytes and administer replacement therapy as ordered  Outcome: Progressing     Problem: SAFETY ADULT - FALL  Goal: Free from fall injury  Description: INTERVENTIONS:  - Assess pt frequently for physical needs  - Identify cognitive and physical deficits and behaviors that affect risk of falls.  - [place] fall precautions as indicated by assessment.  - Educate pt/family on patient safety including physical limitations  - Instruct pt to call for assistance with activity based on assessment  - Modify environment to reduce risk of injury  - Provide assistive devices as appropriate  - Consider OT/PT consult to assist with strengthening/mobility  - Encourage toileting schedule  Outcome: Progressing     Problem: SKIN/TISSUE INTEGRITY - ADULT  Goal: Skin integrity remains intact  Description: INTERVENTIONS  - Assess and document risk factors for pressure ulcer development  - Assess and document skin integrity  - Monitor for areas of redness and/or skin breakdown  - Initiate interventions, skin care algorithm/standards of care as needed  Outcome: Progressing

## 2024-04-22 NOTE — CM/SW NOTE
04/22/24 1100   Choice of Post-Acute Provider   Informed patient of right to choose their preferred provider Yes   List of appropriate post-acute services provided to patient/family with quality data Yes   Patient/family choice [facility]   Information given to Patient;Daughter     Social work was able to meet with the patient and her daughter at bedside to follow up on ALEXA choice.     The patient and her daughter are ok with her returning to [facility] since [facility] has no beds.    [facility] is reserved in [place].    SW/CM to remain available for support and/or discharge planning.     Henrietta Soriano MSW, LSW  Discharge Planner [id number]

## 2024-04-22 NOTE — DISCHARGE INSTRUCTIONS
Residential Palliative APN to follow at discharge. Please call Residential Palliative care with any questions, they can be reached by phone at [phone].

## 2024-04-22 NOTE — PROGRESS NOTES
Cardiology Progress Note    Elba Malik Patient Status:  Inpatient    1928 MRN [id number]   Location [facility] 3W/SW Attending Sharan Lopez MD   Hosp Day # 4 PCP Sharan Lopez MD     Interval Note:  Pt seen/examined   No new complaints this morning      --------------------------------------------------------------------------------------------------------------------------------  ROS 12 systems reviewed, pertinent findings above.  ROS    History:  Past Medical History:    Arthritis    Asthma (HCC)    Back problem    multiple spinal injections    Cataract    bilateral cataract surgery    Cataracts, bilateral    cataracts, PC IOL  OD Dr. Tate,  PC IOL Dr. Concepcion in [place], OS    Colon polyp    small polyps    Disorder of thyroid    Essential hypertension    Hearing impairment    High blood pressure    Osteoarthritis    Other and unspecified hyperlipidemia    Other ill-defined conditions(799.89)    Left wrist trauma, surgical repair    Posterior capsule opacification    OS, YAG laser     Ptosis of right eyelid    Ptosis RUL    Shortness of breath    Visual impairment     Past Surgical History:   Procedure Laterality Date    Cataract      cataract extraction    Cataract Left     Phaco w/PC IOL    Cataract Right     Phaco w/ PC IOL    Cataract extraction w/  intraocular lens implant Left     PC IOL/ Dr. Pichardo    Cataract extraction w/  intraocular lens implant Right     PC IOL / Dr. Concepcion    Cholecystectomy      Colonoscopy      Ercp,diagnostic  10/16/2018    Choledocholithiasis, dilated intrahepatic and extrahepatic biliary tree    Hip replacement surgery  97/0 per NG    Hysterectomy      Partial    Laparoscopic cholecystectomy  2016    Other surgical history      Left wrist trauma, surgical repair    Other surgical history  2016    Laparoscopy with bilateral salpingo-oophorectomy    Synvisc, intra-articular Bilateral 2013    Synvisc injection  bilateral knees    Total hip replacement      bilateral hip replacements    Yag capsulotomy - os - left eye Left 1998    Dr. Concepcion     Family History   Problem Relation Age of Onset    Ear Problems Mother         hearing loss    Other (Other) Father         emphysema    Diabetes Neg     Glaucoma Neg     Macular degeneration Neg       reports that she has never smoked. She has never used smokeless tobacco. She reports that she does not drink alcohol and does not use drugs.    Objective:   Temp: 98.6 °F (37 °C)  Pulse: 84  Resp: 18  BP: 144/68    Intake/Output:     Intake/Output Summary (Last 24 hours) at 4/22/2024 1150  Last data filed at 4/22/2024 0600  Gross per 24 hour   Intake 562 ml   Output 1750 ml   Net -1188 ml       Physical Exam:    General: Aox3, NAD  HEENT: Normocephalic, anicteric sclera, neck supple.  Neck: No JVD, carotids 2+, no bruits.  Cardiac: Regular rate and rhythm. S1, S2 normal. No murmur, pericardial rub, S3.  Lungs: Clear without wheezes, rales, rhonchi or dullness.  Normal excursions and effort.  Abdomen: Soft, non-tender. BS-present.  Extremities: Without clubbing, cyanosis or edema.  Peripheral pulses are 2+.  Neurologic: Non-focal  Skin: Warm and dry.       Assessment   HFpEF  Pneumonia  Klebsiella UTI  Hypertension  Anemia  CKD  Thrombocytosis  Chronic pain  Arthritis    Plan  Labs stable, BP running high   Good UOP, maintaining -2.3 L fluid status since admission  Continue oral diuretics, lisinopril, metoprolol  No further cardiac workup needed at this time, stable from a HF standpoint  Will be Dr. Loyd's note from yesterday -palliative care consultation pending  Will follow peripherally    Thank you for allowing me to take part in the care of Elba Malik. Please call with any questions of concerns.    Level of care: L3    Jass Briggs DO  [facility]   Interventional Cardiac and Vascular Services      Jass Briggs DO  April 22, 2024  11:50 AM

## 2024-04-23 ENCOUNTER — APPOINTMENT (OUTPATIENT)
Dept: GENERAL RADIOLOGY | Facility: HOSPITAL | Age: 89
End: 2024-04-23
Attending: INTERNAL MEDICINE
Payer: MEDICARE

## 2024-04-23 ENCOUNTER — EXTERNAL FACILITY (OUTPATIENT)
Dept: INTERNAL MEDICINE CLINIC | Facility: CLINIC | Age: 89
End: 2024-04-23

## 2024-04-23 VITALS
RESPIRATION RATE: 18 BRPM | HEART RATE: 84 BPM | BODY MASS INDEX: 24 KG/M2 | TEMPERATURE: 98 F | DIASTOLIC BLOOD PRESSURE: 58 MMHG | SYSTOLIC BLOOD PRESSURE: 152 MMHG | OXYGEN SATURATION: 96 % | WEIGHT: 144.69 LBS

## 2024-04-23 DIAGNOSIS — G89.29 CHRONIC LEFT-SIDED LOW BACK PAIN WITH LEFT-SIDED SCIATICA: ICD-10-CM

## 2024-04-23 DIAGNOSIS — D50.9 IRON DEFICIENCY ANEMIA, UNSPECIFIED IRON DEFICIENCY ANEMIA TYPE: ICD-10-CM

## 2024-04-23 DIAGNOSIS — I10 ESSENTIAL HYPERTENSION: ICD-10-CM

## 2024-04-23 DIAGNOSIS — M54.42 CHRONIC LEFT-SIDED LOW BACK PAIN WITH LEFT-SIDED SCIATICA: ICD-10-CM

## 2024-04-23 DIAGNOSIS — M12.9 ARTHROPATHY: ICD-10-CM

## 2024-04-23 DIAGNOSIS — M97.8XXA PERIPROSTHETIC HIP FRACTURE, INITIAL ENCOUNTER: ICD-10-CM

## 2024-04-23 DIAGNOSIS — Z96.649 PERIPROSTHETIC HIP FRACTURE, INITIAL ENCOUNTER: ICD-10-CM

## 2024-04-23 DIAGNOSIS — H02.839 DERMATOCHALASIS OF EYELID: ICD-10-CM

## 2024-04-23 DIAGNOSIS — I50.9 ACUTE ON CHRONIC CONGESTIVE HEART FAILURE, UNSPECIFIED HEART FAILURE TYPE (HCC): Primary | ICD-10-CM

## 2024-04-23 DIAGNOSIS — F51.01 PRIMARY INSOMNIA: ICD-10-CM
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
## 2024-04-23 LAB
ANION GAP SERPL CALC-SCNC: 7 MMOL/L (ref 0–18)
BASOPHILS # BLD AUTO: 0.07 X10(3) UL (ref 0–0.2)
BASOPHILS NFR BLD AUTO: 1 %
BUN BLD-MCNC: 29 MG/DL (ref 9–23)
BUN/CREAT SERPL: 33 (ref 10–20)
CALCIUM BLD-MCNC: 9.3 MG/DL (ref 8.7–10.4)
CHLORIDE SERPL-SCNC: 104 MMOL/L (ref 98–112)
CO2 SERPL-SCNC: 29 MMOL/L (ref 21–32)
CREAT BLD-MCNC: 0.88 MG/DL
DEPRECATED RDW RBC AUTO: 48.6 FL (ref 35.1–46.3)
EGFRCR SERPLBLD CKD-EPI 2021: 60 ML/MIN/1.73M2 (ref 60–?)
EOSINOPHIL # BLD AUTO: 0.09 X10(3) UL (ref 0–0.7)
EOSINOPHIL NFR BLD AUTO: 1.2 %
ERYTHROCYTE [DISTWIDTH] IN BLOOD BY AUTOMATED COUNT: 14.6 % (ref 11–15)
GLUCOSE BLD-MCNC: 89 MG/DL (ref 70–99)
HCT VFR BLD AUTO: 26.9 %
HEMOCCULT STL QL: NEGATIVE
HGB BLD-MCNC: 8.7 G/DL
IMM GRANULOCYTES # BLD AUTO: 0.15 X10(3) UL (ref 0–1)
IMM GRANULOCYTES NFR BLD: 2 %
LYMPHOCYTES # BLD AUTO: 1.85 X10(3) UL (ref 1–4)
LYMPHOCYTES NFR BLD AUTO: 25.2 %
MCH RBC QN AUTO: 29.4 PG (ref 26–34)
MCHC RBC AUTO-ENTMCNC: 32.3 G/DL (ref 31–37)
MCV RBC AUTO: 90.9 FL
MONOCYTES # BLD AUTO: 1.04 X10(3) UL (ref 0.1–1)
MONOCYTES NFR BLD AUTO: 14.2 %
NEUTROPHILS # BLD AUTO: 4.14 X10 (3) UL (ref 1.5–7.7)
NEUTROPHILS # BLD AUTO: 4.14 X10(3) UL (ref 1.5–7.7)
NEUTROPHILS NFR BLD AUTO: 56.4 %
OSMOLALITY SERPL CALC.SUM OF ELEC: 295 MOSM/KG (ref 275–295)
PLATELET # BLD AUTO: 804 10(3)UL (ref 150–450)
POTASSIUM SERPL-SCNC: 3.9 MMOL/L (ref 3.5–5.1)
RBC # BLD AUTO: 2.96 X10(6)UL
SODIUM SERPL-SCNC: 140 MMOL/L (ref 136–145)
WBC # BLD AUTO: 7.3 X10(3) UL (ref 4–11)

## 2024-04-23 PROCEDURE — 99238 HOSP IP/OBS DSCHRG MGMT 30/<: CPT | Performed by: INTERNAL MEDICINE

## 2024-04-23 PROCEDURE — 71045 X-RAY EXAM CHEST 1 VIEW: CPT | Performed by: INTERNAL MEDICINE

## 2024-04-23 PROCEDURE — 99306 1ST NF CARE HIGH MDM 50: CPT | Performed by: INTERNAL MEDICINE

## 2024-04-23 PROCEDURE — 99231 SBSQ HOSP IP/OBS SF/LOW 25: CPT | Performed by: NURSE PRACTITIONER

## 2024-04-23 RX ORDER — LISINOPRIL 20 MG/1
20 TABLET ORAL DAILY
Status: SHIPPED | COMMUNITY
Start: 2024-04-24

## 2024-04-23 RX ORDER — ZOLPIDEM TARTRATE 5 MG/1
5 TABLET ORAL NIGHTLY PRN
Status: SHIPPED | COMMUNITY
Start: 2024-04-23

## 2024-04-23 RX ORDER — ENOXAPARIN SODIUM 100 MG/ML
40 INJECTION SUBCUTANEOUS DAILY
Status: DISCONTINUED | OUTPATIENT
Start: 2024-04-24 | End: 2024-04-23

## 2024-04-23 RX ORDER — GABAPENTIN 100 MG/1
100 CAPSULE ORAL NIGHTLY
Qty: 30 CAPSULE | Refills: 0 | Status: SHIPPED | OUTPATIENT
Start: 2024-04-23

## 2024-04-23 RX ORDER — HYDROCODONE BITARTRATE AND ACETAMINOPHEN 5; 325 MG/1; MG/1
1 TABLET ORAL EVERY 6 HOURS PRN
Qty: 30 TABLET | Refills: 0 | Status: SHIPPED | OUTPATIENT
Start: 2024-04-23

## 2024-04-23 RX ORDER — ACETAMINOPHEN 325 MG/1
650 TABLET ORAL 2 TIMES DAILY
Qty: 60 TABLET | Refills: 0 | Status: SHIPPED | OUTPATIENT
Start: 2024-04-23

## 2024-04-23 NOTE — DISCHARGE PLANNING
I called and gave report to nurse Zamora at [facility] rehab facility. Patient's physical and history were relayed to nursing staff and included past medical history & diagnosis of CHF. Patient will be discharging at 4pm as arranged by social work/case management. Phone number of primary nurse provided for follow up questions.     Code status: DNAR/select; copy of POLST form included in packet to be sent with patient and code status also relayed in report    Janette NICOLE, Discharge Leader [id number]

## 2024-04-23 NOTE — CM/SW NOTE
04/23/24 1400   Discharge disposition   Expected discharge disposition subacute   Post Acute Care Provider [facility]   Home services after discharge None   Discharge transportation [facility]     The patient will be transported to [facility] via [facility] at 4pm.  PCS Complete.    The number to call report is [phone]  Kacey in admissions is aware of transport time.    Social work notified the patient.    SW/CM to remain available for support and/or discharge planning.     Henrietta Soriano MSW, LSW  Discharge Planner [id number]

## 2024-04-23 NOTE — PLAN OF CARE
1506 This RN called [facility] to give report and no answer. Left voicemail no answer.

## 2024-04-23 NOTE — PALLIATIVE CARE NOTE
[facility]  part of [facility]  Palliative Care Progress Note    Elba Malik Patient Status:  Inpatient    1928 MRN [id number]   Location [facility] 3W/SW Attending Sharan Lopez MD   Hosp Day # 5 PCP Sharan SOLANO. MD Jessica     The  Cures Act makes medical notes like these available to patients in the interest of transparency. Please be advised this is a medical document. Medical documents are intended to carry relevant information, facts as evident, and the clinical opinion of the practitioner. The medical note is intended as peer to peer communication and may appear blunt or direct. It is written in medical language and may contain abbreviations or verbiage that are unfamiliar.     Subjective     When I entered the room, the patient was sitting up in the chair she is awake and alert, states pain in hip from taking a step to get into chair, pt was working with PT. She took a Norco 5/325 X1 which helps relieve her pain. She is on scheduled Tylenol twice daily. She is 0X4 and very pleasant. Pts dtr Kenn is present at the bedside,     Review of pertinent medication requirements in past 24 hours:   On scheduled Tylenol, Lidocaine patches, gabapentin and Norco as needed for pain. Pt has  taken Norco 5/325 X1 dose    Palliative Care symptom needs assessed:   Pertinent items are noted in HPI/below    Fatigue:  Yes  Dyspnea: denies   Current O2 therapy: RA  Cough: denies  Pain Present: pain in left hip after working with PT and getting into the chair  Non-verbal signs of pain present: NO  Appetite: good  Constipation: denies  Diarrhea: denies  Nausea/Vomiting: denies  Depression: denies  Anxiety: denies    Allergies:  No Known Allergies    Medications:     Current Facility-Administered Medications:     HYDROcodone-acetaminophen (Norco) 5-325 MG per tab 1 tablet, 1 tablet, Oral, Q6H PRN    furosemide (Lasix) tab 40 mg, 40 mg, Oral, Daily    metoprolol tartrate  (Lopressor) tab 25 mg, 25 mg, Oral, 2x Daily(Beta Blocker)    zolpidem (Ambien) tab 5 mg, 5 mg, Oral, Nightly PRN    lisinopril (Prinivil; Zestril) tab 20 mg, 20 mg, Oral, Daily    lidocaine-menthol 4-1 % patch 1 patch, 1 patch, Transdermal, Daily    lidocaine-menthol 4-1 % patch 1 patch, 1 patch, Transdermal, Daily    lidocaine-menthol 4-1 % patch 1 patch, 1 patch, Transdermal, Daily    acetaminophen (Tylenol) tab 650 mg, 650 mg, Oral, BID    gabapentin (Neurontin) cap 100 mg, 100 mg, Oral, Nightly    fluticasone propionate (Flonase) 50 MCG/ACT nasal suspension 2 spray, 2 spray, Nasal, Daily    enoxaparin (Lovenox) 30 MG/0.3ML SUBQ injection 30 mg, 30 mg, Subcutaneous, Daily    Objective     Vital Signs:  Blood pressure 152/58, pulse 84, temperature 98.1 °F (36.7 °C), temperature source Oral, resp. rate 18, weight 144 lb 11.2 oz (65.6 kg), SpO2 96%, not currently breastfeeding.  Body mass index is 24.08 kg/m².  Present Level of pain: no pain at rest.   Non-verbal signs of pain present: No    Physical Exam:  General: pt is sitting up in the chair awake and alert very pleasant, oriented X4 and in no apparent distress.  HEENT: pt has own dentition, oral MM dry  Cardiac: Regular rate and rhythm, S1, S2 normal, no murmur, rub or gallop.  Lungs: Clear to diminished without wheezes, rales, rhonchi or dullness.  Normal excursions and effort. on RA  Abdomen: Soft, non-tender, + bowel sounds, no rebound or guarding, + BM  Extremities: Without clubbing, cyanosis. + lower extremity edema trace  Neurologic: Alert and oriented X4, normal affect. Very pleasant  Skin: Warm and dry.  Pt has numbness and tingling bilateral lower extremities which is chronic     Prior to admission Palliative performance scale PPSv2 (%): 40    Hematology:  Lab Results   Component Value Date    WBC 7.3 04/23/2024    HGB 8.7 (L) 04/23/2024    HCT 26.9 (L) 04/23/2024    .0 (H) 04/23/2024       Coags:  Lab Results   Component Value Date    INR 0.96  04/05/2024    PTT 33.0 04/05/2024       Chemistry:  Lab Results   Component Value Date    CREATSERUM 0.88 04/23/2024    BUN 29 (H) 04/23/2024     04/23/2024    K 3.9 04/23/2024     04/23/2024    CO2 29.0 04/23/2024    GLU 89 04/23/2024    CA 9.3 04/23/2024    ALB 3.6 04/18/2024    ALKPHO 101 04/18/2024    BILT 0.4 04/18/2024    TP 6.3 04/18/2024    AST 54 (H) 04/18/2024    ALT 46 04/18/2024    DDIMER 0.63 01/10/2023     (H) 06/06/2016       Imaging:  XR CHEST AP PORTABLE  (CPT=71045)    Result Date: 4/23/2024  CONCLUSION:   Stable small left pleural effusion with mild adjacent left basilar opacity.    Dictated by (CST): Jessee Colunga MD on 4/23/2024 at 9:13 AM     Finalized by (CST): Jessee Colunga MD on 4/23/2024 at 9:14 AM             Summary of Discussion    4/23/24 pt is looking forward to discharge to [facility] to start getting more PT. Her overall goal is to be able to get around on her own and return to assisted living. Possible discharge to [facility] today.     4/22/24 followed up with pt and dtr Kenn who is present at the bedside. Claritza VICK at the bedside discussing with family [facility] placement and bed availabilities. Pt states she would like to get moving more with PT. She states she would like to sit up in the chair, she doesn't want to stay in bed to long as she states she does not want to get weaker. She takes Norco before PT she did not get PT over the weekend so she did not take any Norco. Pt feels that the scheduled Tylenol, lidocaine patches and the gabapentin may be helping with pain relief. Appetite has been good, + BM  Will set up [facility] at discharge. Answered any questions they had and provided emotional support.     Assessment and Recommendation       Acute on chronic congestive heart failure    Community acquired pneumonia    UTI    H/o right hip replacement surgery X2 with recent fall resulting in right periprosthetic hip/femur fracture    Anemia    Chronic arthritis-bilateral  shoulders-bilateral knees      Chronic low back pain/hip pain  -scheduled Tylenol 650mg twice daily  -lidocaine patches to low back and bilateral knees  -continue with Norco 5/325 every 6 hours as needed for pain  Will print script for Norco for discharge to [facility]       Peripheral neuropathy  -continue with gabapentin 100mg nightly      Goals of care counseling  -see above for details  -Pt is DNAR/DNI with selective treatment  -Agreeable to palliative care following  -Dispo: [facility] with [facility]. SW to help with dc planning.   -family did not request   -pt and family are aware of hospice option when pt is ready  -ongoing GOC discussions will be needed over time.  -Provided emotional support to pt/family who are coping as best they can at this time     Advance care planning  -see above for details  -Pt's dtrs Kenn Anderson [phone] and Maribell Us [phone] are HCPOA's  Other successors are granddaughter Nahomy Peter [phone] and Lily Suggs [phone]     Palliative Performance Scale 40%    Palliative Care Follow Up: Palliative care team will continue to follow. Feel free to contact our team with any questions or concerns.    Thank you for allowing Palliative Care services to participate in the care of Elba Malik.    A total of 25 minutes were spent on this follow-up, which included all of the following: chart review, direct face to face contact, history taking, physical examination, counseling and coordinating care, and documentation.     Suzanne Mercado, [id number]  4/23/2024  10:37 AM  Palliative Care Services

## 2024-04-23 NOTE — PLAN OF CARE
Problem: Patient Centered Care  Goal: Patient preferences are identified and integrated in the patient's plan of care  Description: Interventions:  - What would you like us to know as we care for you? From assisted living faility  - Provide timely, complete, and accurate information to patient/family  - Incorporate patient and family knowledge, values, beliefs, and cultural backgrounds into the planning and delivery of care  - Encourage patient/family to participate in care and decision-making at the level they choose  - Honor patient and family perspectives and choices  Outcome: Progressing     Problem: Patient/Family Goals  Goal: Patient/Family Long Term Goal  Description: Patient's Long Term Goal: to go back to facility    Interventions:  - follow medical regimen  - See additional Care Plan goals for specific interventions  Outcome: Progressing  Goal: Patient/Family Short Term Goal  Description: Patient's Short Term Goal: to not be SOB    Interventions:   - follow medical regimen  - See additional Care Plan goals for specific interventions  Outcome: Progressing     Problem: CARDIOVASCULAR - ADULT  Goal: Maintains optimal cardiac output and hemodynamic stability  Description: INTERVENTIONS:  - Monitor vital signs, rhythm, and trends  - Monitor for bleeding, hypotension and signs of decreased cardiac output  - Evaluate effectiveness of vasoactive medications to optimize hemodynamic stability  - Monitor arterial and/or venous puncture sites for bleeding and/or hematoma  - Assess quality of pulses, skin color and temperature  - Assess for signs of decreased coronary artery perfusion - ex. Angina  - Evaluate fluid balance, assess for edema, trend weights  Outcome: Progressing  Goal: Absence of cardiac arrhythmias or at baseline  Description: INTERVENTIONS:  - Continuous cardiac monitoring, monitor vital signs, obtain 12 lead EKG if indicated  - Evaluate effectiveness of antiarrhythmic and heart rate control  medications as ordered  - Initiate emergency measures for life threatening arrhythmias  - Monitor electrolytes and administer replacement therapy as ordered  Outcome: Progressing     Problem: SAFETY ADULT - FALL  Goal: Free from fall injury  Description: INTERVENTIONS:  - Assess pt frequently for physical needs  - Identify cognitive and physical deficits and behaviors that affect risk of falls.  - [place] fall precautions as indicated by assessment.  - Educate pt/family on patient safety including physical limitations  - Instruct pt to call for assistance with activity based on assessment  - Modify environment to reduce risk of injury  - Provide assistive devices as appropriate  - Consider OT/PT consult to assist with strengthening/mobility  - Encourage toileting schedule  Outcome: Progressing     Problem: SKIN/TISSUE INTEGRITY - ADULT  Goal: Skin integrity remains intact  Description: INTERVENTIONS  - Assess and document risk factors for pressure ulcer development  - Assess and document skin integrity  - Monitor for areas of redness and/or skin breakdown  - Initiate interventions, skin care algorithm/standards of care as needed  Outcome: Progressing

## 2024-04-23 NOTE — DISCHARGE SUMMARY
Discharge Summary    Date of Admission: 4/18/2024    Date of Discharge: 4/23/2024    Hospital Course:   The patient was admitted to the hospital with congestive heart failure.  She was evaluated by cardiology.  She was diuresed.  Lisinopril 20 mg was initiated.  The patient did well clinically.  Her breathing improved and she was subsequently taken off oxygen.  The patient was anemic but did not receive blood.  She was stool Hemoccult negative.  The ferritin was within normal range although the iron saturation was only 17%.  The patient progressed with physical therapy and she was very poorly mobile and still needed further subacute rehabilitation.  She was also found to have a Klebsiella UTI and received empiric antibiotic course.    Discharge Medications:     Medication List        START taking these medications      acetaminophen 325 MG Tabs  Commonly known as: Tylenol  Take 2 tablets (650 mg total) by mouth in the morning and 2 tablets (650 mg total) before bedtime.     gabapentin 100 MG Caps  Commonly known as: Neurontin  Take 1 capsule (100 mg total) by mouth nightly.     * lidocaine-menthol 4-1 % Ptch  Place 1 patch onto the skin daily.  Start taking on: April 24, 2024     * lidocaine-menthol 4-1 % Ptch  Place 1 patch onto the skin daily.  Start taking on: April 24, 2024     * lidocaine-menthol 4-1 % Ptch  Place 1 patch onto the skin daily.  Start taking on: April 24, 2024     lisinopril 20 MG Tabs  Commonly known as: Prinivil; Zestril  Start taking on: April 24, 2024           * This list has 3 medication(s) that are the same as other medications prescribed for you. Read the directions carefully, and ask your doctor or other care provider to review them with you.                CHANGE how you take these medications      HYDROcodone-acetaminophen 5-325 MG Tabs  Commonly known as: Norco  Take 1 tablet by mouth every 6 (six) hours as needed for Pain.  What changed:   when to take this  reasons to take this      zolpidem 5 MG Tabs  Commonly known as: Ambien  What changed:   medication strength  how much to take            CONTINUE taking these medications      enoxaparin 30 MG/0.3ML Sosy  Commonly known as: Lovenox     fluticasone propionate 50 MCG/ACT Susp  Commonly known as: Flonase  USE 2 SPRAYS IN EACH NOSTRIL DAILY     furosemide 40 MG Tabs  Commonly known as: Lasix     levothyroxine 50 MCG Tabs  Commonly known as: Synthroid  Take 1 tablet (50 mcg total) by mouth before breakfast.     metoprolol tartrate 25 MG Tabs  Commonly known as: Lopressor  Take 0.5 tablets (12.5 mg total) by mouth 2x Daily(Beta Blocker).     Misc. Devices Misc  20% salicylic acid with 2% 5-fluorouracil.  Apply to warts daily as instructed     ondansetron 4 MG/2ML Soln  Commonly known as: Zofran     Polyethylene Glycol 3350 17 g Pack  Commonly known as: MIRALAX     Sennosides 17.2 MG Tabs            STOP taking these medications      potassium chloride 10 MEQ Tbcr  Commonly known as: K-Dur               Where to Get Your Medications        These medications were sent to [facility] - [place], [place] - [address] [phone], [phone]  [address], [place] [address]      Phone: [phone]   acetaminophen 325 MG Tabs  lidocaine-menthol 4-1 % Ptch  lidocaine-menthol 4-1 % Ptch  lidocaine-menthol 4-1 % Ptch       You can get these medications from any pharmacy    Bring a paper prescription for each of these medications  gabapentin 100 MG Caps  HYDROcodone-acetaminophen 5-325 MG Tabs         Discharge Plans:  Patient will return to rehab    Discharge Diagnosis:  Congestive heart failure, anemia, urinary tract infection, gait instability, generalized weakness    Sharan Lopez MD  Medical Director, Critical Care, [facility]  Medical Director, [facility]  Pager: [phone]

## 2024-04-23 NOTE — PROGRESS NOTES
[facility]  part of [facility]    Progress Note      Assessment and Plan:   1.  Congestive heart failure-much improved clinically.    Recommendations:  1.  Continue current medical regime as per cardiology.  2.  Anticipate return to open for care as soon as tomorrow.    2.  Klebsiella UTI-continue ceftriaxone IV today    3.  CODE STATUS-DNAR select    4.  DVT prophylaxis-Lovenox    5.  History of fall-to return to rehab for further supportive care and rehabilitative services    6.  Goals of care-community palliative care services    7.  Anemia-will send iron studies now.  May be Venofer candidate.  Will also send stool Hemoccult.    Subjective:   Elba Malik is a(n) 89 year old female who is breathing much better    Objective:   Blood pressure 146/56, pulse 85, temperature 98.3 °F (36.8 °C), temperature source Oral, resp. rate 18, weight 146 lb (66.2 kg), SpO2 95%, not currently breastfeeding.    Physical Exam alert white female  HEENT examination is unremarkable with pupils equal round and reactive to light and accommodation.   Neck without adenopathy, thyromegaly, JVD nor bruit.   Lungs slightly diminished to auscultation and percussion.  Cardiac regular rate and rhythm no murmur.   Abdomen nontender, without hepatosplenomegaly and no mass appreciable.   Extremities without clubbing cyanosis nor edema.   Neurologic grossly intact with symmetric tone and strength and reflex.  Skin without gross abnormality     Results:     Lab Results   Component Value Date    WBC 7.9 04/22/2024    HGB 8.3 04/22/2024    HCT 25.8 04/22/2024    .0 04/22/2024    CREATSERUM 1.00 04/22/2024    BUN 31 04/22/2024     04/22/2024    K 3.9 04/22/2024     04/22/2024    CO2 31.0 04/22/2024    GLU 93 04/22/2024    CA 9.0 04/22/2024       Sharan Lopez MD  Medical Director, Critical Care, [facility]  Medical Director, [facility]  Pager: [phone]

## 2024-04-23 NOTE — PLAN OF CARE
Problem: Patient Centered Care  Goal: Patient preferences are identified and integrated in the patient's plan of care  Description: Interventions:  - What would you like us to know as we care for you? From assisted living faility  - Provide timely, complete, and accurate information to patient/family  - Incorporate patient and family knowledge, values, beliefs, and cultural backgrounds into the planning and delivery of care  - Encourage patient/family to participate in care and decision-making at the level they choose  - Honor patient and family perspectives and choices  Outcome: Progressing     Problem: Patient/Family Goals  Goal: Patient/Family Long Term Goal  Description: Patient's Long Term Goal: to go back to facility    Interventions:  - follow medical regimen  - See additional Care Plan goals for specific interventions  Outcome: Progressing  Goal: Patient/Family Short Term Goal  Description: Patient's Short Term Goal: to not be SOB    Interventions:   - follow medical regimen  - See additional Care Plan goals for specific interventions  Outcome: Progressing     Problem: CARDIOVASCULAR - ADULT  Goal: Maintains optimal cardiac output and hemodynamic stability  Description: INTERVENTIONS:  - Monitor vital signs, rhythm, and trends  - Monitor for bleeding, hypotension and signs of decreased cardiac output  - Evaluate effectiveness of vasoactive medications to optimize hemodynamic stability  - Monitor arterial and/or venous puncture sites for bleeding and/or hematoma  - Assess quality of pulses, skin color and temperature  - Assess for signs of decreased coronary artery perfusion - ex. Angina  - Evaluate fluid balance, assess for edema, trend weights  Outcome: Progressing  Goal: Absence of cardiac arrhythmias or at baseline  Description: INTERVENTIONS:  - Continuous cardiac monitoring, monitor vital signs, obtain 12 lead EKG if indicated  - Evaluate effectiveness of antiarrhythmic and heart rate control  medications as ordered  - Initiate emergency measures for life threatening arrhythmias  - Monitor electrolytes and administer replacement therapy as ordered  Outcome: Progressing     Problem: SAFETY ADULT - FALL  Goal: Free from fall injury  Description: INTERVENTIONS:  - Assess pt frequently for physical needs  - Identify cognitive and physical deficits and behaviors that affect risk of falls.  - [place] fall precautions as indicated by assessment.  - Educate pt/family on patient safety including physical limitations  - Instruct pt to call for assistance with activity based on assessment  - Modify environment to reduce risk of injury  - Provide assistive devices as appropriate  - Consider OT/PT consult to assist with strengthening/mobility  - Encourage toileting schedule  Outcome: Progressing     Problem: SKIN/TISSUE INTEGRITY - ADULT  Goal: Skin integrity remains intact  Description: INTERVENTIONS  - Assess and document risk factors for pressure ulcer development  - Assess and document skin integrity  - Monitor for areas of redness and/or skin breakdown  - Initiate interventions, skin care algorithm/standards of care as needed  Outcome: Progressing

## 2024-04-23 NOTE — PHYSICAL THERAPY NOTE
PHYSICAL THERAPY TREATMENT NOTE - INPATIENT     Room Number: 336/336-A       Presenting Problem: CHF       Problem List  Principal Problem:    Acute on chronic congestive heart failure, unspecified heart failure type (HCC)  Active Problems:    Community acquired pneumonia, unspecified laterality    Palliative care encounter    Pain    Goals of care, counseling/discussion      PHYSICAL THERAPY ASSESSMENT   Patient demonstrates limited progress this session, goals  remain in progress.    Patient continues to function below baseline with bed mobility, transfers, gait, stair negotiation, maintaining seated position, standing prolonged periods, and performing household tasks.  Contributing factors to remaining limitations include decreased functional strength, decreased endurance/aerobic capacity, pain, impaired standing balance, impaired motor planning, decreased muscular endurance, and medical status.  Next session anticipate patient to progress bed mobility, transfers, gait, stair negotiation, maintaining seated position, standing prolonged periods, and performing household tasks.  Physical Therapy will continue to follow patient for duration of hospitalization.    Patient continues to benefit from continued skilled PT services: to promote return to prior level of function and safety with continuous assistance and gradual rehabilitative therapy .    PLAN  PT Treatment Plan: Bed mobility;Body mechanics;Endurance;Energy conservation;Patient education;Gait training;Transfer training;Balance training  Frequency (Obs): 5x/week    SUBJECTIVE  I feel limited with walking I can only take a few steps then I have to sit the L leg is weak and sore with standing I also have bad shoulders.     OBJECTIVE  Precautions: Limb alert - left    WEIGHT BEARING RESTRICTION           L Lower Extremity: Weight Bearing as Tolerated    PAIN ASSESSMENT   Rating: 3  Location: LLE with movement  Management Techniques: Activity promotion;Body  mechanics;Breathing techniques;Relaxation;Repositioning    BALANCE  Static Sitting: Fair  Dynamic Sitting: Fair -  Static Standing: Poor -  Dynamic Standing: Poor -    ACTIVITY TOLERANCE  Pulse: 84  Heart Rate Source: Monitor  Resp: 18  BP: 152/58  BP Location: Right arm  BP Method: Automatic  Patient Position: Sitting     O2 WALK       AM-PAC '6-Clicks' INPATIENT SHORT FORM - BASIC MOBILITY  How much difficulty does the patient currently have...  Patient Difficulty: Turning over in bed (including adjusting bedclothes, sheets and blankets)?: A Lot   Patient Difficulty: Sitting down on and standing up from a chair with arms (e.g., wheelchair, bedside commode, etc.): A Lot   Patient Difficulty: Moving from lying on back to sitting on the side of the bed?: A Lot   How much help from another person does the patient currently need...   Help from Another: Moving to and from a bed to a chair (including a wheelchair)?: A Lot   Help from Another: Need to walk in hospital room?: A Lot   Help from Another: Climbing 3-5 steps with a railing?: Total     AM-PAC Score:  Raw Score: 11   Approx Degree of Impairment: 72.57%   Standardized Score (AM-PAC Scale): 33.86   CMS Modifier (G-Code): CL    FUNCTIONAL ABILITY STATUS  Functional Mobility/Gait Assessment  Gait Assistance: Maximum assistance  Distance (ft): SPT to chair  Assistive Device: Rolling walker  Pattern: Shuffle (antalgic gait LLE retropulsive)  Rolling: moderate assist  Supine to Sit: moderate assist  Sit to Supine: moderate assist  Sit to Stand: maximum assist    Additional information: Pt ed with bed mobility with VC's for hand placement and max A to stand with RW. Pt ed with gait progression  4-5 shuffling steps tolerated to chair prior to fatgiue. Therex in chair reviewed x 10 reps each.     The patient's Approx Degree of Impairment: 72.57% has been calculated based on documentation in the [facility] '6 clicks' Inpatient Daily Activity Short Form.  Research supports that  patients with this level of impairment may benefit from IP Rehab.  Final disposition will be made by interdisciplinary medical team.    THERAPEUTIC EXERCISES  Lower Extremity Ankle pumps  Heel slides  LAQ     Position Sitting & Standing       Patient End of Session: Up in chair;With  staff;Needs met;Call light within reach;RN aware of session/findings;All patient questions and concerns addressed;Alarm set    CURRENT GOALS   Goals to be met by: 5/10/24  Patient Goal Patient's self-stated goal is: to go home   Goal #1 Patient is able to demonstrate supine - sit EOB @ level: supervision      Goal #1   Current Status Max A   Goal #2 Patient is able to demonstrate transfers Sit to/from Stand at assistance level: minimum assistance with walker - rolling      Goal #2  Current Status Max a holding onto the therapist   Goal #3 Patient is able to ambulate 10 feet with assist device: walker - rolling at assistance level: minimum assistance   Goal #3   Current Status SPT max A holding onto the therapist   Goal #4     Goal #4   Current Status     Goal #5 Patient to demonstrate independence with home activity/exercise instructions provided to patient in preparation for discharge.   Goal #5   Current Status IN PROGRESS     Gait Training: 10 minutes  Therapeutic Exercise: 15 minutes

## 2024-04-25 ENCOUNTER — EXTERNAL FACILITY (OUTPATIENT)
Dept: INTERNAL MEDICINE CLINIC | Facility: CLINIC | Age: 89
End: 2024-04-25

## 2024-04-25 DIAGNOSIS — M17.0 OSTEOARTHRITIS OF BOTH KNEES, UNSPECIFIED OSTEOARTHRITIS TYPE: ICD-10-CM

## 2024-04-25 DIAGNOSIS — M97.8XXA PERIPROSTHETIC HIP FRACTURE, INITIAL ENCOUNTER: ICD-10-CM

## 2024-04-25 DIAGNOSIS — Z96.649 PERIPROSTHETIC HIP FRACTURE, INITIAL ENCOUNTER: ICD-10-CM

## 2024-04-25 DIAGNOSIS — I50.9 ACUTE ON CHRONIC CONGESTIVE HEART FAILURE, UNSPECIFIED HEART FAILURE TYPE (HCC): Primary | ICD-10-CM
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
## 2024-04-25 PROBLEM — R52 PAIN: Status: RESOLVED | Noted: 2024-01-01 | Resolved: 2024-01-01

## 2024-04-25 PROBLEM — R52 PAIN: Status: RESOLVED | Noted: 2024-04-19 | Resolved: 2024-04-25

## 2024-04-25 PROBLEM — Z71.89 GOALS OF CARE, COUNSELING/DISCUSSION: Status: RESOLVED | Noted: 2024-04-19 | Resolved: 2024-04-25

## 2024-04-25 PROBLEM — Z71.89 GOALS OF CARE, COUNSELING/DISCUSSION: Status: RESOLVED | Noted: 2024-01-01 | Resolved: 2024-01-01

## 2024-04-25 PROCEDURE — 99309 SBSQ NF CARE MODERATE MDM 30: CPT | Performed by: INTERNAL MEDICINE

## 2024-04-25 NOTE — PROGRESS NOTES
History and physical    [facility] [place] note transcribed by Dr. Jeff Damico    Admit date: 4/23/2024    Date seen: 4/23/2024    Chief complaint: CHF exacerbation    HPI: [place] Malik is a 89 year old female who has been admitted to [facility] rehab stable condition after being discharged from the hospital.  Patient seen and examined by me, medications continued as recommended by the discharging hospital physician.  Patient seems stable, was readmitted last week for CHF exacerbation, was admitted in stable condition, treated with IV diuretics, improved quickly, stabilized, now is transition back care to [facility] in stable condition for rehab, medications continued as recommended by discharging hospital physician.  Normally is seen and taken care of by Dr. Lopez.  Daughter at bedside, from out of town, we did discuss plan of care, she did agree with the plan of care, weight monitoring.    Wt Readings from Last 3 Encounters:   04/23/24 144 lb 11.2 oz (65.6 kg)   04/12/24 158 lb 1.6 oz (71.7 kg)   04/05/24 153 lb (69.4 kg)      Past Medical History:    Arthritis    Asthma (HCC)    Back problem    multiple spinal injections    Cataract    bilateral cataract surgery    Cataracts, bilateral    cataracts, PC IOL 1997 OD Dr. Tate,  PC IOL Dr. Concepcion in [place], OS    Colon polyp    small polyps    Disorder of thyroid    Essential hypertension    Hearing impairment    High blood pressure    Osteoarthritis    Other and unspecified hyperlipidemia    Other ill-defined conditions(799.89)    Left wrist trauma, surgical repair    Posterior capsule opacification    OS, YAG laser 2014    Ptosis of right eyelid    Ptosis RUL    Shortness of breath    Visual impairment      Past Surgical History:   Procedure Laterality Date    Cataract      cataract extraction    Cataract Left 1997    Phaco w/PC IOL    Cataract Right 1998    Phaco w/ PC IOL    Cataract extraction w/  intraocular lens implant Left 1997    PC  IOL/ Dr. Pichardo    Cataract extraction w/  intraocular lens implant Right 1998    PC IOL / Dr. Concepcion    Cholecystectomy      Colonoscopy      Ercp,diagnostic  10/16/2018    Choledocholithiasis, dilated intrahepatic and extrahepatic biliary tree    Hip replacement surgery  97/0 per NG    Hysterectomy      Partial    Laparoscopic cholecystectomy  05/27/2016    Other surgical history      Left wrist trauma, surgical repair    Other surgical history  05/27/2016    Laparoscopy with bilateral salpingo-oophorectomy    Synvisc, intra-articular Bilateral 2013    Synvisc injection bilateral knees    Total hip replacement      bilateral hip replacements    Yag capsulotomy - os - left eye Left 1998    Dr. Concepcion      Family History   Problem Relation Age of Onset    Ear Problems Mother         hearing loss    Other (Other) Father         emphysema    Diabetes Neg     Glaucoma Neg     Macular degeneration Neg       Social History:  Social History     Socioeconomic History    Marital status:    Occupational History    Occupation: homemaker   Tobacco Use    Smoking status: Never    Smokeless tobacco: Never   Vaping Use    Vaping status: Never Used   Substance and Sexual Activity    Alcohol use: No     Alcohol/week: 0.0 standard drinks of alcohol    Drug use: No    Sexual activity: Never   Other Topics Concern    Caffeine Concern Yes     Comment: coffee, 1 cup daily    Grew up on a farm No    History of tanning No    Outdoor occupation No    Pt has a pacemaker No    Pt has a defibrillator No    Breast feeding No    Reaction to local anesthetic No     Social Determinants of Health     Financial Resource Strain: Low Risk  (1/16/2024)    Financial Resource Strain     Difficulty of Paying Living Expenses: Not hard at all     Med Affordability: No   Food Insecurity: No Food Insecurity (4/18/2024)    Food Insecurity     Food Insecurity: Never true   Transportation Needs: No Transportation Needs (4/18/2024)    Transportation Needs      Lack of Transportation: No   Housing Stability: Low Risk  (4/18/2024)    Housing Stability     Housing Instability: No         Current medications: See chart  Current Outpatient Medications   Medication Sig Dispense Refill    HYDROcodone-acetaminophen 5-325 MG Oral Tab Take 1 tablet by mouth every 6 (six) hours as needed for Pain. 30 tablet 0    gabapentin 100 MG Oral Cap Take 1 capsule (100 mg total) by mouth nightly. 30 capsule 0    acetaminophen 325 MG Oral Tab Take 2 tablets (650 mg total) by mouth in the morning and 2 tablets (650 mg total) before bedtime. 60 tablet 0    lisinopril 20 MG Oral Tab Take 1 tablet (20 mg total) by mouth daily.      lidocaine-menthol 4-1 % External Patch Place 1 patch onto the skin daily. 30 patch 0    lidocaine-menthol 4-1 % External Patch Place 1 patch onto the skin daily. 30 patch 0    lidocaine-menthol 4-1 % External Patch Place 1 patch onto the skin daily. 30 patch 0    zolpidem 5 MG Oral Tab Take 1 tablet (5 mg total) by mouth nightly as needed for Sleep.      enoxaparin 30 MG/0.3ML Injection Solution Prefilled Syringe Inject 0.3 mL (30 mg total) into the skin daily.      furosemide 40 MG Oral Tab Take 1 tablet (40 mg total) by mouth daily.      ondansetron 4 MG/2ML Injection Solution Inject 2 mL (4 mg total) into the vein every 6 (six) hours as needed.      polyethylene glycol, PEG 3350, 17 g Oral Powd Pack Take 17 g by mouth daily as needed (If no bowel movement in last 24 hours).      sennosides 17.2 MG Oral Tab Take 1 tablet (17.2 mg total) by mouth nightly as needed (constipation, as needed if no bowel movement that day).      levothyroxine 50 MCG Oral Tab Take 1 tablet (50 mcg total) by mouth before breakfast. 90 tablet 3    metoprolol tartrate 25 MG Oral Tab Take 0.5 tablets (12.5 mg total) by mouth 2x Daily(Beta Blocker). 60 tablet 5    Misc. Devices Does not apply Misc 20% salicylic acid with 2% 5-fluorouracil.  Apply to warts daily as instructed (Patient not  taking: Reported on 4/18/2024) 1 each 2    FLUTICASONE PROPIONATE 50 MCG/ACT Nasal Suspension USE 2 SPRAYS IN EACH NOSTRIL DAILY 16 g 5       REVIEW OF SYSTEMS:  Constitutional: Negative for Chills, fatigue, fever, malaise, weight gain and weight loss.  ENMT: Negative for Nasal drainage and sinus pressure.  Eyes: Negative for Vision changes.  Respiratory: Negative for Cough, dyspnea and wheezing.  Cardio: Negative Chest pain and irregular heartbeat/palpitations.  GI: Negative for Abdominal pain, constipation, diarrhea, heartburn, nausea and vomiting.  : Negative for Dysuria and urinary frequency.  Endocrine: Negative for Cold intolerance and heat intolerance.  Neuro: Negative for Gait disturbance and memory impairment.  Psych: Negative for Anxiety and depression.  Integumentary: Negative for Hives and rash.  MS: Negative muscle weakness. neg for joint pain  Hema/Lymph: Negative Easy bleeding and easy bruising.  Allergic/Immuno: Negative Environmental allergies and food allergies.    EXAM:   There were no vitals taken for this visit.  There is no height or weight on file to calculate BMI.   Vital signs: See point click care charting for updated details  Gen. exam: Alert and awake, baseline mental status noted, in no acute distress   HEENT: Pupils equal and reactive to light and accommodation, moist mucous membranes  Neck exam:  Supple.  Normal thyroid trachea midline, no JVD  Heart exam: Regular rate and rhythm no murmurs no S3 no S4   Lung exam: No rales no rhonchi no wheezes  Abdominal exam: Soft, nontender, nondistended, positive bowel sounds are normoactive  Extremities exam: no clubbing, no cyanosis, 1/4 edema  Skin exam: No obvious wounds, no rashes  Neurological exam: Cranial nerves II through XII intact, no gross deficits  Musculoskeletal exam: Advanced bilateral generalized hand arthritis appreciated, no obvious deformity    Labs/imaging: See chart    DVT prophylaxis: with Lovenox    Ambulatory status: Per  hospital discharge recommendations, specialist involvement/recommendations and physical therapy evaluation    ASSESSMENT AND PLAN:   Elba Malik is a 89 year old female seen at [facility] [place] with the followin. Acute on chronic congestive heart failure, unspecified heart failure type (HCC)  Acute diastolic heart failure: Treated, continue Lasix, involve in-house cardiology, CHF protocol, continue monitoring management, serial blood work as well.    2. Periprosthetic hip fracture, initial encounter  Stable continue current monitor management, engage physical therapy.  Physical therapy, occupational therapy, physiatry to evaluate and treat, further orders pending clinical course    3. Fall, initial encounter  Stable continue current monitoring and management    4. Essential hypertension  Stable continue current monitoring and management    5. Iron deficiency anemia, unspecified iron deficiency anemia type  Stable continue current monitoring and management    6. Chronic left-sided low back pain with left-sided sciatica  Stable continue current monitoring and management    7. Arthropathy  Stable continue current monitoring and management    8. Dermatochalasis of eyelid  Stable continue current monitoring and management    9. Primary insomnia  Ambien 10 mg nightly, tolerated well, and agreed on by the family and patient.  Discussed at length, gave options to lower this.      CODE STATUS:   Code Status: DNAR/Selective Treatment    admit condition: Stable    Over 60 minutes spent in direct patient contact reviewing and creating admission orders, obtaining history, evaluating patient, discussing treatment options, family/staff communication, review of available labs and radiology reports, completing documentation, and coordinating care      Jeff Anthony D'Amico, DO  2024  12:30 PM

## 2024-04-26 ENCOUNTER — EXTERNAL FACILITY (OUTPATIENT)
Dept: INTERNAL MEDICINE CLINIC | Facility: CLINIC | Age: 89
End: 2024-04-26

## 2024-04-26 ENCOUNTER — TELEPHONE (OUTPATIENT)
Dept: PALLIATIVE CARE | Facility: HOSPITAL | Age: 89
End: 2024-04-26
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
## 2024-04-26 DIAGNOSIS — D75.839 THROMBOCYTOSIS: ICD-10-CM

## 2024-04-26 DIAGNOSIS — M17.0 OSTEOARTHRITIS OF BOTH KNEES, UNSPECIFIED OSTEOARTHRITIS TYPE: ICD-10-CM

## 2024-04-26 DIAGNOSIS — I50.9 ACUTE ON CHRONIC CONGESTIVE HEART FAILURE, UNSPECIFIED HEART FAILURE TYPE (HCC): Primary | ICD-10-CM

## 2024-04-26 DIAGNOSIS — M97.8XXA PERIPROSTHETIC HIP FRACTURE, INITIAL ENCOUNTER: ICD-10-CM

## 2024-04-26 DIAGNOSIS — Z96.649 PERIPROSTHETIC HIP FRACTURE, INITIAL ENCOUNTER: ICD-10-CM

## 2024-04-26 NOTE — TELEPHONE ENCOUNTER
I returned pt's dtr Kenn's call regarding which community palliative care program will be following her mother at [facility]. I discussed according to notes, SW made referral to [facility] to follow on dc and I provide her their contact info to see when they will set up first visit. She said she had received a few other calls from other community palliative care agencies so was unsure which agency was going to follow. Answered questions and provided support.    Elise Castellanos, ANP-BC, ACHPN

## 2024-04-26 NOTE — PROGRESS NOTES
[facility] [place] note transcribed by Dr. Jeff Damico  .  Date seen: 4/25/2024  .  subjective: Patient seen and examined acute diastolic CHF, hypertension, left hip fracture, falls, knee pain. Patient seems stable, but is complaining about her knees, she claims has having trouble with getting more physical with physical therapy, with restrictions on her bilateral knee osteoarthritis. She continues to remain stable, breathing looks stable, tolerating medications, doing well. She does agree with seeing the physiatrist for possible intervention.  .  EXAM:  Vital signs: See point click care charting for updated details  Gen. exam: Alert and awake, baseline mental status noted, in no acute distress  HEENT: Pupils equal and reactive to light andaccommodation, moist mucous membranes  Neck exam: Supple. Normal thyroid trachea midline, no JVD  Heart exam: Regular rate and rhythm no murmurs no S3 no S4  Lung exam: No rales no rhonchi no wheezes  Abdominal exam: Soft, nontender, nondistended, positive bowel sounds are normoactive  Extremities exam: no clubbing, no cyanosis, 1/4 edema, unchanged  Skin exam: No obvious wounds, no rashes  Neurological exam: Cranial nerves II through XII intact, no gross deficits  Musculoskeletal exam: Advanced bilateral generalized hand arthritis appreciated, no obvious deformity  .  Labs/imaging: See chart  DVT prophylaxis: with Lovenox  Ambulatory status: Per hospital discharge recommendations, specialist involvement/recommendations and physical therapy evaluation  .  ASSESSMENT AND PLAN:  Elba Malik is a 89 year old female seen at [facility] [place] with the following:  Acute diastolic heart failure: Treated, continue Lasix, involve in-house cardiology, CHF protocol, continue monitoring management, serial blood work as well,  Periprosthetic hip fracture, initial encounter: Physical therapy, occupational therapy, physiatry to evaluate and treat, further orders pending  clinical course, anticoagulation per hospital recommendations  Fall, initial encounter fall precautions, continue current monitoring management  Bilateral knee osteoarthritis: I have messaged Dr. FAITH to consider some injections here, she does agree with that plan.  .  Stable problem list:  Essential hypertension  Iron deficiency anemia, unspecified iron deficiency anemia type  Chronic left-sided low back pain with left-sided sciatica  Arthropathy  Dermatochalasis of eyelid  Primary insomnia  Ambien 10 mg nightly, tolerated well, and agreed on by the family and patient. Discussed at length, gave options to lower this.

## 2024-04-29 ENCOUNTER — EXTERNAL FACILITY (OUTPATIENT)
Dept: INTERNAL MEDICINE CLINIC | Facility: CLINIC | Age: 89
End: 2024-04-29

## 2024-04-29 DIAGNOSIS — I50.9 ACUTE ON CHRONIC CONGESTIVE HEART FAILURE, UNSPECIFIED HEART FAILURE TYPE (HCC): Primary | ICD-10-CM

## 2024-04-29 DIAGNOSIS — D75.839 THROMBOCYTOSIS: ICD-10-CM

## 2024-04-29 DIAGNOSIS — M97.8XXA PERIPROSTHETIC HIP FRACTURE, INITIAL ENCOUNTER: ICD-10-CM
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
## 2024-04-29 DIAGNOSIS — M17.0 OSTEOARTHRITIS OF BOTH KNEES, UNSPECIFIED OSTEOARTHRITIS TYPE: ICD-10-CM

## 2024-04-29 DIAGNOSIS — Z96.649 PERIPROSTHETIC HIP FRACTURE, INITIAL ENCOUNTER: ICD-10-CM

## 2024-04-29 PROCEDURE — 99309 SBSQ NF CARE MODERATE MDM 30: CPT | Performed by: INTERNAL MEDICINE

## 2024-04-29 NOTE — PROGRESS NOTES
[facility] [place] note transcribed by Dr. Jeff Damico  .  Date seen: 4/26/2024  .  Subjective: Patient seen and examined acute diastolic CHF, hypertension, left hip fracture, falls, knee pain. Patient seems stable, doing well, did receive a cortisone injection with Dr. MARCELL metz, left knee, she claims this did help, she had a good day with physical therapy, was able to tolerate more activity, pain looks well-controlled, volume status looks stable she is compliant with medications, no recent weight taken. Was reminded. She continues to tolerate medications, seems to be doing well in the facility. Lab work reviewed. Platelet count 752. Renal function stable.  .  EXAM:  Vital signs: See point click care charting for updated details  Gen. exam: Alert and awake, baseline mental status noted, in no acute distress  HEENT: Pupils equal and reactive to light andaccommodation, moist mucous membranes  Neck exam: Supple. Normal thyroid trachea midline, no JVD  Heart exam: Regular rate and rhythm no murmurs no S3 no S4  Lung exam: No rales no rhonchi no wheezes  Abdominal exam: Soft, nontender, nondistended, positive bowel sounds are normoactive  Extremities exam: no clubbing, no cyanosis, 1/4 edema, unchanged  Skin exam: No obvious wounds, no rashes  Neurological exam: Cranial nerves II through XII intact, no gross deficits  Musculoskeletal exam: Advanced bilateral generalized hand arthritis appreciated, no obvious deformity  .  Labs/imaging: See chart  DVT prophylaxis: with Lovenox  Ambulatory status: Per hospital discharge recommendations, specialist involvement/recommendations and physical therapy evaluation  .  ASSESSMENT AND PLAN:  Elba Malik is a 89 year old female seen at [facility] [place] with the following:  Acute diastolic heart failure: Treated, continue Lasix, involve in-house cardiology, CHF protocol, continue monitoring management, serial blood work as well,  Periprosthetic hip fracture, initial  encounter: Physical therapy, occupational therapy, physiatry to evaluate and treat, further orders pending clinical course, anticoagulation per hospital recommendations  Fall, initial encounter fall precautions, continue current monitoring management  Bilateral knee osteoarthritis: Status post injection, continue conservative management  Thrombocytosis: Looks reactive, will continue to monitor, further orders pending its progression.  .  Stable problem list:  Essential hypertension  Iron deficiency anemia, unspecified iron deficiency anemia type  Chronic left-sided low back pain with left-sided sciatica  Arthropathy  Dermatochalasis of eyelid  Primary insomnia  Ambien 10 mg nightly, tolerated well, and agreed on by the family and patient. Discussed at length, gave options to lower this.

## 2024-04-30 ENCOUNTER — EXTERNAL FACILITY (OUTPATIENT)
Dept: INTERNAL MEDICINE CLINIC | Facility: CLINIC | Age: 89
End: 2024-04-30
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
## 2024-04-30 DIAGNOSIS — D75.839 THROMBOCYTOSIS: ICD-10-CM

## 2024-04-30 DIAGNOSIS — I50.9 ACUTE ON CHRONIC CONGESTIVE HEART FAILURE, UNSPECIFIED HEART FAILURE TYPE (HCC): Primary | ICD-10-CM

## 2024-04-30 DIAGNOSIS — M97.8XXA PERIPROSTHETIC HIP FRACTURE, INITIAL ENCOUNTER: ICD-10-CM

## 2024-04-30 DIAGNOSIS — M17.0 OSTEOARTHRITIS OF BOTH KNEES, UNSPECIFIED OSTEOARTHRITIS TYPE: ICD-10-CM

## 2024-04-30 DIAGNOSIS — Z96.649 PERIPROSTHETIC HIP FRACTURE, INITIAL ENCOUNTER: ICD-10-CM

## 2024-04-30 NOTE — PROGRESS NOTES
[facility] [place] note transcribed by Dr. Jeff Damico  .  Date seen: 4/29/2024  .  subjective: Patient seen and examined acute diastolic CHF, hypertension, left hip fracture, falls, knee pain. Patient seems stable, uneventful weekend, claims she was able to do more physical therapy today. Bowels are steady, weight steady at 143. I reviewed lab work from last week, things look stable, fine status looks unchanged, tolerating medications. She is talking about a heavy week and physical therapy in which she is planning on getting back to walking, she was walking between the parallel bars today.  .  EXAM:  Vital signs: See point click care charting for updated details  Gen. exam: Alert and awake, baseline mental status noted, in no acute distress  HEENT: Pupils equal and reactive to light andaccommodation, moist mucous membranes  Neck exam: Supple. Normal thyroid trachea midline, no JVD  Heart exam: Regular rate and rhythm no murmurs no S3 no S4  Lung exam: No rales no rhonchi no wheezes  Abdominal exam: Soft, nontender, nondistended, positive bowel sounds are normoactive  Extremities exam: no clubbing, no cyanosis, 1/4 edema, unchanged  Skin exam: No obvious wounds, no rashes  Neurological exam: Cranial nerves II through XII intact, no gross deficits  Musculoskeletal exam: Advanced bilateral generalized hand arthritis appreciated, no obvious deformity  .  Labs/imaging: See chart  DVT prophylaxis: with Lovenox  Ambulatory status: Per hospital discharge recommendations, specialist involvement/recommendations and physical therapy evaluation  .  ASSESSMENT AND PLAN:  Elba Malik is a 89 year old female seen at [facility] [place] with the following:  Acute diastolic heart failure: Treated, continue Lasix, involve in-house cardiology, CHF protocol, continue monitoring management, serial blood work as well,  Periprosthetic hip fracture, initial encounter: Physical therapy, occupational therapy, physiatry to  evaluate and treat, further orders pending clinical course, anticoagulation per hospitalrecommendations  Fall, initial encounter fall precautions, continue current monitoring management  Bilateral knee osteoarthritis: Status post injection, continue conservative management  Thrombocytosis: Looks reactive, will continue to monitor for downtrend lab work in the a.m.  .  Stable problem list:  Essential hypertension  Iron deficiency anemia, unspecified iron deficiency anemia type  Chronic left-sided low back pain with left-sided sciatica  Arthropathy  Dermatochalasis of eyelid  Primary insomnia  Ambien 10 mg nightly, tolerated well, and agreed on by the family and patient. Discussed at length, gave options to lower this.

## 2024-05-02 ENCOUNTER — EXTERNAL FACILITY (OUTPATIENT)
Dept: INTERNAL MEDICINE CLINIC | Facility: CLINIC | Age: 89
End: 2024-05-02

## 2024-05-02 DIAGNOSIS — Z96.649 PERIPROSTHETIC HIP FRACTURE, INITIAL ENCOUNTER: ICD-10-CM

## 2024-05-02 DIAGNOSIS — D75.839 THROMBOCYTOSIS: ICD-10-CM

## 2024-05-02 DIAGNOSIS — M97.8XXA PERIPROSTHETIC HIP FRACTURE, INITIAL ENCOUNTER: ICD-10-CM

## 2024-05-02 DIAGNOSIS — I50.9 ACUTE ON CHRONIC CONGESTIVE HEART FAILURE, UNSPECIFIED HEART FAILURE TYPE (HCC): Primary | ICD-10-CM
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
## 2024-05-02 DIAGNOSIS — M17.0 OSTEOARTHRITIS OF BOTH KNEES, UNSPECIFIED OSTEOARTHRITIS TYPE: ICD-10-CM

## 2024-05-02 PROCEDURE — 99309 SBSQ NF CARE MODERATE MDM 30: CPT | Performed by: INTERNAL MEDICINE

## 2024-05-03 NOTE — PROGRESS NOTES
[facility] [place] note transcribed by Dr. Jeff Damico  .  Date seen: 4/30/2024  .  subjective: Patient seen and examined acute diastolic CHF, hypertension, left hip fracture, falls, knee pain. Patient seems stable, doing very well, daughter Kenn atbedside, weight seems stable, had another good day with physical therapy, pain seems controlled. She continues to improve, lab work reviewed, BNP around 400 which is improved from hospital numbers, still with low hemoglobin, kidney function looks unchanged. Platelet count coming down. Bowels seem to be moving, tolerating activities, stable and doing well.  .  EXAM:  Vital signs: See point click care charting for updated details  Gen. exam: Alert and awake, baseline mental status noted, in no acute distress  HEENT: Pupils equal and reactive to light andaccommodation, moist mucous membranes  Neck exam: Supple. Normal thyroid trachea midline, no JVD  Heart exam: Regular rate and rhythm no murmurs no S3 no S4  Lung exam: No rales no rhonchi no wheezes  Abdominal exam: Soft, nontender, nondistended, positive bowel sounds are normoactive  Extremities exam: no clubbing, no cyanosis, trace bilateral lower extremity edema, improved  Skin exam: No obvious wounds, no rashes  Neurological exam: Cranial nerves II through XII intact, no gross deficits  Musculoskeletal exam: Advanced bilateral generalized hand arthritis appreciated, no obvious deformity  .  Labs/imaging: See chart  DVT prophylaxis: with Lovenox  Ambulatory status: Per hospital discharge recommendations, specialist involvement/recommendations and physical therapy evaluation  .  ASSESSMENT AND PLAN:  Elba Malik is a 89 year old female seen at [facility] [place] with the following:  Acute diastolic heart failure: Treated, continue Lasix, involve in-house cardiology, CHF protocol, continue monitoring management, serial blood work as well, next on Friday, vigilance to kidney function with current oral  diuretics.  Periprosthetic hip fracture, initial encounter: Physical therapy, occupationaltherapy, physiatry to evaluate and treat, further orders pending clinical course, anticoagulation per hospitalrecommendations  Fall, initial encounter fall precautions, continue current monitoring management  Bilateral knee osteoarthritis: Status post injection, continue conservative management  Thrombocytosis: Looks reactive, improving  .  Stable problem list:  Essential hypertension  Iron deficiency anemia, unspecified iron deficiency anemia type  Chronic left-sided low back pain with left-sided sciatica  Arthropathy  Dermatochalasis of eyelid  Primary insomnia  Ambien 10 mg nightly, tolerated well, and agreed on by the family and patient. Discussed at length, gave options to lower this.

## 2024-05-06 ENCOUNTER — EXTERNAL FACILITY (OUTPATIENT)
Dept: INTERNAL MEDICINE CLINIC | Facility: CLINIC | Age: 89
End: 2024-05-06

## 2024-05-06 DIAGNOSIS — Z96.649 PERIPROSTHETIC HIP FRACTURE, INITIAL ENCOUNTER: ICD-10-CM

## 2024-05-06 DIAGNOSIS — I50.9 ACUTE ON CHRONIC CONGESTIVE HEART FAILURE, UNSPECIFIED HEART FAILURE TYPE (HCC): Primary | ICD-10-CM

## 2024-05-06 DIAGNOSIS — D75.839 THROMBOCYTOSIS: ICD-10-CM
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
## 2024-05-06 DIAGNOSIS — M97.8XXA PERIPROSTHETIC HIP FRACTURE, INITIAL ENCOUNTER: ICD-10-CM

## 2024-05-06 DIAGNOSIS — M17.0 OSTEOARTHRITIS OF BOTH KNEES, UNSPECIFIED OSTEOARTHRITIS TYPE: ICD-10-CM

## 2024-05-06 PROCEDURE — 99309 SBSQ NF CARE MODERATE MDM 30: CPT | Performed by: INTERNAL MEDICINE

## 2024-05-07 ENCOUNTER — EXTERNAL FACILITY (OUTPATIENT)
Dept: INTERNAL MEDICINE CLINIC | Facility: CLINIC | Age: 89
End: 2024-05-07

## 2024-05-07 DIAGNOSIS — M17.0 OSTEOARTHRITIS OF BOTH KNEES, UNSPECIFIED OSTEOARTHRITIS TYPE: ICD-10-CM
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
## 2024-05-07 DIAGNOSIS — Z96.649 PERIPROSTHETIC HIP FRACTURE, INITIAL ENCOUNTER: ICD-10-CM

## 2024-05-07 DIAGNOSIS — I50.9 ACUTE ON CHRONIC CONGESTIVE HEART FAILURE, UNSPECIFIED HEART FAILURE TYPE (HCC): Primary | ICD-10-CM

## 2024-05-07 DIAGNOSIS — M97.8XXA PERIPROSTHETIC HIP FRACTURE, INITIAL ENCOUNTER: ICD-10-CM

## 2024-05-07 DIAGNOSIS — D75.839 THROMBOCYTOSIS: ICD-10-CM

## 2024-05-07 PROCEDURE — 99309 SBSQ NF CARE MODERATE MDM 30: CPT | Performed by: INTERNAL MEDICINE

## 2024-05-07 NOTE — PROGRESS NOTES
[facility] [place] note transcribed by Dr. Jeff Damico  .  Date seen: 5/2/2024  .  subjective: Patient seen and examined acute diastolic CHF, hypertension, left hip fracture, falls, knee pain. Patient seems stable, doing well, promising week so far,doing well with therapy, seems to be going farther and farther with distances in therapy, but still is bothered by her knees, and her hip. She has remained stable from a weight perspective, today she is up 3 pounds over the past 2 days, edema level looks improved, she does not feel any shortness of breath, has been compliant with rest of medications. No family at bedside, doing quite well.  .  EXAM:  Vital signs: See point click care charting for updated details  Gen. exam: Alert and awake, baseline mental status noted, in no acute distress  HEENT: Pupils equal and reactive to light andaccommodation, moist mucous membranes  Neck exam: Supple. Normal thyroid trachea midline, no JVD  Heart exam: Regular rate and rhythm no murmurs no S3 no S4  Lung exam: No rales no rhonchi no wheezes  Abdominal exam: Soft, nontender, nondistended, positive bowel sounds are normoactive  Extremities exam: no clubbing, no cyanosis, trace bilateral lower extremity edema, improved  Skin exam: No obvious wounds, no rashes  Neurological exam: Cranial nerves II through XII intact, no gross deficits  Musculoskeletal exam: Advanced bilateral generalized hand arthritis appreciated, no obvious deformity  .  Labs/imaging: See chart  DVT prophylaxis: with Lovenox  Ambulatory status: Per hospital discharge recommendations, specialist involvement/recommendations and physical therapy evaluation  .  ASSESSMENT AND PLAN:  Elba Malik is a 89 year old female seen at [facility] [place] with the following:  Acute diastolic heart failure: Treated, continue Lasix, involve in-house cardiology, CHF protocol, continue monitoring management, serial blood work as well, next on Friday, vigilance to  kidney function with current oral diuretics. With a slight uptrend, will watch weights closely, react with diuretic changes based on weight and vital sign factors  Periprosthetic hip fracture, initial encounter: Physical therapy, occupationaltherapy, physiatry to evaluate and treat, further orders pending clinical course, anticoagulation per hospitalrecommendations  Fall, initial encounter fall precautions, continue current monitoring management  Bilateral knee osteoarthritis: Status post injection, continue conservative management  Thrombocytosis: Looks reactive, improving, labs in the a.m.  .  Stable problem list:  Essential hypertension  Iron deficiency anemia, unspecified iron deficiency anemia type  Chronic left-sided low back pain with left-sided sciatica  Arthropathy  Dermatochalasis of eyelid  Primary insomnia  Ambien 10 mg nightly, tolerated well, and agreed on by the family and patient. Discussed at length, gave options to lower this.

## 2024-05-08 NOTE — PROGRESS NOTES
[facility] [place] note transcribed by Dr. Jeff Damico  .  Date seen: 5/6/2024  .  subjective: Patient seen and examined acute diastolic CHF, hypertension, left hip fracture, falls, knee pain. Seems stable, doing well, working forward with physical therapy, pain in her knees continues to be her limiting factor. Her volume status looks euvolemic, daughter at bedside, long discussion had, lab work reviewed. Patient seems to be tolerating pain well, using pain medication 1-2 times a day. , looks to be more of her baseline.  .  EXAM:  Vital signs: See point click care charting for updated details  Gen. exam: Alert and awake, baseline mental status noted, in no acute distress  HEENT: Pupils equal and reactive to light and accommodation, moist mucous membranes  Neck exam: Supple. Normal thyroid trachea midline, no JVD  Heart exam: Regular rate and rhythm no murmurs no S3 no S4  Lung exam: No rales no rhonchi no wheezes  Abdominal exam: Soft, nontender, nondistended, positive bowel sounds are normoactive  Extremities exam: no clubbing, no cyanosis, trace bilateral lower extremity edema, improved  Skin exam: No obvious wounds, no rashes  Neurological exam: Cranial nerves II through XII intact, no gross deficits  Musculoskeletal exam: Advanced bilateral generalized hand arthritis appreciated, no obvious deformity  .  Labs/imaging: See chart  DVT prophylaxis: with Lovenox  Ambulatory status: Per hospital discharge recommendations, specialist involvement/recommendations and physical therapy evaluation  .  ASSESSMENT AND PLAN:  Elba Malik is a 89 year old female seen at [facility] [place] with the following:  Acute diastolic heart failure: Treated, continue Lasix, involve in-house cardiology, CHF protocol, continue monitoring management,serial blood work as well, , vigilance to kidney function with current oral diuretics. Euvolemic, very stable.  Periprosthetic hip fracture, initial encounter:  Physical therapy, occupationaltherapy, physiatry to evaluate and treat, further orderspending clinical course, anticoagulation per hospital recommendations  Fall, initial encounter fall precautions, continue current monitoring management  Bilateral knee osteoarthritis: Status post injection, continue conservative management  Thrombocytosis: Reactive, improving  .  Stable problem list:  Essential hypertension  Iron deficiency anemia, unspecified iron deficiency anemia type  Chronic left-sided low back pain with left-sided sciatica  Arthropathy  Dermatochalasis of eyelid  Primary insomnia  Ambien 10 mg nightly, tolerated well, and agreed on by the family and patient. Discussed at length, gave options to lower this.

## 2024-05-09 ENCOUNTER — EXTERNAL FACILITY (OUTPATIENT)
Dept: INTERNAL MEDICINE CLINIC | Facility: CLINIC | Age: 89
End: 2024-05-09

## 2024-05-09 DIAGNOSIS — Z96.649 PERIPROSTHETIC HIP FRACTURE, INITIAL ENCOUNTER: ICD-10-CM

## 2024-05-09 DIAGNOSIS — I50.9 ACUTE ON CHRONIC CONGESTIVE HEART FAILURE, UNSPECIFIED HEART FAILURE TYPE (HCC): Primary | ICD-10-CM

## 2024-05-09 DIAGNOSIS — D75.839 THROMBOCYTOSIS: ICD-10-CM

## 2024-05-09 DIAGNOSIS — M97.8XXA PERIPROSTHETIC HIP FRACTURE, INITIAL ENCOUNTER: ICD-10-CM
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
## 2024-05-09 DIAGNOSIS — M17.0 OSTEOARTHRITIS OF BOTH KNEES, UNSPECIFIED OSTEOARTHRITIS TYPE: ICD-10-CM

## 2024-05-09 NOTE — PROGRESS NOTES
[facility] [place] note transcribed by Dr. Jeff Damico  .  Date seen: 5/7/2024  .  subjective: Patient seen and examined acute diastolic CHF, hypertension, left hip fracture, falls, knee pain. Patient seems stable, doing well, no changes overnight, she has been a bit frustrated in therapy as she has not been able to get to her feet on her own, she is able to walk with a walker for short distance, and she did have an evaluation today, she claims. She does have complaints about the injection of Lovenox, at this point in time she is up and moving, we did discuss discontinuing that, she verbalized understanding of the risks of DVT, and these are minimized now that she is walking, she agrees with stopping that with close observation for now. Toleratingmedications, pain seems to be tolerable, no family available tonight. Weight down to 140 pounds  .  EXAM:  Vital signs: See point click care charting for updated details  Gen. exam: Alert and awake, baseline mental status noted, in no acute distress  HEENT: Pupils equal and reactive to light and accommodation, moist mucous membranes  Neck exam: Supple. Normal thyroid trachea midline, no JVD  Heart exam: Regular rate and rhythm no murmurs no S3 no S4  Lung exam: No rales no rhonchi no wheezes  Abdominal exam: Soft, nontender, nondistended, positive bowel sounds are normoactive  Extremities exam: no clubbing, no cyanosis, no edema  Skin exam: No obvious wounds, no rashes  Neurological exam: Cranial nerves II through XII intact, no gross deficits  Musculoskeletal exam: Advanced bilateral generalized hand arthritis appreciated, no obvious deformity  .  Labs/imaging: See chart  DVT prophylaxis: Lovenox discontinued  Ambulatory status: Per hospital discharge recommendations, specialist involvement/recommendations and physical therapy evaluation  .  ASSESSMENT AND PLAN:  Elba Malik is a 89 year old female seen at [facility] [place] with the following:  Acute  diastolic heart failure: Continue with Lasix, CHF protocol, daily weight monitoring, weight seems stable, baseline diuretics, with continued monitoring of kidney function.  Periprosthetic hip fracture, initial encounter: Physical therapy, occupationaltherapy, physiatry to evaluate and treat, further orderspending clinical course, anticoagulation per hospital recommendations, with ambulatory status improving, injection of anticoagulation from a prophylaxis standpoint, no longer necessary in my medical opinion, patient does agree with this plan of care, will be discontinued.  Fall, initial encounter fall precautions, continue current monitoring management  Bilateral knee osteoarthritis: Status post injection, continue conservative management  Thrombocytosis: Reactive, improving, resolving  .  Stable problem list:  Essential hypertension  Iron deficiency anemia, unspecified iron deficiency anemia type  Chronic left-sided low back pain with left-sided sciatica  Arthropathy  Dermatochalasis of eyelid  Primary insomnia  Ambien 10 mg nightly, tolerated well, and agreed on by the family and patient. Discussed at length, gave options to lower this.

## 2024-05-10 ENCOUNTER — EXTERNAL FACILITY (OUTPATIENT)
Dept: INTERNAL MEDICINE CLINIC | Facility: CLINIC | Age: 89
End: 2024-05-10

## 2024-05-10 DIAGNOSIS — I50.9 ACUTE ON CHRONIC CONGESTIVE HEART FAILURE, UNSPECIFIED HEART FAILURE TYPE (HCC): Primary | ICD-10-CM

## 2024-05-10 DIAGNOSIS — Z96.649 PERIPROSTHETIC HIP FRACTURE, INITIAL ENCOUNTER: ICD-10-CM

## 2024-05-10 DIAGNOSIS — M97.8XXA PERIPROSTHETIC HIP FRACTURE, INITIAL ENCOUNTER: ICD-10-CM

## 2024-05-10 DIAGNOSIS — D75.839 THROMBOCYTOSIS: ICD-10-CM

## 2024-05-10 DIAGNOSIS — M17.0 OSTEOARTHRITIS OF BOTH KNEES, UNSPECIFIED OSTEOARTHRITIS TYPE: ICD-10-CM
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
## 2024-05-13 ENCOUNTER — EXTERNAL FACILITY (OUTPATIENT)
Dept: INTERNAL MEDICINE CLINIC | Facility: CLINIC | Age: 89
End: 2024-05-13

## 2024-05-13 DIAGNOSIS — I50.9 ACUTE ON CHRONIC CONGESTIVE HEART FAILURE, UNSPECIFIED HEART FAILURE TYPE (HCC): Primary | ICD-10-CM

## 2024-05-13 DIAGNOSIS — M17.0 OSTEOARTHRITIS OF BOTH KNEES, UNSPECIFIED OSTEOARTHRITIS TYPE: ICD-10-CM

## 2024-05-13 DIAGNOSIS — Z96.649 PERIPROSTHETIC HIP FRACTURE, INITIAL ENCOUNTER: ICD-10-CM

## 2024-05-13 DIAGNOSIS — M97.8XXA PERIPROSTHETIC HIP FRACTURE, INITIAL ENCOUNTER: ICD-10-CM

## 2024-05-13 DIAGNOSIS — N28.9 RENAL INSUFFICIENCY: ICD-10-CM

## 2024-05-14 ENCOUNTER — EXTERNAL FACILITY (OUTPATIENT)
Dept: INTERNAL MEDICINE CLINIC | Facility: CLINIC | Age: 89
End: 2024-05-14

## 2024-05-14 DIAGNOSIS — I50.9 ACUTE ON CHRONIC CONGESTIVE HEART FAILURE, UNSPECIFIED HEART FAILURE TYPE (HCC): Primary | ICD-10-CM

## 2024-05-16 ENCOUNTER — EXTERNAL FACILITY (OUTPATIENT)
Dept: INTERNAL MEDICINE CLINIC | Facility: CLINIC | Age: 89
End: 2024-05-16

## 2024-05-16 DIAGNOSIS — N28.9 RENAL INSUFFICIENCY: ICD-10-CM

## 2024-05-16 DIAGNOSIS — M97.8XXA PERIPROSTHETIC HIP FRACTURE, INITIAL ENCOUNTER: ICD-10-CM

## 2024-05-16 DIAGNOSIS — Z96.649 PERIPROSTHETIC HIP FRACTURE, INITIAL ENCOUNTER: ICD-10-CM

## 2024-05-16 DIAGNOSIS — I50.31 ACUTE DIASTOLIC HEART FAILURE (HCC): Primary | ICD-10-CM

## 2024-05-16 DIAGNOSIS — M17.0 OSTEOARTHRITIS OF BOTH KNEES, UNSPECIFIED OSTEOARTHRITIS TYPE: ICD-10-CM

## 2024-05-20 ENCOUNTER — EXTERNAL FACILITY (OUTPATIENT)
Dept: INTERNAL MEDICINE CLINIC | Facility: CLINIC | Age: 89
End: 2024-05-20

## 2024-05-20 DIAGNOSIS — M17.0 OSTEOARTHRITIS OF BOTH KNEES, UNSPECIFIED OSTEOARTHRITIS TYPE: ICD-10-CM

## 2024-05-20 DIAGNOSIS — Z96.649 PERIPROSTHETIC HIP FRACTURE, INITIAL ENCOUNTER: ICD-10-CM

## 2024-05-20 DIAGNOSIS — M97.8XXA PERIPROSTHETIC HIP FRACTURE, INITIAL ENCOUNTER: ICD-10-CM

## 2024-05-20 DIAGNOSIS — N28.9 RENAL INSUFFICIENCY: ICD-10-CM

## 2024-05-20 DIAGNOSIS — I50.31 ACUTE DIASTOLIC HEART FAILURE (HCC): Primary | ICD-10-CM

## 2024-05-21 ENCOUNTER — EXTERNAL FACILITY (OUTPATIENT)
Dept: INTERNAL MEDICINE CLINIC | Facility: CLINIC | Age: 89
End: 2024-05-21

## 2024-05-21 DIAGNOSIS — Z96.649 PERIPROSTHETIC HIP FRACTURE, INITIAL ENCOUNTER: ICD-10-CM

## 2024-05-21 DIAGNOSIS — M97.8XXA PERIPROSTHETIC HIP FRACTURE, INITIAL ENCOUNTER: ICD-10-CM

## 2024-05-21 DIAGNOSIS — N28.9 RENAL INSUFFICIENCY: ICD-10-CM

## 2024-05-21 DIAGNOSIS — I50.31 ACUTE DIASTOLIC HEART FAILURE (HCC): Primary | ICD-10-CM

## 2024-05-21 DIAGNOSIS — M17.0 OSTEOARTHRITIS OF BOTH KNEES, UNSPECIFIED OSTEOARTHRITIS TYPE: ICD-10-CM

## 2024-05-22 NOTE — PROGRESS NOTES
[facility] [place] note transcribed by Dr. Jeff Damico  .  Date seen: 5/10/2024  .  subjective: Patient seen and examined acute diastolic CHF, hypertension, left hip fracture, falls, knee pain. Seen and examined seems stable, doing well, weight continues to remain stable, she is asking about taking the meloxicam/Celebrex, it should have started with the dosing today she did not notice much difference with help with the pain in the knees, she is continue to work with physical therapy, has goals to ambulate on her own, seems to be stable doing well, looking forward to a uneventful Mother's Day weekend. Lab work planned for Monday.  .  EXAM:  Vital signs: See point click care charting for updated details  Gen. exam: Alert and awake, baseline mental status noted, in no acute distress  HEENT: Pupils equal and reactive to light and accommodation, moist mucous membranes  Neck exam: Supple.Normal thyroid trachea midline, no JVD  Heart exam: Regular rate and rhythm no murmurs no S3 no S4  Lung exam: No rales no rhonchi no wheezes  Abdominal exam: Soft, nontender, nondistended, positive bowel sounds are normoactive  Extremities exam: no clubbing, no cyanosis, no edema  Skin exam: No obvious wounds, no rashes  Neurological exam: Cranial nerves II through XII intact, no gross deficits  Musculoskeletal exam: Advanced bilateral generalized hand arthritis appreciated, no obvious deformity  .  Labs/imaging: See chart  DVT prophylaxis: Lovenox discontinued  Ambulatory status: Per hospital discharge recommendations,specialist involvement/recommendations and physical therapy evaluation  .  ASSESSMENT AND PLAN:  Elba Malik is a 89 year old female seen at [facility] [place] with the following:  Acute diastolic heart failure: Continue with Lasix, CHF protocol, daily weight monitoring, weight seems stable, baseline diuretics, with continued monitoring of kidney function. Lab work Monday  Periprosthetic hip fracture,  initial encounter: Physical therapy, occupationaltherapy, physiatry to evaluate and treat,further orderspending clinical course, anticoagulation per hospital recommendations, with ambulatory status improving, Lovenox /discontinued  Fall, initial encounter fall precautions, continue current monitoring management  Bilateral knee osteoarthritis: Status post injection, continue conservative management, add/continue meloxicam low-dose of 7.5 mg  Thrombocytosis: Reactive, improving, resolving  .  Stable problem list:  Essential hypertension  Iron deficiency anemia, unspecified iron deficiency anemia type  Chronic left-sided low back pain with left-sided sciatica  Arthropathy  Dermatochalasis of eyelid  Primary insomnia  Ambien 10 mg nightly, tolerated well, and agreed on by the family and patient. Discussed at length, gave options to lower this.

## 2024-05-22 NOTE — PROGRESS NOTES
[facility] [place] note transcribed by Dr. Jeff Damico  .  Date seen: 5/9/2024  .  subjective: Patient seen and examined acute diastolic CHF, hypertension, left hip fracture, falls, knee pain. Patient seems stable, doing well, continues to work withphysical therapy, weight down to 139 pounds, she continues to remain stable, lab work has remained stable as well. Pain seems controlled, she is still frustrated she is limited with her physicality, but continues to push ahead with physical therapy.  .  EXAM:  Vital signs: See point click care charting for updated details  Gen. exam: Alert and awake, baseline mental status noted, in no acute distress  HEENT: Pupils equal and reactive to light and accommodation, moist mucous membranes  Neck exam: Supple.Normal thyroid trachea midline, no JVD  Heart exam: Regular rate and rhythm no murmurs no S3 no S4  Lung exam: No rales no rhonchi no wheezes  Abdominal exam: Soft, nontender, nondistended, positive bowel sounds are normoactive  Extremities exam: no clubbing, no cyanosis, no edema  Skin exam: No obvious wounds, no rashes  Neurological exam: Cranial nerves II through XII intact, no gross deficits  Musculoskeletal exam: Advanced bilateral generalized hand arthritis appreciated, no obvious deformity  .  Labs/imaging: See chart  DVT prophylaxis: Lovenox discontinued  Ambulatory status: Per hospital discharge recommendations, specialist involvement/recommendations and physical therapy evaluation  .  ASSESSMENT AND PLAN:  Elba Malik is a 89 year old female seen at [facility] [place] with the following:  Acute diastolic heart failure: Continue with Lasix, CHF protocol, daily weight monitoring, weight seems stable, baseline diuretics, with continued monitoring of kidney function.  Periprosthetic hip fracture, initial encounter: Physical therapy, occupationaltherapy, physiatry to evaluate and treat, further orderspending clinical course, anticoagulation per  hospital recommendations, with ambulatory status improving, Lovenox /discontinued  Fall, initial encounter fall precautions, continue current monitoring management  Bilateral knee osteoarthritis: Status post injection, continue conservative management  Thrombocytosis: Reactive, improving, resolving  .  Stable problem list:  Essential hypertension  Iron deficiency anemia, unspecified iron deficiency anemia type  Chronic left-sided low back pain with left-sided sciatica  Arthropathy  Dermatochalasis of eyelid  Primary insomnia  Ambien 10 mg nightly, tolerated well, and agreed on by the family and patient. Discussed at length, gave options to lower this.

## 2024-05-22 NOTE — PROGRESS NOTES
[facility] [place] note transcribed by Dr. Jeff Damico  .  Date seen: 5/13/2024  .  subjective: Patient seen and examined acute diastolic CHF, hypertension, left hip fracture, falls, knee pain. Patient seen examined seems stable, uneventful weekend,doing well today, she continues to work with therapy, continues to tolerate medications. Claims since she had started the meloxicam, it has not made any difference that she can tell. She still is having the pain, still struggling with physical therapy but continuing to work with them. Lab work reviewed today showed elevated BUN, creatinine unchanged.  .  EXAM:  Vital signs: See point click care charting for updated details  Gen. exam: Alert and awake, baseline mental status noted, in no acute distress  HEENT: Pupils equal and reactive to light and accommodation, moist mucous membranes  Neck exam: Supple.Normal thyroid trachea midline, no JVD  Heart exam: Regular rate and rhythm no murmurs no S3 no S4  Lung exam: No rales no rhonchi no wheezes  Abdominal exam: Soft, nontender, nondistended, positive bowel sounds are normoactive  Extremities exam: no clubbing, no cyanosis, no edema  Skin exam: No obvious wounds, no rashes  Neurological exam: Cranial nerves II through XII intact, no gross deficits  Musculoskeletal exam: Advanced bilateral generalized hand arthritis appreciated, no obvious deformity  .  Labs/imaging: See chart  DVT prophylaxis: Lovenox discontinued  Ambulatory status: Per hospital discharge recommendations,specialist involvement/recommendations and physical therapy evaluation  .  ASSESSMENT AND PLAN:  Elba Malik is a 89 year old female seen at [facility] [place] with the following:  Acute diastolic heart failure: Continue with Lasix, CHF protocol, daily weight monitoring, weight seems stable, baseline diuretics, with continued monitoring of kidney function.  Periprosthetic hip fracture, initial encounter: Physical therapy,  occupationaltherapy, physiatry to evaluate and treat,further orderspending clinical course, anticoagulation per hospital recommendations, with ambulatory status improving, Lovenox /discontinued  Bilateral knee osteoarthritis: Continue with physiatry following, will discontinue meloxicam due to ineffectiveness, possible impact on the renal function.  Renal insufficiency: With elevated BUN, euvolemic we will continue to monitor with serial lab work, discontinue meloxicam for now.  .  Stable problem list:  Fall, subsequent encounter: Fall precautions  Thrombocytosis: Resolved.  Essential hypertension  Iron deficiency anemia, unspecified iron deficiency anemia type  Chronic left-sided low back pain with left-sided sciatica  Arthropathy  Dermatochalasis of eyelid  Primary insomnia  Ambien 10 mg nightly, tolerated well, and agreed on by the family and patient. Discussed at length, gave options to lower this.

## 2024-05-23 ENCOUNTER — EXTERNAL FACILITY (OUTPATIENT)
Dept: INTERNAL MEDICINE CLINIC | Facility: CLINIC | Age: 89
End: 2024-05-23

## 2024-05-23 DIAGNOSIS — I50.31 ACUTE DIASTOLIC HEART FAILURE (HCC): Primary | ICD-10-CM

## 2024-05-23 DIAGNOSIS — M17.0 OSTEOARTHRITIS OF BOTH KNEES, UNSPECIFIED OSTEOARTHRITIS TYPE: ICD-10-CM

## 2024-05-23 DIAGNOSIS — M97.8XXA PERIPROSTHETIC HIP FRACTURE, INITIAL ENCOUNTER: ICD-10-CM

## 2024-05-23 DIAGNOSIS — Z96.649 PERIPROSTHETIC HIP FRACTURE, INITIAL ENCOUNTER: ICD-10-CM

## 2024-05-23 DIAGNOSIS — N28.9 RENAL INSUFFICIENCY: ICD-10-CM

## 2024-05-23 NOTE — PROGRESS NOTES
[facility] [place] note transcribed by Dr. Jeff Damico  .  Date seen: 5/16/2024  .  subjective: Patient seen and examined acute diastolic CHF, hypertension, left hip fracture, falls, knee pain. Patient seen and examined seems stable, euvolemic, doing well with therapy, but still having trouble getting to a standing position on her own. She continues to work with therapy and is pleased with the progress. Her bowels are moving, medications are being tolerated, diuretics are stable, lab work reviewedand stable.  .  EXAM:  Vital signs: See point click care charting for updated details  Gen. exam: Alert and awake, baseline mental status noted, in no acute distress  HEENT: Pupils equal and reactive to light and accommodation, moist mucous membranes  Neck exam: Supple.Normal thyroid trachea midline, no JVD  Heart exam: Regular rate and rhythm no murmurs no S3 no S4  Lung exam: No rales no rhonchi no wheezes  Abdominal exam: Soft, nontender, nondistended, positive bowel sounds are normoactive  Extremities exam: no clubbing, no cyanosis, no edema  Skin exam: No obvious wounds, no rashes  Neurological exam: Cranial nerves II through XII intact, no gross deficits  Musculoskeletal exam: Advanced bilateral generalized hand arthritis appreciated, no obvious deformity  .  Labs/imaging: See chart  DVT prophylaxis: Lovenox discontinued  Ambulatory status: Per hospital discharge recommendations,specialist involvement/recommendations and physical therapy evaluation  .  ASSESSMENT AND PLAN:  Elba Malik is a89 year old female seen at [facility] [place] with the following:  Acute diastolic heart failure: Continue with Lasix, CHF protocol, daily weight monitoring, weight seems stable, baseline diuretics, with continued monitoring of kidney function.  Periprosthetic hip fracture, initial encounter: Physical therapy, occupationaltherapy, physiatry to evaluate and treat,further orderspending clinical course,  anticoagulation per hospital recommendations, with ambulatory status improving, Lovenox /discontinued  Bilateral knee osteoarthritis: Continue with physiatry following, will discontinue meloxicam due to ineffectiveness, possible impact on the renal function.  Renal insufficiency: Seems improved continue serial monitoring. Labs in the a.m.  .  Stable problem list:  Fall, subsequent encounter: Fall precautions  Thrombocytosis: Resolved.  Essential hypertension  Iron deficiency anemia, unspecified iron deficiency anemia type  Chronic left-sided low back pain with left-sided sciatica  Arthropathy  Dermatochalasis of eyelid  Primary insomnia  Ambien 10 mg nightly, tolerated well, and agreed on by the family and patient. Discussed at length, gave options to lower this.

## 2024-05-23 NOTE — PROGRESS NOTES
[facility] [place] note transcribed by Dr. Jeff Damico  .  Date seen: 5/20/2024  .  subjective: Patient seen and examined acute diastolic CHF, hypertension, left hip fracture, falls, knee pain. Patient seems stable, doing well, no current complaints, volume status looks stable, her joints continue to bother her, but she continues to improve with physical therapy, she now is able to get to a standing position on her own, is very pleased with this, is doing some discharge planning, is planning on going home later the week. Tolerating medications, no family available today. Good support system, but a few of her daughters do live out of town.  .  EXAM:  Vital signs: See point click care charting for updated details  Gen. exam: Alert and awake, baseline mental status noted, in no acute distress  HEENT: Pupils equal and reactive to light and accommodation, moist mucous membranes  Neck exam: Supple.Normal thyroid trachea midline, no JVD  Heart exam: Regular rate and rhythm no murmurs no S3 no S4  Lung exam: No rales no rhonchi no wheezes  Abdominal exam: Soft, nontender, nondistended, positive bowel sounds are normoactive  Extremities exam: no clubbing, no cyanosis, no edema  Skin exam: No obvious wounds, no rashes  Neurological exam: Cranial nerves II through XII intact, no gross deficits  Musculoskeletal exam: Advanced bilateral generalized hand arthritis appreciated, no obvious deformity  .  Labs/imaging: See chart  DVT prophylaxis: Lovenox discontinued  Ambulatory status: Per hospital discharge recommendations,specialist involvement/recommendations and physical therapy evaluation  .  ASSESSMENT AND PLAN:  Elba Malik is a 89 year old female seen at [facility] [place] with the following:  Acute diastolic heart failure: Continue with Lasix, CHF protocol, daily weight monitoring, weight seems stable, baseline diuretics, with continued monitoring of kidney function.  Periprosthetic hip fracture, initial  encounter: Physical therapy, occupationaltherapy, physiatry to evaluate and treat,furtherorderspending clinical course, anticoagulation per hospital recommendations, with ambulatory status improving, Lovenox /discontinued  Bilateral knee osteoarthritis: Continue with physiatry following, will discontinue meloxicam due to ineffectiveness, possible impact on the renal function.  Renal insufficiency: Continue monitoring, lab work pending today. Discharge planning underway.  .  Stable problem list:  Fall, subsequent encounter: Fall precautions  Thrombocytosis: Resolved.  Essential hypertension  Iron deficiency anemia, unspecified iron deficiency anemia type  Chronic left-sided low back pain with left-sided sciatica  Arthropathy  Dermatochalasis of eyelid  Primary insomnia  Ambien 10 mg nightly, tolerated well, and agreed on by the familyand patient. Discussed at length, gave options to lower this.

## 2024-05-24 NOTE — PROGRESS NOTES
Discharge summary:  [facility] [place] note transcribed by Dr. Jeff Damico  .  Date seen: 5/23/2024  .  subjective: Patient seen and examined acute diastolic CHF, hypertension, left hip fracture, falls, knee pain, discharge planning. Patient seen and examined seems stable, and is ready for discharge home, daughter at bedside. Patient has a good grasp on her discharge plan, feeling very steady, able to ambulate with some independence, and feels very steady doing so. She has family help with that will be here today, and over the weekend, her daughter is here today. Nursing has given her medications for home, we did discuss the Lasix dosing as it is down to 20 mg on discharge. She continues to improve.  .  EXAM:  Vital signs: See point click care charting for updated details  Gen. exam: Alert and awake,baseline mental status noted, in no acute distress  HEENT: Pupils equal and reactive to light and accommodation, moist mucous membranes  Neck exam: Supple. Normal thyroid trachea midline, no JVD  Heart exam: Regular rate and rhythm no murmurs no S3 no S4  Lung exam: No rales no rhonchi no wheezes  Abdominal exam: Soft, nontender, nondistended, positive bowel sounds are normoactive  Extremities exam: no clubbing, no cyanosis, no edema  Skin exam: No obvious wounds, no rashes  Neurological exam: Cranial nerves II through XII intact, no gross deficits  Musculoskeletal exam: Advanced bilateral generalized hand arthritis appreciated, no obvious deformity  .  Labs/imaging: See chart  DVT prophylaxis: Lovenox discontinued  Ambulatory status: Per hospital discharge recommendations, specialist involvement/recommendations and physical therapy evaluation  .  ASSESSMENT AND PLAN:  Elba Malik is a 89 year old female seen at [facility] [place] with the following:  Acute diastolic heart failure: Continue with Lasix, CHF protocol, daily weight monitoring, weight seems stable, baseline diuretics lasix 20mg qd, with  continued monitoring of kidney function. dc hpme with home services.  Periprosthetic hipfracture, initial encounter: Physical therapy, occupational therapy, physiatry to evaluate and treat, further orders pending clinical course, anticoagulation per hospital recommendations, with ambulatory status improving, Lovenox /discontinued  Bilateral knee osteoarthritis: Continue with physiatry following, will discontinue meloxicam due to ineffectiveness, possible impact on the renal function, With increased pain, unknown whether it is weather induced, or from a lack of the anti-inflammatory, for now I do think the risks of this medication do outweigh the benefits, will have to find alternative options for the Celebrex/meloxicam medication class.  Renal insufficiency: Continue monitoring, lab work looks reassuring, continue to monitor as an outpatient, continue current medications.  .  Stable problem list:  Fall, subsequent encounter: Fall precautions  Thrombocytosis: Resolved.  Essential hypertension  Iron deficiency anemia, unspecified iron deficiency anemia type  Chronic left-sided low back pain with left-sided sciatica  Arthropathy  Dermatochalasis of eyelid  Primary insomnia  Ambien 10 mg nightly, tolerated well, and agreed on by the family and patient. Discussed at length, gave options to lower this.  .  dc summary:  Patient had a mildly eventful stay at [facility] which she improved with gait, strength, balance, and endurance, she did have a hospital readmission for flash pulmonary edema/CHF, was medication adjusted properly, transition back to facility did very well, she continues to diurese well over the time she was here, kidney function was watched diligently, and she remained stable. She was able to gain an ambulatory status, and will be discharged home in stable condition she does follow with Dr. Lopez as an outpatient we will follow-up with him in 5 to 7 days,  have maximized her home  services and she will be discharged home in stable condition. Family at bedside, agrees with the plan of care. She was instructed if she sees a gain of water weight, or volume changes she transitions to her home environment, to be vigilant, and realize she has been on 40 mg of Lasix with good results.  .  dc med list:  Senna Tablet 8.6 MG (Sennosides) Active 4/24/2024  Give 2 tablet by mouth every 24 hours as needed for Constipation  Menthol Cold/Hot External Patch 5 % (Menthol (Topical Analgesic)) Active 4/24/2024  Apply to Affected Area x 3 topically one time a day for Pain x 3 locations and remove per schedule  MiraLax Oral Powder 17 GM/SCOOP (Polyethylene Glycol 3350) Active 4/30/2024 4/23/2024  Give 1 scoop by mouth every 24 hours as needed for BM  Fluticasone Propionate Suspension 50 MCG/ACT Active 4/24/2024 4/23/2024  1 spray in each nostril one time a day for nasal congestion  Salicylic Acid External Gel 2 % (Salicylic Acid) Active 4/23/2024 4/23/2024  Apply to Affected Area topically every 24 hours as needed for warts Salicylic acid with 2% 5-Fluorouracil  Lisinopril Tablet 20 MG Active 4/24/2024 4/23/2024  Give 1 tablet by mouth one time a day for hypertension Hold is BP < 110/60 or HR < 60  Gabapentin Capsule 100 MG Active 4/23/2024 4/23/2024  Give 100 mg by mouth at bedtime for Neuropathy  Metoprolol Tartrate Tablet 25 MG Active 5/10/2024 5/10/2024  Give 0.5 tablet by mouth two times a day for HTN Hold is BP < 110/60 or HR < 60  This order is potentially duplicated by other orders on the chart. Click to view details. Hydrocodone-Acetaminophen Tablet 5-325 MG Active 4/24/2024 4/23/2024  Give 1 tablet by mouth every 6 hours as needed for pain  This order is potentially duplicated by other orders on the chart. Click to view details. Acetaminophen Oral Tablet 325 MG (Acetaminophen) Active 4/24/2024  Give 2 tablet by mouth two times a day for mod pain, discomfort  Ondansetron HCl Tablet 4 MG Active 4/23/2024  4/23/2024  Give 1 tablet by mouth every 6 hours as needed for Nausea and Vomiting  Levoxyl Tablet 50 MCG (Levothyroxine Sodium) Active 5/20/2024 5/20/2024  Give 1 tablet by mouth one time a day for low thyroid hormone  Zolpidem Tartrate Oral Tablet 10 MG (Zolpidem Tartrate) Active 5/16/2024 5/16/2024  Give 1 tablet by mouth at bedtime for Insomnia  Furosemide Tablet 20 MG Active 5/22/2024 5/21/2024  Give 1 tablet by mouth one time a day for Fluid Overload

## 2024-05-24 NOTE — PROGRESS NOTES
[facility] [place] note transcribed by Dr. Jeff Damico  .  Date seen: 5/21/2024  .  subjective: Patient seen and examined acute diastolic CHF, hypertension, left hip fracture, falls, knee pain. Patient seems stable, but is feeling some extra joint pain today, claims her back has been aching today more than usual, her knees as well, she did work physical therapy, lab work reviewed from yesterday, looks excellent, the BUN and creatinine have returned to normal baseline levels, and she seems to be stable, tolerating medications, weight is down to 135 pounds, and no signs of swelling. She is planning a discharge for the near future, possibly Thursday.  .  EXAM:  Vital signs: See point click care charting for updated details  Gen. exam: Alert and awake,baseline mental status noted, in no acute distress  HEENT: Pupils equal and reactive to light and accommodation, moist mucous membranes  Neck exam: Supple. Normal thyroid trachea midline, no JVD  Heart exam: Regular rate and rhythm no murmurs no S3 no S4  Lung exam: No rales no rhonchi no wheezes  Abdominal exam: Soft, nontender, nondistended, positive bowel sounds are normoactive  Extremities exam: no clubbing, no cyanosis, no edema  Skin exam: No obvious wounds, no rashes  Neurological exam: Cranial nerves II through XII intact, no gross deficits  Musculoskeletal exam: Advanced bilateral generalized hand arthritis appreciated, no obvious deformity  .  Labs/imaging: See chart  DVT prophylaxis: Lovenox discontinued  Ambulatory status: Per hospital discharge recommendations, specialist involvement/recommendations and physical therapy evaluation  .  ASSESSMENT AND PLAN:  Elba Malik is a 89 year old female seen at [facility] [place] with the following:  Acute diastolic heart failure: Continue with Lasix, CHF protocol, daily weight monitoring, weight seems stable, baseline diuretics, with continued monitoring of kidney function.  Periprosthetic hip fracture,  initial encounter: Physical therapy, occupational therapy, physiatry to evaluate and treat, further orders pending clinical course, anticoagulation per hospital recommendations, with ambulatory status improving, Lovenox /discontinued  Bilateral knee osteoarthritis: Continue with physiatry following, will discontinue meloxicam due to ineffectiveness, possible impact on the renal function, With increased pain, unknown whether it is weather induced, or from a lack of the anti-inflammatory, for now I do think the risks of this medication do outweigh the benefits, will have to find alternative options for the Celebrex/meloxicam medication class.  Renal insufficiency: Continue monitoring, lab work looks reassuring, continue to monitor as an outpatient, continue current medications  .  Stable problem list:  Fall, subsequent encounter: Fallprecautions  Thrombocytosis: Resolved.  Essential hypertension  Iron deficiency anemia, unspecified iron deficiency anemia type  Chronic left-sided low back pain with left-sided sciatica  Arthropathy  Dermatochalasis of eyelid  Primary insomnia  Ambien 10 mg nightly, tolerated well, and agreed on by the family and patient. Discussed at length, gave options to lower this.

## 2024-05-29 ENCOUNTER — TELEPHONE (OUTPATIENT)
Dept: PULMONOLOGY | Facility: CLINIC | Age: 89
End: 2024-05-29

## 2024-05-29 DIAGNOSIS — Z51.5 PALLIATIVE CARE ENCOUNTER: ICD-10-CM

## 2024-05-29 DIAGNOSIS — R52 PAIN: ICD-10-CM

## 2024-05-29 RX ORDER — HYDROCODONE BITARTRATE AND ACETAMINOPHEN 5; 325 MG/1; MG/1
1 TABLET ORAL EVERY 6 HOURS PRN
Qty: 30 TABLET | Refills: 0 | Status: SHIPPED | OUTPATIENT
Start: 2024-05-29

## 2024-05-29 NOTE — TELEPHONE ENCOUNTER
Yes okay to do a telehealth visit sometime within the next 2 months.  If she is sick, add her in sooner, but if she is doing well, can put off a little bit.

## 2024-05-29 NOTE — TELEPHONE ENCOUNTER
Spoke with patient. Appointment scheduled for 6/25/24 at 12:15PM as video visit. Verified date and time. Patient verbalized understanding.

## 2024-05-29 NOTE — TELEPHONE ENCOUNTER
Spoke with patient. Patient reports pain to shoulder, back, and knees, rates pain an 8-10 with movement. Patient was discharged from rehab on 5/23. Patient will be doing PT/OT and requesting refill for [place] to help when moving. Patient states she would take half a tablet of Norco with Tylenol.

## 2024-05-29 NOTE — TELEPHONE ENCOUNTER
Patient was discharged from rehab on 5/23, requesting follow-up appointment.     Dr. Lopez: When would you like to see patient? Also, can visit be phone or video visit?

## 2024-05-29 NOTE — TELEPHONE ENCOUNTER
Received fax from [facility]. Placed in Dr. Lopez folder for review and signature.

## 2024-05-29 NOTE — TELEPHONE ENCOUNTER
Patient states she needs to be seen as soon as possible after being discharged from rehab. Also wondering if it can be telehealth appointment. The schedule is booked please call

## 2024-05-30 NOTE — TELEPHONE ENCOUNTER
Lorrie from [facility] calling maryann due to insurance asking to follow up if can be done before 6/22/24, per guidelines. Please call.

## 2024-06-04 NOTE — TELEPHONE ENCOUNTER
Both Fax's from [facility] have been signed by Dr. Lopez and faxed back to [facility] with confirmation and order sent for scanning.

## 2024-06-18 ENCOUNTER — TELEPHONE (OUTPATIENT)
Dept: NEPHROLOGY | Facility: CLINIC | Age: 89
End: 2024-06-18

## 2024-06-18 ENCOUNTER — TELEPHONE (OUTPATIENT)
Dept: PULMONOLOGY | Facility: CLINIC | Age: 89
End: 2024-06-18

## 2024-06-18 NOTE — TELEPHONE ENCOUNTER
Order [id number] signed by Dr. Lopez and faxed back to [facility] with confirmation and order sent for scanning.

## 2024-06-18 NOTE — TELEPHONE ENCOUNTER
Received fax from [facility] order [id number]. Placed in Dr Lopez's folder for signature

## 2024-06-20 ENCOUNTER — TELEMEDICINE (OUTPATIENT)
Dept: PULMONOLOGY | Facility: CLINIC | Age: 89
End: 2024-06-20

## 2024-06-20 DIAGNOSIS — Z51.5 PALLIATIVE CARE ENCOUNTER: ICD-10-CM

## 2024-06-20 DIAGNOSIS — M97.8XXA PERIPROSTHETIC HIP FRACTURE, INITIAL ENCOUNTER: Primary | ICD-10-CM

## 2024-06-20 DIAGNOSIS — Z96.649 PERIPROSTHETIC HIP FRACTURE, INITIAL ENCOUNTER: Primary | ICD-10-CM

## 2024-06-20 DIAGNOSIS — R52 PAIN: ICD-10-CM

## 2024-06-20 DIAGNOSIS — F51.04 PSYCHOPHYSIOLOGICAL INSOMNIA: ICD-10-CM

## 2024-06-20 PROCEDURE — 99213 OFFICE O/P EST LOW 20 MIN: CPT | Performed by: INTERNAL MEDICINE

## 2024-06-20 RX ORDER — ZOLPIDEM TARTRATE 5 MG/1
5 TABLET ORAL NIGHTLY PRN
Qty: 30 TABLET | Refills: 5 | Status: SHIPPED | OUTPATIENT
Start: 2024-06-20

## 2024-06-20 RX ORDER — LISINOPRIL 20 MG/1
20 TABLET ORAL DAILY
Qty: 30 TABLET | Refills: 5 | Status: SHIPPED | OUTPATIENT
Start: 2024-06-20

## 2024-06-20 RX ORDER — FUROSEMIDE 40 MG/1
40 TABLET ORAL DAILY
Qty: 30 TABLET | Refills: 5 | Status: SHIPPED | OUTPATIENT
Start: 2024-06-20

## 2024-06-20 RX ORDER — GABAPENTIN 100 MG/1
100 CAPSULE ORAL NIGHTLY
Qty: 30 CAPSULE | Refills: 5 | Status: SHIPPED | OUTPATIENT
Start: 2024-06-20

## 2024-06-20 RX ORDER — FLUTICASONE PROPIONATE 50 MCG
2 SPRAY, SUSPENSION (ML) NASAL DAILY
Qty: 16 G | Refills: 5 | Status: SHIPPED | OUTPATIENT
Start: 2024-06-20

## 2024-06-20 RX ORDER — LEVOTHYROXINE SODIUM 0.05 MG/1
50 TABLET ORAL
Qty: 90 TABLET | Refills: 3 | Status: SHIPPED | OUTPATIENT
Start: 2024-06-20

## 2024-06-20 RX ORDER — POLYETHYLENE GLYCOL 3350 17 G/17G
17 POWDER, FOR SOLUTION ORAL DAILY PRN
Qty: 30 PACKET | Refills: 5 | Status: SHIPPED | OUTPATIENT
Start: 2024-06-20

## 2024-06-20 NOTE — PROGRESS NOTES
Virtual Video Visit    NAYELY BALDERRAMA verbally consents to to a Video Visit on 6/20/2024. Patient understands and accepts financial responsibility for any deductible, co-insurance and/or co-pays associated with this service.    Please note that the following visit was completed using two-way, real-time interactive audio and/or video communication. This has been done in good alonzo to provide continuity of care in the best interest of the provider-patient relationship, due to the ongoing public health crisis/national emergency and because of restrictions of visitation. There are limitations of this visit as the physical exam was observed through audio/video only. Every conscious effort was taken to allow for sufficient and adequate time. This billing was spent on reviewing labs, medications, radiology tests and decision making. Appropriate medical decision-making and tests are ordered as detailed in the plan of care above.    History of Present Illness: The patient is a 89-year-old female who fell and had a periprosthetic hip fracture and was admitted to the hospital and then sent to rehab and she left rehab 2 weeks ago and now is getting by at home with a part-time caregiver.  She uses a walker.  She otherwise is doing fairly well.  She had dyspnea while in the hospital and was thought to have mild congestive heart failure.  She was started on gabapentin and metoprolol and was evaluated by cardiology.  Breathing is good now when she is off oxygen.    Review of Systems: Vision normal. Ear nose and throat normal. Bowel normal. Bladder function normal. No depression. No thyroid disease. No rash. Muscles and joints normal. No weight loss or weight gain.    Physical Exam: This is a video visit and the patient is conversant and wakeful with good coloring and with normal mentation.    Assessment and Plan:  1.  Periprosthetic hip fracture-ongoing rehabilitative services as available.  2.  Depression-resolved.  Patient is no  longer on Lexapro  3.  Hypertension-better control.  4.  Shoulder hemarthrosis-conservative management  5.  Dyspnea with heart failure-also better controlled.  Had markedly elevated BNP.  Will continue furosemide and metoprolol with lisinopril and blood pressure control  6.  Ankle discomfort-improved.  7.  General health-the patient should never drink and drive, always wear a safety belt, have a smoke detector and fire extiguisher in the house, avoid the use of candles in the home as they are the most common cause of housefire, and see me annually for complete examination.  The patient should do another video visit with me at the 6-month interval or sooner if needed and contact me promptly if new trouble.    Duration of the Service: 15 minutes    Sharan Lopez MD

## 2024-06-24 ENCOUNTER — TELEPHONE (OUTPATIENT)
Dept: PULMONOLOGY | Facility: CLINIC | Age: 89
End: 2024-06-24

## 2024-06-24 NOTE — TELEPHONE ENCOUNTER
Lorrie calling from [facility] request last visit chart notes. Please send.    Fax: [phone]

## 2024-06-24 NOTE — TELEPHONE ENCOUNTER
Received fax from [facility] with order numbers [id number] and [id number]. Placed in Dr Lopez's folder for signature

## 2024-06-25 NOTE — TELEPHONE ENCOUNTER
Orders signed by Dr Lopez and faxed back to [facility]. Confirmation received and sent to scanning

## 2024-06-28 ENCOUNTER — TELEPHONE (OUTPATIENT)
Dept: PULMONOLOGY | Facility: CLINIC | Age: 89
End: 2024-06-28

## 2024-06-28 NOTE — TELEPHONE ENCOUNTER
Received fax from [facility] with certification and plan of care order #[id number]. Placed in Dr Lopez's folder for signature

## 2024-07-01 NOTE — TELEPHONE ENCOUNTER
Orders signed by Dr. Lopez and faxed back to [facility]. Confirmation received and sent to scanning.

## 2024-07-02 ENCOUNTER — TELEPHONE (OUTPATIENT)
Dept: PULMONOLOGY | Facility: CLINIC | Age: 89
End: 2024-07-02

## 2024-07-02 NOTE — TELEPHONE ENCOUNTER
Fax from [facility] signed by Dr. Lopez and faxed back to #[phone] with confirmation and fax sent for scanning.

## 2024-07-02 NOTE — TELEPHONE ENCOUNTER
Received fax from [facility] requesting out patient physical therapy. Placed in Dr. Lopez folder to sign.

## 2024-07-08 ENCOUNTER — TELEPHONE (OUTPATIENT)
Dept: PULMONOLOGY | Facility: CLINIC | Age: 89
End: 2024-07-08

## 2024-07-08 NOTE — TELEPHONE ENCOUNTER
Received fax from [facility] with order [id number]. Placed in Dr Lopez's folder for signature   14-Feb-2017

## 2024-07-12 DIAGNOSIS — R52 PAIN: ICD-10-CM

## 2024-07-12 DIAGNOSIS — Z51.5 PALLIATIVE CARE ENCOUNTER: ICD-10-CM

## 2024-07-12 RX ORDER — FUROSEMIDE 40 MG
40 TABLET ORAL DAILY
Qty: 30 TABLET | Refills: 5 | OUTPATIENT
Start: 2024-07-12

## 2024-07-12 RX ORDER — GABAPENTIN 100 MG/1
100 CAPSULE ORAL NIGHTLY
Qty: 30 CAPSULE | Refills: 5 | OUTPATIENT
Start: 2024-07-12

## 2024-07-12 NOTE — TELEPHONE ENCOUNTER
Last seen: 6/20/24 (telemedicine)  Suggested follow up: 6 months  Next appointment: not scheduled  Last refills: 6/20/24 - 30 tablets, 5 refills    Spoke with pharmacy staff who confirmed patient filled medications today and 4 more refills remain. Prescription request denied on this basis.

## 2024-07-18 ENCOUNTER — TELEPHONE (OUTPATIENT)
Dept: PULMONOLOGY | Facility: CLINIC | Age: 89
End: 2024-07-18

## 2024-07-18 NOTE — TELEPHONE ENCOUNTER
Patient states that she wants to discuss what side effects she needs to watch out for while taking Lisinopril.  She states that she has had blurred vision since starting it.  Please call

## 2024-07-18 NOTE — TELEPHONE ENCOUNTER
Dr. Lopez-patient reports blurry vision since starting lisinopril in April 2024. Patient went to eye doctor last week and was told everything is fine. Patient wants to know how to proceed.

## 2024-07-18 NOTE — TELEPHONE ENCOUNTER
I spoke to the patient and she is going to simply try taking the lisinopril before bed and see if that makes a difference for her instead of taking it first thing in the morning.

## 2024-07-23 ENCOUNTER — TELEPHONE (OUTPATIENT)
Dept: PULMONOLOGY | Facility: CLINIC | Age: 89
End: 2024-07-23

## 2024-07-23 NOTE — TELEPHONE ENCOUNTER
[facility] with order request and progress notes dated 5/21/24. Placed in Dr. Lopez folder to review and sign.

## 2024-07-24 ENCOUNTER — TELEPHONE (OUTPATIENT)
Dept: PULMONOLOGY | Facility: CLINIC | Age: 89
End: 2024-07-24

## 2024-07-24 NOTE — TELEPHONE ENCOUNTER
Received fax from [facility] regarding patient being discharged. Placed in Dr. Lopez folder to sign.

## 2024-09-16 ENCOUNTER — TELEPHONE (OUTPATIENT)
Dept: PULMONOLOGY | Facility: CLINIC | Age: 89
End: 2024-09-16

## 2024-09-16 RX ORDER — PREDNISONE 20 MG/1
TABLET ORAL
Qty: 10 TABLET | Refills: 0 | Status: SHIPPED | OUTPATIENT
Start: 2024-09-16

## 2024-09-16 NOTE — TELEPHONE ENCOUNTER
Patient states that she is concerned about having nasal congestion, which feels stuffy and it drips, for the past 4 to 5 weeks, so she wants to know if Dr Sharan Lopez is able to prescribed any medication. Patient states that she does not have chest congestion, cough and no headaches. Patient states that she is taking Allegra, but it is not helping her symptoms.

## 2024-09-20 ENCOUNTER — TELEPHONE (OUTPATIENT)
Dept: PULMONOLOGY | Facility: CLINIC | Age: 89
End: 2024-09-20

## 2024-09-20 RX ORDER — AZITHROMYCIN 250 MG/1
TABLET, FILM COATED ORAL
Qty: 6 TABLET | Refills: 0 | Status: SHIPPED | OUTPATIENT
Start: 2024-09-20

## 2024-09-20 NOTE — TELEPHONE ENCOUNTER
Spoke with patient informed her of Dr. Lopez's message, patient accepted azithromycin prescription.    Dr. Lopez - Please review/sign pended order

## 2024-09-20 NOTE — TELEPHONE ENCOUNTER
Spoke with patient states she has increase in nasal congestion which alternates dripping clear and light green, throat irritation, denies fever, headache, chest tightness, chest pain, cough. Taking Allegra and has 1-2 days of Prednisone remaining. Aware Dr. Lopez out of office.

## 2024-09-20 NOTE — TELEPHONE ENCOUNTER
Patient called to speak with a nurse in regards to her symptoms. Patient states she has been experiencing having a scratchy throat, some congestion, and having itchy eyes and is asking for guidance. Patient states she was taking an allergy pill and was finishing her dose of the prednisone, patient states she doesn't think the prednisone has been making a difference. Please call.

## 2024-09-21 NOTE — TELEPHONE ENCOUNTER
LOV: 1/29/24 (telemedicine)  Suggested follow up: 6 months  NOV: none  Last refill: 9/29/23  Last TSH: 6/5/23    Refill pended, please review/sign if agreeable.   
Yes

## 2024-10-07 NOTE — TELEPHONE ENCOUNTER
Bed: ED23  Expected date: 10/7/24  Expected time:   Means of arrival:   Comments:  T1   Spoke with patient, states symptoms began on 9/25/2023. Patient complaining of worsening scratchy throat that is irritating, nasal congestion, burning eyes, and fatigue. Patient denies fever, shortness of breath, dizziness, or cough. Patient denies taking any over the counter medications. Patient states they have not had anything like this in \"quite a while,\" and has not taken an at home COVID test. Patient states they use a motorized chair and finds it difficult to get around, declined first available appointment for 10/12/2023 with Dr. Shayla Gasca. Patient states \"nothing comes to mind,\" when RN inquired if they know of anything that helps or has helped in the past.    Patient states they have no allergies to medication that they know of and confirmed their pharmacy of choice is [facility] in [address] Dr. Shayla Gasca- Patient wondering if any treatment can be provided without coming in to be assessed.

## 2024-10-12 DIAGNOSIS — Z51.5 PALLIATIVE CARE ENCOUNTER: ICD-10-CM

## 2024-10-12 DIAGNOSIS — R52 PAIN: ICD-10-CM

## 2024-10-14 RX ORDER — FUROSEMIDE 40 MG
40 TABLET ORAL DAILY
Qty: 30 TABLET | Refills: 0 | Status: SHIPPED | OUTPATIENT
Start: 2024-10-14

## 2024-10-14 RX ORDER — GABAPENTIN 100 MG/1
100 CAPSULE ORAL NIGHTLY
Qty: 30 CAPSULE | Refills: 5 | Status: SHIPPED | OUTPATIENT
Start: 2024-10-14

## 2024-10-14 NOTE — TELEPHONE ENCOUNTER
Dr. Lopez- please sign pended order for gabapentin 100 MG and furosemide 40 MG, if agreeable    Last office visit: 6/20/2024 Last refill: 6/20/2024    Patient advised to follow up in 6 months (12/20/2024)  Sent patient [employer] message, a follow up is needed for further refills.

## 2024-10-20 ENCOUNTER — APPOINTMENT (OUTPATIENT)
Dept: GENERAL RADIOLOGY | Facility: HOSPITAL | Age: 89
End: 2024-10-20
Attending: EMERGENCY MEDICINE
Payer: MEDICARE

## 2024-10-20 ENCOUNTER — HOSPITAL ENCOUNTER (INPATIENT)
Facility: HOSPITAL | Age: 89
LOS: 3 days | Discharge: HOME HEALTH | End: 2024-10-23
Attending: EMERGENCY MEDICINE | Admitting: INTERNAL MEDICINE
Payer: MEDICARE

## 2024-10-20 ENCOUNTER — APPOINTMENT (OUTPATIENT)
Dept: CT IMAGING | Facility: HOSPITAL | Age: 89
End: 2024-10-20
Attending: EMERGENCY MEDICINE
Payer: MEDICARE

## 2024-10-20 DIAGNOSIS — N30.00 ACUTE CYSTITIS WITHOUT HEMATURIA: Primary | ICD-10-CM

## 2024-10-20 DIAGNOSIS — E87.5 HYPERKALEMIA: ICD-10-CM

## 2024-10-20 DIAGNOSIS — R41.0 DELIRIUM, ACUTE: ICD-10-CM

## 2024-10-20 PROBLEM — N39.0 UTI (URINARY TRACT INFECTION): Status: ACTIVE | Noted: 2024-01-01

## 2024-10-20 PROBLEM — N39.0 UTI (URINARY TRACT INFECTION): Status: ACTIVE | Noted: 2024-10-20

## 2024-10-20 LAB
ALBUMIN SERPL-MCNC: 4.5 G/DL (ref 3.2–4.8)
ALP LIVER SERPL-CCNC: 124 U/L
ALT SERPL-CCNC: 8 U/L
ANION GAP SERPL CALC-SCNC: 8 MMOL/L (ref 0–18)
AST SERPL-CCNC: 18 U/L (ref ?–34)
BASOPHILS # BLD AUTO: 0.03 X10(3) UL (ref 0–0.2)
BASOPHILS NFR BLD AUTO: 0.4 %
BILIRUB DIRECT SERPL-MCNC: <0.1 MG/DL (ref ?–0.3)
BILIRUB SERPL-MCNC: 0.3 MG/DL (ref 0.2–0.9)
BILIRUB UR QL: NEGATIVE
BUN BLD-MCNC: 40 MG/DL (ref 9–23)
BUN/CREAT SERPL: 34.2 (ref 10–20)
CALCIUM BLD-MCNC: 10 MG/DL (ref 8.7–10.4)
CHLORIDE SERPL-SCNC: 115 MMOL/L (ref 98–112)
CLARITY UR: CLEAR
CO2 SERPL-SCNC: 22 MMOL/L (ref 21–32)
CREAT BLD-MCNC: 1.17 MG/DL
DEPRECATED RDW RBC AUTO: 55.2 FL (ref 35.1–46.3)
EGFRCR SERPLBLD CKD-EPI 2021: 43 ML/MIN/1.73M2 (ref 60–?)
EOSINOPHIL # BLD AUTO: 0.06 X10(3) UL (ref 0–0.7)
EOSINOPHIL NFR BLD AUTO: 0.7 %
ERYTHROCYTE [DISTWIDTH] IN BLOOD BY AUTOMATED COUNT: 15.7 % (ref 11–15)
GLUCOSE BLD-MCNC: 84 MG/DL (ref 70–99)
GLUCOSE BLDC GLUCOMTR-MCNC: 80 MG/DL (ref 70–99)
GLUCOSE UR-MCNC: NORMAL MG/DL
HCT VFR BLD AUTO: 34.4 %
HGB BLD-MCNC: 10.9 G/DL
HGB UR QL STRIP.AUTO: NEGATIVE
IMM GRANULOCYTES # BLD AUTO: 0.06 X10(3) UL (ref 0–1)
IMM GRANULOCYTES NFR BLD: 0.7 %
KETONES UR-MCNC: NEGATIVE MG/DL
LEUKOCYTE ESTERASE UR QL STRIP.AUTO: 250
LYMPHOCYTES # BLD AUTO: 2.58 X10(3) UL (ref 1–4)
LYMPHOCYTES NFR BLD AUTO: 32.1 %
MCH RBC QN AUTO: 30.5 PG (ref 26–34)
MCHC RBC AUTO-ENTMCNC: 31.7 G/DL (ref 31–37)
MCV RBC AUTO: 96.4 FL
MONOCYTES # BLD AUTO: 1 X10(3) UL (ref 0.1–1)
MONOCYTES NFR BLD AUTO: 12.5 %
NEUTROPHILS # BLD AUTO: 4.3 X10 (3) UL (ref 1.5–7.7)
NEUTROPHILS # BLD AUTO: 4.3 X10(3) UL (ref 1.5–7.7)
NEUTROPHILS NFR BLD AUTO: 53.6 %
NITRITE UR QL STRIP.AUTO: NEGATIVE
OSMOLALITY SERPL CALC.SUM OF ELEC: 309 MOSM/KG (ref 275–295)
PH UR: 5.5 [PH] (ref 5–8)
PLATELET # BLD AUTO: 338 10(3)UL (ref 150–450)
POTASSIUM SERPL-SCNC: 5.7 MMOL/L (ref 3.5–5.1)
PROT SERPL-MCNC: 7.3 G/DL (ref 5.7–8.2)
PROT UR-MCNC: 20 MG/DL
RBC # BLD AUTO: 3.57 X10(6)UL
SODIUM SERPL-SCNC: 145 MMOL/L (ref 136–145)
SP GR UR STRIP: 1.01 (ref 1–1.03)
UROBILINOGEN UR STRIP-ACNC: NORMAL
WBC # BLD AUTO: 8 X10(3) UL (ref 4–11)

## 2024-10-20 PROCEDURE — 71045 X-RAY EXAM CHEST 1 VIEW: CPT | Performed by: EMERGENCY MEDICINE

## 2024-10-20 PROCEDURE — 70450 CT HEAD/BRAIN W/O DYE: CPT | Performed by: EMERGENCY MEDICINE

## 2024-10-20 RX ORDER — METOPROLOL TARTRATE 25 MG/1
25 TABLET, FILM COATED ORAL ONCE
Status: COMPLETED | OUTPATIENT
Start: 2024-10-20 | End: 2024-10-20

## 2024-10-20 RX ORDER — HYDRALAZINE HYDROCHLORIDE 20 MG/ML
10 INJECTION INTRAMUSCULAR; INTRAVENOUS EVERY 4 HOURS PRN
Status: DISCONTINUED | OUTPATIENT
Start: 2024-10-20 | End: 2024-10-23

## 2024-10-20 RX ORDER — ACETAMINOPHEN 325 MG/1
650 TABLET ORAL EVERY 6 HOURS PRN
Status: DISCONTINUED | OUTPATIENT
Start: 2024-10-20 | End: 2024-10-21

## 2024-10-20 RX ORDER — SODIUM CHLORIDE 9 MG/ML
INJECTION, SOLUTION INTRAVENOUS CONTINUOUS
Status: DISCONTINUED | OUTPATIENT
Start: 2024-10-20 | End: 2024-10-21

## 2024-10-20 RX ORDER — HEPARIN SODIUM 5000 [USP'U]/ML
5000 INJECTION, SOLUTION INTRAVENOUS; SUBCUTANEOUS EVERY 8 HOURS SCHEDULED
Status: DISCONTINUED | OUTPATIENT
Start: 2024-10-20 | End: 2024-10-23

## 2024-10-20 RX ORDER — LABETALOL HYDROCHLORIDE 5 MG/ML
10 INJECTION, SOLUTION INTRAVENOUS ONCE
Status: COMPLETED | OUTPATIENT
Start: 2024-10-20 | End: 2024-10-20

## 2024-10-20 NOTE — ED INITIAL ASSESSMENT (HPI)
Via ems from [facility] for transient confusion. Per ems, patient forgot how to unlock her ipad. Axo x4

## 2024-10-20 NOTE — ED PROVIDER NOTES
Patient Seen in: [facility] Emergency Department      History     Chief Complaint   Patient presents with    Confusion     Stated Complaint:     Subjective:   HPI          Objective:     Past Medical History:    Arthritis    Asthma (HCC)    Back problem    multiple spinal injections    Cataract    bilateral cataract surgery    Cataracts, bilateral    cataracts, PC IOL 1997 OD Dr. Tate,  PC IOL Dr. Concepcion in [place], OS    Colon polyp    small polyps    Disorder of thyroid    Essential hypertension    Hearing impairment    High blood pressure    Osteoarthritis    Other and unspecified hyperlipidemia    Other ill-defined conditions(799.89)    Left wrist trauma, surgical repair    Posterior capsule opacification    OS, YAG laser 2014    Ptosis of right eyelid    Ptosis RUL    Shortness of breath    Visual impairment              Past Surgical History:   Procedure Laterality Date    Cataract      cataract extraction    Cataract Left 1997    Phaco w/PC IOL    Cataract Right 1998    Phaco w/ PC IOL    Cataract extraction w/  intraocular lens implant Left 1997    PC IOL/ Dr. Pichardo    Cataract extraction w/  intraocular lens implant Right 1998    PC IOL / Dr. Concepcion    Cholecystectomy      Colonoscopy      Ercp,diagnostic  10/16/2018    Choledocholithiasis, dilated intrahepatic and extrahepatic biliary tree    Hip replacement surgery  97/0 per NG    Hysterectomy      Partial    Laparoscopic cholecystectomy  05/27/2016    Other surgical history      Left wrist trauma, surgical repair    Other surgical history  05/27/2016    Laparoscopy with bilateral salpingo-oophorectomy    Synvisc, intra-articular Bilateral 2013    Synvisc injection bilateral knees    Total hip replacement      bilateral hip replacements    Yag capsulotomy - os - left eye Left 1998    Dr. Concepcion                Social History     Socioeconomic History    Marital status:    Occupational History    Occupation: homemaker   Tobacco Use     Smoking status: Never    Smokeless tobacco: Never   Vaping Use    Vaping status: Never Used   Substance and Sexual Activity    Alcohol use: No     Alcohol/week: 0.0 standard drinks of alcohol    Drug use: No    Sexual activity: Never   Other Topics Concern    Caffeine Concern Yes     Comment: coffee, 1 cup daily    Grew up on a farm No    History of tanning No    Outdoor occupation No    Pt has a pacemaker No    Pt has a defibrillator No    Breast feeding No    Reaction to local anesthetic No     Social Drivers of Health     Financial Resource Strain: Low Risk  (1/16/2024)    Financial Resource Strain     Difficulty of Paying Living Expenses: Not hard at all     Med Affordability: No   Food Insecurity: No Food Insecurity (4/18/2024)    Food Insecurity     Food Insecurity: Never true   Transportation Needs: No Transportation Needs (4/18/2024)    Transportation Needs     Lack of Transportation: No   Housing Stability: Low Risk  (4/18/2024)    Housing Stability     Housing Instability: No                  Physical Exam     ED Triage Vitals   BP 10/20/24 1351 (!) 208/96   Pulse 10/20/24 1351 85   Resp 10/20/24 1351 21   Temp 10/20/24 1352 97.8 °F (36.6 °C)   Temp src --    SpO2 10/20/24 1351 98 %   O2 Device 10/20/24 1351 None (Room air)       Current Vitals:   Vital Signs  BP: (!) 205/76  Pulse: 87  Resp: 18  Temp: 97.8 °F (36.6 °C)  MAP (mmHg): (!) 114    Oxygen Therapy  SpO2: 98 %  O2 Device: None (Room air)        Physical Exam        ED Course     Labs Reviewed   BASIC METABOLIC PANEL (8) - Abnormal; Notable for the following components:       Result Value    Potassium 5.7 (*)     Chloride 115 (*)     BUN 40 (*)     Creatinine 1.17 (*)     BUN/CREA Ratio 34.2 (*)     Calculated Osmolality 309 (*)     eGFR-Cr 43 (*)     All other components within normal limits   CBC WITH DIFFERENTIAL WITH PLATELET - Abnormal; Notable for the following components:    RBC 3.57 (*)     HGB 10.9 (*)     HCT 34.4 (*)     RDW-SD 55.2 (*)      RDW 15.7 (*)     All other components within normal limits   HEPATIC FUNCTION PANEL (7) - Abnormal; Notable for the following components:    ALT 8 (*)     All other components within normal limits   URINALYSIS WITH CULTURE REFLEX - Abnormal; Notable for the following components:    Protein Urine 20 (*)     Leukocyte Esterase Urine 250 (*)     WBC Urine 11-20 (*)     Bacteria Urine 3+ (*)     Squamous Epi. Cells Few (*)     All other components within normal limits   POCT GLUCOSE - Normal   RAINBOW DRAW LAVENDER   RAINBOW DRAW LIGHT GREEN   RAINBOW DRAW BLUE   URINE CULTURE, ROUTINE     EKG    Rate, intervals and axes as noted on EKG Report.  Rate: 83  Rhythm: Sinus Rhythm  Reading: Normal sinus rhythm without signs of acute ischemia or abnormal intervals.                         MDM      89-year-old female with history of arthritis and hypertension presents today with confusion.  Starting this morning, the patient has been somewhat confused.  She has had difficulty unlocking her iPad and and saying some things that do not make sense.  Her granddaughter, at bedside, provides supplemental history.  She states that the patient usually is ANO x 4.  She is somewhat weak at baseline but does not have any mental or cognitive disability.  Patient denies any recent fever, chills, chest pain, lightheadedness, headache, blurry vision, belly pain, dysuria, or other symptoms.    On exam, hypertensive to 200s over 90s, PERRLA, EOMI, strength and sensation grossly intact in the upper and lower extremities, no facial droop, no pronator drift, abdomen benign, lungs clear    Differential: Delirium, infection, electrolyte abnormality, dehydration, CVA/ICH    Labs were performed showing a prerenal LAMAR.  Mild hyperkalemia for which Lokelma was given and normal saline.  UA with signs of possible infection.    I independently reviewed the patient's chest x-ray. No clear evidence of consolidation or pneumothorax.    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    Will plan to start antibiotics for UTI and admit for treatment and further workup as indicated.    Admission disposition: 10/20/2024  4:25 PM             Medical Decision Making      Disposition and Plan     Clinical Impression:  1. Acute cystitis without hematuria    2. Delirium, acute    3. Hyperkalemia         Disposition:  Admit  10/20/2024  4:25 pm    Follow-up:  No follow-up provider specified.  We recommend that you schedule follow up care with a primary care provider within the next three months to obtain basic health screening including reassessment of your blood pressure.      Medications Prescribed:  Current Discharge Medication List              Supplementary Documentation:         Hospital Problems       Present on Admission  Date Reviewed: 6/20/2024            ICD-10-CM Noted POA    UTI (urinary tract infection) N39.0 10/20/2024 Unknown

## 2024-10-20 NOTE — ED QUICK NOTES
Orders for admission, patient is aware of plan and ready to go upstairs. Any questions, please call ED RN irene at extension [phone].     Patient Covid vaccination status: Fully vaccinated     COVID Test Ordered in ED: None    COVID Suspicion at Admission: N/A    Running Infusions:      Mental Status/LOC at time of transport: x4, forgetful      Other pertinent information:   CIWA score: N/A   NIH score:  N/A

## 2024-10-21 LAB
ANION GAP SERPL CALC-SCNC: 8 MMOL/L (ref 0–18)
ANION GAP SERPL CALC-SCNC: 8 MMOL/L (ref 0–18)
BASOPHILS # BLD AUTO: 0.03 X10(3) UL (ref 0–0.2)
BASOPHILS # BLD AUTO: 0.04 X10(3) UL (ref 0–0.2)
BASOPHILS NFR BLD AUTO: 0.4 %
BASOPHILS NFR BLD AUTO: 0.4 %
BNP SERPL-MCNC: 719 PG/ML
BUN BLD-MCNC: 27 MG/DL (ref 9–23)
BUN BLD-MCNC: 29 MG/DL (ref 9–23)
BUN/CREAT SERPL: 29 (ref 10–20)
BUN/CREAT SERPL: 29 (ref 10–20)
CALCIUM BLD-MCNC: 9 MG/DL (ref 8.7–10.4)
CALCIUM BLD-MCNC: 9.4 MG/DL (ref 8.7–10.4)
CHLORIDE SERPL-SCNC: 116 MMOL/L (ref 98–112)
CHLORIDE SERPL-SCNC: 116 MMOL/L (ref 98–112)
CO2 SERPL-SCNC: 22 MMOL/L (ref 21–32)
CO2 SERPL-SCNC: 22 MMOL/L (ref 21–32)
CREAT BLD-MCNC: 0.93 MG/DL
CREAT BLD-MCNC: 1 MG/DL
DEPRECATED RDW RBC AUTO: 52.9 FL (ref 35.1–46.3)
DEPRECATED RDW RBC AUTO: 53.6 FL (ref 35.1–46.3)
EGFRCR SERPLBLD CKD-EPI 2021: 52 ML/MIN/1.73M2 (ref 60–?)
EGFRCR SERPLBLD CKD-EPI 2021: 56 ML/MIN/1.73M2 (ref 60–?)
EOSINOPHIL # BLD AUTO: 0.02 X10(3) UL (ref 0–0.7)
EOSINOPHIL # BLD AUTO: 0.04 X10(3) UL (ref 0–0.7)
EOSINOPHIL NFR BLD AUTO: 0.2 %
EOSINOPHIL NFR BLD AUTO: 0.5 %
ERYTHROCYTE [DISTWIDTH] IN BLOOD BY AUTOMATED COUNT: 15.5 % (ref 11–15)
ERYTHROCYTE [DISTWIDTH] IN BLOOD BY AUTOMATED COUNT: 15.5 % (ref 11–15)
GLUCOSE BLD-MCNC: 103 MG/DL (ref 70–99)
GLUCOSE BLD-MCNC: 91 MG/DL (ref 70–99)
HCT VFR BLD AUTO: 31 %
HCT VFR BLD AUTO: 32.2 %
HGB BLD-MCNC: 10.1 G/DL
HGB BLD-MCNC: 10.2 G/DL
IMM GRANULOCYTES # BLD AUTO: 0.06 X10(3) UL (ref 0–1)
IMM GRANULOCYTES # BLD AUTO: 0.07 X10(3) UL (ref 0–1)
IMM GRANULOCYTES NFR BLD: 0.8 %
IMM GRANULOCYTES NFR BLD: 0.8 %
LYMPHOCYTES # BLD AUTO: 2.06 X10(3) UL (ref 1–4)
LYMPHOCYTES # BLD AUTO: 2.12 X10(3) UL (ref 1–4)
LYMPHOCYTES NFR BLD AUTO: 22.9 %
LYMPHOCYTES NFR BLD AUTO: 27.1 %
MCH RBC QN AUTO: 30.5 PG (ref 26–34)
MCH RBC QN AUTO: 31.3 PG (ref 26–34)
MCHC RBC AUTO-ENTMCNC: 31.7 G/DL (ref 31–37)
MCHC RBC AUTO-ENTMCNC: 32.6 G/DL (ref 31–37)
MCV RBC AUTO: 96 FL
MCV RBC AUTO: 96.4 FL
MONOCYTES # BLD AUTO: 1.06 X10(3) UL (ref 0.1–1)
MONOCYTES # BLD AUTO: 1.1 X10(3) UL (ref 0.1–1)
MONOCYTES NFR BLD AUTO: 12.2 %
MONOCYTES NFR BLD AUTO: 13.6 %
NEUTROPHILS # BLD AUTO: 4.5 X10 (3) UL (ref 1.5–7.7)
NEUTROPHILS # BLD AUTO: 4.5 X10(3) UL (ref 1.5–7.7)
NEUTROPHILS # BLD AUTO: 5.71 X10 (3) UL (ref 1.5–7.7)
NEUTROPHILS # BLD AUTO: 5.71 X10(3) UL (ref 1.5–7.7)
NEUTROPHILS NFR BLD AUTO: 57.6 %
NEUTROPHILS NFR BLD AUTO: 63.5 %
OSMOLALITY SERPL CALC.SUM OF ELEC: 307 MOSM/KG (ref 275–295)
OSMOLALITY SERPL CALC.SUM OF ELEC: 308 MOSM/KG (ref 275–295)
PLATELET # BLD AUTO: 321 10(3)UL (ref 150–450)
PLATELET # BLD AUTO: 323 10(3)UL (ref 150–450)
POTASSIUM SERPL-SCNC: 4.7 MMOL/L (ref 3.5–5.1)
POTASSIUM SERPL-SCNC: 4.7 MMOL/L (ref 3.5–5.1)
RBC # BLD AUTO: 3.23 X10(6)UL
RBC # BLD AUTO: 3.34 X10(6)UL
SODIUM SERPL-SCNC: 146 MMOL/L (ref 136–145)
SODIUM SERPL-SCNC: 146 MMOL/L (ref 136–145)
WBC # BLD AUTO: 7.8 X10(3) UL (ref 4–11)
WBC # BLD AUTO: 9 X10(3) UL (ref 4–11)

## 2024-10-21 PROCEDURE — 99223 1ST HOSP IP/OBS HIGH 75: CPT | Performed by: INTERNAL MEDICINE

## 2024-10-21 PROCEDURE — 99222 1ST HOSP IP/OBS MODERATE 55: CPT | Performed by: STUDENT IN AN ORGANIZED HEALTH CARE EDUCATION/TRAINING PROGRAM

## 2024-10-21 RX ORDER — LEVOTHYROXINE SODIUM 50 UG/1
50 TABLET ORAL
Status: DISCONTINUED | OUTPATIENT
Start: 2024-10-21 | End: 2024-10-23

## 2024-10-21 RX ORDER — ZOLPIDEM TARTRATE 5 MG/1
5 TABLET ORAL NIGHTLY PRN
Status: DISCONTINUED | OUTPATIENT
Start: 2024-10-21 | End: 2024-10-23

## 2024-10-21 RX ORDER — GABAPENTIN 100 MG/1
100 CAPSULE ORAL NIGHTLY
Status: DISCONTINUED | OUTPATIENT
Start: 2024-10-21 | End: 2024-10-23

## 2024-10-21 RX ORDER — ACETAMINOPHEN 325 MG/1
650 TABLET ORAL 2 TIMES DAILY
Status: DISCONTINUED | OUTPATIENT
Start: 2024-10-21 | End: 2024-10-23

## 2024-10-21 RX ORDER — POLYETHYLENE GLYCOL 3350 17 G/17G
17 POWDER, FOR SOLUTION ORAL DAILY PRN
Status: DISCONTINUED | OUTPATIENT
Start: 2024-10-21 | End: 2024-10-23

## 2024-10-21 RX ORDER — SENNOSIDES 8.6 MG
17.2 TABLET ORAL NIGHTLY PRN
Status: DISCONTINUED | OUTPATIENT
Start: 2024-10-21 | End: 2024-10-23

## 2024-10-21 RX ORDER — FLUTICASONE PROPIONATE 50 MCG
2 SPRAY, SUSPENSION (ML) NASAL DAILY
Status: DISCONTINUED | OUTPATIENT
Start: 2024-10-21 | End: 2024-10-23

## 2024-10-21 RX ORDER — LISINOPRIL 20 MG/1
20 TABLET ORAL DAILY
Status: DISCONTINUED | OUTPATIENT
Start: 2024-10-21 | End: 2024-10-23

## 2024-10-21 NOTE — PLAN OF CARE
Problem: Patient Centered Care  Goal: Patient preferences are identified and integrated in the patient's plan of care  Description: Interventions:  - What would you like us to know as we care for you? From [facility]  - Provide timely, complete, and accurate information to patient/family  - Incorporate patient and family knowledge, values, beliefs, and cultural backgrounds into the planning and delivery of care  - Encourage patient/family to participate in care and decision-making at the level they choose  - Honor patient and family perspectives and choices  Outcome: Progressing     Problem: GENITOURINARY - ADULT  Goal: Absence of urinary retention  Description: INTERVENTIONS:  - Assess patient’s ability to void and empty bladder  - Monitor intake/output and perform bladder scan as needed  - Follow urinary retention protocol/standard of care  - Consider collaborating with pharmacy to review patient's medication profile  - Implement strategies to promote bladder emptying  Outcome: Progressing     Problem: METABOLIC/FLUID AND ELECTROLYTES - ADULT  Goal: Electrolytes maintained within normal limits  Description: INTERVENTIONS:  - Monitor labs and rhythm and assess patient for signs and symptoms of electrolyte imbalances  - Administer electrolyte replacement as ordered  - Monitor response to electrolyte replacements, including rhythm and repeat lab results as appropriate  - Fluid restriction as ordered  - Instruct patient on fluid and nutrition restrictions as appropriate  Outcome: Progressing  Goal: Hemodynamic stability and optimal renal function maintained  Description: INTERVENTIONS:  - Monitor labs and assess for signs and symptoms of volume excess or deficit  - Monitor intake, output and patient weight  - Monitor urine specific gravity, serum osmolarity and serum sodium as indicated or ordered  - Monitor response to interventions for patient's volume status, including labs, urine output, blood pressure  (other measures as available)  - Encourage oral intake as appropriate  - Instruct patient on fluid and nutrition restrictions as appropriate  Outcome: Progressing     Problem: Impaired Cognition  Goal: Patient will exhibit improved attention, thought processing and/or memory  Description: Interventions:  - Minimize distractions in the room when full attention is required  - Allow additional time for processing after asking questions or providing instructions  - Encourage use of hearing aids  Outcome: Progressing     Problem: PAIN - ADULT  Goal: Verbalizes/displays adequate comfort level or patient's stated pain goal  Description: INTERVENTIONS:  - Encourage pt to monitor pain and request assistance  - Assess pain using appropriate pain scale  - Administer analgesics based on type and severity of pain and evaluate response  - Implement non-pharmacological measures as appropriate and evaluate response  - Consider cultural and social influences on pain and pain management  - Manage/alleviate anxiety  - Utilize distraction and/or relaxation techniques  - Monitor for opioid side effects  - Notify MD/LIP if interventions unsuccessful or patient reports new pain  - Anticipate increased pain with activity and pre-medicate as appropriate  Outcome: Progressing     IV fluids and IV antibiotics continued.  Educated on use of call light.  Bed locked and in lowest position, bed alarm on.  PRN Tylenol for pain.

## 2024-10-21 NOTE — PLAN OF CARE
Patient is A&Ox4, room air, 1 assist with walker. Continuing IVF & antibiotics. Call light within reach and fall precautions in place.     Problem: Patient Centered Care  Goal: Patient preferences are identified and integrated in the patient's plan of care  Description: Interventions:  - What would you like us to know as we care for you? From [facility]  - Provide timely, complete, and accurate information to patient/family  - Incorporate patient and family knowledge, values, beliefs, and cultural backgrounds into the planning and delivery of care  - Encourage patient/family to participate in care and decision-making at the level they choose  - Honor patient and family perspectives and choices  Outcome: Progressing     Problem: GENITOURINARY - ADULT  Goal: Absence of urinary retention  Description: INTERVENTIONS:  - Assess patient’s ability to void and empty bladder  - Monitor intake/output and perform bladder scan as needed  - Follow urinary retention protocol/standard of care  - Consider collaborating with pharmacy to review patient's medication profile  - Implement strategies to promote bladder emptying  Outcome: Progressing     Problem: METABOLIC/FLUID AND ELECTROLYTES - ADULT  Goal: Electrolytes maintained within normal limits  Description: INTERVENTIONS:  - Monitor labs and rhythm and assess patient for signs and symptoms of electrolyte imbalances  - Administer electrolyte replacement as ordered  - Monitor response to electrolyte replacements, including rhythm and repeat lab results as appropriate  - Fluid restriction as ordered  - Instruct patient on fluid and nutrition restrictions as appropriate  Outcome: Progressing  Goal: Hemodynamic stability and optimal renal function maintained  Description: INTERVENTIONS:  - Monitor labs and assess for signs and symptoms of volume excess or deficit  - Monitor intake, output and patient weight  - Monitor urine specific gravity, serum osmolarity  and serum sodium as indicated or ordered  - Monitor response to interventions for patient's volume status, including labs, urine output, blood pressure (other measures as available)  - Encourage oral intake as appropriate  - Instruct patient on fluid and nutrition restrictions as appropriate  Outcome: Progressing     Problem: PAIN - ADULT  Goal: Verbalizes/displays adequate comfort level or patient's stated pain goal  Description: INTERVENTIONS:  - Encourage pt to monitor pain and request assistance  - Assess pain using appropriate pain scale  - Administer analgesics based on type and severity of pain and evaluate response  - Implement non-pharmacological measures as appropriate and evaluate response  - Consider cultural and social influences on pain and pain management  - Manage/alleviate anxiety  - Utilize distraction and/or relaxation techniques  - Monitor for opioid side effects  - Notify MD/LIP if interventions unsuccessful or patient reports new pain  - Anticipate increased pain with activity and pre-medicate as appropriate  Outcome: Progressing     Problem: SAFETY ADULT - FALL  Goal: Free from fall injury  Description: INTERVENTIONS:  - Assess pt frequently for physical needs  - Identify cognitive and physical deficits and behaviors that affect risk of falls.  - Schwertner fall precautions as indicated by assessment.  - Educate pt/family on patient safety including physical limitations  - Instruct pt to call for assistance with activity based on assessment  - Modify environment to reduce risk of injury  - Provide assistive devices as appropriate  - Consider OT/PT consult to assist with strengthening/mobility  - Encourage toileting schedule  Outcome: Progressing     Problem: DISCHARGE PLANNING  Goal: Discharge to home or other facility with appropriate resources  Description: INTERVENTIONS:  - Identify barriers to discharge w/pt and caregiver  - Include patient/family/discharge partner in discharge planning  -  Arrange for needed discharge resources and transportation as appropriate  - Identify discharge learning needs (meds, wound care, etc)  - Arrange for interpreters to assist at discharge as needed  - Consider post-discharge preferences of patient/family/discharge partner  - Complete POLST form as appropriate  - Assess patient's ability to be responsible for managing their own health  - Refer to Case Management Department for coordinating discharge planning if the patient needs post-hospital services based on physician/LIP order or complex needs related to functional status, cognitive ability or social support system  Outcome: Progressing      Island Pedicle Flap-Requiring Vessel Identification Text: The defect edges were debeveled with a #15 scalpel blade.  Given the location of the defect, shape of the defect and the proximity to free margins an island pedicle advancement flap was deemed most appropriate.  Using a sterile surgical marker, an appropriate advancement flap was drawn, based on the axial vessel mentioned above, incorporating the defect, outlining the appropriate donor tissue and placing the expected incisions within the relaxed skin tension lines where possible.    The area thus outlined was incised deep to adipose tissue with a #15 scalpel blade.  The skin margins were undermined to an appropriate distance in all directions around the primary defect and laterally outward around the island pedicle utilizing iris scissors.  There was minimal undermining beneath the pedicle flap.

## 2024-10-21 NOTE — CM/SW NOTE
10/21/24 1000   CM/SW Referral Data   Referral Source Social Work (self-referral)   Reason for Referral Discharge planning   Informant Patient   Medical Hx   Does patient have an established PCP? Yes  (Sharan Jessica)   Patient Info   Advanced directives? Yes   Patient's Current Mental Status at Time of Assessment Alert;Oriented   Patient's Home Environment Independent Living  ([facility])   Patient lives with Alone   Patient Status Prior to Admission   Independent with ADLs and Mobility No   Pt. requires assistance with Ambulating   Services in place prior to admission DME/Supplies at home   Type of DME/Supplies Standard Walker  (\"battery operated chair\")   Discharge Needs   Anticipated D/C needs No anticipated discharge needs     SW self referred pt for DC Planning.    Met w/ pt at bedside and above assessment completed.  Pt verified home address and confirmed living at [facility] for the past 20 yrs.    Pt confirmed she uses a walker for ambulation at home. Pt also stating she has a \"battery operated chair\" that she will sometimes use.    Pt confirmed she does not drive. Per pt, Dr. Lopez has been good about doing zoom calls for appts.  Pt confirmed Nahomy is her granddtr who lives nearby. Per pt, her 2 dtrs live in [place].    Pt confirmed she will sometimes go to the onsite exercise room at [facility] and utilize the  that is there 3x/week. Pt informed SW that she has done onsite PT at [facility] before as well.    Pt currently feels she has no needs for DC.    PLAN: [facility] - pending med clear      SW/CM to remain available for support and/or discharge planning.       Elda, MSW, LSW [id number]

## 2024-10-21 NOTE — CDS QUERY
.How to answer this Query:     1.) DON'T CLICK COSIGN BUTTON FIRST  2.) Click \"3 dots...\" to the right of cosign button and click EDIT on the toolbar.  2.) Type an \"X\" in the bracket for the diagnosis that applies. (You may also add additional clinical details as you feel necessary to substantiate your response).   3.) Finally click \"Sign\" to complete response.  Thank You        CLINICAL DOCUMENTATION CLARIFICATION     Dear Doctor Jessica,   Clinical information (provided below) includes a diagnosis of Heart Failure. Please specify type  .  PLEASE (X) DIAGNOSIS THAT APPLIES.          (  x  ) Chronic Diastolic Heart Failure    (    ) Other - please specify: _______________________________        Clinical Indicators: Echo 4/10/24 - ejection fraction was 50%,     H&P  A/P -  History of CHF-continue current management.     Risk Factor: 89-year-old female with history of arthritis and hypertension presents  with confusion      Treatment: Home Lasix       If you have any questions, please contact Clinical :  Stoney MENDOZA at [phone]     Thank You!     THIS FORM IS A PERMANENT PART OF THE MEDICAL RECORD

## 2024-10-21 NOTE — H&P
[facility]  part of Located within [facility]    History & Physical    Date:  10/20/2024   Date of Admission:  10/20/2024    Chief Complaint:   Elba Malik is a(n) 89 year old female with left sinus pressure, confusion and pyuria    HPI:   The patient has a longstanding history of chronic rhinitis.  She had been evaluated by ear nose and throat multiple times over the past 8 years.  She was previously seen by Dr. Wheat and then most recently by Dr. Suero in 2022.  She describes a pressure and discomfort at the left face.  Her granddaughter found her to be confused yesterday and brought her to the emergency room primarily for that concern.  She was found to have pyuria.  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  There is no precedent dysuria, fever, chills, shakes, hematuria.  She has had urinary tract infections previously.  She has chronic arthritis and moves poorly on that basis; however, she denies loss of sensation or strength.    History     Past Medical History:    Arthritis    Asthma (HCC)    Back problem    multiple spinal injections    Cataract    bilateral cataract surgery    Cataracts, bilateral    cataracts, PC IOL 1997 OD Dr. Tate,  PC IOL Dr. Concepcion in [place], OS    Colon polyp    small polyps    Disorder of thyroid    Essential hypertension    Hearing impairment    High blood pressure    Osteoarthritis    Other and unspecified hyperlipidemia    Other ill-defined conditions(799.89)    Left wrist trauma, surgical repair    Posterior capsule opacification    OS, YAG laser 2014    Ptosis of right eyelid    Ptosis RUL    Shortness of breath    Visual impairment     Past Surgical History:   Procedure Laterality Date    Cataract      cataract extraction    Cataract Left 1997    Phaco w/PC IOL    Cataract Right 1998    Phaco w/ PC IOL    Cataract extraction w/  intraocular lens implant Left 1997     PC IOL/ Dr. Pichardo    Cataract extraction w/  intraocular lens implant Right 1998    PC IOL / Dr. Concepcion    Cholecystectomy      Colonoscopy      Ercp,diagnostic  10/16/2018    Choledocholithiasis, dilated intrahepatic and extrahepatic biliary tree    Hip replacement surgery  97/0 per NG    Hysterectomy      Partial    Laparoscopic cholecystectomy  05/27/2016    Other surgical history      Left wrist trauma, surgical repair    Other surgical history  05/27/2016    Laparoscopy with bilateral salpingo-oophorectomy    Synvisc, intra-articular Bilateral 2013    Synvisc injection bilateral knees    Total hip replacement      bilateral hip replacements    Yag capsulotomy - os - left eye Left 1998    Dr. Concepcion     Family History   Problem Relation Age of Onset    Ear Problems Mother         hearing loss    Other (Other) Father         emphysema    Diabetes Neg     Glaucoma Neg     Macular degeneration Neg      Social History:  Social History     Socioeconomic History    Marital status:    Occupational History    Occupation: homemaker   Tobacco Use    Smoking status: Never    Smokeless tobacco: Never   Vaping Use    Vaping status: Never Used   Substance and Sexual Activity    Alcohol use: No     Alcohol/week: 0.0 standard drinks of alcohol    Drug use: No    Sexual activity: Never   Other Topics Concern    Caffeine Concern Yes     Comment: coffee, 1 cup daily    Grew up on a farm No    History of tanning No    Outdoor occupation No    Pt has a pacemaker No    Pt has a defibrillator No    Breast feeding No    Reaction to local anesthetic No     Social Drivers of Health     Financial Resource Strain: Low Risk  (1/16/2024)    Financial Resource Strain     Difficulty of Paying Living Expenses: Not hard at all     Med Affordability: No   Food Insecurity: No Food Insecurity (10/20/2024)    Food Insecurity     Food Insecurity: Never true   Transportation Needs: No Transportation Needs (10/20/2024)    Transportation Needs      Lack of Transportation: No   Housing Stability: Low Risk  (10/20/2024)    Housing Stability     Housing Instability: No     Allergies/Medications:   Allergies: Allergies[1]  Medications Prior to Admission   Medication Sig    FUROSEMIDE 40 MG Oral Tab Take 1 tablet (40 mg total) by mouth daily.    gabapentin 100 MG Oral Cap Take 1 capsule (100 mg total) by mouth nightly.    zolpidem 5 MG Oral Tab Take 1 tablet (5 mg total) by mouth nightly as needed for Sleep.    metoprolol tartrate 25 MG Oral Tab Take 0.5 tablets (12.5 mg total) by mouth 2x Daily(Beta Blocker).    lisinopril 20 MG Oral Tab Take 1 tablet (20 mg total) by mouth daily.    levothyroxine 50 MCG Oral Tab Take 1 tablet (50 mcg total) by mouth before breakfast.    fluticasone propionate 50 MCG/ACT Nasal Suspension 2 sprays by Nasal route daily.    azithromycin 250 MG Oral Tab take 2 tablet (500MG)  by ORAL route  every day for 1 day then 1 tablet (250 mg) by oral route once daily for 4 days    predniSONE 20 MG Oral Tab Take 2 tablets (40MG) daily for 3 days and then take 1 tablet (20MG) daily for 4 days.    Sennosides 17.2 MG Oral Tab Take 1 tablet (17.2 mg total) by mouth nightly as needed (constipation, as needed if no bowel movement that day).    Polyethylene Glycol 3350 17 g Oral Powd Pack Take 17 g by mouth daily as needed (If no bowel movement in last 24 hours).    acetaminophen 325 MG Oral Tab Take 2 tablets (650 mg total) by mouth in the morning and 2 tablets (650 mg total) before bedtime.    lidocaine-menthol 4-1 % External Patch Place 1 patch onto the skin daily.    lidocaine-menthol 4-1 % External Patch Place 1 patch onto the skin daily. (Patient not taking: Reported on 10/20/2024)    lidocaine-menthol 4-1 % External Patch Place 1 patch onto the skin daily.       Review of Systems:   Review of Systems:  Vision normal. Ear nose and throat normal. Bowel normal. Bladder function abnormal. No depression. No thyroid disease. No lymphatic system  concerns.  No rash. Muscles and joints notable for arthritis. No weight loss no weight gain.    Physical Exam:   Vital Signs:  Blood pressure (!) 176/65, pulse 79, temperature 97.5 °F (36.4 °C), temperature source Oral, resp. rate 18, height 5' 5\" (1.651 m), weight 136 lb 3.9 oz (61.8 kg), SpO2 96%, not currently breastfeeding.    Alert white female in no distress  HEENT examination is unremarkable with pupils equal round and reactive to light and accommodation.  The left face is slightly puffy.  Neck without adenopathy, thyromegaly, JVD nor bruit.   Lungs clear to auscultation and percussion.  Cardiac regular rate and rhythm no murmur.   Abdomen nontender, without hepatosplenomegaly and no mass appreciable.   Extremities without clubbing cyanosis nor edema.   Neurologic grossly intact with symmetric tone and strength and reflex.  Skin without gross abnormality    Results:     Lab Results   Component Value Date    WBC 7.8 10/21/2024    HGB 10.1 10/21/2024    HCT 31.0 10/21/2024    .0 10/21/2024    CREATSERUM 0.93 10/21/2024    BUN 27 10/21/2024     10/21/2024    K 4.7 10/21/2024     10/21/2024    CO2 22.0 10/21/2024    GLU 91 10/21/2024    CA 9.0 10/21/2024    ALB 4.5 10/20/2024    ALKPHO 124 10/20/2024    BILT 0.3 10/20/2024    TP 7.3 10/20/2024    AST 18 10/20/2024    ALT 8 10/20/2024     CT scan the brain-No hemorrhage or mass effect or edema      Normal midline ventricles      Chronic appearing soft tissue density again seen completely filling the left maxillary sinus and eroding through the medial wall of the left maxillary sinus into the left nasal cavity.  There is a polypoid component of the lesion which a bulges from the   left nasal cavity into the left nasopharynx and which has significantly increased in size now 2.7 x 2.6 centimeter. There is new fluid/soft tissue opacification of the left sphenoid, left ethmoid, left frontal sinuses.       Assessment/Plan:   1.  Delirium-improved now  and her mental status appears to be baseline and good currently.  She was treated for the possibility of urinary tract infection with empiric antibiotic.  Additionally, she has complete opacification of the left sinus and certainly could have a sinusitis although there may be an expansile mass at the left sinus as suggested on serial CT imaging.  Patient may have been mildly dehydrated as well.    Recommendations:  1.  Gentle hydration  2.  Empiric antibiotic  3.  Rehabilitative services    2.  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    Recommendations: ENT opinion    3.  Pyuria-possible UTI.    Recommendations: Empiric antibiotic and await cultures.    4.  DVT prophylaxis-subcutaneous heparin    5.  CODE STATUS-DNAR select    6.  History of CHF-continue current management.    7.  LAMAR-potassium better now.  To follow functions.      Sharan Lopez MD  Medical Director, Critical Care, [facility]  Medical Director, [facility]  Pager: [phone]         [1] No Known Allergies

## 2024-10-22 LAB
ANION GAP SERPL CALC-SCNC: 9 MMOL/L (ref 0–18)
ATRIAL RATE: 83 BPM
BASOPHILS # BLD AUTO: 0.04 X10(3) UL (ref 0–0.2)
BASOPHILS NFR BLD AUTO: 0.4 %
BUN BLD-MCNC: 28 MG/DL (ref 9–23)
BUN/CREAT SERPL: 27.7 (ref 10–20)
CALCIUM BLD-MCNC: 9.5 MG/DL (ref 8.7–10.4)
CHLORIDE SERPL-SCNC: 114 MMOL/L (ref 98–112)
CO2 SERPL-SCNC: 22 MMOL/L (ref 21–32)
CREAT BLD-MCNC: 1.01 MG/DL
DEPRECATED RDW RBC AUTO: 54.3 FL (ref 35.1–46.3)
EGFRCR SERPLBLD CKD-EPI 2021: 51 ML/MIN/1.73M2 (ref 60–?)
EOSINOPHIL # BLD AUTO: 0.07 X10(3) UL (ref 0–0.7)
EOSINOPHIL NFR BLD AUTO: 0.6 %
ERYTHROCYTE [DISTWIDTH] IN BLOOD BY AUTOMATED COUNT: 15.7 % (ref 11–15)
GLUCOSE BLD-MCNC: 94 MG/DL (ref 70–99)
HCT VFR BLD AUTO: 31.2 %
HGB BLD-MCNC: 10 G/DL
IMM GRANULOCYTES # BLD AUTO: 0.09 X10(3) UL (ref 0–1)
IMM GRANULOCYTES NFR BLD: 0.8 %
LYMPHOCYTES # BLD AUTO: 2.25 X10(3) UL (ref 1–4)
LYMPHOCYTES NFR BLD AUTO: 20.6 %
MCH RBC QN AUTO: 30.6 PG (ref 26–34)
MCHC RBC AUTO-ENTMCNC: 32.1 G/DL (ref 31–37)
MCV RBC AUTO: 95.4 FL
MONOCYTES # BLD AUTO: 1.4 X10(3) UL (ref 0.1–1)
MONOCYTES NFR BLD AUTO: 12.8 %
NEUTROPHILS # BLD AUTO: 7.07 X10 (3) UL (ref 1.5–7.7)
NEUTROPHILS # BLD AUTO: 7.07 X10(3) UL (ref 1.5–7.7)
NEUTROPHILS NFR BLD AUTO: 64.8 %
OSMOLALITY SERPL CALC.SUM OF ELEC: 305 MOSM/KG (ref 275–295)
P AXIS: 42 DEGREES
P-R INTERVAL: 202 MS
PLATELET # BLD AUTO: 301 10(3)UL (ref 150–450)
POTASSIUM SERPL-SCNC: 4 MMOL/L (ref 3.5–5.1)
Q-T INTERVAL: 374 MS
QRS DURATION: 84 MS
QTC CALCULATION (BEZET): 439 MS
R AXIS: 6 DEGREES
RBC # BLD AUTO: 3.27 X10(6)UL
SODIUM SERPL-SCNC: 145 MMOL/L (ref 136–145)
T AXIS: 53 DEGREES
VENTRICULAR RATE: 83 BPM
WBC # BLD AUTO: 10.9 X10(3) UL (ref 4–11)

## 2024-10-22 PROCEDURE — 99233 SBSQ HOSP IP/OBS HIGH 50: CPT | Performed by: INTERNAL MEDICINE

## 2024-10-22 NOTE — CM/SW NOTE
Received MDO for DC Planning.    Please see SW note from Monday 10/21. Pt was declining HH at that time. Feels she has no needs.    PLAN: [facility] at [facility] - pending [facility] clear        SW/ to remain available for support and/or discharge planning.         MS EldaW, LSW [phone]

## 2024-10-22 NOTE — PROGRESS NOTES
[facility]  part of [facility]    Progress Note      Assessment and Plan:    1.  Delirium-resolved.    Recommendations:  1.  Gentle hydration  2.  Empiric antibiotic  3.  Rehabilitative services     2.  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  I appreciate ENT opinion.  No surgery at this juncture.    Recommendations: As per ENT opinion, ongoing antibiotics and outpatient follow-up with nasal endoscopy.     3.  Klebsiella UTI.     Recommendations: Will switch to ceftriaxone.     4.  DVT prophylaxis-subcutaneous heparin     5.  CODE STATUS-DNAR select     6.  History of CHF-continue current management.     7.  LAMAR-potassium better now.  To follow functions.    Subjective:   Elba Malik is a(n) 89 year old female who is feeling somewhat better today    Objective:   Blood pressure 151/47, pulse 78, temperature 97.1 °F (36.2 °C), temperature source Oral, resp. rate 16, height 5' 5\" (1.651 m), weight 134 lb 14.7 oz (61.2 kg), SpO2 96%, not currently breastfeeding.    Physical Exam alert white female  HEENT examination is unremarkable with pupils equal round and reactive to light and accommodation.   Neck without adenopathy, thyromegaly, JVD nor bruit.   Lungs clear to auscultation and percussion.  Cardiac regular rate and rhythm no murmur.   Abdomen nontender, without hepatosplenomegaly and no mass appreciable.   Extremities without clubbing cyanosis nor edema.   Neurologic grossly intact with symmetric tone and strength and reflex.  Skin without gross abnormality     Results:     Lab Results   Component Value Date    WBC 10.9 10/22/2024    HGB 10.0 10/22/2024    HCT 31.2 10/22/2024    .0 10/22/2024    CREATSERUM 1.01 10/22/2024    BUN 28 10/22/2024     10/22/2024    K 4.0 10/22/2024     10/22/2024    CO2 22.0 10/22/2024    GLU 94 10/22/2024    CA 9.5 10/22/2024     Urine culture-Klebsiella sensitive to all except ampicillin    Sharan Lopez,  MD  Medical Director, Critical Care, [facility]  Medical Director, [facility]  Pager: [phone]

## 2024-10-22 NOTE — PLAN OF CARE
Patient is from [facility]. A&Ox4. Bilateral hearing aids. Room air. Patient is x1 assist w/ walker. Bed in lowest position and locked. Call light in hand. Personal belongings w/in reach.     Problem: Patient Centered Care  Goal: Patient preferences are identified and integrated in the patient's plan of care  Description: Interventions:  - What would you like us to know as we care for you? From [facility] independent living  - Provide timely, complete, and accurate information to patient/family  - Incorporate patient and family knowledge, values, beliefs, and cultural backgrounds into the planning and delivery of care  - Encourage patient/family to participate in care and decision-making at the level they choose  - Honor patient and family perspectives and choices  Outcome: Progressing     Problem: GENITOURINARY - ADULT  Goal: Absence of urinary retention  Description: INTERVENTIONS:  - Assess patient’s ability to void and empty bladder  - Monitor intake/output and perform bladder scan as needed  - Follow urinary retention protocol/standard of care  - Consider collaborating with pharmacy to review patient's medication profile  - Implement strategies to promote bladder emptying  Outcome: Progressing     Problem: METABOLIC/FLUID AND ELECTROLYTES - ADULT  Goal: Electrolytes maintained within normal limits  Description: INTERVENTIONS:  - Monitor labs and rhythm and assess patient for signs and symptoms of electrolyte imbalances  - Administer electrolyte replacement as ordered  - Monitor response to electrolyte replacements, including rhythm and repeat lab results as appropriate  - Fluid restriction as ordered  - Instruct patient on fluid and nutrition restrictions as appropriate  Outcome: Progressing  Goal: Hemodynamic stability and optimal renal function maintained  Description: INTERVENTIONS:  - Monitor labs and assess for signs and symptoms of volume excess or deficit  - Monitor intake, output and patient  weight  - Monitor urine specific gravity, serum osmolarity and serum sodium as indicated or ordered  - Monitor response to interventions for patient's volume status, including labs, urine output, blood pressure (other measures as available)  - Encourage oral intake as appropriate  - Instruct patient on fluid and nutrition restrictions as appropriate  Outcome: Progressing     Problem: Impaired Cognition  Goal: Patient will exhibit improved attention, thought processing and/or memory  Description: Interventions:  - Allow additional time for processing after asking questions or providing instructions  Outcome: Progressing     Problem: PAIN - ADULT  Goal: Verbalizes/displays adequate comfort level or patient's stated pain goal  Description: INTERVENTIONS:  - Encourage pt to monitor pain and request assistance  - Assess pain using appropriate pain scale  - Administer analgesics based on type and severity of pain and evaluate response  - Implement non-pharmacological measures as appropriate and evaluate response  - Consider cultural and social influences on pain and pain management  - Manage/alleviate anxiety  - Utilize distraction and/or relaxation techniques  - Monitor for opioid side effects  - Notify MD/LIP if interventions unsuccessful or patient reports new pain  - Anticipate increased pain with activity and pre-medicate as appropriate  Outcome: Progressing     Problem: SAFETY ADULT - FALL  Goal: Free from fall injury  Description: INTERVENTIONS:  - Assess pt frequently for physical needs  - Identify cognitive and physical deficits and behaviors that affect risk of falls.  - [place] fall precautions as indicated by assessment.  - Educate pt/family on patient safety including physical limitations  - Instruct pt to call for assistance with activity based on assessment  - Modify environment to reduce risk of injury  - Provide assistive devices as appropriate  - Consider OT/PT consult to assist with  strengthening/mobility  - Encourage toileting schedule  Outcome: Progressing     Problem: DISCHARGE PLANNING  Goal: Discharge to home or other facility with appropriate resources  Description: INTERVENTIONS:  - Identify barriers to discharge w/pt and caregiver  - Include patient/family/discharge partner in discharge planning  - Arrange for needed discharge resources and transportation as appropriate  - Identify discharge learning needs (meds, wound care, etc)  - Arrange for interpreters to assist at discharge as needed  - Consider post-discharge preferences of patient/family/discharge partner  - Complete POLST form as appropriate  - Assess patient's ability to be responsible for managing their own health  - Refer to Case Management Department for coordinating discharge planning if the patient needs post-hospital services based on physician/LIP order or complex needs related to functional status, cognitive ability or social support system  Outcome: Progressing

## 2024-10-22 NOTE — PLAN OF CARE
Pt's BP elevated, PRN Hydralazine given with improvement. Other vital signs stable. Alert and oriented x4. On room air. Tolerating diet. Voids freely via purewick. Up with one assist and the walker. Heparin subcutaneous for DVT prophylaxis. Appropriate safety precautions maintained. Bed locked in lowest position, call light within reach, and frequent rounding maintained. Plan to return to [facility].    Problem: Patient Centered Care  Goal: Patient preferences are identified and integrated in the patient's plan of care  Description: Interventions:  - What would you like us to know as we care for you? From [facility]  - Provide timely, complete, and accurate information to patient/family  - Incorporate patient and family knowledge, values, beliefs, and cultural backgrounds into the planning and delivery of care  - Encourage patient/family to participate in care and decision-making at the level they choose  - Honor patient and family perspectives and choices  Outcome: Progressing     Problem: GENITOURINARY - ADULT  Goal: Absence of urinary retention  Description: INTERVENTIONS:  - Assess patient’s ability to void and empty bladder  - Monitor intake/output and perform bladder scan as needed  - Follow urinary retention protocol/standard of care  - Consider collaborating with pharmacy to review patient's medication profile  - Implement strategies to promote bladder emptying  Outcome: Progressing     Problem: METABOLIC/FLUID AND ELECTROLYTES - ADULT  Goal: Electrolytes maintained within normal limits  Description: INTERVENTIONS:  - Monitor labs and rhythm and assess patient for signs and symptoms of electrolyte imbalances  - Administer electrolyte replacement as ordered  - Monitor response to electrolyte replacements, including rhythm and repeat lab results as appropriate  - Fluid restriction as ordered  - Instruct patient on fluid and nutrition restrictions as  appropriate  Outcome: Progressing  Goal: Hemodynamic stability and optimal renal function maintained  Description: INTERVENTIONS:  - Monitor labs and assess for signs and symptoms of volume excess or deficit  - Monitor intake, output and patient weight  - Monitor urine specific gravity, serum osmolarity and serum sodium as indicated or ordered  - Monitor response to interventions for patient's volume status, including labs, urine output, blood pressure (other measures as available)  - Encourage oral intake as appropriate  - Instruct patient on fluid and nutrition restrictions as appropriate  Outcome: Progressing     Problem: Impaired Cognition  Goal: Patient will exhibit improved attention, thought processing and/or memory  Description: Interventions:  Outcome: Progressing     Problem: PAIN - ADULT  Goal: Verbalizes/displays adequate comfort level or patient's stated pain goal  Description: INTERVENTIONS:  - Encourage pt to monitor pain and request assistance  - Assess pain using appropriate pain scale  - Administer analgesics based on type and severity of pain and evaluate response  - Implement non-pharmacological measures as appropriate and evaluate response  - Consider cultural and social influences on pain and pain management  - Manage/alleviate anxiety  - Utilize distraction and/or relaxation techniques  - Monitor for opioid side effects  - Notify MD/LIP if interventions unsuccessful or patient reports new pain  - Anticipate increased pain with activity and pre-medicate as appropriate  Outcome: Progressing     Problem: SAFETY ADULT - FALL  Goal: Free from fall injury  Description: INTERVENTIONS:  - Assess pt frequently for physical needs  - Identify cognitive and physical deficits and behaviors that affect risk of falls.  - [place] fall precautions as indicated by assessment.  - Educate pt/family on patient safety including physical limitations  - Instruct pt to call for assistance with activity based on  assessment  - Modify environment to reduce risk of injury  - Provide assistive devices as appropriate  - Consider OT/PT consult to assist with strengthening/mobility  - Encourage toileting schedule  Outcome: Progressing     Problem: DISCHARGE PLANNING  Goal: Discharge to home or other facility with appropriate resources  Description: INTERVENTIONS:  - Identify barriers to discharge w/pt and caregiver  - Include patient/family/discharge partner in discharge planning  - Arrange for needed discharge resources and transportation as appropriate  - Identify discharge learning needs (meds, wound care, etc)  - Arrange for interpreters to assist at discharge as needed  - Consider post-discharge preferences of patient/family/discharge partner  - Complete POLST form as appropriate  - Assess patient's ability to be responsible for managing their own health  - Refer to Case Management Department for coordinating discharge planning if the patient needs post-hospital services based on physician/LIP order or complex needs related to functional status, cognitive ability or social support system  Outcome: Progressing

## 2024-10-23 ENCOUNTER — TELEPHONE (OUTPATIENT)
Dept: PULMONOLOGY | Facility: CLINIC | Age: 89
End: 2024-10-23

## 2024-10-23 ENCOUNTER — TELEPHONE (OUTPATIENT)
Dept: OTOLARYNGOLOGY | Facility: CLINIC | Age: 89
End: 2024-10-23

## 2024-10-23 VITALS
BODY MASS INDEX: 21.16 KG/M2 | SYSTOLIC BLOOD PRESSURE: 170 MMHG | OXYGEN SATURATION: 96 % | RESPIRATION RATE: 16 BRPM | HEART RATE: 101 BPM | TEMPERATURE: 97 F | DIASTOLIC BLOOD PRESSURE: 72 MMHG | HEIGHT: 65 IN | WEIGHT: 127 LBS

## 2024-10-23 LAB
ANION GAP SERPL CALC-SCNC: 6 MMOL/L (ref 0–18)
BASOPHILS # BLD AUTO: 0.04 X10(3) UL (ref 0–0.2)
BASOPHILS NFR BLD AUTO: 0.5 %
BUN BLD-MCNC: 24 MG/DL (ref 9–23)
BUN/CREAT SERPL: 27.3 (ref 10–20)
CALCIUM BLD-MCNC: 9.3 MG/DL (ref 8.7–10.4)
CHLORIDE SERPL-SCNC: 115 MMOL/L (ref 98–112)
CO2 SERPL-SCNC: 21 MMOL/L (ref 21–32)
CREAT BLD-MCNC: 0.88 MG/DL
DEPRECATED RDW RBC AUTO: 54.4 FL (ref 35.1–46.3)
EGFRCR SERPLBLD CKD-EPI 2021: 60 ML/MIN/1.73M2 (ref 60–?)
EOSINOPHIL # BLD AUTO: 0.09 X10(3) UL (ref 0–0.7)
EOSINOPHIL NFR BLD AUTO: 1.1 %
ERYTHROCYTE [DISTWIDTH] IN BLOOD BY AUTOMATED COUNT: 15.7 % (ref 11–15)
GLUCOSE BLD-MCNC: 84 MG/DL (ref 70–99)
HCT VFR BLD AUTO: 32.4 %
HGB BLD-MCNC: 10 G/DL
IMM GRANULOCYTES # BLD AUTO: 0.05 X10(3) UL (ref 0–1)
IMM GRANULOCYTES NFR BLD: 0.6 %
LYMPHOCYTES # BLD AUTO: 2.11 X10(3) UL (ref 1–4)
LYMPHOCYTES NFR BLD AUTO: 26.6 %
MCH RBC QN AUTO: 29.7 PG (ref 26–34)
MCHC RBC AUTO-ENTMCNC: 30.9 G/DL (ref 31–37)
MCV RBC AUTO: 96.1 FL
MONOCYTES # BLD AUTO: 1.14 X10(3) UL (ref 0.1–1)
MONOCYTES NFR BLD AUTO: 14.4 %
NEUTROPHILS # BLD AUTO: 4.51 X10 (3) UL (ref 1.5–7.7)
NEUTROPHILS # BLD AUTO: 4.51 X10(3) UL (ref 1.5–7.7)
NEUTROPHILS NFR BLD AUTO: 56.8 %
OSMOLALITY SERPL CALC.SUM OF ELEC: 297 MOSM/KG (ref 275–295)
PLATELET # BLD AUTO: 335 10(3)UL (ref 150–450)
POTASSIUM SERPL-SCNC: 3.9 MMOL/L (ref 3.5–5.1)
RBC # BLD AUTO: 3.37 X10(6)UL
SODIUM SERPL-SCNC: 142 MMOL/L (ref 136–145)
WBC # BLD AUTO: 7.9 X10(3) UL (ref 4–11)

## 2024-10-23 PROCEDURE — 99238 HOSP IP/OBS DSCHRG MGMT 30/<: CPT | Performed by: INTERNAL MEDICINE

## 2024-10-23 RX ORDER — CIPROFLOXACIN 500 MG/1
500 TABLET, FILM COATED ORAL 2 TIMES DAILY
Qty: 20 TABLET | Refills: 0 | Status: SHIPPED | OUTPATIENT
Start: 2024-10-23

## 2024-10-23 NOTE — TELEPHONE ENCOUNTER
Contacted daughter, Nahomy and had questions regarding scheduling and procedure that needs to be done at patient visit.  Per Dr. Mendieta's consult note, patient will need a nasal endoscopy with biopsy in 10 days, is being discharged to assisted living today.  Nahomy Informed that patient will need assistance provided by family for transfers in and out of exam chair, and escort will have to stay length of visit, as patient will be in wheelchair and arriving per medicar, [place] is best location for this.    Appt made.   Future Appointments   Date Time Provider Department Center   11/11/2024  2:15 PM Mathew Mendieta, DO [facility]     Dr. Mendieta, family is wondering if patient should be seen sooner since was told to follow up in 10 days. 1st available [place] location appt made.

## 2024-10-23 NOTE — DISCHARGE INSTRUCTIONS
Sometimes managing your health at home requires assistance.  The [facility] team has recognized your preference to use [facility].  A representative from the home health agency will contact you or your family to schedule your first visit.        [facility]  [address] [place] [id number]  Phone: [phone]

## 2024-10-23 NOTE — PLAN OF CARE
Patient is from [facility] ([facility] [place] A&Ox4. Room air. Patient is X1 assist w/ walker. Bed in lowest position and locked. Call light in hand. Personal belongings w/in reach. Patient is ready for discharge IV removed. Tele box removed and returned. Nurse  cleared.     Problem: Patient Centered Care  Goal: Patient preferences are identified and integrated in the patient's plan of care  Description: Interventions:  - What would you like us to know as we care for you? From [facility] independent living  - Provide timely, complete, and accurate information to patient/family  - Incorporate patient and family knowledge, values, beliefs, and cultural backgrounds into the planning and delivery of care  - Encourage patient/family to participate in care and decision-making at the level they choose  - Honor patient and family perspectives and choices  Outcome: Adequate for Discharge     Problem: GENITOURINARY - ADULT  Goal: Absence of urinary retention  Description: INTERVENTIONS:  - Assess patient’s ability to void and empty bladder  - Monitor intake/output and perform bladder scan as needed  - Follow urinary retention protocol/standard of care  - Consider collaborating with pharmacy to review patient's medication profile  - Implement strategies to promote bladder emptying  Outcome: Adequate for Discharge     Problem: METABOLIC/FLUID AND ELECTROLYTES - ADULT  Goal: Electrolytes maintained within normal limits  Description: INTERVENTIONS:  - Monitor labs and rhythm and assess patient for signs and symptoms of electrolyte imbalances  - Administer electrolyte replacement as ordered  - Monitor response to electrolyte replacements, including rhythm and repeat lab results as appropriate  - Fluid restriction as ordered  - Instruct patient on fluid and nutrition restrictions as appropriate  Outcome: Adequate for Discharge  Goal: Hemodynamic stability and optimal renal function maintained  Description:  INTERVENTIONS:  - Monitor labs and assess for signs and symptoms of volume excess or deficit  - Monitor intake, output and patient weight  - Monitor urine specific gravity, serum osmolarity and serum sodium as indicated or ordered  - Monitor response to interventions for patient's volume status, including labs, urine output, blood pressure (other measures as available)  - Encourage oral intake as appropriate  - Instruct patient on fluid and nutrition restrictions as appropriate  Outcome: Adequate for Discharge     Problem: Impaired Cognition  Goal: Patient will exhibit improved attention, thought processing and/or memory  Description: Interventions:  - Allow additional time for processing after asking questions or providing instructions  Outcome: Adequate for Discharge     Problem: PAIN - ADULT  Goal: Verbalizes/displays adequate comfort level or patient's stated pain goal  Description: INTERVENTIONS:  - Encourage pt to monitor pain and request assistance  - Assess pain using appropriate pain scale  - Administer analgesics based on type and severity of pain and evaluate response  - Implement non-pharmacological measures as appropriate and evaluate response  - Consider cultural and social influences on pain and pain management  - Manage/alleviate anxiety  - Utilize distraction and/or relaxation techniques  - Monitor for opioid side effects  - Notify MD/LIP if interventions unsuccessful or patient reports new pain  - Anticipate increased pain with activity and pre-medicate as appropriate  Outcome: Adequate for Discharge     Problem: SAFETY ADULT - FALL  Goal: Free from fall injury  Description: INTERVENTIONS:  - Assess pt frequently for physical needs  - Identify cognitive and physical deficits and behaviors that affect risk of falls.  - [place] fall precautions as indicated by assessment.  - Educate pt/family on patient safety including physical limitations  - Instruct pt to call for assistance with activity based  on assessment  - Modify environment to reduce risk of injury  - Provide assistive devices as appropriate  - Consider OT/PT consult to assist with strengthening/mobility  - Encourage toileting schedule  Outcome: Adequate for Discharge     Problem: DISCHARGE PLANNING  Goal: Discharge to home or other facility with appropriate resources  Description: INTERVENTIONS:  - Identify barriers to discharge w/pt and caregiver  - Include patient/family/discharge partner in discharge planning  - Arrange for needed discharge resources and transportation as appropriate  - Identify discharge learning needs (meds, wound care, etc)  - Arrange for interpreters to assist at discharge as needed  - Consider post-discharge preferences of patient/family/discharge partner  - Complete POLST form as appropriate  - Assess patient's ability to be responsible for managing their own health  - Refer to Case Management Department for coordinating discharge planning if the patient needs post-hospital services based on physician/LIP order or complex needs related to functional status, cognitive ability or social support system  Outcome: Adequate for Discharge

## 2024-10-23 NOTE — PHYSICAL THERAPY NOTE
PHYSICAL THERAPY EVALUATION - INPATIENT     Room Number: 301/301-A  Evaluation Date: 10/23/2024  Type of Evaluation: Initial   Physician Order: PT Eval and Treat    Presenting Problem: confusion,acute cystitis  Co-Morbidities : Arthritis    Asthma (HCC)   Back problem    multiple spinal injections   Cataract    bilateral cataract surgery   Cataracts, bilateral    cataracts, PC IOL 1997 OD Dr. Tate,  PC IOL Dr. Concepcion in [place], OS   Colon polyp    small polyps   Disorder of thyroid   Essential hypertension   Hearing impairment   High blood pressure   Osteoarthritis   Other and unspecified hyperlipidemia   Other ill-defined conditions(799.89)    Left wrist trauma, surgical repair   Posterior capsule opacification    OS, YAG laser 2014   Ptosis of right eyelid    Ptosis RUL   Shortness of breath   Visual impairment  Reason for Therapy: Mobility Dysfunction and Discharge Planning    PHYSICAL THERAPY ASSESSMENT   Patient is a 89 year old female admitted 10/20/2024 for confusion and acute cystitis.  Prior to admission, patient's baseline is Independent with most ADLs, short distance ambulation with Rollator. She has assistance for getting in and out of bed and lift chair for sit<>stand transfers.  Patient is currently functioning below baseline with bed mobility, transfers, gait, maintaining seated position, standing prolonged periods, and performing household tasks.  Patient is requiring minimal assist and moderate assist as a result of the following impairments: decreased functional strength, decreased endurance/aerobic capacity, pain, impaired sitting and standing balance, decreased muscular endurance, and medical status.  Physical Therapy will continue to follow for duration of hospitalization.    Patient will benefit from continued skilled PT Services to promote return to prior level of function and safety with continuous assistance and gradual rehabilitative therapy .    PLAN DURING HOSPITALIZATION  Nursing  Mobility Recommendation : 1 Assist     PT Treatment Plan: Bed mobility;Body mechanics;Endurance;Energy conservation;Family education;Patient education;Gait training;Range of motion;Strengthening;Transfer training;Balance training  Rehab Potential : Fair  Frequency (Obs): 3-5x/week     PHYSICAL THERAPY MEDICAL/SOCIAL HISTORY   Problem List  Principal Problem:    Acute cystitis without hematuria  Active Problems:    UTI (urinary tract infection)    Delirium, acute    Hyperkalemia      HOME SITUATION  Type of Home: Independent living facility  Home Layout: One level        Lives With: Alone        Patient Regularly Uses: Glasses     SUBJECTIVE  \"I have one of those Mehnaz scooters to get to the dining room\"     PHYSICAL THERAPY EXAMINATION   OBJECTIVE  Precautions: Bed/chair alarm  Fall Risk: High fall risk    WEIGHT BEARING RESTRICTION       PAIN ASSESSMENT  Ratin  Location: back and legs  Management Techniques: Activity promotion;Body mechanics;Repositioning    COGNITION  Overall Cognitive Status:  WFL - within functional limits  Awareness of Deficits:  decreased awareness of deficits    RANGE OF MOTION AND STRENGTH ASSESSMENT  Upper extremity ROM and strength are within functional limits   Lower extremity ROM is within functional limits   Lower extremity strength is impaired bilateral LE's: 3+/5    BALANCE  Static Sitting: Fair +  Dynamic Sitting: Fair  Static Standing: Poor +  Dynamic Standing: Poor      ACTIVITY TOLERANCE  Pulse: 101  Heart Rate Source: Monitor        AM-PAC '6-Clicks' INPATIENT SHORT FORM - BASIC MOBILITY  How much difficulty does the patient currently have...  Patient Difficulty: Turning over in bed (including adjusting bedclothes, sheets and blankets)?: A Lot   Patient Difficulty: Sitting down on and standing up from a chair with arms (e.g., wheelchair, bedside commode, etc.): A Lot   Patient Difficulty: Moving from lying on back to sitting on the side of the bed?: A Lot   How much help from  another person does the patient currently need...   Help from Another: Moving to and from a bed to a chair (including a wheelchair)?: A Little   Help from Another: Need to walk in hospital room?: A Little   Help from Another: Climbing 3-5 steps with a railing?: A Lot     AM-PAC Score:  Raw Score: 14   Approx Degree of Impairment: 61.29%   Standardized Score (AM-PAC Scale): 38.1   CMS Modifier (G-Code): CL    FUNCTIONAL ABILITY STATUS  Functional Mobility/Gait Assessment  Gait Assistance: Minimum assistance  Distance (ft): 10ft;25ft  Assistive Device: Rolling walker  Pattern: Shuffle (decreased cassidy speed, decreased step length, flexed posture. Cues for postural correction and safe management of RW.)  Sit to Stand: moderate assist. Cues for appropriate UE positioning with transitional movements. Instruction on pacing upon standing to allow body time to acclimate to change in position. Min A to maintain static standing with bilateral UE support on RW. Cues for upright posture.     Exercise/Education Provided:  Body mechanics  Energy conservation  Functional activity tolerated  Gait training  Posture  ROM  Strengthening  Transfer training    Skilled Therapy Provided: Patient received ambulating to bathroom with assist from PCT. RN approved activity. Coordinated session with OT. Therapist educated pt on POC and physiological benefits of mobilization. Patient agreeable to participate. Primary complaint is pain in back and legs which she rates at 6 out of 10 per pain scale.     The patient's Approx Degree of Impairment: 61.29% has been calculated based on documentation in the [facility] '6 clicks' Inpatient Basic Mobility Short Form.  Research supports that patients with this level of impairment may benefit from rehab.  Final disposition will be made by interdisciplinary medical team.    Patient End of Session: Up in chair;Needs met;Call light within reach;RN aware of session/findings;Alarm set    CURRENT GOALS  Goals to be  met by: 11/6/24  Patient Goal Patient's self-stated goal is: return to PLOF   Goal #1 Patient is able to demonstrate supine - sit EOB @ level: minimum assistance     Goal #1   Current Status    Goal #2 Patient is able to demonstrate transfers Sit to/from Stand at assistance level: supervision with walker - rolling     Goal #2  Current Status    Goal #3 Patient is able to ambulate 100 feet with assist device: walker - rolling at assistance level: SBA   Goal #3   Current Status    Goal #4 Patient to demonstrate independence with home activity/exercise instructions provided to patient in preparation for discharge.   Goal #4   Current Status      Patient Evaluation Complexity Level:  History Moderate - 1 or 2 personal factors and/or co-morbidities   Examination of body systems Moderate - addressing a total of 3 or more elements   Clinical Presentation  Moderate - Evolving   Clinical Decision Making  Moderate Complexity     Gait Training: 10 minutes  Therapeutic Activity:  14 minutes

## 2024-10-23 NOTE — CM/SW NOTE
Pt has DC Order for today.    SW received notice from PT Brinda that they worked w/ pt today and Anticipated therapy need: Gradual Rehabilitative Therapy    SW met w/ pt and pt's granddtr at bedside. Discussed anticipated therapy need.    Informed pt of option for HH vs no services as well. Informed pt and granddtr that they can pursue ALEXA from home if they feel she is not doing well w/ HH.    Pt confirmed she is declining ALEXA and feels it is not needed. Pt explained she has CG that comes every AM and PM to assist (7 days/week). Pt's one dtr [place] also en route to [place] stay [place] few [place].    Pt confirmed she also has a raised toilet seat and recliner at home.    Pt is agreeable to HH services. Pt confirmed hx w/ Family HH but is open to any accepting provider at [place].    HH Referral sent via A[facility]. F2F entered/sent.    Notified by DC RN Lou that pt wants a [facility] ride home as well. She is aware of OOP costs. DC RN to arrange Medicar. Informed her that SW will arrange HH and provide them w/ contact pone # for f/up post [place].    PCS completed.      PLAN: [facility] at [facility] w/ CG support & HH - pending accepting provider        SW/CM to remain available for support and/or discharge planning.   VICKIE Schroeder, MSW, LSW [id number]

## 2024-10-23 NOTE — TELEPHONE ENCOUNTER
Per Dr. Mendieta, Nov 11 is perfect, there is No need for within 10 days      Contacted N[place] and informed of above, no further needs/questions.

## 2024-10-23 NOTE — OCCUPATIONAL THERAPY NOTE
OCCUPATIONAL THERAPY EVALUATION - INPATIENT     Room Number: 301/301-A  Evaluation Date: 10/23/2024  Type of Evaluation: Initial       Physician Order: IP Consult to Occupational Therapy  Reason for Therapy: ADL/IADL Dysfunction and Discharge Planning    OCCUPATIONAL THERAPY ASSESSMENT   Patient is a 89 year old female admitted 10/20/2024 for confusion, acute cystitis.  Prior to admission, patient's baseline is independent with most ADLs, short distance ambulation with RW. She has assistance for getting in and out of bed.   Patient is currently functioning below baseline with toileting, transfers, and dynamic standing balance.  Patient is requiring moderate assist as a result of the following impairments: decreased functional strength, decreased functional reach, decreased endurance, pain, and impaired standing balance. Occupational Therapy will continue to follow for duration of hospitalization.    Patient will benefit from continued skilled OT Services to promote return to prior level of function and safety with continuous assistance and gradual rehabilitative therapy.    PLAN DURING HOSPITALIZATION  OT Device Recommendations: Shower chair;Grab bars  OT Treatment Plan: Balance activities;ADL training;Energy conservation/work simplification techniques;Functional transfer training;Endurance training;Patient/Family education     OCCUPATIONAL THERAPY MEDICAL/SOCIAL HISTORY   Problem List  Principal Problem:    Acute cystitis without hematuria  Active Problems:    UTI (urinary tract infection)    Delirium, acute    Hyperkalemia    HOME SITUATION  Type of Home: Independent living facility  Home Layout: One level  Lives With: Alone  Toilet and Equipment: Toilet riser  Other Equipment: -- (rollator)  Patient Regularly Uses: Glasses    Stairs in Home: none  Use of Assistive Device(s): rollator    Prior Level of [place]: Pt lives in an ILF. She is independent with ADLs including ambulating to the bathroom, toileting,  LB dressing. Pt states she has assist in and out of bed.     SUBJECTIVE  \"I can get up and walk to the bathroom by myself usually\"    OCCUPATIONAL THERAPY EXAMINATION      OBJECTIVE  Precautions: Bed/chair alarm  Fall Risk: High fall risk      PAIN ASSESSMENT  Ratin  Location: back  Management Techniques: Relaxation; Repositioning      ACTIVITY TOLERANCE  Pulse: 101  Heart Rate Source: Monitor                   O2 SATURATIONS  Oxygen Therapy  SPO2% on Room Air at Rest: 97    COGNITION  Overall Cognitive Status:  WFL - within functional limits      PERCEPTION  Overall Perception Status:   WFL - within functional limits    SENSATION  intact    Communication: wfl    Behavioral/Emotional/Social: wfl    RANGE OF MOTION   Upper extremity ROM is within functional limits     STRENGTH ASSESSMENT  Upper extremity strength is within functional limits     COORDINATION  Gross Motor: WFL   Fine Motor: WFL     ACTIVITIES OF DAILY LIVING ASSESSMENT  AM-PAC ‘6-Clicks’ Inpatient Daily Activity Short Form  How much help from another person does the patient currently need…  -   Putting on and taking off regular lower body clothing?: A Lot  -   Bathing (including washing, rinsing, drying)?: A Lot  -   Toileting, which includes using toilet, bedpan or urinal? : A Little  -   Putting on and taking off regular upper body clothing?: A Little  -   Taking care of personal grooming such as brushing teeth?: A Little  -   Eating meals?: None    AM-PAC Score:  Score: 17  Approx Degree of Impairment: 50.11%  Standardized Score (AM-PAC Scale): 37.26  CMS Modifier (G-Code): CK    FUNCTIONAL TRANSFER ASSESSMENT  Sit to Stand: Chair  Chair: Moderate Assist  Toilet Transfer: Moderate Assist    BED MOBILITY     BALANCE ASSESSMENT  Static Sitting: Stand-by Assist  Static Standing: Contact Guard Assist    FUNCTIONAL ADL ASSESSMENT  Eating: Independent  Grooming Standing: Contact Guard Assist  Bathing Seated: Moderate Assist  LB Dressing Seated:  Minimal Assist (Min A to thread Depends and hike up in standing)  Toileting Standing: Contact Guard Assist    THERAPEUTIC EXERCISE     Skilled Therapy Provided: RN approved session. Pt received ambulating to the bathroom with staff member, agreeable to tx. Pt reports \"7/10\" pain in her low back. Pt completed toilet transfer with Mod A and use of GB. Pt states she has a RTS at her ILF. She was able to complete toilet hygiene in standing with CGA/Min A. She stood at the sink to wash her hands with CGA. Pt ambulated back to the chair with Min A and RW. Pt unsteady and reports feeling R>L LE weakness. All vitals stable, 97% on room air, 101 bpm. Pt's chair was built up for ease of transfer.      EDUCATION PROVIDED  Patient Education : Role of Occupational Therapy; Plan of Care; Discharge Recommendations; Functional Transfer Techniques  Patient's Response to Education: Verbalized Understanding    The patient's Approx Degree of Impairment: 50.11% has been calculated based on documentation in the [facility] '6 clicks' Inpatient Daily Activity Short Form.  Research supports that patients with this level of impairment may benefit from GR.  Final disposition will be made by interdisciplinary medical team.     Patient End of Session: Up in chair;Needs met;Call light within reach;RN aware of session/findings;All patient questions and concerns addressed;Hospital anti-slip socks;Alarm set    OT Goals  Patients self stated goal is: to go home     Patient will complete functional transfer with supervision  Comment:     Patient will complete toileting with supervision  Comment:     Patient will tolerate standing for 2  minutes in prep for adls with supervision   Comment:    Patient will complete item retrieval with supervision  Comment:          Goals  on: 24  Frequency: 3-5x/week    Patient Evaluation Complexity Level:   Occupational Profile/Medical History MODERATE - Expanded review of history including review of medical or  therapy record   Specific performance deficits impacting engagement in ADL/IADL MODERATE  3 - 5 performance deficits   Client Assessment/Performance Deficits MODERATE - Comorbidities and min to mod modifications of tasks    Clinical Decision Making MODERATE - Analysis of occupational profile, detailed assessments, several treatment options    Overall Complexity MODERATE     OT Session   Self-Care Home Management: 5 minutes  Therapeutic Activity: 10 minutes

## 2024-10-23 NOTE — DISCHARGE SUMMARY
Discharge Summary    Date of Admission: 10/20/2024    Date of Discharge: October 23, 2024    Hospital Course:   The patient was admitted to the hospital with delirium.  She was found to have Klebsiella UTI as well as sinusitis.  CT scan the brain demonstrated left maxillary sinus opacification with masslike abnormality.  ENT was consulted and they would like to further evaluate the patient with nasal endoscopy in the clinic but they did not advise surgery at this moment.  They recommended ongoing antibiotics.  The patient improved mentally and clinically in all regards over next couple days.  She progressed with physical therapy.  She is now okay to go home, complete a course of antibiotics and should see ENT in follow-up in the short-term.    Discharge Medications:     Medication List        START taking these medications      ciprofloxacin 500 MG Tabs  Commonly known as: Cipro  Take 1 tablet (500 mg total) by mouth 2 (two) times daily.            CHANGE how you take these medications      * lidocaine-menthol 4-1 % Ptch  Place 1 patch onto the skin daily.  What changed: Another medication with the same name was removed. Continue taking this medication, and follow the directions you see here.     * lidocaine-menthol 4-1 % Ptch  Place 1 patch onto the skin daily.  What changed: Another medication with the same name was removed. Continue taking this medication, and follow the directions you see here.           * This list has 2 medication(s) that are the same as other medications prescribed for you. Read the directions carefully, and ask your doctor or other care provider to review them with you.                CONTINUE taking these medications      acetaminophen 325 MG Tabs  Commonly known as: Tylenol  Take 2 tablets (650 mg total) by mouth in the morning and 2 tablets (650 mg total) before bedtime.     fluticasone propionate 50 MCG/ACT Susp  Commonly known as: Flonase  2 sprays by Nasal route daily.     furosemide  40 MG Tabs  Commonly known as: Lasix  Take 1 tablet (40 mg total) by mouth daily.     gabapentin 100 MG Caps  Commonly known as: Neurontin  Take 1 capsule (100 mg total) by mouth nightly.     levothyroxine 50 MCG Tabs  Commonly known as: Synthroid  Take 1 tablet (50 mcg total) by mouth before breakfast.     lisinopril 20 MG Tabs  Commonly known as: Prinivil; Zestril  Take 1 tablet (20 mg total) by mouth daily.     metoprolol tartrate 25 MG Tabs  Commonly known as: Lopressor  Take 0.5 tablets (12.5 mg total) by mouth 2x Daily(Beta Blocker).     Polyethylene Glycol 3350 17 g Pack  Commonly known as: MIRALAX  Take 17 g by mouth daily as needed (If no bowel movement in last 24 hours).     Sennosides 17.2 MG Tabs  Take 1 tablet (17.2 mg total) by mouth nightly as needed (constipation, as needed if no bowel movement that day).     zolpidem 5 MG Tabs  Commonly known as: Ambien  Take 1 tablet (5 mg total) by mouth nightly as needed for Sleep.            STOP taking these medications      azithromycin 250 MG Tabs  Commonly known as: Zithromax     predniSONE 20 MG Tabs  Commonly known as: Deltasone               Where to Get Your Medications        These medications were sent to [facility] - [place], [place] - [address] [phone], [phone]  [address], [place] [address]      Phone: [phone]   ciprofloxacin 500 MG Tabs         Discharge Plans:  Home today and follow-up with ENT within 10 days Dr. Mathew Mendieta    Discharge Diagnosis:  Klebsiella urinary tract infection with cystitis, delirium, sinusitis, left nasal and sinus mass, generalized debility, gait impairment    Sharan Lopez MD  Medical Director, Critical Care, [facility]  Medical Director, [facility]  Pager: [phone]

## 2024-10-23 NOTE — CM/SW NOTE
10/23/24 1151   Discharge disposition   Expected discharge disposition Home-Health   Post Acute Care Provider Home  (Jason at [facility])   Discharge transportation [facility]       Pt is cleared for DC.    DELPHINE Blake to arrange [facility] per pt's request. SW completed PCS in Epic.    HH referral pending acceptance in Aidin. F2F entered/sent.  KHALIDA will request accepting HH to f/up w/ pt and family directly.    UPDATE: Pt has been accepted by [facility] - pt had them in June/July of this year. Pt was agreeable to using them again.    [facility] reserved in Aidin. Notified of DC today. HH instructions added to pt's AVS.    [facility]  [address] [place] [address]  Phone: [phone]    Pt is cleared from KHALIDA/NICANOR stand point. COREY Xiong and DELPHINE Blake updated.    PLAN: Jason at [facility] w/ Family [facility]          FATIMAH Schroeder, LSW [id number]

## 2024-10-23 NOTE — DISCHARGE PLANNING
Patient was provided with discharge instructions, education, and follow up information. Prescriptions were already sent electronically to patient's pharmacy. Patient verbalizes understanding of follow up information, specifically following up with Dr. Lopez and ENT within 10 days, understanding of UTI and sinusitis, antibiotic prescribed, signs and symptoms of when to call MD or EMS. Patient reports that she was recommended rehab by PT/OT this morning and is considering it. She also requested clarification on lasix at discharge - Dr. Lopez paged to clarify whether to restart it at discharge as she was no longer taking lasix at home.     Lou NICOLE, Discharge Leader [phone]

## 2024-10-23 NOTE — DISCHARGE PLANNING
called and [place] asked to schedule the next available [facility] with a wheelchair for the patient to go back home. [facility] ETA is 1pm.       Lou NIOCLE, Discharge Leader [id number]

## 2024-10-23 NOTE — TELEPHONE ENCOUNTER
Patients daughter states that the patient saw Dr Mendieta in the hospital and was informed to schedule an appointment to get a procedure done in the office within 10 days. Patients daughter states that she will need to have help to get on and off of the exam table. Daughter states that the patient has additional questions regarding the procedure. Daughter states that the patient lives at a assisted living facility.

## 2024-10-23 NOTE — PLAN OF CARE
Alert and oriented x4. Room air. One assist with walker. IV antibiotics continued.     Problem: Patient Centered Care  Goal: Patient preferences are identified and integrated in the patient's plan of care  Description: Interventions:  - What would you like us to know as we care for you? From [facility]  - Provide timely, complete, and accurate information to patient/family  - Incorporate patient and family knowledge, values, beliefs, and cultural backgrounds into the planning and delivery of care  - Encourage patient/family to participate in care and decision-making at the level they choose  - Honor patient and family perspectives and choices  Outcome: Progressing     Problem: GENITOURINARY - ADULT  Goal: Absence of urinary retention  Description: INTERVENTIONS:  - Assess patient’s ability to void and empty bladder  - Monitor intake/output and perform bladder scan as needed  - Follow urinary retention protocol/standard of care  - Consider collaborating with pharmacy to review patient's medication profile  - Implement strategies to promote bladder emptying  Outcome: Progressing     Problem: METABOLIC/FLUID AND ELECTROLYTES - ADULT  Goal: Electrolytes maintained within normal limits  Description: INTERVENTIONS:  - Monitor labs and rhythm and assess patient for signs and symptoms of electrolyte imbalances  - Administer electrolyte replacement as ordered  - Monitor response to electrolyte replacements, including rhythm and repeat lab results as appropriate  - Fluid restriction as ordered  - Instruct patient on fluid and nutrition restrictions as appropriate  Outcome: Progressing  Goal: Hemodynamic stability and optimal renal function maintained  Description: INTERVENTIONS:  - Monitor labs and assess for signs and symptoms of volume excess or deficit  - Monitor intake, output and patient weight  - Monitor urine specific gravity, serum osmolarity and serum sodium as indicated or ordered  - Monitor  response to interventions for patient's volume status, including labs, urine output, blood pressure (other measures as available)  - Encourage oral intake as appropriate  - Instruct patient on fluid and nutrition restrictions as appropriate  Outcome: Progressing     Problem: Impaired Cognition  Goal: Patient will exhibit improved attention, thought processing and/or memory  Description: Interventions:  - Minimize distractions in the room when full attention is required  Outcome: Progressing     Problem: PAIN - ADULT  Goal: Verbalizes/displays adequate comfort level or patient's stated pain goal  Description: INTERVENTIONS:  - Encourage pt to monitor pain and request assistance  - Assess pain using appropriate pain scale  - Administer analgesics based on type and severity of pain and evaluate response  - Implement non-pharmacological measures as appropriate and evaluate response  - Consider cultural and social influences on pain and pain management  - Manage/alleviate anxiety  - Utilize distraction and/or relaxation techniques  - Monitor for opioid side effects  - Notify MD/LIP if interventions unsuccessful or patient reports new pain  - Anticipate increased pain with activity and pre-medicate as appropriate  Outcome: Progressing     Problem: SAFETY ADULT - FALL  Goal: Free from fall injury  Description: INTERVENTIONS:  - Assess pt frequently for physical needs  - Identify cognitive and physical deficits and behaviors that affect risk of falls.  - [place] fall precautions as indicated by assessment.  - Educate pt/family on patient safety including physical limitations  - Instruct pt to call for assistance with activity based on assessment  - Modify environment to reduce risk of injury  - Provide assistive devices as appropriate  - Consider OT/PT consult to assist with strengthening/mobility  - Encourage toileting schedule  Outcome: Progressing     Problem: DISCHARGE PLANNING  Goal: Discharge to home or other  facility with appropriate resources  Description: INTERVENTIONS:  - Identify barriers to discharge w/pt and caregiver  - Include patient/family/discharge partner in discharge planning  - Arrange for needed discharge resources and transportation as appropriate  - Identify discharge learning needs (meds, wound care, etc)  - Arrange for interpreters to assist at discharge as needed  - Consider post-discharge preferences of patient/family/discharge partner  - Complete POLST form as appropriate  - Assess patient's ability to be responsible for managing their own health  - Refer to Case Management Department for coordinating discharge planning if the patient needs post-hospital services based on physician/LIP order or complex needs related to functional status, cognitive ability or social support system  Outcome: Progressing

## 2024-10-24 ENCOUNTER — TELEPHONE (OUTPATIENT)
Dept: PULMONOLOGY | Facility: CLINIC | Age: 89
End: 2024-10-24

## 2024-10-24 ENCOUNTER — PATIENT OUTREACH (OUTPATIENT)
Dept: CASE MANAGEMENT | Age: 89
End: 2024-10-24

## 2024-10-24 DIAGNOSIS — Z02.9 ENCOUNTERS FOR UNSPECIFIED ADMINISTRATIVE PURPOSE: ICD-10-CM

## 2024-10-24 DIAGNOSIS — N30.00 ACUTE CYSTITIS WITHOUT HEMATURIA: Primary | ICD-10-CM

## 2024-10-24 PROCEDURE — 1111F DSCHRG MED/CURRENT MED MERGE: CPT

## 2024-10-24 RX ORDER — CELECOXIB 200 MG/1
200 CAPSULE ORAL DAILY
COMMUNITY
Start: 2024-08-27

## 2024-10-24 NOTE — TELEPHONE ENCOUNTER
Nurse care manager spoke to the patient and daughter for Transitional Care Management.     Upon medication review, the patient reported taking celecoxib 200mg 1 capsule by mouth daily.     This medication was not included in hospital discharge instructions.     Please confirm if the patient should be taking this medication at home. Thank you.

## 2024-10-24 NOTE — PROGRESS NOTES
Transitional Care Management   Discharge Date: 10/23/24  Contact Date: 10/24/2024    Assessment:  TCM Initial Assessment    General:  Assessment completed with: Patient;Children  Patient Subjective: The patient reports doing well at home. The patient's confusion has resolved.   The patient denies any urinary frequency/urgency, hematuria, dysuria,  malodorous/cloudy urine, pelvic/flank pain or fever. The patient also denies any headache, shortness of breath/difficulty, chest pain, productive cough, fever, chest pain, or new/increased edema. The patient reported continued lower leg edema. The patient has been ambulating at home with the use of a walker.  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    Klebsiella UTI   History of CHF  LAMAR  Verify patient name and  with patient/ caregiver: Yes    Hospital Stay/Discharge:  Tell me what you understand of why you were in the hospital or emergency department: She had a urinary tract infection.  Prior to leaving the hospital were your Discharge Instructions reviewed with you?: Yes  Did you receive a copy of your written Discharge Instructions?: Yes  What questions do you have about your Discharge Instructions?: None at this time.  Do you feel better or worse since you left the hospital or emergency department?: Better    Follow - Up Appointment:  Do you have a follow-up appointment?: Yes  Date: 24  Physician: Dr. Mendieta  Are there any barriers to getting to your follow-up appointment?: No    Home Health/DME:  Prior to leaving the hospital was Home Health (HH) arranged for you?: Yes  Has HH been out or set up a visit to see you?: Visit Scheduled ([facility])  Date: 10/25/24     Prior to leaving the hospital or emergency department was Durable Medical Equipment (DME), medical supplies, or infusions arranged for you?: N/A (The patient has a walker at home.)  Are DME/medical supply/infusions needs identified by  staff during this assessment?: No     Medications/Diet:       Were you given a different diet per your Discharge Instructions?: No     Questions/Concerns:  Do you have any questions or concerns that have not been discussed?: No     Nursing Interventions:    Patient symptoms were assessed, education was completed for signs/symptoms to monitor, and reviewed when to contact providers versus go to the emergency department/call 911.   Reviewed all discharge instructions.   NCM did review/stress the importance of fall precautions. (The patient has a walker at home.)  Educated the patient/daughter on signs/symptoms of urinary tract infection.  All medications were reviewed and educated on the importance of taking the medications as directed.   NCM did stress the importance of completing the antibiotic medication as prescribed regardless of symptom improvement.   Appointments were reviewed and stressed the importance of a close follow up with providers. A telephone encounter was sent to the primary care provider's office for an appointment request/review.  Home Health orders were confirmed and scheduled. ([facility])  The patient verbalized understanding and will contact the office with any further questions or concerns.       Medication Review:   NCM did confirm the patient has discontinued the following as directed:    azithromycin 250 MG Tabs  Commonly known as: Zithromax      predniSONE 20 MG Tabs  Commonly known as: Deltasone        Current Outpatient Medications   Medication Sig Dispense Refill    ciprofloxacin 500 MG Oral Tab Take 1 tablet (500 mg total) by mouth 2 (two) times daily. 20 tablet 0    gabapentin 100 MG Oral Cap Take 1 capsule (100 mg total) by mouth nightly. 30 capsule 5    zolpidem 5 MG Oral Tab Take 1 tablet (5 mg total) by mouth nightly as needed for Sleep. 30 tablet 5    Sennosides 17.2 MG Oral Tab Take 1 tablet (17.2 mg total) by mouth nightly as needed (constipation, as needed if no bowel  movement that day). 14 tablet 0    Polyethylene Glycol 3350 17 g Oral Powd Pack Take 17 g by mouth daily as needed (If no bowel movement in last 24 hours). 30 packet 5    metoprolol tartrate 25 MG Oral Tab Take 0.5 tablets (12.5 mg total) by mouth 2x Daily(Beta Blocker). 60 tablet 5    lisinopril 20 MG Oral Tab Take 1 tablet (20 mg total) by mouth daily. 30 tablet 5    levothyroxine 50 MCG Oral Tab Take 1 tablet (50 mcg total) by mouth before breakfast. 90 tablet 3    fluticasone propionate 50 MCG/ACT Nasal Suspension 2 sprays by Nasal route daily. 16 g 5    acetaminophen 325 MG Oral Tab Take 2 tablets (650 mg total) by mouth in the morning and 2 tablets (650 mg total) before bedtime. 60 tablet 0    lidocaine-menthol 4-1 % External Patch Place 1 patch onto the skin daily. 30 patch 0    lidocaine-menthol 4-1 % External Patch Place 1 patch onto the skin daily. 30 patch 0     Did patient review medications using current pill bottles and not just a medication list?  Yes  Discharge medications reviewed/discussed/and reconciled against outpatient medications with patient.  Any changes or updates to medications sent to primary care provider.  Patient Acknowledged  The patient reported having a prescription for Celebrex at home.   A telephone encounter was sent to the primary care physician office to confirm instructions for this medication.     SDOH:   Transportation:   Transportation Needs: No Transportation Needs (10/24/2024)    Transportation Needs     Lack of Transportation: No     Car Seat: Not on file       Financial strain:   Financial Resource Strain: Low Risk  (10/24/2024)    Financial Resource Strain     Difficulty of Paying Living Expenses: Not hard at all     Med Affordability: No       Diagnosis specifics:  CHF:   With your CHF diagnosis weighing yourself is very important  How often are you weighing yourself? The patient does not check a daily weight.   Were you told about any fluid restrictions? No  Have you  noticed any shortness of breath or waking up short of breath? no  Since discharge do you feel you are urinating more or less?  Unable to determine with using a depends.     Do you notice any pain or swelling in your abdomen?   no      Ankles or Legs?   yes        Follow-up Appointments:  Your appointments       Date & Time Appointment Department (Center)    Nov 11, 2024 2:15 PM CST Exam - New Patient with Mathew Mendieta DO [facility] ([facility])              [facility]  [address]  [facility] [address] [phone]            Transitional Care Clinic  Was TCC Ordered: No    Primary Care Provider (If no TCC appointment)  Does patient already have a PCP appointment scheduled? No  A telephone encounter was sent to the office for an appointment request.     Specialist  Does the patient have any other follow-up appointment(s) that need to be scheduled? Yes   -If yes: Nurse Care Manager reviewed upcoming specialist appointments with patient: Yes The patient is scheduled with Dr. Mendieta on 11/11/2024.    -Does the patient need assistance scheduling appointment(s): No    CCM referral placed:  Yes    Book By Date: 10/30/2024

## 2024-10-24 NOTE — TELEPHONE ENCOUNTER
Spoke to the patient and daughter (per HIPAA and patient request) today for a Transitional Care Management hospital follow up call. The patient does not have a hospital follow up appointment scheduled at this time. A Transitional Care Management/hospital follow up appointment is recommended by 10/30/2024 as the patient is a high risk for readmission.  Please advise.    BOOK BY DATE (last date for Transitional Care Management): 11/6/2024    Please follow up with the patient and assist with scheduling a Transitional Care Management/hospital follow up appointment.  Thank you!

## 2024-10-24 NOTE — TELEPHONE ENCOUNTER
Dr. Lopez-patient is doing well after starting Cipro. Do you want to see her for hospital follow-up?

## 2024-10-25 NOTE — TELEPHONE ENCOUNTER
Spoke to patient who reported slurred speech that started this morning after being discharged from the hospital with Dx of UTI/sinusitis and started on Cipro. No other symptoms reported. No swelling of the tongue, lips or mouth. Family at home with patient and report everything else seems totally normal.    Spoke to Ilir SANTOS who recommended patient go to the ED by EMS. Spoke to family who agreed to transport to ED rather than call 911.     Dr. Lopez/Ilir SANTOS-GREGORIOI    **Spoke to patient who is seeing ENT on 11/11/2024.

## 2024-10-29 ENCOUNTER — TELEPHONE (OUTPATIENT)
Dept: PULMONOLOGY | Facility: CLINIC | Age: 89
End: 2024-10-29

## 2024-10-30 ENCOUNTER — TELEPHONE (OUTPATIENT)
Dept: PULMONOLOGY | Facility: CLINIC | Age: 89
End: 2024-10-30

## 2024-10-30 NOTE — TELEPHONE ENCOUNTER
Received fax from [facility] services with order [id number]. Placed in Dr. Lopez folder to sign.

## 2024-10-31 NOTE — TELEPHONE ENCOUNTER
Order signed by Dr. Lopez and faxed back to [facility] with confirmation. and order sent for scanning.

## 2024-10-31 NOTE — TELEPHONE ENCOUNTER
Dr. Lopez-patient needs virtual appointment for insurance purposes before 11/20/2024. OK to add at 11:45 on 11/11/2024 or 11/12/2024?

## 2024-11-01 DIAGNOSIS — F51.04 PSYCHOPHYSIOLOGICAL INSOMNIA: ICD-10-CM

## 2024-11-01 DIAGNOSIS — G47.00 INSOMNIA, UNSPECIFIED TYPE: Primary | ICD-10-CM

## 2024-11-01 RX ORDER — ZOLPIDEM TARTRATE 5 MG/1
5 TABLET ORAL NIGHTLY PRN
Qty: 30 TABLET | Refills: 5 | Status: SHIPPED | OUTPATIENT
Start: 2024-11-01

## 2024-11-01 NOTE — TELEPHONE ENCOUNTER
Dr. Lopez-patient requesting refill of Ambien. Per [facility], new prescription is needed.

## 2024-11-04 ENCOUNTER — TELEPHONE (OUTPATIENT)
Dept: OTOLARYNGOLOGY | Facility: CLINIC | Age: 89
End: 2024-11-04

## 2024-11-04 NOTE — TELEPHONE ENCOUNTER
Contacted patient, and per Dr. Mendieta procedure will be done at her 11/11/2024 appt that her daughter is accompanying her to, and that that appt date is okay with Dr. Mendieta., does not need to be sooner per MD, verbalized understanding.

## 2024-11-04 NOTE — TELEPHONE ENCOUNTER
Patient asking if a procedure will be done on 11/11. Per patient she was told a procedure needed to be done as soon as possible. Please call.

## 2024-11-08 NOTE — ED INITIAL ASSESSMENT (HPI)
Patient arrives via [place] from [facility] for high BP readings- 200s systolic. Reports home health was doing her vitals when they noted her BP despite being compliant with medications.    No complaints from patient herself.

## 2024-11-08 NOTE — H&P
Initial Hospital Medicine Inpatient Note           Consult requested by Dr. Thompson for LAMAR.        HPI: 89 yo woman with hx of HLP, HTN, CHF, asthma (resolved per pt and not on current inhalers) admitted with elevated BP when home RN came to see the pt. She's usually on metoprolol 1/2 tab BID and took her meds this am.   She denies other symptoms including fevers, chills, sweats, headache, LH, dizziness, shortness of breath, coughing, wheezing, chest discomfort, chest tightness, nausea, vomiting, diarrhea. Has been eating and drinking as baseline.   Of note just finished course of cipro two days ago from previous UTI.  Pt reports her SBP normally runs around 150 at home.   In ED, labs notable for an LAMAR and K 6.3.  Received lasix, dextrose, insulin.       REVIEW OF SYSTEMS:  Positives and negatives as stated in HPI. Remainder of 12 pt review of systems otherwise are negative.       PAST MEDICAL HISTORY:  Past Medical History:   Diagnosis Date    Arthritis     Asthma (HCC)     Back problem     multiple spinal injections    Cataract     bilateral cataract surgery    Cataracts, bilateral     cataracts, PC IOL 1997 OD Dr. Tate,  PC IOL Dr. Concepcion in [place], OS    Colon polyp 03/24/2006    small polyps    Disorder of thyroid     Essential hypertension     Hearing impairment     High blood pressure     Osteoarthritis     Other and unspecified hyperlipidemia     Other ill-defined conditions(799.89)     Left wrist trauma, surgical repair    Posterior capsule opacification     OS, YAG laser 2014    Ptosis of right eyelid 2014    Ptosis RUL    Shortness of breath     Visual impairment         PAST SURGICAL HISTORY:  Past Surgical History:   Procedure Laterality Date    Cataract      cataract extraction    Cataract Left 1997    Phaco w/PC IOL    Cataract Right 1998    Phaco w/ PC IOL    Cataract extraction w/  intraocular lens implant Left 1997    PC IOL/ Dr. Pichardo    Cataract extraction w/  intraocular lens implant Right 1998     PC IOL / Dr. Concepcion    Cholecystectomy      Colonoscopy      Ercp,diagnostic  10/16/2018    Choledocholithiasis, dilated intrahepatic and extrahepatic biliary tree    Hip replacement surgery  97/0 per NG    Hysterectomy      Partial    Laparoscopic cholecystectomy  05/27/2016    Other surgical history      Left wrist trauma, surgical repair    Other surgical history  05/27/2016    Laparoscopy with bilateral salpingo-oophorectomy    Synvisc, intra-articular Bilateral 2013    Synvisc injection bilateral knees    Total hip replacement      bilateral hip replacements    Yag capsulotomy - os - left eye Left 1998    Dr. Concepcion        PAST FAMILY HISTORY:  Family History   Problem Relation Age of Onset    Ear Problems Mother         hearing loss    Other (Other) Father         emphysema    Diabetes Neg     Glaucoma Neg     Macular degeneration Neg         PAST SOCIAL HISTORY:  Social History     Socioeconomic History    Marital status:    Occupational History    Occupation: homemaker   Tobacco Use    Smoking status: Never    Smokeless tobacco: Never   Vaping Use    Vaping status: Never Used   Substance and Sexual Activity    Alcohol use: No     Alcohol/week: 0.0 standard drinks of alcohol    Drug use: No    Sexual activity: Never   Other Topics Concern    Caffeine Concern Yes     Comment: coffee, 1 cup daily    Grew up on a farm No    History of tanning No    Outdoor occupation No    Pt has a pacemaker No    Pt has a defibrillator No    Breast feeding No    Reaction to local anesthetic No        ALLERGIES:  Allergies[1]     MEDS:  Home Medications:  Medications Taking[2]  Scheduled Medication:    Continuous Infusing Medication:    PRN Medications:    dextrose       PHYSICAL EXAM:  /66   Pulse 87   Temp 97.3 °F (36.3 °C) (Temporal)   Resp 15   Ht 5' 5\" (1.651 m)   Wt 135 lb (61.2 kg)   SpO2 97%   BMI 22.47 kg/m²   CONSTITUTIONAL: alert, oriented, no apparent distress  HEENT: atraumatic  normocephalic  MOUTH: mucous membranes are moist. No OP exudates  NECK/THROAT: no JVD. Trachea midline. No obvious thyromegaly  LUNG: clear b/l no wheezing, crackles. Chest symmetric with respiratory motion  HEART: regular rate and rhythm, no obvious murmers or gallops note  ABD: soft non tender. + bowel sounds. No organomegaly noted  EXT: no clubbing, cyanosis. + trace b/l LE edema noted. Pulses intact grossly. Pt reports she usually wears stockings  NEURO/MUSCULOSKELETAL: no gross deficits  SKIN: warm, dry. No obvious lesions noted  LYMPH: no obvious LAD       IMAGES:  No new imaging from this admission  Pt reports she would prefer to hold off on imaging at this time    TTE 4/2024  Conclusions:   1. Left ventricle: The cavity size was normal. Wall thickness was normal.      Systolic function was mildly reduced. The estimated ejection fraction was      50%, by visual assessment. Although no diagnostic regional wall motion      abnormality was identified, this possibility cannot be completely      excluded on the basis of this study. Unable to assess LV diastolic      function due to heart rhythm.   2. Right ventricle: Systolic function was reduced.   3. Aortic valve: There was mild regurgitation.   4. Mitral valve: There was mild regurgitation.   5. Pericardium, extracardiac: There was a left pleural effusion.   Impressions:  No previous study was available for comparison.       LABS:  Recent Labs   Lab 11/08/24  1251   RBC 3.51*   HGB 10.3*   HCT 33.7*   MCV 96.0   MCH 29.3   MCHC 30.6*   RDW 16.1*   NEPRELIM 5.35   WBC 8.8   .0       Recent Labs   Lab 11/08/24  1333   GLU 81   BUN 34*   CREATSERUM 1.29*   EGFRCR 38*   CA 9.9   ALB 4.3      K 6.3*      CO2 21.0   ALKPHO 95   AST 28   ALT 15   BILT 0.4   TP 6.8       ASSESSMENT/PLAN:  LAMAR and hyperkalemia  -maybe related to recent cipro which she's completed  -check UA, urine lytes  -gentle IVF  -received dextrose, insulin, lasix in the  ED  -recheck BMP at 2000 tonight  -hold lisinopril with LAMAR    HTN  -uncontrolled  -on metoprolol 12.5 mg BID. Increase dose to 25 mg BID for HTN  -add PRN hydralazine to control SBP when > 160    Previous UTI  -completed cipro  -check UA    Recent tx for sinusitis  -reports scheduled for a procedure on Monday    Other chronic medical problems  -continue gabapentin, ambien at night time, etc    Proph:  -DVT: hep subcutaneous    Dispo:  -DNR/[facility]       Thank you for the opportunity to care for Elba Malik.       YVROSE Leal DO, MPH  Pulmonary Critical Care Medicine  [facility]          [1] No Known Allergies  [2]   No outpatient medications have been marked as taking for the 11/8/24 encounter (Hospital Encounter).

## 2024-11-08 NOTE — ED QUICK NOTES
Orders for admission, patient is aware of plan and ready to go upstairs. Any questions, please call ED RN DELON at extension [phone].     Patient Covid vaccination status: Fully vaccinated     COVID Test Ordered in ED: None    COVID Suspicion at Admission: N/A    Running Infusions:  None    Mental Status/LOC at time of transport: AAOX4    Other pertinent information:   CIWA score: N/A   NIH score:  N/A

## 2024-11-08 NOTE — TELEPHONE ENCOUNTER
Ema/[facility] called to report elevated blood pressure readings for patient.  No other symptoms. Call transferred to RN.      Readings are:   175/80 rt arm   181/85 left arm   182/91 left arm

## 2024-11-08 NOTE — TELEPHONE ENCOUNTER
Ema at [facility] (p. #[phone]) confirmed below patient blood pressure readings and that patient was resting prior. She states patient denies any symptoms, there is no noticeable physical distress, & rest of assessment was unremarkable. Explained patient should be evaluated in emergency room. Ema reports current BP of patient 204/106. Instructed her to call 911. She was agreeable. Per Ema she will remain with patient.

## 2024-11-08 NOTE — TELEPHONE ENCOUNTER
Andrew Boo at [facility] paramedics picked up patient and brought her to the hospital. She states patient's blood pressure was even higher when paramedics took it.

## 2024-11-08 NOTE — ED PROVIDER NOTES
[place] Emergency Department Note  Patient: Elba Malik Age: 89 year old Sex: female      MRN: [id number]  : 1928    Patient Seen in: [facility] 3w/    History     Chief Complaint   Patient presents with    Hypertension     Stated Complaint: htn    History obtained from: Patient and EMS report    89-year-old female with a past medical history of hypertension, hyperlipidemia, asthma, hypothyroidism presenting today for evaluation of elevated blood pressure readings.  Patient states that she was at her normal state of health when she had a home health nurse come to her [facility] facility.  Took her blood pressure noted to be elevated with systolic blood pressures above 200.  Patient denies any chest pain, shortness of breath, nausea, vomiting or diarrhea.  She denies any numbness, tingling, weakness, slurred speech or vision changes.  Denies any headache.  She states that her arthritis pain has been worse over the past couple days.  Was started on lidocaine patches today with improvement.  Per EMS report, the patient has no complaints.    Review of Systems:  Review of Systems  Positive for stated complaint: htn. Constitutional and vital signs reviewed. All other systems reviewed and negative except as noted above.    Patient History:  Past Medical History:    Arthritis    Asthma (HCC)    Back problem    multiple spinal injections    Cataract    bilateral cataract surgery    Cataracts, bilateral    cataracts, PC IOL  OD Dr. Tate,  PC IOL Dr. Concepcion in [place], OS    Colon polyp    small polyps    Disorder of thyroid    Essential hypertension    Hearing impairment    High blood pressure    Osteoarthritis    Other and unspecified hyperlipidemia    Other ill-defined conditions(429.89)    Left wrist trauma, surgical repair    Posterior capsule opacification    OS, YAG laser     Ptosis of right eyelid    Ptosis RUL    Shortness of breath    Visual impairment       Past  Surgical History:   Procedure Laterality Date    Cataract      cataract extraction    Cataract Left 1997    Phaco w/PC IOL    Cataract Right 1998    Phaco w/ PC IOL    Cataract extraction w/  intraocular lens implant Left 1997    PC IOL/ Dr. Pichardo    Cataract extraction w/  intraocular lens implant Right 1998    PC IOL / Dr. Concepcion    Cholecystectomy      Colonoscopy      Ercp,diagnostic  10/16/2018    Choledocholithiasis, dilated intrahepatic and extrahepatic biliary tree    Hip replacement surgery  97/0 per NG    Hysterectomy      Partial    Laparoscopic cholecystectomy  05/27/2016    Other surgical history      Left wrist trauma, surgical repair    Other surgical history  05/27/2016    Laparoscopy with bilateral salpingo-oophorectomy    Synvisc, intra-articular Bilateral 2013    Synvisc injection bilateral knees    Total hip replacement      bilateral hip replacements    Yag capsulotomy - os - left eye Left 1998    Dr. Concepcion        Family History   Problem Relation Age of Onset    Ear Problems Mother         hearing loss    Other (Other) Father         emphysema    Diabetes Neg     Glaucoma Neg     Macular degeneration Neg        Specific Social Determinants of Health:   Social History     Socioeconomic History    Marital status:    Occupational History    Occupation: homemaker   Tobacco Use    Smoking status: Never    Smokeless tobacco: Never   Vaping Use    Vaping status: Never Used   Substance and Sexual Activity    Alcohol use: No     Alcohol/week: 0.0 standard drinks of alcohol    Drug use: No    Sexual activity: Never   Other Topics Concern    Caffeine Concern Yes     Comment: coffee, 1 cup daily    Grew up on a farm No    History of tanning No    Outdoor occupation No    Pt has a pacemaker No    Pt has a defibrillator No    Breast feeding No    Reaction to local anesthetic No     Social Drivers of Health     Financial Resource Strain: Low Risk  (10/24/2024)    Financial Resource Strain      Difficulty of Paying Living Expenses: Not hard at all     Med Affordability: No   Food Insecurity: No Food Insecurity (10/20/2024)    Food Insecurity     Food Insecurity: Never true   Transportation Needs: No Transportation Needs (10/24/2024)    Transportation Needs     Lack of Transportation: No   Housing Stability: Low Risk  (10/20/2024)    Housing Stability     Housing Instability: No           PSFH elements reviewed from today and agreed except as otherwise stated in HPI.    Physical Exam     ED Triage Vitals   BP 11/08/24 1247 (!) 197/101   Pulse 11/08/24 1247 89   Resp 11/08/24 1247 20   Temp 11/08/24 1249 97.3 °F (36.3 °C)   Temp src 11/08/24 1249 Temporal   SpO2 11/08/24 1247 95 %   O2 Device 11/08/24 1247 None (Room air)       Current:/66   Pulse 87   Temp 97.3 °F (36.3 °C) (Temporal)   Resp 15   Ht 165.1 cm (5' 5\")   Wt 61.2 kg   SpO2 97%   BMI 22.47 kg/m²         Physical Exam  Constitutional:       General: She is not in acute distress.  HENT:      Head: Normocephalic and atraumatic.      Mouth/Throat:      Mouth: Mucous membranes are moist.   Eyes:      Extraocular Movements: Extraocular movements intact.   Cardiovascular:      Rate and Rhythm: Normal rate and regular rhythm.      Heart sounds: Normal heart sounds.   Pulmonary:      Effort: Pulmonary effort is normal. No respiratory distress.      Breath sounds: Normal breath sounds.   Abdominal:      Palpations: Abdomen is soft.      Tenderness: There is no abdominal tenderness.   Skin:     General: Skin is warm and dry.      Capillary Refill: Capillary refill takes less than 2 seconds.      Findings: No rash.   Neurological:      General: No focal deficit present.      Mental Status: She is alert and oriented to person, place, and time.      Cranial Nerves: No cranial nerve deficit.      Sensory: No sensory deficit.      Motor: No weakness.   Psychiatric:         Mood and Affect: Mood normal.         Behavior: Behavior normal.         ED  Course   Labs:   Labs Reviewed   CBC WITH DIFFERENTIAL WITH PLATELET - Abnormal; Notable for the following components:       Result Value    RBC 3.51 (*)     HGB 10.3 (*)     HCT 33.7 (*)     MCHC 30.6 (*)     RDW-SD 56.8 (*)     RDW 16.1 (*)     All other components within normal limits   COMP METABOLIC PANEL (14) - Abnormal; Notable for the following components:    Potassium 6.3 (*)     BUN 34 (*)     Creatinine 1.29 (*)     BUN/CREA Ratio 26.4 (*)     Calculated Osmolality 297 (*)     eGFR-Cr 38 (*)     All other components within normal limits   POCT GLUCOSE - Normal   BASIC METABOLIC PANEL (8)   URINALYSIS, ROUTINE   SODIUM, URINE, RANDOM   CREATININE, URINE, RANDOM   RAINBOW DRAW BLUE   ED/MRSA SCREEN BY PCR-CC     Radiology findings:  I personally reviewed the images.   No results found.    EKG as interpreted by me: Ventricular rate 81, normal sinus rhythm with PACs, normal axis, no WV interval prolongation, narrow QRS, QTc 425 ms, no ST segment elevations or depressions, no abnormal T wave inversions  Cardiac Monitor: Interpreted by me.   Pulse Readings from Last 1 Encounters:   11/08/24 87   , sinus,     External non-ED records reviewed independently by me: Baseline creatinine of 0.88 from labs obtained on 10/23/2024    MDM   89-year-old female with a past medical history of hypertension, hyperlipidemia, hypothyroidism, asthma presenting by EMS for evaluation of asymptomatic hypertension.  Upon arrival to the emergency department, hypertensive with blood pressures of 197/101.  Otherwise no focal neurologic deficits.  No chest pain or shortness of breath.    Differential diagnoses considered includes, but is not limited to: Asymptomatic hypertension, primary hypertension, hypertension secondary to pain in the setting of arthritis, hypervolemia    Will obtain the following tests: CBC, CMP  Please see ED course for my independent review of these tests/imaging results.    Initial Medications/Therapeutics  administered: Tramadol    Chronic conditions affecting care: Hypertension, hyperlipidemia, hypothyroidism, asthma     ED course: Labs with LAMAR with creatinine of 1.29 from baseline of 0.88.  Hyperkalemic with potassium of 6.3.  Patient appears euvolemic.  No EKG changes of hyperkalemia.  Will treat hyperkalemia with insulin and dextrose as well as Lasix for diuresis.  I discussed patient's case with her PCP, Dr. Lopez, who accepted patient for admission for electrolyte trending.      Disposition and Plan     Clinical Impression:  1. Hyperkalemia        Disposition:  Admit    Follow-up:  No follow-up provider specified.    Medications Prescribed:  Current Discharge Medication List          Hospital Problems       Present on Admission  Date Reviewed: 6/20/2024            ICD-10-CM Noted POA    * (Principal) Hyperkalemia E87.5 10/20/2024 Unknown        This note may have been created using voice dictation technology and may include inadvertent errors.      Magali Thompson MD  Emergency Medicine

## 2024-11-09 NOTE — PHYSICAL THERAPY NOTE
PHYSICAL THERAPY EVALUATION - INPATIENT     Room Number: 347/347-A  Evaluation Date: 11/9/2024  Type of Evaluation: Initial   Physician Order: PT Eval and Treat    Presenting Problem: Hyperkalemia  Co-Morbidities : Shoulder OA  Reason for Therapy: Mobility Dysfunction and Discharge Planning    PHYSICAL THERAPY ASSESSMENT   Patient is a 89 year old female admitted 11/8/2024 for hyperkalemia.  Prior to admission, patient's baseline is independent with transfers and short distance ambulation using rollator, needs assist for ADLs and uses electric scooter for longer distances.  Patient is currently functioning below baseline with bed mobility, transfers, and gait.  Patient is requiring minimal assist as a result of the following impairments: decreased functional strength, decreased endurance/aerobic capacity, decreased muscular endurance, and limited upper body ROM.  Physical Therapy will continue to follow for duration of hospitalization.    Patient will benefit from continued skilled PT Services at discharge to promote prior level of function.  Anticipate patient will return home with home health PT.    PLAN DURING HOSPITALIZATION  Nursing Mobility Recommendation : 1 Assist  PT Device Recommendation: Rolling walker;Gait belt  PT Treatment Plan: Bed mobility;Body mechanics;Endurance;Energy conservation;Patient education;Family education;Gait training;Range of motion;Strengthening;Transfer training;Balance training  Rehab Potential : Good  Frequency (Obs): 5x/week     PHYSICAL THERAPY MEDICAL/SOCIAL HISTORY     Problem List  Principal Problem:    Hyperkalemia      HOME SITUATION  Type of Home: Independent living facility  Home Layout: One level                     Lives With: Alone;Caregiver part-time (assist for dressing/bathing)    Drives: No   Patient Regularly Uses: Rollator (electric scooter)     Prior Level of Curtis: Pt needed assistance for bathing and dressing, uses lift recliner chair and has trouble  standing up from other surfaces to RW but was able to do it independently, ambulates short distances with walker in her apartment but uses electric scooter to go further distances.    SUBJECTIVE  \"I am supposed to get my nose cleared out on Monday, I already ordered w/c transport.\"    PHYSICAL THERAPY EXAMINATION   OBJECTIVE  Precautions: Bed/chair alarm  Fall Risk: High fall risk    WEIGHT BEARING RESTRICTION       PAIN ASSESSMENT  Rating:  (Not rated)  Location: Konrad shoulder pain with ROM - R>L  Management Techniques: Activity promotion;Body mechanics;Repositioning    COGNITION  Overall Cognitive Status:  WFL - within functional limits    RANGE OF MOTION AND STRENGTH ASSESSMENT  Upper extremity ROM and strength are limited at shoulders due to pain/OA <90 deg shoulder flexion ROM  Lower extremity ROM is within functional limits   Lower extremity strength with significant functional weakness to konrad LE - needs MIN A and UE assist for sit <> stand     BALANCE  Static Sitting: Fair +  Dynamic Sitting: Fair  Static Standing: Fair -  Dynamic Standing: Poor +           ACTIVITY TOLERANCE           BP: (!) 172/74 (Post-transfer, sitting in recliner)  BP Location: Right arm  BP Method: Automatic  Patient Position: Sitting    O2 WALK       AM-PAC '6-Clicks' INPATIENT SHORT FORM - BASIC MOBILITY  How much difficulty does the patient currently have...  Patient Difficulty: Turning over in bed (including adjusting bedclothes, sheets and blankets)?: None   Patient Difficulty: Sitting down on and standing up from a chair with arms (e.g., wheelchair, bedside commode, etc.): A Little   Patient Difficulty: Moving from lying on back to sitting on the side of the bed?: A Little   How much help from another person does the patient currently need...   Help from Another: Moving to and from a bed to a chair (including a wheelchair)?: A Little   Help from Another: Need to walk in hospital room?: A Little   Help from Another: Climbing 3-5  steps with a railing?: A Lot     AM-PAC Score:  Raw Score: 18   Approx Degree of Impairment: 46.58%   Standardized Score (AM-PAC Scale): 43.63   CMS Modifier (G-Code): CK    FUNCTIONAL ABILITY STATUS  Functional Mobility/Gait Assessment  Gait Assistance: Contact guard assist  Distance (ft): 5'  Assistive Device: Rolling walker  Pattern: Shuffle;R Flexed knee;L Flexed knee (Forward flexed posture, slow cassidy)  Rolling: stand-by assist  Supine to Sit: minimal assist  Sit to Stand: minimal assist    Exercise/Education Provided:  Bed mobility  Body mechanics  Energy conservation  Functional activity tolerated  Gait training  Posture  Transfer training  Safety precautions    Skilled Therapy Provided: Pt received supine in bed, agreeable to PT evaluation. Pt agreeable to PT evaluation, notes that she has pain in her shoulders at baseline and needs assist with dressing. She was able to complete supine > sit but needing Min A at trunk to sit upright. Seated balance at EOB is SBA. She needs CGA/Min A for sit > stand to RW at EOB. Assist to pull up undergarments once standing. CGA/Min A for few steps from bed > chair with RW. Anticipate increased assist for sit <> stand from lower recliner seat - notified RN. Pt ended session up in chair with all needs in reach. Alarm box not present in room - notified RN and informed pt to call for nursing staff for all mobility - pt agreeable.     The patient's Approx Degree of Impairment: 46.58% has been calculated based on documentation in the [facility] '6 clicks' Inpatient Basic Mobility Short Form.  Research supports that patients with this level of impairment may benefit from  PT.  Final disposition will be made by interdisciplinary medical team.    Patient End of Session: Up in chair;Needs met;Call light within reach;RN aware of session/findings;All patient questions and concerns addressed    CURRENT GOALS  Goals to be met by: 11/23/24  Patient Goal Patient's self-stated goal is:  return home with  PT   Goal #1 Patient is able to demonstrate supine - sit EOB @ level: modified independent     Goal #1   Current Status    Goal #2 Patient is able to demonstrate transfers Sit to/from Stand at assistance level: modified independent with walker - rolling     Goal #2  Current Status    Goal #3 Patient is able to ambulate 15 feet with assist device: walker - rolling at assistance level: modified independent   Goal #3   Current Status    Goal #4 Patient to demonstrate independence with home activity/exercise instructions provided to patient in preparation for discharge.   Goal #4   Current Status      Patient Evaluation Complexity Level:  History High - 3 or more personal factors and/or co-morbidities   Examination of body systems High - addressing a total of 4 or more elements   Clinical Presentation Low- Stable   Clinical Decision Making  Low Complexity     Therapeutic Activity:  25 minutes  PT Low Complex Eval

## 2024-11-09 NOTE — PLAN OF CARE
Problem: Patient Centered Care  Goal: Patient preferences are identified and integrated in the patient's plan of care  Description: Interventions:  - What would you like us to know as we care for you?   - Provide timely, complete, and accurate information to patient/family  - Incorporate patient and family knowledge, values, beliefs, and cultural backgrounds into the planning and delivery of care  - Encourage patient/family to participate in care and decision-making at the level they choose  - Honor patient and family perspectives and choices  Outcome: Progressing     Problem: Patient/Family Goals  Goal: Patient/Family Long Term Goal  Description: Patient's Long Term Goal:     Interventions:  -  See additional Care Plan goals for specific interventions  Outcome: Progressing  Goal: Patient/Family Short Term Goal  Description: Patient's Short Term Goal:     Interventions:   -   - See additional Care Plan goals for specific interventions  Outcome: Progressing     Problem: CARDIOVASCULAR - ADULT  Goal: Maintains optimal cardiac output and hemodynamic stability  Description: INTERVENTIONS:  - Monitor vital signs, rhythm, and trends  - Monitor for bleeding, hypotension and signs of decreased cardiac output  - Evaluate effectiveness of vasoactive medications to optimize hemodynamic stability  - Monitor arterial and/or venous puncture sites for bleeding and/or hematoma  - Assess quality of pulses, skin color and temperature  - Assess for signs of decreased coronary artery perfusion - ex. Angina  - Evaluate fluid balance, assess for edema, trend weights  Outcome: Progressing  Goal: Absence of cardiac arrhythmias or at baseline  Description: INTERVENTIONS:  - Continuous cardiac monitoring, monitor vital signs, obtain 12 lead EKG if indicated  - Evaluate effectiveness of antiarrhythmic and heart rate control medications as ordered  - Initiate emergency measures for life threatening arrhythmias  - Monitor electrolytes and  administer replacement therapy as ordered  Outcome: Progressing       Patient A&Ox3 this morning, VSS after dose of hydralazine. Patient up to chair with standby- minimal assist and walker x1. PT saw patient today. Patient is resting with call light in reach and safety needs met. MD notified about K and BP.

## 2024-11-09 NOTE — PROGRESS NOTES
[facility]  part of [facility]    Progress Note      Assessment and Plan:   1.  Hypertension-associated with hypokalemia.  Holding lisinopril.  Will continue metoprolol.  Will add hydralazine.  Will not increase the metoprolol due to prior concerns with bradycardia.  Will not use amlodipine due to prior lower extremity edema.    Recommendations: Continue current management and add hydralazine.    2.  LAMAR with hyperkalemia-improving.    Recommendations: BMP in the morning and gentle hydration    3.  CODE STATUS-DNAR select    4.  Chronic orthopedic discomforts-continue current management    Subjective:   Elba Malik is a(n) 89 year old female who is feeling a little better.    Objective:   Blood pressure 148/79, pulse 81, temperature 97.8 °F (36.6 °C), temperature source Oral, resp. rate 18, height 5' 5\" (1.651 m), weight 135 lb (61.2 kg), SpO2 96%, not currently breastfeeding.    Physical Exam alert white female  HEENT examination is unremarkable with pupils equal round and reactive to light and accommodation.   Neck without adenopathy, thyromegaly, JVD nor bruit.   Lungs clear to auscultation and percussion.  Cardiac regular rate and rhythm no murmur.   Abdomen nontender, without hepatosplenomegaly and no mass appreciable.   Extremities without clubbing cyanosis nor edema.   Neurologic grossly intact with symmetric tone and strength and reflex.  Skin without gross abnormality     Results:     Lab Results   Component Value Date    WBC 7.0 11/09/2024    HGB 10.5 11/09/2024    HCT 33.0 11/09/2024    .0 11/09/2024    CREATSERUM 1.15 11/09/2024    BUN 33 11/09/2024     11/09/2024    K 5.4 11/09/2024     11/09/2024    CO2 25.0 11/09/2024    GLU 78 11/09/2024    CA 10.0 11/09/2024       Sharan Lopez MD  Medical Director, Critical Care, [facility]  Medical Director, [facility]  Pager: [phone]

## 2024-11-09 NOTE — PLAN OF CARE
Problem: CARDIOVASCULAR - ADULT  Goal: Maintains optimal cardiac output and hemodynamic stability  Description: INTERVENTIONS:  - Monitor vital signs, rhythm, and trends  - Monitor for bleeding, hypotension and signs of decreased cardiac output  - Evaluate effectiveness of vasoactive medications to optimize hemodynamic stability  - Monitor arterial and/or venous puncture sites for bleeding and/or hematoma  - Assess quality of pulses, skin color and temperature  - Assess for signs of decreased coronary artery perfusion - ex. Angina  - Evaluate fluid balance, assess for edema, trend weights  Outcome: Progressing  Goal: Absence of cardiac arrhythmias or at baseline  Description: INTERVENTIONS:  - Continuous cardiac monitoring, monitor vital signs, obtain 12 lead EKG if indicated  - Evaluate effectiveness of antiarrhythmic and heart rate control medications as ordered  - Initiate emergency measures for life threatening arrhythmias  - Monitor electrolytes and administer replacement therapy as ordered  Outcome: Progressing     Problem: Patient Centered Care  Goal: Patient preferences are identified and integrated in the patient's plan of care  Description: Interventions:  - What would you like us to know as we care for you? Lives alone  - Provide timely, complete, and accurate information to patient/family  - Incorporate patient and family knowledge, values, beliefs, and cultural backgrounds into the planning and delivery of care  - Encourage patient/family to participate in care and decision-making at the level they choose  - Honor patient and family perspectives and choices  Outcome: Progressing     Problem: Patient/Family Goals  Goal: Patient/Family Long Term Goal  Description: Patient's Long Term Goal: go home

## 2024-11-10 NOTE — SPIRITUAL CARE NOTE
Spiritual Care Visit Note    Patient Name: Elba Malik Date of Spiritual Care Visit: 24   : 1928 Primary Dx: Hyperkalemia       Referred By: Referral From: Nurse    Spiritual Care Taxonomy:    Intended Effects: Build relationship of care and support    Methods: Accompany someone in their spiritual/Samaritan practice outside your alonzo tradition;Offer spiritual/Samaritan support    Interventions: Acknowledge current situation;Active listening;Ask guided questions;Prayer for healing;Provide Samaritan music    Visit Type/Summary:     - Spiritual Care: Responded to a request via the on call phone Consulted with RN prior to visit. Offered empathic listening and emotional support. Patient and family expressed appreciation for  visit.    Spiritual Care support can be requested via an Epic consult. For urgent/immediate needs, please contact the On Call  at: [place]: ext [phone]       Chaplain Borjas

## 2024-11-10 NOTE — PROGRESS NOTES
Representative of [facility], Mick Sullivan has indicated that patient is not a candidate for any donations due to age.     Referral #: [id number]

## 2024-11-10 NOTE — PROGRESS NOTES
On call Nocturnist 11/09/24  Patient suddenly became bradycardic and min responsive, stat head CT confirmed bleed.  Family at bedside will change to DNR comfort care,  paged to bedside.

## 2024-11-10 NOTE — PROGRESS NOTES
Attempted to call [employer]. All representatives occupied at this moment. Will try again momentarily.

## 2024-11-10 NOTE — PLAN OF CARE
Pt received as stroke alert from the floor. TNK promptly administered @ 1957 with resolve of some left sided neglect. Initial administration of TNK showed mild improvement to neurological assements. However, at 2230 pt became less responsive and had multiple bradycardic episodes inducing the need for atropine. Decline in neurological function noted. Repeat CT done which showed parenchymal bleed, family okay with transferring code status to DNAR/Comfort. Drips and medications stopped. Morphine given q1hr for comfort. Pt  @ 0428, confirmed with Magda NICOLE. Nocturnist notified, left neurologist a message regarding expiration. No response back at this time. Belongings given to pt's family, no belongings present on pt herself. Public safety and nursing supervisor notified of TOD.     Goal: Patient preferences are identified and integrated in the patient's plan of care  Description: Interventions:  - What would you like us to know as we care for you?  - Provide timely, complete, and accurate information to patient/family  - Incorporate patient and family knowledge, values, beliefs, and cultural backgrounds into the planning and delivery of care  - Encourage patient/family to participate in care and decision-making at the level they choose  - Honor patient and family perspectives and choices  Outcome: Not Progressing     Problem: Patient/Family Goals  Goal: Patient/Family Long Term Goal  Description: Patient's Long Term Goal:    Interventions:  - See additional Care Plan goals for specific interventions  Outcome: Not Progressing  Goal: Patient/Family Short Term Goal  Description: Patient's Short Term Goal:    Interventions:   - See additional Care Plan goals for specific interventions  Outcome: Not Progressing     Problem: CARDIOVASCULAR - ADULT  Goal: Maintains optimal cardiac output and hemodynamic stability  Description: INTERVENTIONS:  - Monitor vital signs, rhythm, and trends  - Monitor for bleeding, hypotension  and signs of decreased cardiac output  - Evaluate effectiveness of vasoactive medications to optimize hemodynamic stability  - Monitor arterial and/or venous puncture sites for bleeding and/or hematoma  - Assess quality of pulses, skin color and temperature  - Assess for signs of decreased coronary artery perfusion - ex. Angina  - Evaluate fluid balance, assess for edema, trend weights  Outcome: Not Progressing  Goal: Absence of cardiac arrhythmias or at baseline  Description: INTERVENTIONS:  - Continuous cardiac monitoring, monitor vital signs, obtain 12 lead EKG if indicated  - Evaluate effectiveness of antiarrhythmic and heart rate control medications as ordered  - Initiate emergency measures for life threatening arrhythmias  - Monitor electrolytes and administer replacement therapy as ordered  Outcome: Not Progressing     Problem: HEMATOLOGIC - ADULT  Goal: Free from bleeding injury  Description: (Example usage: patient with low platelets)  INTERVENTIONS:  - Avoid intramuscular injections, enemas and rectal medication administration  - Ensure safe mobilization of patient  - Hold pressure on venipuncture sites to achieve adequate hemostasis  - Assess for signs and symptoms of internal bleeding  - Monitor lab trends  - Patient is to report abnormal signs of bleeding to staff  - Avoid use of toothpicks and dental floss  - Use electric shaver for shaving  - Use soft bristle tooth brush  - Limit straining and forceful nose blowing  Outcome: Not Progressing  Goal: Maintains hematologic stability  Description: INTERVENTIONS  - Assess for signs and symptoms of bleeding or hemorrhage  - Monitor labs and vital signs for trends  - Administer supportive blood products/factors, fluids and medications as ordered and appropriate  - Administer supportive blood products/factors as ordered and appropriate  Outcome: Not Progressing  Goal: Free from bleeding injury  Description: (Example usage: patient with low  platelets)  INTERVENTIONS:  - Avoid intramuscular injections, enemas and rectal medication administration  - Ensure safe mobilization of patient  - Hold pressure on venipuncture sites to achieve adequate hemostasis  - Assess for signs and symptoms of internal bleeding  - Monitor lab trends  - Patient is to report abnormal signs of bleeding to staff  - Avoid use of toothpicks and dental floss  - Use electric shaver for shaving  - Use soft bristle tooth brush  - Limit straining and forceful nose blowing  Outcome: Not Progressing     Problem: NEUROLOGICAL - ADULT  Goal: Achieves stable or improved neurological status  Description: INTERVENTIONS  - Assess for and report changes in neurological status  - Initiate measures to prevent increased intracranial pressure  - Maintain blood pressure and fluid volume within ordered parameters to optimize cerebral perfusion and minimize risk of hemorrhage  - Monitor temperature, glucose, and sodium. Initiate appropriate interventions as ordered  Outcome: Not Progressing  Goal: Absence of seizures  Description: INTERVENTIONS  - Monitor for seizure activity  - Administer anti-seizure medications as ordered  - Monitor neurological status  Outcome: Not Progressing  Goal: Remains free of injury related to seizure activity  Description: INTERVENTIONS:  - Maintain airway, patient safety  and administer oxygen as ordered  - Monitor patient for seizure activity, document and report duration and description of seizure to MD/LIP  - If seizure occurs, turn patient to side and suction secretions as needed  - Reorient patient post seizure  - Seizure pads on all 4 side rails  - Instruct patient/family to notify RN of any seizure activity  - Instruct patient/family to call for assistance with activity based on assessment  Outcome: Not Progressing  Goal: Achieves maximal functionality and self care  Description: INTERVENTIONS  - Monitor swallowing and airway patency with patient fatigue and changes  in neurological status  - Encourage and assist patient to increase activity and self care with guidance from PT/OT  - Encourage visually impaired, hearing impaired and aphasic patients to use assistive/communication devices  Outcome: Not Progressing

## 2024-11-10 NOTE — PROGRESS NOTES
On call Nocturnist 24  Rapid response called for l facial droop, l sided weakness arm and leg hit the bed, expressive aphasia and l sided neglect noted.  Was last well at 5:45 pm.  Bp 187/85    -stat CT/CTA ordered  -I contacted carlos and Dr. Lopez,  -Spoke with daugther regarding acte stroke and tnk possibility  -stat labetalol ordered contacted pharmacy to alert them that tnk ordered    Patient stat head CT no acute bleed per radiologist  -7:48 re exam still with l sided arm and leg weakness l sided neglect repeat SBP < 170.  -contacted pharmacy requesting tNK to bedside      I spent a total of 45 minutes of critical care time in obtaining history, performing a physical exam, bedside monitoring of interventions, collecting and interpreting tests and discussion with consultants but not including time spent performing procedures.      Patient family at bedside, adelina made available.  Decision made to make patient DNR/comfort care.  Patient  at 0428.

## 2024-11-10 NOTE — DISCHARGE SUMMARY
Discharge Summary    Date of Admission: 11/8/2024    Date of Discharge: 11/10/2024    Hospital Course:   The patient was admitted to the hospital with hyperkalemia and hypertension.  She proved somewhat clinically although during the short course of her hospitalization, she developed stroke symptomatology and a TNK was administered.  She improved markedly over the first 30 to 60 minutes after which she deteriorated and emergent repeat CT scan of the brain demonstrated large pontine hemorrhage.  She passed comfortably thereafter.  Family was at the bedside and condolences were provided.  I spoke with the daughter and granddaughter over the phone shortly thereafter.    Discharge Medications:     Medication List        ASK your doctor about these medications      acetaminophen 325 MG Tabs  Commonly known as: Tylenol  Take 2 tablets (650 mg total) by mouth in the morning and 2 tablets (650 mg total) before bedtime.     celecoxib 200 MG Caps  Commonly known as: CeleBREX     ciprofloxacin 500 MG Tabs  Commonly known as: Cipro  Take 1 tablet (500 mg total) by mouth 2 (two) times daily.     fluticasone propionate 50 MCG/ACT Susp  Commonly known as: Flonase  2 sprays by Nasal route daily.     gabapentin 100 MG Caps  Commonly known as: Neurontin  Take 1 capsule (100 mg total) by mouth nightly.     levothyroxine 50 MCG Tabs  Commonly known as: Synthroid  Take 1 tablet (50 mcg total) by mouth before breakfast.     * lidocaine-menthol 4-1 % Ptch  Place 1 patch onto the skin daily.     * lidocaine-menthol 4-1 % Ptch  Place 1 patch onto the skin daily.     lisinopril 20 MG Tabs  Commonly known as: Prinivil; Zestril  Take 1 tablet (20 mg total) by mouth daily.     metoprolol tartrate 25 MG Tabs  Commonly known as: Lopressor  Take 0.5 tablets (12.5 mg total) by mouth 2x Daily(Beta Blocker).     Polyethylene Glycol 3350 17 g Pack  Commonly known as: MIRALAX  Take 17 g by mouth daily as needed (If no bowel movement in last 24  hours).     Sennosides 17.2 MG Tabs  Take 1 tablet (17.2 mg total) by mouth nightly as needed (constipation, as needed if no bowel movement that day).     zolpidem 5 MG Tabs  Commonly known as: Ambien  Take 1 tablet (5 mg total) by mouth nightly as needed for Sleep.           * This list has 2 medication(s) that are the same as other medications prescribed for you. Read the directions carefully, and ask your doctor or other care provider to review them with you.                  Discharge Plans: None    Discharge Diagnosis: CVA, intracranial hemorrhage, status post TNK, hyperkalemia, hypertension, acute kidney injury, orthopedic discomforts    Sharan Lopez MD  Medical Director, Critical Care, [facility]  Medical Director, [facility]  Pager: [phone]

## 2024-11-11 NOTE — CDS QUERY
Dear Doctor:Jessica    Can the acuity and type of Congestive heart failure be clarified?    (    ) Chronic Systolic Heart Failure  (  x  ) Chronic Diastolic Heart Failure    (    ) Chronic combined systolic and diastolic Heart Failure   (    ) Other - please specify:           CLINICAL INDICATORS: H/O chf  4/24 ECHO-systolic function reduced, EF 50%    TREATMENT: on home metoprolol    RISK FACTORS: Advanced age, htn, chf      If you have any questions, please contact Clinical :  Gema MAE RN at [phone]     Thank You!     THIS FORM IS A PERMANENT PART OF THE MEDICAL RECORD

## 2024-11-11 NOTE — CDS QUERY
Dear Doctor:TRAMAINE  Clinical information (provided below) suggests a finding of Midline Shift on the brain imaging report. Can this be further clarified?      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  Trace parenchymal hemorrhage in occipital lobes. 2. Minimal midline shift towards the left and partially effacement of 4th ventricle   11/9 Dr. Forrester-rrt=l facial droop, l sided weakness arm and leg hit the bed, expressive aphasia and l sided neglect noted.   11/9 -Patient suddenly became bradycardic and min responsive, stat head CT confirmed bleed   TREATMENT: CTH, TNK given, transferred to icu, changed to DNR comfort care  RISK FACTORS: advanced age, HTN     If you have any questions, please contact Clinical :  Gema MAE RN at [phone]     Thank You!    THIS FORM IS A PERMANENT PART OF THE MEDICAL RECORD      no previous reaction

## 2024-11-13 ENCOUNTER — TELEPHONE (OUTPATIENT)
Dept: PULMONOLOGY | Facility: CLINIC | Age: 89
End: 2024-11-13

## 2024-11-13 NOTE — TELEPHONE ENCOUNTER
Received fax from [facility] to discontinue visits since patient is . Placed in Dr. Lopez folder to sign.

## 2024-11-13 NOTE — TELEPHONE ENCOUNTER
Laura from  home is following up on death certificate fax was sent to the office please follow up

## 2024-11-15 NOTE — TELEPHONE ENCOUNTER
Order signed by Dr Lopez and faxed back to [facility]. Confirmation received and sent to scanning

## 2024-11-18 NOTE — TELEPHONE ENCOUNTER
Received Courtesy copy of death certificate. Verified original and courtesy copy match. Original and courtesy copy sent to scanning.

## 2024-12-23 ENCOUNTER — TELEPHONE (OUTPATIENT)
Dept: PULMONOLOGY | Facility: CLINIC | Age: 89
End: 2024-12-23

## 2024-12-23 NOTE — TELEPHONE ENCOUNTER
Received fax from [facility] order number [id number] that patient was admitted to hospital on 24 and  on 11/10/24. Placed in Dr Lopez's for signature

## 2024-12-24 NOTE — TELEPHONE ENCOUNTER
Order signed and faxed back to [facility]. Confirmation received and sent to scanning

## 2025-01-23 ENCOUNTER — TELEPHONE (OUTPATIENT)
Dept: PULMONOLOGY | Facility: CLINIC | Age: OVER 89
End: 2025-01-23

## 2025-01-23 NOTE — TELEPHONE ENCOUNTER
Fax from Home Health face to face encounter has been reviewed and signed by Dr. Lopez and faxed back to home health with confirmation  has been sent for scanning.

## 2025-01-23 NOTE — TELEPHONE ENCOUNTER
Received fax from [facility] with face to face encounter form 10/23/2024, placed in Dr. Lopez folder to sign.

## (undated) DEVICE — ENDOSCOPY PACK UPPER: Brand: MEDLINE INDUSTRIES, INC.

## (undated) DEVICE — CONTAINER UBG 200ML POR CLTH

## (undated) DEVICE — REM POLYHESIVE ADULT PATIENT RETURN ELECTRODE: Brand: VALLEYLAB

## (undated) DEVICE — RETRIEVAL BALLOON CATHETER: Brand: EXTRACTOR™ PRO RX

## (undated) DEVICE — LOCKING DEVICE RX & BIOPSY CAP

## (undated) DEVICE — SPHINCTEROTOME: Brand: HYDRATOME RX 44

## (undated) DEVICE — GLOVE SURG SENSICARE SZ 7-1/2

## (undated) NOTE — ED AVS SNAPSHOT
Julianna Santos   MRN: [id number]    Department:  [facility] Emergency Department   Date of Visit:  1/20/2019           Disclosure     Insurance plans vary and the physician(s) referred by the ER may not be covered by your plan.  Please contact within the next three months to obtain basic health screening including reassessment of your blood pressure.     IF THERE IS ANY CHANGE OR WORSENING OF YOUR CONDITION, CALL YOUR PRIMARY CARE PHYSICIAN AT ONCE OR RETURN IMMEDIATELY TO THE EMERGENCY DEPARTMEN

## (undated) NOTE — IP AVS SNAPSHOT
[facility]            (For Outpatient Use Only) Initial Admit Date: 1/28/2019   Inpt/Obs Admit Date: Inpt: 1/30/19 / Obs: 01/28/19   Discharge Date:    Gilmar Seo:  [de-identified]   MRN: [de-identified]   CSN: [id number]        ENCOUNTER  Patient Subscriber Name:  Diana Daniel :    Subscriber ID:  Pt Rel to Subscriber:    Hospital Account Financial Class: Medicare    2019

## (undated) NOTE — MR AVS SNAPSHOT
[facility] SPECIALTY [facility] [place] [address] [address]  [id number] [phone]               Thank you for choosing us for your health care visit with Lois Sherman MD.  We are glad to serve you and happy to provide you with this summary of y Visit [email or web address] to learn more.            Visit [facility] online at  "[email or web address]

## (undated) NOTE — IP AVS SNAPSHOT
Patient Demographics     Address  [address]   [facility] [address] Phone  [phone] (Home) *Preferred*  [phone] (Mobile) E-mail Address  [email or web address]      Patient Contacts     Name Relation Home Work Mobile    Nahomy Green Relative   [phone]    Kenn Wiggins Daughter   [phone]    Maribell sU   [phone]    Lily Suggs   [phone]      Allergies as of 4/23/2024  Review status set to Review Complete on 4/18/2024   No Known Allergies     Code Status Information     Code Status    DNAR/Selective Treatment        Patient Instructions       Residential Palliative APN to follow at discharge. Please call Residential Palliative care with any questions, they can be reached by phone at [phone].        Follow-up Information     Sharan Lopez MD Follow up in 1 week(s).    Specialties: PULMONARY DISEASES, Internal Medicine, Sleep Disorders  Contact information:  [address]  [address]  [place] [address]  [phone]             Santana Dupree MD Follow up in 1 week(s).    Specialty: CARDIOLOGY  Contact information:  [address]  [address]  [facility] [address]  [phone]                        Your Home Meds List      TAKE these medications       Instructions Authorizing Provider Morning Afternoon Evening As Needed   acetaminophen 325 MG Tabs  Commonly known as: Tylenol  Next dose due: tonight      Take 2 tablets (650 mg total) by mouth in the morning and 2 tablets (650 mg total) before bedtime.   Suzanne Mercado         enoxaparin 30 MG/0.3ML Sosy  Commonly known as: Lovenox  Next dose due: Tomorrow morning      Inject 0.3 mL (30 mg total) into the skin daily.   Sharan Lopez         fluticasone propionate 50 MCG/ACT Susp  Commonly known as: Flonase  Next dose due: Tomorrow morning      USE 2 SPRAYS IN EACH NOSTRIL DAILY   Ilir Murguia         furosemide 40 MG Tabs  Commonly known as: Lasix  Next dose due: Tomorrow morning      Take 1  tablet (40 mg total) by mouth daily.   Sharan Lopez         gabapentin 100 MG Caps  Commonly known as: Neurontin  Next dose due: tonight      Take 1 capsule (100 mg total) by mouth nightly.   Suzanne Mercado         HYDROcodone-acetaminophen 5-325 MG Tabs  Commonly known as: Norco      Take 1 tablet by mouth every 6 (six) hours as needed for Pain.   Suzanne Mercado         levothyroxine 50 MCG Tabs  Commonly known as: Synthroid  Next dose due: Tomorrow morning      Take 1 tablet (50 mcg total) by mouth before breakfast.   Sharan Lopez         lidocaine-menthol 4-1 % Ptch  Start taking on: April 24, 2024  Next dose due: Tomorrow morning      Place 1 patch onto the skin daily.   Sharan Lopez         lidocaine-menthol 4-1 % Ptch  Start taking on: April 24, 2024  Next dose due: Tomorrow morning      Place 1 patch onto the skin daily.   Sharan Lopez         lidocaine-menthol 4-1 % Ptch  Start taking on: April 24, 2024  Next dose due: Tomorrow morning      Place 1 patch onto the skin daily.   Sharan Lopez         lisinopril 20 MG Tabs  Commonly known as: Prinivil; Zestril  Start taking on: April 24, 2024  Next dose due: Tomorrow morning      Take 1 tablet (20 mg total) by mouth daily.   Sharan Lopez         metoprolol tartrate 25 MG Tabs  Commonly known as: Lopressor  Next dose due: tonight      Take 0.5 tablets (12.5 mg total) by mouth 2x Daily(Beta Blocker).   Sharan Lopez         Misc. Devices Misc      20% salicylic acid with 2% 5-fluorouracil.  Apply to warts daily as instructed   Yamile Nichols         ondansetron 4 MG/2ML Soln  Commonly known as: Zofran      Inject 2 mL (4 mg total) into the vein every 6 (six) hours as needed.   Sharan Lopez         Polyethylene Glycol 3350 17 g Pack  Commonly known as: MIRALAX      Take 17 g by mouth daily as needed (If no bowel movement in last 24 hours).   Sharan Lopez         Sennosides 17.2 MG Tabs      Take 1 tablet (17.2 mg total) by mouth  nightly as needed (constipation, as needed if no bowel movement that day).   Sharan Lopez         zolpidem 5 MG Tabs  Commonly known as: Ambien      Take 1 tablet (5 mg total) by mouth nightly as needed for Sleep.   Sharan Lopez               Where to Get Your Medications      These medications were sent to [facility] - [place] - [address] [phone], [phone]  [address], [place] [address]    Phone: [phone]   acetaminophen 325 MG Tabs  lidocaine-menthol 4-1 % Ptch  lidocaine-menthol 4-1 % Ptch  lidocaine-menthol 4-1 % Ptch     Please  your prescriptions at the location directed by your doctor or nurse    Bring a paper prescription for each of these medications  gabapentin 100 MG Caps  HYDROcodone-acetaminophen 5-325 MG Tabs           [id number] - MAR ACTION REPORT  (last 48 hrs)    ** SITE UNKNOWN **     Order ID Medication Name Action Time Action Reason Comments    [id number] HYDROcodone-acetaminophen (Norco) 5-325 MG per tab 1 tablet 04/23/24 1006 Given      [id number] acetaminophen (Tylenol) tab 650 mg 04/21/24 2139 Given      [id number] acetaminophen (Tylenol) tab 650 mg 04/22/24 0849 Given      [id number] acetaminophen (Tylenol) tab 650 mg 04/22/24 2042 Given      [id number] acetaminophen (Tylenol) tab 650 mg 04/23/24 0840 Given      [id number] cefTRIAXone (Rocephin) 1 g in D5W 100 mL IVPB-ADD 04/22/24 0849 New Bag      [id number] furosemide (Lasix) tab 40 mg 04/22/24 0849 Given      [id number] furosemide (Lasix) tab 40 mg 04/23/24 0840 Given      [id number] gabapentin (Neurontin) cap 100 mg 04/21/24 2100 Given      [id number] gabapentin (Neurontin) cap 100 mg 04/22/24 2042 Given      [id number] lisinopril (Prinivil; Zestril) tab 20 mg 04/22/24 0849 Given      [id number] lisinopril (Prinivil; Zestril) tab 20 mg 04/23/24 0841 Given      [id number] metoprolol tartrate (Lopressor) tab 25 mg 04/21/24 1716 Given      [id number] metoprolol tartrate (Lopressor) tab 25 mg 04/22/24  0631 Given      [id number] metoprolol tartrate (Lopressor) tab 25 mg 04/22/24 1718 Given      [id number] metoprolol tartrate (Lopressor) tab 25 mg 04/23/24 0657 Given      [id number] zolpidem (Ambien) tab 5 mg 04/21/24 2139 Given      [id number] zolpidem (Ambien) tab 5 mg 04/22/24 2046 Given            BILATERAL NARES     Order ID Medication Name Action Time Action Reason Comments    [id number] fluticasone propionate (Flonase) 50 MCG/ACT nasal suspension 2 spray 04/22/24 0851 Given      [id number] fluticasone propionate (Flonase) 50 MCG/ACT nasal suspension 2 spray 04/23/24 0842 Given            LEFT LEG     Order ID Medication Name Action Time Action Reason Comments    [id number] lidocaine-menthol 4-1 % patch 1 patch 04/22/24 0851 Patch Applied      [id number] lidocaine-menthol 4-1 % patch 1 patch 04/23/24 0841 Patch Applied            LEFT UPPER ARM     Order ID Medication Name Action Time Action Reason Comments    [id number] lidocaine-menthol 4-1 % patch 1 patch 04/22/24 0850 Patch Applied      [id number] lidocaine-menthol 4-1 % patch 1 patch 04/23/24 0842 Patch Applied            RIGHT UPPER ARM     Order ID Medication Name Action Time Action Reason Comments    [id number] lidocaine-menthol 4-1 % patch 1 patch 04/23/24 0841 Patch Applied      [id number] lidocaine-menthol 4-1 % patch 1 patch 04/22/24 0849 Patch Applied              Recent Vital Signs    Flowsheet Row Most Recent Value   /58 Filed at 04/23/2024 0936   Pulse 84 Filed at 04/23/2024 0936   Resp 18 Filed at 04/23/2024 0936   Temp 98.1 °F (36.7 °C) Filed at 04/23/2024 0839   SpO2 96 % Filed at 04/23/2024 0839      Patient's Most Recent Weight    Flowsheet Row Most Recent Value   Patient Weight 65.6 kg (144 lb 11.2 oz)         Lab Results Last 24 Hours      Basic Metabolic Panel (8) [[id number]] (Abnormal)  Resulted: 04/23/24 0715, Result status: Final result   Ordering provider: Sharan Lopez MD  04/22/24 4735 Resulting lab: [facility]  ([facility])    Specimen Information    Type Source Collected On   Blood — 04/23/24 0605          Components    Component Value Reference Range Flag Lab   Glucose 89 70 - 99 mg/dL — [facility])   Sodium 140 136 - 145 mmol/L — [facility])   Potassium 3.9 3.5 - 5.1 mmol/L — [facility])   Chloride 104 98 - 112 mmol/L — [facility])   CO2 29.0 21.0 - 32.0 mmol/L — [facility])   Anion Gap 7 0 - 18 mmol/L — [facility])   BUN 29 9 - 23 mg/dL H [facility])   Creatinine 0.88 0.55 - 1.02 mg/dL — [facility])   BUN/CREA Ratio 33.0 10.0 - 20.0 H [facility])   Calcium, Total 9.3 8.7 - 10.4 mg/dL — [facility])   Calculated Osmolality 295 275 - 295 mOsm/kg — [facility] ([facility])   eGFR-Cr 60 >=60 mL/min/1.73m2 — [facility] ([facility])            CBC With Differential With Platelet [[id number]] (Abnormal)  Resulted: 04/23/24 0652, Result status: Final result   Ordering provider: Sharan Lopez MD  04/22/24 2300 Resulting lab: [facility] ([facility])   Narrative:  The following orders were created for panel order CBC With Differential With Platelet.  Procedure                               Abnormality         Status                     ---------                               -----------         ------                     CBC W/ DIFFERENTIAL[[id number]]          Abnormal            Final result                 Please view results for these tests on the individual orders.    Specimen Information    Type Source Collected On   Blood — 04/23/24 0605            Iron And Tibc [[id number]] (Abnormal)  Resulted: 04/22/24 2009, Result status: Final result   Ordering provider: Sharan Lopez MD  04/22/24 1939 Resulting lab: [facility] ([facility])    Specimen Information    Type Source Collected On   Blood — 04/22/24 0631          Components    Component Value Reference Range Flag Lab   Iron 39 50 - 170 ug/dL [facility] ([facility])   Transferrin 157 250 - 380 mg/dL [facility] ([facility])   Total Iron Binding Capacity 234 250 - 425 ug/dL [facility] ([facility])   % Saturation 17 15 - 50 % — [facility])            Ferritin [[id number]] (Normal)  Resulted: 04/22/24 2009, Result status: Final result   Ordering provider: Sharan Lopez MD  04/22/24 1939 Resulting lab: [facility] ([facility])    Specimen Information    Type Source Collected On   Blood — 04/22/24 0631          Components    Component Value Reference Range Flag Lab   Ferritin 121.2 18.0 - 340.0 ng/mL — [facility] ([facility])            Testing Performed By     Lab - Abbreviation Name Director Address Valid Date Range    162 - [facility] ([facility]) [facility] ([facility]) Armando Paulson M.D. [facility] [address] [facility] [address] 03/19/20 1442 - Present            Microbiology Results (All)     Procedure Component Value Units Date/Time    Occult Blood Stool, Diagnostic [[id number]]  (Normal) Collected: 04/23/24 1234    Order Status: Completed Lab Status: Final result Updated: 04/23/24 1301    Specimen: Stool      Occult Blood Negative    Blood Culture [[id number]] Collected: 04/18/24 0900    Order Status: Completed Lab Status: Final result Updated: 04/23/24 1000    Specimen: Blood,peripheral      Blood Culture Result No Growth 5 Days    Blood Culture [[id number]] Collected: 04/18/24 0855    Order Status: Completed Lab Status: Final result Updated: 04/23/24 1000    Specimen: Blood,peripheral      Blood Culture Result No Growth 5 Days    Urine Culture, Routine [[id number]]  (Abnormal)  (Susceptibility) Collected: 04/18/24 0926    Order Status: Completed Lab Status: Final result Updated: 04/20/24 0658    Specimen: Urine, clean catch      Urine Culture >100,000 CFU/ML Klebsiella pneumoniae    Susceptibility      Klebsiella pneumoniae      Not Specified     Ampicillin >=32  Resistant     Ampicillin + Sulbactam 4  Sensitive     Cefazolin  <=4  Sensitive     Ciprofloxacin <=0.25  Sensitive     Gentamicin <=1  Sensitive     Levofloxacin <=0.12  Sensitive     Meropenem 0.5  Sensitive     Nitrofurantoin 64  Intermediate     Piperacillin + Tazobactam <=4  Sensitive     Trimethoprim/Sulfa <=20  Sensitive                          Clostridium difficile(toxigenic)PCR [[id number]]  (Normal) Collected: 24 1415    Order Status: Completed Lab Status: Final result Updated: 24 1900    Specimen: Stool      C. Difficile Toxin B Gene Negative    Emergency MRSA Screen by PCR [[id number]]  (Normal) Collected: 24 0955    Order Status: Completed Lab Status: Final result Updated: 24 1118    Specimen: Other from [facility]      MRSA Screen By PCR Negative    SARS-CoV-2 by PCR ([facility]) [[id number]]  (Normal) Collected: 24 0741    Order Status: Completed Lab Status: Final result Updated: 24 0833    Specimen: Other from [facility]      SARS-CoV-2 (COVID-19) - ([facility]) Not Detected         H&P - H&P Note      H&P signed by Liane Loyd DO at 2024 12:17 PM  Version 1 of 1    Author: Liane Loyd DO Service: Pulmonology Author Type: Physician    Filed: 2024 12:17 PM Date of Service: 2024 12:06 PM Status: Signed    : Liane Loyd DO (Physician)       [facility]  part of [facility]    History & Physical    Elba Malik Patient Status:  Inpatient    1928 MRN [id number]   Location [facility] 3W/SW Attending Sharan Lopez MD   Hosp Day # 0 PCP Sharan Lopez MD     Date of Admission:  2024  History of Present Illness:   Patient is a 89-year-old female who presents with chief complaint of some worsening dyspnea over the last several days.  Some minimal cough nonproductive in nature.  Denies fevers or chills.  Does admit to some lower extremity edema.  Similar discharged on 2024 with periprosthetic hip fracture status post fall.  Denies significant hip pain.   Resting comfortably otherwise.  Admits to some dysuria.    Past Medical History  Past Medical History:    Arthritis    Asthma (HCC)    Back problem    multiple spinal injections    Cataract    bilateral cataract surgery    Cataracts, bilateral    cataracts, PC IOL 1997 OD Dr. Tate,  PC IOL Dr. Concepcion in [place], OS    Colon polyp    small polyps    Disorder of thyroid    Essential hypertension    Hearing impairment    High blood pressure    Osteoarthritis    Other and unspecified hyperlipidemia    Other ill-defined conditions(799.89)    Left wrist trauma, surgical repair    Posterior capsule opacification    OS, YAG laser 2014    Ptosis of right eyelid    Ptosis RUL    Shortness of breath    Visual impairment       Past Surgical History  Past Surgical History:   Procedure Laterality Date    Cataract      cataract extraction    Cataract Left 1997    Phaco w/PC IOL    Cataract Right 1998    Phaco w/ PC IOL    Cataract extraction w/  intraocular lens implant Left 1997    PC IOL/ Dr. Pichardo    Cataract extraction w/  intraocular lens implant Right 1998    PC IOL / Dr. Concepcion    Cholecystectomy      Colonoscopy      Ercp,diagnostic  10/16/2018    Choledocholithiasis, dilated intrahepatic and extrahepatic biliary tree    Hip replacement surgery  97/0 per NG    Hysterectomy      Partial    Laparoscopic cholecystectomy  05/27/2016    Other surgical history      Left wrist trauma, surgical repair    Other surgical history  05/27/2016    Laparoscopy with bilateral salpingo-oophorectomy    Synvisc, intra-articular Bilateral 2013    Synvisc injection bilateral knees    Total hip replacement      bilateral hip replacements    Yag capsulotomy - os - left eye Left 1998    Dr. Concepcion       Family History  Family History   Problem Relation Age of Onset    Ear Problems Mother         hearing loss    Other (Other) Father         emphysema    Diabetes Neg     Glaucoma Neg     Macular degeneration Neg        Social History  Social  History     Socioeconomic History    Marital status:    Occupational History    Occupation: homemaker   Tobacco Use    Smoking status: Never    Smokeless tobacco: Never   Vaping Use    Vaping status: Never Used   Substance and Sexual Activity    Alcohol use: No     Alcohol/week: 0.0 standard drinks of alcohol    Drug use: No    Sexual activity: Never   Other Topics Concern    Caffeine Concern Yes     Comment: coffee, 1 cup daily    Grew up on a farm No    History of tanning No    Outdoor occupation No    Pt has a pacemaker No    Pt has a defibrillator No    Breast feeding No    Reaction to local anesthetic No           Current Medications:  Current Facility-Administered Medications   Medication Dose Route Frequency    azithromycin (Zithromax) 500 mg in sodium chloride 0.9% 250mL IVPB premix  500 mg Intravenous Once    phenazopyridine (Pyridum) tab 200 mg  200 mg Oral Once    furosemide (Lasix) 10 mg/mL injection 40 mg  40 mg Intravenous BID (Diuretic)    lisinopril (Zestril) tab 10 mg  10 mg Oral Daily    [START ON 4/19/2024] cefTRIAXone (Rocephin) 1 g in D5W 100 mL IVPB-ADD  1 g Intravenous Q24H    azithromycin (Zithromax) tab 500 mg  500 mg Oral Daily    fluticasone propionate (Flonase) 50 MCG/ACT nasal suspension 2 spray  2 spray Nasal Daily    enoxaparin (Lovenox) 30 MG/0.3ML SUBQ injection 30 mg  30 mg Subcutaneous Daily    potassium chloride (K-Dur) tab 40 mEq  40 mEq Oral Once     Medications Prior to Admission   Medication Sig    enoxaparin 30 MG/0.3ML Injection Solution Prefilled Syringe Inject 0.3 mL (30 mg total) into the skin daily.    furosemide 40 MG Oral Tab Take 1 tablet (40 mg total) by mouth daily.    HYDROcodone-acetaminophen 5-325 MG Oral Tab Take 1 tablet by mouth every 4 (four) hours as needed.    ondansetron 4 MG/2ML Injection Solution Inject 2 mL (4 mg total) into the vein every 6 (six) hours as needed.    polyethylene glycol, PEG 3350, 17 g Oral Powd Pack Take 17 g by mouth daily as  needed (If no bowel movement in last 24 hours).    sennosides 17.2 MG Oral Tab Take 1 tablet (17.2 mg total) by mouth nightly as needed (constipation, as needed if no bowel movement that day).    zolpidem 10 MG Oral Tab Take 1 tablet (10 mg total) by mouth nightly as needed for Sleep.    levothyroxine 50 MCG Oral Tab Take 1 tablet (50 mcg total) by mouth before breakfast.    metoprolol tartrate 25 MG Oral Tab Take 0.5 tablets (12.5 mg total) by mouth 2x Daily(Beta Blocker).    FLUTICASONE PROPIONATE 50 MCG/ACT Nasal Suspension USE 2 SPRAYS IN EACH NOSTRIL DAILY    potassium chloride 10 MEQ Oral Tab CR Take 1 tablet (10 mEq total) by mouth daily. While on lasix (Patient not taking: Reported on 4/18/2024)    Misc. Devices Does not apply Misc 20% salicylic acid with 2% 5-fluorouracil.  Apply to warts daily as instructed (Patient not taking: Reported on 4/18/2024)       Allergies  No Known Allergies    Review of Systems:   Constitutional: denies fevers, chills, weakness, fatigue, recent illness  HEENT: denies headache, sore throat, vision loss  Cardio: denies chest pain, chest pressure, palpitations  Respiratory: dyspnea, occasional cough, denies wheezing, hemoptysis   GI: denies nausea, vomiting, abdominal pain  : Dysuria  Musculoskeletal: denies arthralgia, myalgia  Integumentary: denies rash, itching  Neurological: denies syncope, weakness, dizziness,   Psychiatric: denies depression, anxiety  Hematologic: denies bruising    Physical Exam:   Blood pressure (!) 175/90, pulse 103, temperature 98.5 °F (36.9 °C), temperature source Oral, resp. rate 18, weight 158 lb 1.6 oz (71.7 kg), SpO2 98%, not currently breastfeeding.    Constitutional: no acute distress  HEENT: PERRL  Neck: neck supple, no JVD  Cardio: RRR, S1 S2  Respiratory: Faint basilar crackles  GI: abdomen soft, non tender, active bowel sounds, no organomegaly  Extremities: no clubbing, cyanosis, edema  Neurologic: no gross motor deficits  Skin: warm,  dry    Results:   Laboratory Data  Lab Results   Component Value Date    WBC 13.9 (H) 04/18/2024    HGB 9.5 (L) 04/18/2024    HCT 28.1 (L) 04/18/2024    .0 (H) 04/18/2024    CREATSERUM 0.94 04/18/2024    BUN 34 (H) 04/18/2024     (L) 04/18/2024    K 3.4 (L) 04/18/2024    CL 96 (L) 04/18/2024    CO2 29.0 04/18/2024     (H) 04/18/2024    CA 9.2 04/18/2024    ALB 3.6 04/18/2024    ALKPHO 101 04/18/2024    TP 6.3 04/18/2024    AST 54 (H) 04/18/2024    ALT 46 04/18/2024    PTT 33.0 04/05/2024    INR 0.96 04/05/2024    PTP 13.3 04/05/2024    TSH 1.430 06/05/2023    DDIMER 0.63 01/10/2023     (H) 06/06/2016         Imaging  XR CHEST AP PORTABLE  (CPT=71045)    Result Date: 4/18/2024  PROCEDURE: XR CHEST AP PORTABLE  (CPT=71045) TIME: 807 hours.   COMPARISON: [facility], XR CHEST AP PORTABLE (CPT=71045), 4/11/2024, 2:18 PM.  [facility], XR CHEST AP PORTABLE (CPT=71045), 4/09/2024, 7:33 AM.  [facility], XR CHEST AP PORTABLE (CPT=71045), 4/05/2024, 5:20 AM.  INDICATIONS: Shortness of breath and chest pain since this morning. No injury.  TECHNIQUE:   Single view.   FINDINGS:  CARDIAC/MEDIAST: Stable art and mediastinum.  No vascular congestion.  LUNGS/PLEURA:  Small left pleural effusion mild opacity left lung base unchanged.  Linear opacity right base probably atelectasis.  No pneumothorax. OTHER:  Osteopenia.  Advanced degenerative change both shoulders.  Degenerative change thoracic spine.         CONCLUSION:   Stable small left pleural effusion with mild adjacent left basilar opacity which may reflect atelectasis.  Pneumonia excluded    Dictated by (CST): Jessee Colunga MD on 4/18/2024 at 8:28 AM     Finalized by (CST): Jessee Colunga MD on 4/18/2024 at 8:29 AM          XR CHEST AP PORTABLE  (CPT=71045)    Result Date: 4/11/2024  PROCEDURE: XR CHEST AP PORTABLE  (CPT=71045) TIME: 14:19.   COMPARISON: [facility], XR CHEST AP  PORTABLE (CPT=71045), 2024, 5:20 AM.  [facility], CT ABDOMEN + PELVIS (CPT=74176), 10/04/2018, 2:18 PM.  [facility], XR CHEST AP PORTABLE (CPT=71045), 2024, 7:33 AM.  INDICATIONS: Shortness of breath.  History of hypertension.  TECHNIQUE:   Single view.   FINDINGS:  CARDIAC/VASC: The cardiac silhouette is not enlarged.  The thoracic aorta is again tortuous with atherosclerotic calcification.  No central pulmonary venous congestion.  MEDIAST/TAWNYA:   The pulmonary arterial contours are again prominent bilaterally. LUNGS/PLEURA: There are small pleural effusions bilaterally, slightly improved on the left.  Consolidation and volume loss is again seen in both lower lobes, left greater than right.  There has been development of mild horizontal linear opacity in the mid right lung.  No pneumothorax. BONES: The osseous structures are unchanged. OTHER: Negative.          CONCLUSION:  1. Mild CHF/fluid overload with slight improvement in a small left pleural effusion and a grossly stable small right pleural effusion. 2. Passive atelectasis at both lung bases, slightly improved on the left.  New discoid atelectasis in the mid right lung.   [facility]  Dictated by (CST): Donovan Garrett MD on 2024 at 4:06 PM     Finalized by (CST): Donovan Garrett MD on 2024 at 4:09 PM          CARD ECHO 2D DOPPLER (CPT=93306)    Result Date: 4/10/2024  Transthoracic Echocardiogram Name:Elba Malik Date: 04/10/2024 :  1928 Ht:  (65in)  BP: 158 / 74 MRN:  [id number]    Age:  89years    Wt:  (165lb) HR: 96bpm Loc:  EM       Gndr: F          BSA: 1.82m^2 Sonographer: Cy Ordering:    Sharan Lopez Consulting:  Jasmeet Crowder ---------------------------------------------------------------------------- History/Indications:   Risk factors:  Shortness of Breath. Orthopnea. Pulmonary Edema. ----------------------------------------------------------------------------  Procedure information:  A transthoracic complete 2D study was performed. Additional evaluation included M-mode, complete spectral Doppler, and color Doppler.  Patient status:  Inpatient.  Location:  Bedside.    No prior study was available for comparison.    This was a routine study. Transthoracic echocardiography for diagnosis and ventricular function evaluation. Image quality was adequate. The study was technically limited due to restricted patient mobility and patient sitting up. ECG rhythm:   Atrial fibrillation ---------------------------------------------------------------------------- Conclusions: 1. Left ventricle: The cavity size was normal. Wall thickness was normal.    Systolic function was mildly reduced. The estimated ejection fraction was    50%, by visual assessment. Although no diagnostic regional wall motion    abnormality was identified, this possibility cannot be completely    excluded on the basis of this study. Unable to assess LV diastolic    function due to heart rhythm. 2. Right ventricle: Systolic function was reduced. 3. Aortic valve: There was mild regurgitation. 4. Mitral valve: There was mild regurgitation. 5. Pericardium, extracardiac: There was a left pleural effusion. Impressions:  No previous study was available for comparison. * ---------------------------------------------------------------------------- * Findings: Left ventricle:  The cavity size was normal. Wall thickness was normal. Systolic function was mildly reduced. The estimated ejection fraction was 50%, by visual assessment. Although no diagnostic regional wall motion abnormality was identified, this possibility cannot be completely excluded on the basis of this study. Unable to assess LV diastolic function due to heart rhythm. Left atrium:  The left atrial volume was normal. Right ventricle:  The cavity size was normal. Systolic function was reduced. Right atrium:  Well visualized. The atrium was normal in size. Mitral  valve:  The leaflets were mildly thickened and mildly calcified. Leaflet separation was normal.  Doppler:  Transvalvular velocity was within the normal range. There was no evidence for stenosis. There was mild regurgitation.    The valve area by pressure half-time was 6.29cm^2. The valve area index by pressure half-time was 3.45cm^2/m^2. Aortic valve:   The valve was trileaflet. The leaflets were mildly thickened and mildly calcified. Cusp separation was normal.  Doppler:  Transvalvular velocity was within the normal range. There was no evidence for stenosis. There was mild regurgitation.    The peak systolic gradient was 11mm Hg. Tricuspid valve:  The valve is structurally normal. Leaflet separation was normal.  Doppler:  Transvalvular velocity was within the normal range. There was no evidence for stenosis. There was no significant regurgitation. Pulmonic valve:   The valve is structurally normal. Cusp separation was normal.  Doppler:  Transvalvular velocity was within the normal range. There was no evidence for stenosis. There was no significant regurgitation. Pericardium:   There was no pericardial effusion. Pleura:  There was a left pleural effusion. Aorta: Aortic root: The aortic root was normal. Ascending aorta: The ascending aorta was normal. Pulmonary arteries: Systolic pressure could not be accurately estimated. ---------------------------------------------------------------------------- Measurements  Left ventricle                  Value          Ref  IVS thickness, ED, PLAX         0.9   cm       0.6 - 0.9  LV ID, ED, PLAX                 4.8   cm       3.8 - 5.2  LV ID, ES, PLAX             (H) 3.7   cm       2.2 - 3.5  LV PW thickness, ED, PLAX       0.8   cm       0.6 - 0.9  IVS/LV PW ratio, ED, PLAX       1.13           ---------  LV PW/LV ID ratio, ED, PLAX     0.17           ---------  LV ejection fraction        (L) 46    %        54 - 74  Stroke volume/bsa, 2D           32    ml/m^2   ---------   LVOT                            Value          Ref  LVOT ID                         2.1   cm       ---------  LVOT peak velocity, S           0.83  m/sec    ---------  LVOT VTI, S                     18.0  cm       ---------  LVOT peak gradient, S           3     mm Hg    ---------  LVOT mean gradient, S           2     mm Hg    ---------  Stroke volume (SV), LVOT DP     62    ml       ---------  Stroke index (SV/bsa), LVOT     34    ml/m^2   ---------  DP  Aortic valve                    Value          Ref  Aortic valve peak velocity,     1.62  m/sec    ---------  S  Aortic peak gradient, S         11    mm Hg    ---------  Velocity ratio, peak,           0.49           ---------  LVOT/AV  Aortic root                     Value          Ref  Aortic root ID                  3.2   cm       2.5 - 4.0  Ascending aorta                 Value          Ref  Ascending aorta ID              3.5   cm       1.9 - 3.5  Left atrium                     Value          Ref  LA volume/bsa, S                26    ml/m^2   16 - 34  LA volume, ES, 1-p A4C          38    ml       22 - 52  LA volume, ES, 1-p A2C          39    ml       22 - 52  LA volume, ES, A/L              47    ml       ---------  LA volume/bsa, ES, A/L          26    ml/m^2   16 - 34  Mitral valve                    Value          Ref  Mitral E-wave peak velocity     1.08  m/sec    ---------  Mitral deceleration time        118   ms       ---------  Mitral pressure half-time       35    ms       ---------  Mitral peak gradient, D         5     mm Hg    ---------  Mitral valve area, PHT, DP      6.29  cm^2     ---------  Mitral valve area/bsa, PHT,     3.45  cm^2/m^2 ---------  DP  Right atrium                    Value          Ref  RA ID, S-I, ES, A4C         (H) 5.7   cm       3.4 - 5.3  RA ID/bsa, S-I, ES, A4C         3.1   cm/m^2   1.9 - 3.1  RA area, ES, A4C                16    cm^2     10 - 18  RA volume, ES, 1-p A4C          36    ml       ---------  RA  volume/bsa, ES, 1-p A4C      20    ml/m^2   9 - 33  Systemic veins                  Value          Ref  Estimated CVP                   3     mm Hg    ---------  Inferior vena cava              Value          Ref  ID                              2.0   cm       <=2.1  Right ventricle                 Value          Ref  TAPSE, 2D                   (L) 1.57  cm       >=1.70  TAPSE, MM                   (L) 1.57  cm       >=1.70  RV s', lateral                  9.5   cm/sec   >=9.5 Legend: (L)  and  (H)  fortino values outside specified reference range. ---------------------------------------------------------------------------- Prepared and electronically signed by Suleman Huerta 04/10/2024 10:31     XR CHEST AP PORTABLE  (CPT=71045)    Result Date: 4/9/2024  PROCEDURE: XR CHEST AP PORTABLE  (CPT=71045) TIME: 7:33.   COMPARISON: [facility], XR CHEST AP PORTABLE (CPT=71045), 4/05/2024, 5:20 AM.  [facility], XR CHEST PA + LAT CHEST (CPT=71046), 1/10/2023, 1:14 PM.  [facility], XR CHEST PA + LAT CHEST (WLR=08581), 10/02/2018, 2:00 PM.  INDICATIONS: Shortness of breath  TECHNIQUE:   Single view.   FINDINGS:  CARDIAC/VASC: The cardiomediastinal silhouette is unchanged in size.  There is atherosclerotic calcification of the tortuous thoracic aorta. MEDIAST/TAWNYA:   The mediastinum and hilum are unchanged. LUNGS/PLEURA: No significant change in the small left greater than right pleural effusions.  There are small patchy airspace opacities in the left greater than right lower lobes.  There is unchanged pulmonary edema demonstrated by pulmonary vascular congestion and bilateral perihilar fullness. BONES: There is severe bilateral glenohumeral joint osteoarthrosis with associated deformities involving the humeral heads.  Bilateral acromioclavicular joint degenerative change.  Multilevel degenerative changes of the thoracic spine.  The bones are demineralized.  OTHER: Negative.          CONCLUSION:   No significant change when compared to 04/05/2024.  Redemonstrated small left greater than right pleural effusions with associated bibasilar airspace opacities, which likely reflect atelectasis.  No significant change in the pulmonary edema.    Dictated by (CST): Jarret Das MD on 4/09/2024 at 7:53 AM     Finalized by (CST): Jarret Das MD on 4/09/2024 at 7:56 AM          MRI HIPS, LEFT (CPT=73721)    Result Date: 4/7/2024  PROCEDURE: MRI HIPS, LEFT (CPT=73721)  COMPARISON: [facility], CT ABDOMEN + PELVIS (CPT=74176), 10/04/2018, 2:18 PM.  [facility], CT PELVIS (CPT=72192), 4/05/2024, 2:38 AM.  [facility], XR HIP W OR WO PELVIS 2 OR 3 VIEWS, LEFT (CPT=73502), 4/05/2024, 1:05 AM.  INDICATIONS: Acute on chronic left hip pain post mechanical fall.  History of bilateral total hip arthroplasties with a periprosthetic fracture involving the greater trochanter of the left femur noted on prior studies.  TECHNIQUE: A comprehensive examination was performed utilizing a variety of imaging planes and imaging parameters to optimize visualization of suspected pathology.  Images were performed without contrast.  FINDINGS: HIP JOINT:   Postoperative changes are again noted from previous bilateral total hip arthroplasties.  Resulting metallic susceptibility artifact limits assessment of the surrounding structures.  However, there is redemonstration of a mildly displaced fracture involving the greater trochanter of the left femur.  There is associated marrow edema.  OTHER BONES:  Severe multilevel spondylosis is seen throughout the visualized lumbar spine.  There is stable mild sacroiliac joint osteoarthritis bilaterally.  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  There is otherwise no marrow signal abnormality or osseous malalignment. EFFUSIONS: None.  No synovitis or loose bodies. BURSAE: No evidence of iliopsoas  bursitis.  There is mild T2 hyperintense fluid in the right trochanteric bursa.  There is a moderate amount of T2 hyperintense/T1 hyperintense fluid in the left trochanteric bursa, likely relating to posttraumatic blood products. TENDONS: There is a partial thickness tear at the right hamstring tendinous origin with a small resulting T2 hyperintense fluid collection.  There is mild left hamstring origin tendinosis.  Normal gluteal and iliopsoas tendons.  Normal tendinous origins of the quadriceps and adductor muscle groups. MUSCLES: There is moderate left gluteus medius muscle edema.  There is mild edema involving the left adductor muscle group.  There is severe atrophy of the left gluteus minimus muscles with mild involvement of the remaining gluteus muscles bilaterally, all likely postoperative in nature. OTHER: Negative.         CONCLUSION:  1. Bilateral total hip arthroplasties with redemonstration of an acute to subacute mildly displaced periprosthetic fracture involving the greater trochanter of the left femur. 2. Moderate left gluteus medius muscle strain with moderate posttraumatic blood products extending into the left trochanteric bursa.  There is also mild strain involving the left hip adductor muscles. 3. Partial-thickness right hamstring origin tendon tear with associated mild ischial bursitis. 4. Mild left hamstring origin tendinosis.   A preliminary report was issued by the [facility] teleradiology service. There are no major discrepancies.  [facility]  Dictated by (CST): Donovan Garrett MD on 4/07/2024 at 5:49 AM     Finalized by (CST): Donovan Garrett MD on 4/07/2024 at 6:02 AM          XR CHEST AP PORTABLE  (CPT=71045)    Result Date: 4/5/2024  PROCEDURE: XR CHEST AP PORTABLE  (CPT=71045) TIME: 0523 hours.   COMPARISON: [facility], XR CHEST PA + LAT CHEST (CPT=71046), 1/10/2023, 1:14 PM.  [facility], XR CHEST PA + LAT CHEST (CPT=71046),  10/02/2018, 2:00 PM.  INDICATIONS: Shortness of breath and left hip pain.  TECHNIQUE:   Single view.   FINDINGS:  CARDIAC/VASC: Heart is normal size.  Aortic atherosclerosis. MEDIAST/TAWNYA:   No visible mass or adenopathy. LUNGS/PLEURA: Small bilateral pleural effusions.  Pulmonary edema.  No pneumothorax.  Mild background scarring. BONES: No fracture or visible bony lesion.  Severe degenerative joint disease of the shoulders .  Thoracic spondylosis. OTHER: Negative.          CONCLUSION:   Pulmonary edema and small bilateral pleural effusions.  Superimposed consolidations are not excluded.    Preliminary report was given by [facility].  There are no clinically significant discrepancies.    [facility]    Dictated by (CST): Jame Napier MD on 4/05/2024 at 11:32 AM     Finalized by (CST): Jame Napier MD on 4/05/2024 at 11:34 AM          CT PELVIS (CPT=72192)    Result Date: 4/5/2024  PROCEDURE: CT PELVIS (CPT=72192)  COMPARISON: [facility], CT ABDOMEN + PELVIS (CPT=74176), 10/04/2018, 2:18 PM.  [facility], CT ABDOMEN PELVIS WO CON, 5/04/2016, 10:45 AM.  INDICATIONS: XR with poss nondisplaced periprosthetic fracture L femur  TECHNIQUE:   CT images were obtained without intravenous contrast.  Automated exposure control for dose reduction was used. Adjustment of the mA and/or kV was done based on the patient's size. Use of iterative reconstruction technique for dose reduction was used.  Dose information is transmitted to the ACR (American College of Radiology) NRDR (National Radiology Data Registry) which includes the Dose Index Registry.   FINDINGS:  RETROPERITONEUM:   No mass or enlarged adenopathy.  AORTA/VASCULAR:   Extensive calcified atherosclerosis. PERITONEUM: No free fluid or air. GI TRACT/MESENTERY:    No visible mass, obstruction, or bowel wall thickening. URINARY BLADDER: Unremarkable. REPRODUCTIVE ORGANS: The uterus is not seen.  There is no adnexal mass.  ABDOMINAL WALL:   No acute abnormality. BONES:  There are bilateral hip arthroplasties.  There is a minimally displaced comminuted fracture adjacent to the left hip prosthesis at the greater trochanter.          CONCLUSION: Minimally displaced comminuted fracture adjacent to the left hip prosthesis at the greater trochanter. Additional chronic or incidental findings are described in the body of this report.  Preliminary report was given by [facility].  There are no clinically significant discrepancies.    [facility]    Dictated by (CST): Jame Napier MD on 4/05/2024 at 10:03 AM     Finalized by (CST): Jame Napier MD on 4/05/2024 at 10:06 AM          CT BRAIN OR HEAD (17877)    Result Date: 4/5/2024  PROCEDURE: CT BRAIN OR HEAD (CPT=70450)  COMPARISON: None.  INDICATIONS: L hip pain  TECHNIQUE: CT images were obtained without contrast material.  Automated exposure control for dose reduction was used.  Dose information is transmitted to the ACR (American College of Radiology) NRDR (National Radiology Data Registry) which includes the Dose Index Registry.  FINDINGS:  VENTRICLES: No hydrocephalus.  EXTRA-AXIAL: No extraaxial hemorrhage.  No midline shift or herniation.  PARENCHYMA: No CT evidence of acute or subacute infarct.  No mass.  Parenchymal volume is normal.  Gray-white matter differentiation is maintained.  SOFT TISSUES: No acute abnormality. BONES: No acute fracture. SINUSES: Expansile density in the left maxillary sinus extending into the left nasal passages. ORBITS: No acute abnormality. MASTOIDS: Clear. EACS: Clear.          CONCLUSION:   No acute intracranial abnormality.  No acute intracranial hemorrhage.  No calvarial fracture.  Mild chronic microvascular ischemic changes.  Expansile density in the left maxillary sinus extending into the left nasal passage, probably a polyp.    Preliminary report was given by [facility].  There are no clinically significant discrepancies.    [facility]   Dictated by (CST): Jame Napier MD on 4/05/2024 at 9:36 AM     Finalized by (CST): Jame Napier MD on 4/05/2024 at 9:37 AM          CT SPINE CERVICAL (CPT=72125)    Result Date: 4/5/2024  PROCEDURE: CT SPINE CERVICAL (CPT=72125)  COMPARISON: [facility], CT SHOULDER LEFT (CPT=73200), 1/12/2024, 8:11 AM.  INDICATIONS: L hip pain  TECHNIQUE:   Multi-planar CT images were obtained without intravenous contrast material.  Automated exposure control for dose reduction was used. Adjustment of the mA and/or kV was done based on the patient's size. Use of iterative reconstruction technique for dose reduction was used.  Dose information is transmitted to the ACR (American College of Radiology) NRDR (National Radiology Data Registry) which includes the Dose Index Registry.   FINDINGS:  BONES: No acute fracture. The odontoid process is intact.  Marked osteopenia.  Osseous degenerative changes. ALIGNMENT: Anterolisthesis of C3 on C4 measures 2 mm. Anterolisthesis of C4 on C5 measures 2 mm. DISCS/JOINTS: Moderate to advanced multilevel disc height loss.  Moderate facet arthropathy. SOFT TISSUES: No acute abnormality. VESSELS: Calcified atherosclerosis. AIRWAYS AND LUNGS: Apical pleural parenchymal scarring is seen.  Airways are clear. SKULL BASE: Normal foramen magnum with no Chiari Malformation.  Polyp or mucous retention cyst in the left maxillary sinus.  SPINAL CANAL: Limited noncontrast evaluation.  Suspected spinal canal stenosis at C2-3 through C6-7 due to disc osteophyte complexes.  Suspected multilevel foraminal stenosis.          CONCLUSION:   1. No acute fracture of the cervical spine.  Marked osteopenia.  2. Minimal anterolisthesis of C3 on C4 and C4 on C5 is favored degenerative in etiology.  Overall advanced spondylosis with suspected multilevel spinal canal and foraminal stenosis.  This would be better evaluated with MRI.  3. Polyp or mucous retention cyst in the left maxillary sinus.    Preliminary  report was given by [facility].  There are no clinically significant discrepancies.    [facility]    Dictated by (CST): Jame Napier MD on 4/05/2024 at 9:30 AM     Finalized by (CST): Jame Napier MD on 4/05/2024 at 9:36 AM          XR HIP W OR WO PELVIS 2 OR 3 VIEWS, LEFT (CPT=73502)    Result Date: 4/5/2024  PROCEDURE: XR HIP W OR WO PELVIS 2 OR 3 VIEWS, LEFT (CPT=73502)  COMPARISON: [facility], CT PELVIS (CPT=72192), 4/05/2024, 2:38 AM.  [facility] 2nd Floor, X HIP UNILATERAL 1 VIEW LT, 3/10/2015, 2:22 PM.  INDICATIONS: Post fall with left hip pain.  TECHNIQUE: 3 views were obtained.    FINDINGS/CONCLUSION:          Bilateral hip arthroplasties are seen.  No evidence of hardware complication.  Question minimally displaced fracture of the left greater trochanter.  Consider further evaluation with CT of the pelvis.  Osteopenia.  Soft tissues are unremarkable aside from vascular calcifications.    Preliminary report was given by [facility].  There are no clinically significant discrepancies.    [facility]   Dictated by (CST): Jame Napier MD on 4/05/2024 at 9:13 AM     Finalized by (CST): Jame Napier MD on 4/05/2024 at 9:19 AM            Assessment   1.  Dyspnea  2.  Leukocytosis  3.  Recent periprosthetic hip fracture  4.  Hypertension  5.  Anemia  6.  Chronic pain  7.  Arthritis  8.  Hyponatremia    Plan   -Presents with some increased dyspnea over the last several days with some lower extremity edema.  Recently discharged on 4/11/2024 after periprosthetic hip fracture.  -Chest x-ray on presentation with small left pleural effusion left basilar opacity cannot rule out atelectasis versus pneumonia  -Empiric IV antibiotic therapy at this time.  -Urine cultures pending  -Will check procalcitonin  -Wean oxygen as tolerated  -Resume home medications  -Discussed with family  -Reviewed vitals, labs and imaging    Liane Loyd DO  Pulmonary Critical Care  [facility]  2024  12:07 PM      Electronically signed by Liane Loyd DO on 2024 12:17 PM              Consults - MD Consult Notes      Consults signed by Santana Dupree MD at 2024 11:00 AM     Author: Santana Dupree MD Service: — Author Type: Physician    Filed: 2024 11:00 AM Date of Service: 2024 10:29 AM Status: Signed    : Santana Dupree MD (Physician)     Consult Orders    1. ED Consult to Cardiology [[id number]] ordered by Jean Marie Graves MD at 24 0944                 [facility] - CARDIOLOGY CONSULT NOTE    Elba Malik Patient Status:  Emergency    1928 MRN [id number]   Location [facility] EMERGENCY DEPARTMENT Attending Jean Marie Graves   Hosp Day # 0 PCP Sharan Lopez MD     Date of Admission:  2024  Date of Consult:  2024  I was asked by Jean Marie Graves to provide recommendations for evaluation and management of cardiac issues.  Impression:     89-year-old female with increasing shortness of breath and hypoxia.  Recent hip fracture.    Recommendations:  1.  Shortness of breath  Multifactorial appears congested on exam status post azithromycin BNP level 1100.  Start Lasix 40 mg IV twice a day preserved EF on recent echocardiogram 50% no need to repeat.  Pulmonary to evaluate for possible pneumonia.  No infiltrate on chest x-ray however elevated white count.   2. periprosthetic hip fracture  Management as per primary team.  3.  Hypertension  Add lisinopril 10 mg daily.  4.  Tachycardia  Likely reactive in the setting of hypoxia and possible infection.      L5 consult, will follow.    Reason for Consultation:   Shortness of breath, fever      History of Present Illness:   Patient is a 89 year old female who was admitted to the hospital for shortness of breath.  Patient was discharged on .  Was admitted for periprosthetic hip fracture medical management was recommended by  orthopedics.  Was found to have fluid congestion EF 50% on echocardiogram diastolic dysfunction.  Started on IV diuretics and transferred to subacute rehab.  Starting yesterday became more short of breath.  Denies any cough.  Also noticed ankle swelling.  Also chills and fever.  Brought into the hospital.  No orthopnea no diaphoresis no chest pain.  No palpitations.      Cardiac tests:    Past Medical History  Past Medical History:    Arthritis    Asthma (HCC)    Back problem    multiple spinal injections    Cataract    bilateral cataract surgery    Cataracts, bilateral    cataracts, PC IOL 1997 OD Dr. Tate,  PC IOL Dr. Concepcion in [place], OS    Colon polyp    small polyps    Disorder of thyroid    Essential hypertension    Hearing impairment    High blood pressure    Osteoarthritis    Other and unspecified hyperlipidemia    Other ill-defined conditions(799.89)    Left wrist trauma, surgical repair    Posterior capsule opacification    OS, YAG laser 2014    Ptosis of right eyelid    Ptosis RUL    Shortness of breath    Visual impairment       Past Surgical History  Past Surgical History:   Procedure Laterality Date    Cataract      cataract extraction    Cataract Left 1997    Phaco w/PC IOL    Cataract Right 1998    Phaco w/ PC IOL    Cataract extraction w/  intraocular lens implant Left 1997    PC IOL/ Dr. Pichardo    Cataract extraction w/  intraocular lens implant Right 1998    PC IOL / Dr. Concepcion    Cholecystectomy      Colonoscopy      Ercp,diagnostic  10/16/2018    Choledocholithiasis, dilated intrahepatic and extrahepatic biliary tree    Hip replacement surgery  97/0 per NG    Hysterectomy      Partial    Laparoscopic cholecystectomy  05/27/2016    Other surgical history      Left wrist trauma, surgical repair    Other surgical history  05/27/2016    Laparoscopy with bilateral salpingo-oophorectomy    Synvisc, intra-articular Bilateral 2013    Synvisc injection bilateral knees    Total hip replacement       bilateral hip replacements    Yag capsulotomy - os - left eye Left 1998    Dr. Concepcion       Family History  Family History   Problem Relation Age of Onset    Ear Problems Mother         hearing loss    Other (Other) Father         emphysema    Diabetes Neg     Glaucoma Neg     Macular degeneration Neg      Denies smoking or drug use.  Social History  Pediatric History   Patient Parents    Not on file     Other Topics Concern     Service Not Asked    Blood Transfusions Not Asked    Caffeine Concern Yes     Comment: coffee, 1 cup daily    Occupational Exposure Not Asked    Hobby Hazards Not Asked    Sleep Concern Not Asked    Stress Concern Not Asked    Weight Concern Not Asked    Special Diet Not Asked    Back Care Not Asked    Exercise Not Asked    Bike Helmet Not Asked    Seat Belt Not Asked    Self-Exams Not Asked    Grew up on a farm No    History of tanning No    Outdoor occupation No    Pt has a pacemaker No    Pt has a defibrillator No    Breast feeding No    Reaction to local anesthetic No    Left Handed Not Asked    Right Handed Not Asked    Currently spends a great deal of time in the sun Not Asked    Past Sunlamp Treatments for Acne Not Asked    Hx of Spending Great Deal of Time in Sun Not Asked    Bad sunburns in the past Not Asked    Tanning Salons in the Past Not Asked    Hx of Radiation Treatments Not Asked    Regular use of sun block Not Asked   Social History Narrative    Not on file           Current Medications:  Current Facility-Administered Medications   Medication Dose Route Frequency    azithromycin (Zithromax) 500 mg in sodium chloride 0.9% 250mL IVPB premix  500 mg Intravenous Once    phenazopyridine (Pyridum) tab 200 mg  200 mg Oral Once     (Not in a hospital admission)      Allergies  No Known Allergies    Review of Systems:   10 pt ROS performed, separate from HPI  Review of Systems:  GENERAL: + fevers, +chills, sweats  HEENT: no visual or hearing changes  SKIN: denies any  unusual skin lesions or rashes  RESPIRATORY:  shortness of breath at rest.  CARDIOVASCULAR: no active chest pain, no claudication  GI: denies abdominal pain and denies heartburn  : no dysuria or hematuria  NEURO: denies headaches, focal weaknesses or paresthesias  All other systems reviewed and negative.  fatigue is present  All other systems were reviewed are negative  Physical Exam:   Blood pressure (!) 162/82, pulse 98, temperature 98.6 °F (37 °C), resp. rate 20, SpO2 98%, not currently breastfeeding.    Scheduled Meds:    azithromycin  500 mg Intravenous Once    phenazopyridine  200 mg Oral Once         Physical Exam:    General: Alert and oriented. No apparent distress. No respiratory or constitutional distress.  Frail elderly female.  HEENT: Normocephalic, anicteric sclera, neck supple  Neck: No JVD, carotids 2+, supple  Cardiac: irregular rhythm no pathologic murmur.  Lungs: Clear with normal effort.  Normal excursions and effort.  Abdomen: Soft, non-tender. BS-present.  Extremities: Without clubbing, cyanosis.  Peripheral pulsespresent.  Neurologic: Alert and oriented, normal affect. Motor ok.  Skin: Warm and dry.     Results:     Laboratory Data:  Lab Results   Component Value Date    WBC 13.9 (H) 04/18/2024    HGB 9.5 (L) 04/18/2024    HCT 28.1 (L) 04/18/2024    .0 (H) 04/18/2024    CREATSERUM 0.94 04/18/2024    BUN 34 (H) 04/18/2024     (L) 04/18/2024    K 3.4 (L) 04/18/2024    CL 96 (L) 04/18/2024    CO2 29.0 04/18/2024     (H) 04/18/2024    CA 9.2 04/18/2024    ALB 3.6 04/18/2024    ALKPHO 101 04/18/2024    TP 6.3 04/18/2024    AST 54 (H) 04/18/2024    ALT 46 04/18/2024    PTT 33.0 04/05/2024    INR 0.96 04/05/2024    PTP 13.3 04/05/2024    TSH 1.430 06/05/2023    DDIMER 0.63 01/10/2023     (H) 06/06/2016         Thank you for allowing me to participate in the care of your patient.    Santana Dupree MD  [facility]  Interventional  Cardiology  4/18/2024      Electronically signed by Santana Dupree MD on 4/18/2024 11:00 AM              Discharge Summary - D/C Summary      Discharge Summary signed by Sharan Lopez MD at 4/23/2024  3:13 PM  Version 1 of 1    Author: Sharan Lopez MD Service: Pulmonology Author Type: Physician    Filed: 4/23/2024  3:13 PM Date of Service: 4/23/2024  3:11 PM Status: Signed    : Sharan Lopez MD (Physician)       Discharge Summary    Date of Admission: 4/18/2024    Date of Discharge: 4/23/2024    Hospital Course:   The patient was admitted to the hospital with congestive heart failure.  She was evaluated by cardiology.  She was diuresed.  Lisinopril 20 mg was initiated.  The patient did well clinically.  Her breathing improved and she was subsequently taken off oxygen.  The patient was anemic but did not receive blood.  She was stool Hemoccult negative.  The ferritin was within normal range although the iron saturation was only 17%.  The patient progressed with physical therapy and she was very poorly mobile and still needed further subacute rehabilitation.  She was also found to have a Klebsiella UTI and received empiric antibiotic course.    Discharge Medications:     Medication List        START taking these medications      acetaminophen 325 MG Tabs  Commonly known as: Tylenol  Take 2 tablets (650 mg total) by mouth in the morning and 2 tablets (650 mg total) before bedtime.     gabapentin 100 MG Caps  Commonly known as: Neurontin  Take 1 capsule (100 mg total) by mouth nightly.     * lidocaine-menthol 4-1 % Ptch  Place 1 patch onto the skin daily.  Start taking on: April 24, 2024     * lidocaine-menthol 4-1 % Ptch  Place 1 patch onto the skin daily.  Start taking on: April 24, 2024     * lidocaine-menthol 4-1 % Ptch  Place 1 patch onto the skin daily.  Start taking on: April 24, 2024     lisinopril 20 MG Tabs  Commonly known as: Prinivil; Zestril  Start taking on: April 24, 2024           *  This list has 3 medication(s) that are the same as other medications prescribed for you. Read the directions carefully, and ask your doctor or other care provider to review them with you.                CHANGE how you take these medications      HYDROcodone-acetaminophen 5-325 MG Tabs  Commonly known as: Norco  Take 1 tablet by mouth every 6 (six) hours as needed for Pain.  What changed:   when to take this  reasons to take this     zolpidem 5 MG Tabs  Commonly known as: Ambien  What changed:   medication strength  how much to take            CONTINUE taking these medications      enoxaparin 30 MG/0.3ML Sosy  Commonly known as: Lovenox     fluticasone propionate 50 MCG/ACT Susp  Commonly known as: Flonase  USE 2 SPRAYS IN EACH NOSTRIL DAILY     furosemide 40 MG Tabs  Commonly known as: Lasix     levothyroxine 50 MCG Tabs  Commonly known as: Synthroid  Take 1 tablet (50 mcg total) by mouth before breakfast.     metoprolol tartrate 25 MG Tabs  Commonly known as: Lopressor  Take 0.5 tablets (12.5 mg total) by mouth 2x Daily(Beta Blocker).     Misc. Devices Misc  20% salicylic acid with 2% 5-fluorouracil.  Apply to warts daily as instructed     ondansetron 4 MG/2ML Soln  Commonly known as: Zofran     Polyethylene Glycol 3350 17 g Pack  Commonly known as: MIRALAX     Sennosides 17.2 MG Tabs            STOP taking these medications      potassium chloride 10 MEQ Tbcr  Commonly known as: K-Dur               Where to Get Your Medications        These medications were sent to [facility] - [place], [place] - [address] [facility] [phone], [phone]  [address], [place] [address]      Phone: [phone]   acetaminophen 325 MG Tabs  lidocaine-menthol 4-1 % Ptch  lidocaine-menthol 4-1 % Ptch  lidocaine-menthol 4-1 % Ptch       You can get these medications from any pharmacy    Bring a paper prescription for each of these medications  gabapentin 100 MG Caps  HYDROcodone-acetaminophen 5-325 MG Tabs         Discharge  Plans:  Patient will return to rehab    Discharge Diagnosis:  Congestive heart failure, anemia, urinary tract infection, gait instability, generalized weakness    Sharan Lopez MD  Medical Director, Critical Care, [facility]  Medical Director, [facility]  Pager: [phone]      Electronically signed by Sharan Lopez MD on 2024  3:13 PM           Imaging Results (HF patients)    Chest X-Ray Results (HF patients only)    No exam resulted this encounter.      2D Echocardiogram Results (HF patients only)    CARD ECHO 2D DOPPLER (CPT=93306)    Result Date: 4/10/2024  Transthoracic Echocardiogram Name:Elba Malik Date: 04/10/2024 :  1928 Ht:  (65in)  BP: 158 / 74 MRN:  [id number]    Age:  89years    Wt:  (165lb) HR: 96bpm Loc:  [facility]       Gndr: F          BSA: 1.82m^2 Sonographer: Cy Ordering:    Sharan Lopez Consulting:  Jasmeet Crowder ---------------------------------------------------------------------------- History/Indications:   Risk factors:  Shortness of Breath. Orthopnea. Pulmonary Edema. ---------------------------------------------------------------------------- Procedure information:  A transthoracic complete 2D study was performed. Additional evaluation included M-mode, complete spectral Doppler, and color Doppler.  Patient status:  Inpatient.  Location:  Bedside.    No prior study was available for comparison.    This was a routine study. Transthoracic echocardiography for diagnosis and ventricular function evaluation. Image quality was adequate. The study was technically limited due to restricted patient mobility and patient sitting up. ECG rhythm:   Atrial fibrillation ---------------------------------------------------------------------------- Conclusions: 1. Left ventricle: The cavity size was normal. Wall thickness was normal.    Systolic function was mildly reduced. The estimated ejection fraction was    50%, by visual assessment. Although no diagnostic  regional wall motion    abnormality was identified, this possibility cannot be completely    excluded on the basis of this study. Unable to assess LV diastolic    function due to heart rhythm. 2. Right ventricle: Systolic function was reduced. 3. Aortic valve: There was mild regurgitation. 4. Mitral valve: There was mild regurgitation. 5. Pericardium, extracardiac: There was a left pleural effusion. Impressions:  No previous study was available for comparison. * ---------------------------------------------------------------------------- * Findings: Left ventricle:  The cavity size was normal. Wall thickness was normal. Systolic function was mildly reduced. The estimated ejection fraction was 50%, by visual assessment. Although no diagnostic regional wall motion abnormality was identified, this possibility cannot be completely excluded on the basis of this study. Unable to assess LV diastolic function due to heart rhythm. Left atrium:  The left atrial volume was normal. Right ventricle:  The cavity size was normal. Systolic function was reduced. Right atrium:  Well visualized. The atrium was normal in size. Mitral valve:  The leaflets were mildly thickened and mildly calcified. Leaflet separation was normal.  Doppler:  Transvalvular velocity was within the normal range. There was no evidence for stenosis. There was mild regurgitation.    The valve area by pressure half-time was 6.29cm^2. The valve area index by pressure half-time was 3.45cm^2/m^2. Aortic valve:   The valve was trileaflet. The leaflets were mildly thickened and mildly calcified. Cusp separation was normal.  Doppler:  Transvalvular velocity was within the normal range. There was no evidence for stenosis. There was mild regurgitation.    The peak systolic gradient was 11mm Hg. Tricuspid valve:  The valve is structurally normal. Leaflet separation was normal.  Doppler:  Transvalvular velocity was within the normal range. There was no evidence for  stenosis. There was no significant regurgitation. Pulmonic valve:   The valve is structurally normal. Cusp separation was normal.  Doppler:  Transvalvular velocity was within the normal range. There was no evidence for stenosis. There was no significant regurgitation. Pericardium:   There was no pericardial effusion. Pleura:  There was a left pleural effusion. Aorta: Aortic root: The aortic root was normal. Ascending aorta: The ascending aorta was normal. Pulmonary arteries: Systolic pressure could not be accurately estimated. ---------------------------------------------------------------------------- Measurements  Left ventricle                  Value          Ref  IVS thickness, ED, PLAX         0.9   cm       0.6 - 0.9  LV ID, ED, PLAX                 4.8   cm       3.8 - 5.2  LV ID, ES, PLAX             (H) 3.7   cm       2.2 - 3.5  LV PW thickness, ED, PLAX       0.8   cm       0.6 - 0.9  IVS/LV PW ratio, ED, PLAX       1.13           ---------  LV PW/LV ID ratio, ED, PLAX     0.17           ---------  LV ejection fraction        (L) 46    %        54 - 74  Stroke volume/bsa, 2D           32    ml/m^2   ---------  LVOT                            Value          Ref  LVOT ID                         2.1   cm       ---------  LVOT peak velocity, S           0.83  m/sec    ---------  LVOT VTI, S                     18.0  cm       ---------  LVOT peak gradient, S           3     mm Hg    ---------  LVOT mean gradient, S           2     mm Hg    ---------  Stroke volume (SV), LVOT DP     62    ml       ---------  Stroke index (SV/bsa), LVOT     34    ml/m^2   ---------  DP  Aortic valve                    Value          Ref  Aortic valve peak velocity,     1.62  m/sec    ---------  S  Aortic peak gradient, S         11    mm Hg    ---------  Velocity ratio, peak,           0.49           ---------  LVOT/AV  Aortic root                     Value          Ref  Aortic root ID                  3.2   cm       2.5 - 4.0   Ascending aorta                 Value          Ref  Ascending aorta ID              3.5   cm       1.9 - 3.5  Left atrium                     Value          Ref  LA volume/bsa, S                26    ml/m^2   16 - 34  LA volume, ES, 1-p A4C          38    ml       22 - 52  LA volume, ES, 1-p A2C          39    ml       22 - 52  LA volume, ES, A/L              47    ml       ---------  LA volume/bsa, ES, A/L          26    ml/m^2   16 - 34  Mitral valve                    Value          Ref  Mitral E-wave peak velocity     1.08  m/sec    ---------  Mitral deceleration time        118   ms       ---------  Mitral pressure half-time       35    ms       ---------  Mitral peak gradient, D         5     mm Hg    ---------  Mitral valve area, PHT, DP      6.29  cm^2     ---------  Mitral valve area/bsa, PHT,     3.45  cm^2/m^2 ---------  DP  Right atrium                    Value          Ref  RA ID, S-I, ES, A4C         (H) 5.7   cm       3.4 - 5.3  RA ID/bsa, S-I, ES, A4C         3.1   cm/m^2   1.9 - 3.1  RA area, ES, A4C                16    cm^2     10 - 18  RA volume, ES, 1-p A4C          36    ml       ---------  RA volume/bsa, ES, 1-p A4C      20    ml/m^2   9 - 33  Systemic veins                  Value          Ref  Estimated CVP                   3     mm Hg    ---------  Inferior vena cava              Value          Ref  ID                              2.0   cm       <=2.1  Right ventricle                 Value          Ref  TAPSE, 2D                   (L) 1.57  cm       >=1.70  TAPSE, MM                   (L) 1.57  cm       >=1.70  RV s', lateral                  9.5   cm/sec   >=9.5 Legend: (L)  and  (H)  fortino values outside specified reference range. ---------------------------------------------------------------------------- Prepared and electronically signed by Suleman Huerta 04/10/2024 10:31         Cath Angiogram Results (HF Patients only)    No exam resulted this encounter.          Physical Therapy Notes  (last 72 hours)      Physical Therapy Note signed by Yusuf Gandhi PT, DPT [facility] at 4/23/2024  9:42 AM  Version 2 of 2    Author: Yusuf Gandhi PT, DPT W[facility] Service: Rehab Author Type: Physical Therapist    Filed: 4/23/2024  9:42 AM Date of Service: 4/23/2024  9:00 AM Status: Addendum    : Yusuf Gandhi PT, DPT [facility] (Physical Therapist)    Related Notes: Original Note by Yusuf Gandhi PT, DPT [facility] (Physical Therapist) filed at 4/23/2024  9:40 AM       PHYSICAL THERAPY TREATMENT NOTE - INPATIENT     Room Number: 336/336-A       Presenting Problem: CHF       Problem List  Principal Problem:    Acute on chronic congestive heart failure, unspecified heart failure type (HCC)  Active Problems:    Community acquired pneumonia, unspecified laterality    Palliative care encounter    Pain    Goals of care, counseling/discussion      PHYSICAL THERAPY ASSESSMENT   Patient demonstrates limited progress this session, goals  remain in progress.    Patient continues to function below baseline with bed mobility, transfers, gait, stair negotiation, maintaining seated position, standing prolonged periods, and performing household tasks.  Contributing factors to remaining limitations include decreased functional strength, decreased endurance/aerobic capacity, pain, impaired standing balance, impaired motor planning, decreased muscular endurance, and medical status.  Next session anticipate patient to progress bed mobility, transfers, gait, stair negotiation, maintaining seated position, standing prolonged periods, and performing household tasks.  Physical Therapy will continue to follow patient for duration of hospitalization.    Patient continues to benefit from continued skilled PT services: to promote return to prior level of function and safety with continuous assistance and gradual rehabilitative therapy .    PLAN  PT Treatment Plan: Bed mobility;Body mechanics;Endurance;Energy conservation;Patient education;Gait  training;Transfer training;Balance training  Frequency (Obs): 5x/week    SUBJECTIVE  I feel limited with walking I can only take a few steps then I have to sit the L leg is weak and sore with standing I also have bad shoulders.     OBJECTIVE  Precautions: Limb alert - left    WEIGHT BEARING RESTRICTION           L Lower Extremity: Weight Bearing as Tolerated    PAIN ASSESSMENT   Rating: 3  Location: LLE with movement  Management Techniques: Activity promotion;Body mechanics;Breathing techniques;Relaxation;Repositioning    BALANCE  Static Sitting: Fair  Dynamic Sitting: Fair -  Static Standing: Poor -  Dynamic Standing: Poor -    ACTIVITY TOLERANCE  Pulse: 84  Heart Rate Source: Monitor  Resp: 18  BP: 152/58  BP Location: Right arm  BP Method: Automatic  Patient Position: Sitting     O2 WALK       AM-PAC '6-Clicks' INPATIENT SHORT FORM - BASIC MOBILITY  How much difficulty does the patient currently have...  Patient Difficulty: Turning over in bed (including adjusting bedclothes, sheets and blankets)?: A Lot   Patient Difficulty: Sitting down on and standing up from a chair with arms (e.g., wheelchair, bedside commode, etc.): A Lot   Patient Difficulty: Moving from lying on back to sitting on the side of the bed?: A Lot   How much help from another person does the patient currently need...   Help from Another: Moving to and from a bed to a chair (including a wheelchair)?: A Lot   Help from Another: Need to walk in hospital room?: A Lot   Help from Another: Climbing 3-5 steps with a railing?: Total     AM-PAC Score:  Raw Score: 11   Approx Degree of Impairment: 72.57%   Standardized Score (AM-PAC Scale): 33.86   CMS Modifier (G-Code): CL    FUNCTIONAL ABILITY STATUS  Functional Mobility/Gait Assessment  Gait Assistance: Maximum assistance  Distance (ft): SPT to chair  Assistive Device: Rolling walker  Pattern: Shuffle (antalgic gait LLE retropulsive)  Rolling: moderate assist  Supine to Sit: moderate assist  Sit to  Supine: moderate assist  Sit to Stand: maximum assist    Additional information: Pt ed with bed mobility with VC's for hand placement and max A to stand with RW. Pt ed with gait progression  4-5 shuffling steps tolerated to chair prior to fatgiue. Therex in chair reviewed x 10 reps each.     The patient's Approx Degree of Impairment: 72.57% has been calculated based on documentation in the [facility] '6 clicks' Inpatient Daily Activity Short Form.  Research supports that patients with this level of impairment may benefit from IP Rehab.  Final disposition will be made by interdisciplinary medical team.    THERAPEUTIC EXERCISES  Lower Extremity Ankle pumps  Heel slides  LAQ     Position Sitting & Standing       Patient End of Session: Up in chair;With  staff;Needs met;Call light within reach;RN aware of session/findings;All patient questions and concerns addressed;Alarm set    CURRENT GOALS   Goals to be met by: 5/10/24  Patient Goal Patient's self-stated goal is: to go home   Goal #1 Patient is able to demonstrate supine - sit EOB @ level: supervision      Goal #1   Current Status Max A   Goal #2 Patient is able to demonstrate transfers Sit to/from Stand at assistance level: minimum assistance with walker - rolling      Goal #2  Current Status Max a holding onto the therapist   Goal #3 Patient is able to ambulate 10 feet with assist device: walker - rolling at assistance level: minimum assistance   Goal #3   Current Status SPT max A holding onto the therapist   Goal #4     Goal #4   Current Status     Goal #5 Patient to demonstrate independence with home activity/exercise instructions provided to patient in preparation for discharge.   Goal #5   Current Status IN PROGRESS     Gait Training: 10 minutes  Therapeutic Exercise: 15 minutes           Physical Therapy Note signed by Yusuf Gandhi PT, NALDO [facility] at 4/23/2024  9:40 AM  Version 1 of 2    Author: Yusuf Gandhi PT, NALDO ERVINC Service: Rehab Author Type: Physical  Therapist    Filed: 4/23/2024  9:40 AM Date of Service: 4/23/2024  9:00 AM Status: Signed    : Yusuf Gandhi PT, DPT [facility] (Physical Therapist)    Related Notes: Addendum by Yusuf Gandhi PT, DPT W[facility] (Physical Therapist) filed at 4/23/2024  9:42 AM       PHYSICAL THERAPY TREATMENT NOTE - INPATIENT     Room Number: 336/336-A       Presenting Problem: CHF       Problem List  Principal Problem:    Acute on chronic congestive heart failure, unspecified heart failure type (HCC)  Active Problems:    Community acquired pneumonia, unspecified laterality    Palliative care encounter    Pain    Goals of care, counseling/discussion      PHYSICAL THERAPY ASSESSMENT   Patient demonstrates limited progress this session, goals  remain in progress.    Patient continues to function below baseline with bed mobility, transfers, gait, stair negotiation, maintaining seated position, standing prolonged periods, and performing household tasks.  Contributing factors to remaining limitations include decreased functional strength, decreased endurance/aerobic capacity, pain, impaired standing balance, impaired motor planning, decreased muscular endurance, and medical status.  Next session anticipate patient to progress bed mobility, transfers, gait, stair negotiation, maintaining seated position, standing prolonged periods, and performing household tasks.  Physical Therapy will continue to follow patient for duration of hospitalization.    Patient continues to benefit from continued skilled PT services: to promote return to prior level of function and safety with continuous assistance and gradual rehabilitative therapy .    PLAN  PT Treatment Plan: Bed mobility;Body mechanics;Endurance;Energy conservation;Patient education;Gait training;Transfer training;Balance training  Frequency (Obs): 5x/week    SUBJECTIVE  I feel limited with walking I can only take a few steps then I have to sit the L leg is weak and sore with standing I also  have bad shoulders.     OBJECTIVE  Precautions: Limb alert - left    WEIGHT BEARING RESTRICTION           L Lower Extremity: Weight Bearing as Tolerated    PAIN ASSESSMENT   Rating: 3  Location: LLE with movement  Management Techniques: Activity promotion;Body mechanics;Breathing techniques;Relaxation;Repositioning    BALANCE  Static Sitting: Fair  Dynamic Sitting: Fair -  Static Standing: Poor -  Dynamic Standing: Poor -    ACTIVITY TOLERANCE                          O2 WALK       AM-PAC '6-Clicks' INPATIENT SHORT FORM - BASIC MOBILITY  How much difficulty does the patient currently have...  Patient Difficulty: Turning over in bed (including adjusting bedclothes, sheets and blankets)?: A Lot   Patient Difficulty: Sitting down on and standing up from a chair with arms (e.g., wheelchair, bedside commode, etc.): A Lot   Patient Difficulty: Moving from lying on back to sitting on the side of the bed?: A Lot   How much help from another person does the patient currently need...   Help from Another: Moving to and from a bed to a chair (including a wheelchair)?: A Lot   Help from Another: Need to walk in hospital room?: A Lot   Help from Another: Climbing 3-5 steps with a railing?: Total     AM-PAC Score:  Raw Score: 11   Approx Degree of Impairment: 72.57%   Standardized Score (AM-PAC Scale): 33.86   CMS Modifier (G-Code): CL    FUNCTIONAL ABILITY STATUS  Functional Mobility/Gait Assessment  Gait Assistance: Maximum assistance  Distance (ft): SPT to chair  Assistive Device: Rolling walker  Pattern: Shuffle (antalgic gait LLE retropulsive)  Rolling: moderate assist  Supine to Sit: moderate assist  Sit to Supine: moderate assist  Sit to Stand: maximum assist    Additional information: Pt ed with bed mobility with VC's for hand placement and max A to stand with RW. Pt ed with gait progression  4-5 shuffling steps tolerated to chair prior to fatgiue. Therex in chair reviewed x 10 reps each.     The patient's Approx Degree of  Impairment: 72.57% has been calculated based on documentation in the [facility] '6 clicks' Inpatient Daily Activity Short Form.  Research supports that patients with this level of impairment may benefit from IP Rehab.  Final disposition will be made by interdisciplinary medical team.    THERAPEUTIC EXERCISES  Lower Extremity Ankle pumps  Heel slides  LAQ     Position Sitting & Standing       Patient End of Session: Up in chair;With  staff;Needs met;Call light within reach;RN aware of session/findings;All patient questions and concerns addressed;Alarm set    CURRENT GOALS   Goals to be met by: 5/10/24  Patient Goal Patient's self-stated goal is: to go home   Goal #1 Patient is able to demonstrate supine - sit EOB @ level: supervision      Goal #1   Current Status Max A   Goal #2 Patient is able to demonstrate transfers Sit to/from Stand at assistance level: minimum assistance with walker - rolling      Goal #2  Current Status Max a holding onto the therapist   Goal #3 Patient is able to ambulate 10 feet with assist device: walker - rolling at assistance level: minimum assistance   Goal #3   Current Status SPT max A holding onto the therapist   Goal #4     Goal #4   Current Status     Goal #5 Patient to demonstrate independence with home activity/exercise instructions provided to patient in preparation for discharge.   Goal #5   Current Status IN PROGRESS     Gait Training: 10 minutes  Therapeutic Exercise: 15 minutes                 Occupational Therapy Notes (last 72 hours)  Notes from 4/20/2024  4:16 PM through 4/23/2024  4:16 PM   No notes of this type exist for this encounter.     Video Swallow Study Notes    No notes of this type exist for this encounter.     SLP Notes    No notes of this type exist for this encounter.     Immunizations     Name Date      Arexvy RSV Vaccine 12/06/23     Covid-19 Pfizer 05/04/22     Covid-19 Pfizer 02/16/21     Covid-19 Pfizer 01/26/21     Covid-19 Pfizer Bivalent 11/15/22      INFLUENZA 10/10/23     INFLUENZA 10/13/22     INFLUENZA 10/10/20     INFLUENZA 11/01/18     INFLUENZA 11/16/15     INFLUENZA 10/28/14     INFLUENZA 10/15/12     INFLUENZA 10/21/99     Pfizer Covid-19 Vaccine 30mcg/0.3mL 12yrs+ 11/09/23     Pneumovax 23 11/16/15     Pneumovax 23 01/24/00     Zoster Vaccine Live (Zostavax) 03/10/13     Zoster Vaccine Recombinant Adjuvanted (Shingrix) 12/06/23       Multidisciplinary Problems     Active Goals        Problem: Patient/Family Goals    Goal Priority Disciplines Outcome Interventions   Patient/Family Long Term Goal     Interdisciplinary Progressing    Description: Patient's Long Term Goal: to go back to facility    Interventions:  - follow medical regimen  - See additional Care Plan goals for specific interventions   Patient/Family Short Term Goal     Interdisciplinary Progressing    Description: Patient's Short Term Goal: to not be SOB    Interventions:   - follow medical regimen  - See additional Care Plan goals for specific interventions

## (undated) NOTE — LETTER
AUTHORIZATION FOR SURGICAL OPERATION OR OTHER PROCEDURE    1. I hereby authorize GRACIELA Gibbs, and [facility] staff assigned to my case to perform the following operation and/or procedure at the [place] [facility]:    _____________Cortisone injection left shoulder_______________________________      _______________________________________________________________________________________________    2. My physician has explained the nature and purpose of the operation or other procedure, possible alternative methods of treatment, the risks involved, and the possibility of complication to me. I acknowledge that no guarantee has been made as to the result that may be obtained. 3.  I recognize that, during the course of this operation, or other procedure, unforseen conditions may necessitate additional or different procedure than those listed above. I, therefore, further authorize and request that the above named physician, his/her physician assistants or designees perform such procedures as are, in his/her professional opinion, necessary and desirable. 4.  Any tissue or organs removed in the operation or other procedure may be disposed of by and at the discretion of the [facility] and [facility]. 5.  I understand that in the event of a medical emergency, I will be transported by local paramedics to [facility] or other hospital emergency department. 6.  I certify that I have read and fully understand the above consent to operation and/or other procedure. 7.  I acknowledge that my physician has explained sedation/analgesia administration to me including the risks and benefits. I consent to the administration of sedation/analgesia as may be necessary or desirable in the judgement of my physician. Witness signature: ___________________________________________________ Date:  ______/______/_____                    Time:  ________ A. M.  P.M.        Patient Name: ______________________________________________________  (please print)      Patient signature:  ___________________________________________________             Relationship to Patient:           []  Parent    Responsible person                          []  Spouse  In case of minor or                    [] Other  _____________   Incompetent name:  __________________________________________________                               (please print)      _____________      Responsible person  In case of minor or  Incompetent signature:  _______________________________________________    Statement of Physician  My signature below affirms that prior to the time of the procedure, I have explained to the patient and/or his/her guardian, the risks and benefits involved in the proposed treatment and any reasonable alternative to the proposed treatment. I have also explained the risks and benefits involved in the refusal of the proposed treatment and have answered the patient's questions.                         Date:  ______/______/_______  Provider                      Signature:  __________________________________________________________       Time:  ___________ A.M    P.M.

## (undated) NOTE — IP AVS SNAPSHOT
Patient Demographics     Address  [address]   [facility] [address] Phone  [phone] (Home) *Preferred*  [phone] (Mobile) E-mail Address  [email or web address]      Patient Contacts     Name Relation Home Work Mobile    Nahomy Green Relative   [phone]    Kenn Wiggins Daughter   [phone]    Maribell Us Daughter   [phone]    Lily Suggs   [phone]      Allergies as of 4/12/2024  Review status set to In Progress on 4/5/2024   No Known Allergies     Code Status Information     Code Status    DNAR/Selective Treatment      Patient Instructions    None        Your Home Meds List      TAKE these medications       Instructions Authorizing Provider Morning Afternoon Evening As Needed   enoxaparin 30 MG/0.3ML Sosy  Commonly known as: Lovenox  Start taking on: April 13, 2024  Next dose due: Start tomorrow morning (4/13/24)      Inject 0.3 mL (30 mg total) into the skin daily.   Sharan Lopez         fleet enema 7-19 GM/118ML Enem      Place 133 mL rectally once as needed.   Sharan Lopez         fluticasone propionate 50 MCG/ACT Susp  Commonly known as: Flonase  Next dose due: Start tomorrow morning (4/13/24)      USE 2 SPRAYS IN EACH NOSTRIL DAILY   Ilir Murguia         furosemide 40 MG Tabs  Commonly known as: Lasix  Start taking on: April 13, 2024  Next dose due: Start tomorrow morning (4/13/24)      Take 1 tablet (40 mg total) by mouth daily.   Sharan Lopez         HYDROcodone-acetaminophen 5-325 MG Tabs  Commonly known as: Norco      Take 1 tablet by mouth every 4 (four) hours as needed.   Sharan Lopez         levothyroxine 50 MCG Tabs  Commonly known as: Synthroid  Next dose due: Start tomorrow morning before breakfast (4/13/24)      Take 1 tablet (50 mcg total) by mouth before breakfast.   Sharan Lopez         metoprolol tartrate 25 MG Tabs  Commonly known as: Lopressor  Next dose due: Start this evening      Take 0.5 tablets (12.5 mg total) by mouth  2x Daily(Beta Blocker).   Sharan Lopez         Misc. Devices Misc      20% salicylic acid with 2% 5-fluorouracil.  Apply to warts daily as instructed   Yamile Nichols         ondansetron 4 MG/2ML Soln  Commonly known as: Zofran      Inject 2 mL (4 mg total) into the vein every 6 (six) hours as needed.   Sharan Lopez         Polyethylene Glycol 3350 17 g Pack  Commonly known as: MIRALAX      Take 17 g by mouth daily as needed (If no bowel movement in last 24 hours).   Sharan Lopez         Sennosides 17.2 MG Tabs      Take 1 tablet (17.2 mg total) by mouth nightly as needed (constipation, as needed if no bowel movement that day).   Sharan Lopez         zolpidem 10 MG Tabs  Commonly known as: Ambien      Take 1 tablet (10 mg total) by mouth nightly as needed for Sleep.   Sharan Lopez                  106-106-A - MAR ACTION REPORT  (last 48 hrs)    ** SITE UNKNOWN **     Order ID Medication Name Action Time Action Reason Comments    [id number] HYDROcodone-acetaminophen (Norco) 5-325 MG per tab 1 tablet 04/11/24 0045 Given      [id number] HYDROcodone-acetaminophen (Norco) 5-325 MG per tab 1 tablet 04/11/24 2008 Given      [id number] HYDROcodone-acetaminophen (Norco) 5-325 MG per tab 1 tablet 04/12/24 0509 Given      [id number] amLODIPine (Norvasc) tab 5 mg 04/11/24 1005 Given      [id number] amLODIPine (Norvasc) tab 5 mg 04/12/24 0913 Given      [id number] docusate sodium (Colace) cap 100 mg 04/12/24 0913 Given      [id number] furosemide (Lasix) tab 40 mg 04/11/24 1005 Given      [id number] furosemide (Lasix) tab 40 mg 04/12/24 0913 Given      [id number] levothyroxine (Synthroid) tab 50 mcg 04/11/24 0546 Given      [id number] levothyroxine (Synthroid) tab 50 mcg 04/12/24 0700 Given by Other  given by night RN    [id number] metoprolol tartrate (Lopressor) partial tab 12.5 mg 04/10/24 1914 Given      [id number] metoprolol tartrate (Lopressor) partial tab 12.5 mg 04/11/24 0546 Given      [id number] metoprolol  tartrate (Lopressor) partial tab 12.5 mg 04/11/24 1842 Given      [id number] metoprolol tartrate (Lopressor) partial tab 12.5 mg 04/12/24 0508 Given      [id number] potassium chloride (K-Dur) tab 40 mEq 04/12/24 0948 Given      [id number] sennosides (Senokot) tab 17.2 mg 04/10/24 2031 Given      [id number] zolpidem (Ambien) tab 10 mg 04/10/24 1956 Given      [id number] zolpidem (Ambien) tab 10 mg 04/11/24 2120 Given            BILATERAL NARES     Order ID Medication Name Action Time Action Reason Comments    [id number] fluticasone propionate (Flonase) 50 MCG/ACT nasal suspension 1 spray 04/11/24 1005 Given      [id number] fluticasone propionate (Flonase) 50 MCG/ACT nasal suspension 1 spray 04/11/24 2009 Given      [id number] fluticasone propionate (Flonase) 50 MCG/ACT nasal suspension 1 spray 04/12/24 0913 Given            LEFT LOWER ABDOMEN     Order ID Medication Name Action Time Action Reason Comments    [id number] enoxaparin (Lovenox) 30 MG/0.3ML SUBQ injection 30 mg 04/12/24 0913 Given            RIGHT LOWER ABDOMEN     Order ID Medication Name Action Time Action Reason Comments    [id number] enoxaparin (Lovenox) 30 MG/0.3ML SUBQ injection 30 mg 04/11/24 1006 Given              Recent Vital Signs    Flowsheet Row Most Recent Value   /57 Filed at 04/12/2024 0839   Pulse 95 Filed at 04/12/2024 0839   Resp 20 Filed at 04/12/2024 0839   Temp 98.4 °F (36.9 °C) Filed at 04/12/2024 0839   SpO2 92 % Filed at 04/12/2024 0839      Patient's Most Recent Weight    Flowsheet Row Most Recent Value   Patient Weight 71.7 kg (158 lb 1.6 oz)         Lab Results Last 24 Hours      Basic Metabolic Panel (8) [[id number]] (Abnormal)  Resulted: 04/12/24 0744, Result status: Final result   Ordering provider: Sharan Lopez MD  04/11/24 2300 Resulting lab: [facility] ([facility])    Specimen Information    Type Source Collected On   Blood — 04/12/24 0649          Components    Component Value Reference Range Flag  Lab   Glucose 97 70 - 99 mg/dL — [facility] ([facility])   Sodium 131 136 - 145 mmol/L L [facility])   Potassium 3.4 3.5 - 5.1 mmol/L L [facility])   Chloride 99 98 - 112 mmol/L — [facility])   CO2 27.0 21.0 - 32.0 mmol/L — [facility])   Anion Gap 5 0 - 18 mmol/L — [facility])   BUN 43 9 - 23 mg/dL H [facility] ([facility])   Creatinine 1.13 0.55 - 1.02 mg/dL H [facility])   BUN/CREA Ratio 38.1 10.0 - 20.0 H [facility] ([facility])   Calcium, Total 8.7 8.7 - 10.4 mg/dL — [facility])   Calculated Osmolality 283 275 - 295 mOsm/kg — [facility] ([facility])   eGFR-Cr 45 >=60 mL/min/1.73m2 L [facility] ([facility])            CBC With Differential With Platelet [[id number]] (Abnormal)  Resulted: 04/12/24 0711, Result status: Final result   Ordering provider: Sharan Lopez MD  04/11/24 2300 Resulting lab: [facility] ([facility])   Narrative:  The following orders were created for panel order CBC With Differential With Platelet.  Procedure                               Abnormality         Status                     ---------                               -----------         ------                     CBC W/ DIFFERENTIAL[[id number]]          Abnormal            Final result                 Please view results for these tests on the individual orders.    Specimen Information    Type Source Collected On   Blood — 04/12/24 0649            Testing Performed By     Lab - Abbreviation Name Director Address Valid Date Range    162 - [facility] ([facility]) [facility] ([facility]) Armando Chavarria [facility] [id number] 03/19/20 1442 - Present            Microbiology Results (All)     None         H&P - H&P Note      H&P signed by Kell Barlow MD at 4/5/2024  6:29 AM  Version 1 of 1    Author: Kell Barlow MD Service: Internal Medicine Author Type: Physician    Filed: 4/5/2024  6:29 AM Date of Service: 4/5/2024  4:26 AM Status:  Signed    : Kell Barlow MD (Physician)       [facility]  [facility] of [facility]    History & Physical    Elba Malik Patient Status:  Emergency    1928 MRN [id number]   Location [facility] EMERGENCY DEPARTMENT Attending Sandy Horner DO   Hosp Day # 0 PCP Sharan Lopez MD     Date:  2024  Date of Admission:  2024    Chief Complaint:  Chief Complaint   Patient presents with    Hip Pain       History of Present Illness:  Elba Malik is a(n) 89 year old female, who presents after sustaining mechanical fall and now with left hip pain. PMHx significant for arthritis, essential hypertension.  Patient was in the bathroom when she felt that her legs were giving out and she fell to the ground without hitting her head.  She did not have any loss of consciousness prior to the episode.  She did not feel dizzy or lightheaded.  Upon getting up from the bathroom floor she felt left hip pain and unable to ambulate.  Usually she gets around with a walker.  Denies any numbness or tingling sensation in her extremities.  Denies any chest pain or shortness of breath.  Denies any dizziness.  On presentation to the ED, initial vital signs reveal temp 97.3, heart rate 81, respiratory rate 16, blood pressure 171/66.  Imaging including CT head without any intracranial abnormalities.  CT spine no fractures.  X-ray of left hip reveals questionable acute nondisplaced periprosthetic fracture at the intertrochanteric proximal left femur.  Patient treated with a dose of Norco.  Patient admitted under hospitalist service with consultation to orthopedic surgery.    History:  Past Medical History:   Diagnosis Date    Arthritis     Back problem     multiple spinal injections    Cataract     bilateral cataract surgery    Cataracts, bilateral     cataracts, PC IOL  OD Dr. Tate,  PC IOL Dr. Concepcion in [place], OS    Colon polyp 2006    small polyps    Essential  hypertension     Osteoarthritis     Other and unspecified hyperlipidemia     Other ill-defined conditions(799.89)     Left wrist trauma, surgical repair    Posterior capsule opacification     OS, YAG laser 2014    Ptosis of right eyelid 2014    Ptosis RUL     Past Surgical History:   Procedure Laterality Date    CATARACT      cataract extraction    CATARACT Left 1997    Phaco w/PC IOL    CATARACT Right 1998    Phaco w/ PC IOL    CATARACT EXTRACTION W/  INTRAOCULAR LENS IMPLANT Left 1997    PC IOL/ Dr. Pichardo    CATARACT EXTRACTION W/  INTRAOCULAR LENS IMPLANT Right 1998    PC IOL / Dr. Concepcion    CHOLECYSTECTOMY      COLONOSCOPY      ERCP,DIAGNOSTIC  10/16/2018    Choledocholithiasis, dilated intrahepatic and extrahepatic biliary tree    HIP REPLACEMENT SURGERY  97/0 per NG    HYSTERECTOMY      Partial    LAPAROSCOPIC CHOLECYSTECTOMY  05/27/2016    OTHER SURGICAL HISTORY      Left wrist trauma, surgical repair    OTHER SURGICAL HISTORY  05/27/2016    Laparoscopy with bilateral salpingo-oophorectomy    SYNVISC, INTRA-ARTICULAR Bilateral 2013    Synvisc injection bilateral knees    TOTAL HIP REPLACEMENT      bilateral hip replacements    YAG CAPSULOTOMY - OS - LEFT EYE Left 1998    Dr. Concepcion     Family History   Problem Relation Age of Onset    Ear Problems Mother         hearing loss    Other (Other) Father         emphysema    Diabetes Neg     Glaucoma Neg     Macular degeneration Neg       reports that she has never smoked. She has never used smokeless tobacco. She reports that she does not drink alcohol and does not use drugs.    Allergies:  No Known Allergies    Home Medications:  Prior to Admission Medications   Prescriptions Last Dose Informant Patient Reported? Taking?   FLUTICASONE PROPIONATE 50 MCG/ACT Nasal Suspension   No No   Sig: USE 2 SPRAYS IN EACH NOSTRIL DAILY   HYDROcodone-acetaminophen 5-325 MG Oral Tab   No No   Sig: Take 1-2 tablets by mouth every 8 (eight) hours as needed for Pain.   Misc.  Devices Does not apply Misc   No No   Si% salicylic acid with 2% 5-fluorouracil.  Apply to warts daily as instructed   TRAMADOL 50 MG Oral Tab   No No   Sig: Take 1 tablet (50 mg total) by mouth 2 (two) times daily as needed for Pain.   acetaminophen-codeine (TYLENOL WITH CODEINE #3) 300-30 MG Oral Tab   No No   Sig: Take 1 tablet by mouth daily as needed for Pain.   Patient not taking: Reported on 2024   amLODIPine 5 MG Oral Tab   No No   Sig: Take 1 tablet (5 mg total) by mouth daily.   celecoxib 200 MG Oral Cap   Yes No   Sig: Take 1 capsule (200 mg total) by mouth daily.   levothyroxine 50 MCG Oral Tab   No No   Sig: Take 1 tablet (50 mcg total) by mouth before breakfast.   metoprolol tartrate 25 MG Oral Tab   No No   Sig: Take 0.5 tablets (12.5 mg total) by mouth 2x Daily(Beta Blocker).   traMADol 50 MG Oral Tab   No No   Sig: Take 1 tablet (50 mg total) by mouth every 12 (twelve) hours as needed for Pain.   triamterene-hydroCHLOROthiazide 37.5-25 MG Oral Cap   No No   Sig: Take 1 capsule by mouth daily.   zolpidem 10 MG Oral Tab   No No   Sig: Take 1 tablet (10 mg total) by mouth nightly as needed for Sleep. AT BEDTIME   zolpidem 10 MG Oral Tab   No No   Sig: Take 1 tablet (10 mg total) by mouth nightly as needed for Sleep.      Facility-Administered Medications: None       Review of Systems:  Constitutional: Negative  Eye:  Negative.  Ear/Nose/Mouth/Throat:  Negative.  Respiratory:  Negative  Cardiovascular: Negative  Gastrointestinal:  Negative.  Genitourinary:  Negative  Endocrine:  Negative.  Immunologic:  Negative.  Musculoskeletal:  + Left hip pain  Integumentary:  Negative.  Neurologic:  Negative.  Psychiatric:  Negative.  ROS reviewed as documented in chart    Physical Exam:  Temp:  [97.3 °F (36.3 °C)] 97.3 °F (36.3 °C)  Pulse:  [69-81] 69  Resp:  [16-20] 20  BP: (152-186)/(59-77) 152/59  SpO2:  [93 %-95 %] 95 %    General:  Alert and oriented.  Elderly.  Cachectic  Diffuse skin problem:   None.  Eye:  Pupils are equal, round and reactive to light, extraocular movements are intact, Normal conjunctiva.  HENT:  Normocephalic, oral mucosa is moist.  Head:  Normocephalic, atraumatic.  Neck:  Supple, non-tender, no carotid bruit, no jugular venous distention, no lymphadenopathy, no thyromegaly.  Respiratory:  Lungs are clear to auscultation, respirations are non-labored, breath sounds are equal, symmetrical chest wall expansion.  Cardiovascular:  Normal rate, regular rhythm, no murmur, no edema.  Gastrointestinal:  Soft, non-tender, non-distended, normal bowel sounds, no organomegaly.  Lymphatics:  No lymphadenopathy neck, axilla, groin.  Musculoskeletal: Normal range of motion.  normal strength.  Left hip pain  Feet:  Normal pulses.  Neurologic:  Alert, oriented, no focal deficits, cranial nerves II-XII are grossly intact.  Cognition and Speech:  Oriented, speech clear and coherent.  Psychiatric:  Cooperative, appropriate mood & affect.      Laboratory Data:        Imaging:  No results found.     Assessment and Plan:    Questionable acute nondisplaced periprosthetic fx L femur s/p  mechanical fall  -Noted on left x-ray hip, images reviewed.  -Orthopedic surgery consulted, pending evaluation  -Obtain MRI to further evaluate the fracture  -Pain control  -Maintain n.p.o. pending evaluation  -Patient  has chronic pain and is on Norco as well as tramadol at home given her age, she may benefit from being off of pain medications as she is at high risk for further falls  -PT OT/    CKD stage III  -BMP to evaluate renal function is currently pending  -Initiate gentle IV fluid hydration given n.p.o. status  -Avoid nephrotoxins.  Renally dose medications.  -Hold home dose of triamterene-hydrochlorothiazide    Essential hypertension-elevated   -suspecting secondary to pain  - resume Lopressor with holding parameters.  Resume amlodipine.  Continue to closely monitor blood pressure    Other medical  conditions  Arthritis   Chronic pain  Bilateral cataracts      Prophylaxis  Lovenox    CODE STATUS  Full    Primary care physician  Sharan Lopez MD    60 minutes spent on this admission - examining patient, obtaining history, reviewing previous medical records, going over test results/imaging and discussing plan of care. All questions answered.     Disposition  Clinical course will dictate outcome      Kell Barlow MD  2024  4:26 AM       Electronically signed by Kell Barlow MD on 2024  6:29 AM              Consults - MD Consult Notes      Consults signed by Brad Jeff MD at 2024  3:50 PM     Author: Brad Jeff MD Service: Cardiology Author Type: Physician    Filed: 2024  3:50 PM Date of Service: 2024  3:13 PM Status: Signed    : Brad Jeff MD (Physician)         Cardiology Consult Note    Elba Malik Patient Status:  Inpatient    1928 MRN [id number]   Location [facility] 1W Attending Liane Loyd, DO   Hosp Day # 4 PCP Sharan Lopez MD     89-year-old female presents after a fall which is described as mechanical however found to have pulmonary edema on exam therefore cardiology consulted.    Has had SOB and orthopnea for the past 24 hours, LE edema is also new.      ED work up  Creatinine normal, metabolic acidosis nongap  Sodium 128  BNP 1000, chest x-ray with pleural effusions and mild pulmonary edema  Hemoglobin 8.3 baseline 10-11      Assessment:    Heart failure: Most likely diastolic however no recent ejection fraction assessment  Periprosthetic hip fracture, no surgical intervention recommended  Cardiac risk factors: Hypertension, age      Plan:  Agree with diuresis, continue IV Lasix twice daily  Follow sodium closely  Follow-up echocardiogram  Further recommendations to follow      Level of care: C5    Brad Jeff MD  Interventional Cardiology  [facility]  [place]    --------------------------------------------------------------------------------------------------------------------------------  ROS 10 systems reviewed, pertinent findings above.  ROS    History:  Past Medical History:   Diagnosis Date    Arthritis     Asthma (HCC)     Back problem     multiple spinal injections    Cataract     bilateral cataract surgery    Cataracts, bilateral     cataracts, PC IOL 1997 OD Dr. Tate,  PC IOL Dr. Concepcion in [place], OS    Colon polyp 03/24/2006    small polyps    Disorder of thyroid     Essential hypertension     Hearing impairment     High blood pressure     Osteoarthritis     Other and unspecified hyperlipidemia     Other ill-defined conditions(799.89)     Left wrist trauma, surgical repair    Posterior capsule opacification     OS, YAG laser 2014    Ptosis of right eyelid 2014    Ptosis RUL    Shortness of breath     Visual impairment      Past Surgical History:   Procedure Laterality Date    CATARACT      cataract extraction    CATARACT Left 1997    Phaco w/PC IOL    CATARACT Right 1998    Phaco w/ PC IOL    CATARACT EXTRACTION W/  INTRAOCULAR LENS IMPLANT Left 1997    PC IOL/ Dr. Pichardo    CATARACT EXTRACTION W/  INTRAOCULAR LENS IMPLANT Right 1998    PC IOL / Dr. Concepcion    CHOLECYSTECTOMY      COLONOSCOPY      ERCP,DIAGNOSTIC  10/16/2018    Choledocholithiasis, dilated intrahepatic and extrahepatic biliary tree    HIP REPLACEMENT SURGERY  97/0 per NG    HYSTERECTOMY      Partial    LAPAROSCOPIC CHOLECYSTECTOMY  05/27/2016    OTHER SURGICAL HISTORY      Left wrist trauma, surgical repair    OTHER SURGICAL HISTORY  05/27/2016    Laparoscopy with bilateral salpingo-oophorectomy    SYNVISC, INTRA-ARTICULAR Bilateral 2013    Synvisc injection bilateral knees    TOTAL HIP REPLACEMENT      bilateral hip replacements    YAG CAPSULOTOMY - OS - LEFT EYE Left 1998    Dr. Concepcion     Family History   Problem Relation Age of Onset    Ear Problems Mother         hearing loss     Other (Other) Father         emphysema    Diabetes Neg     Glaucoma Neg     Macular degeneration Neg       reports that she has never smoked. She has never used smokeless tobacco. She reports that she does not drink alcohol and does not use drugs.    Objective:   Temp: 97.9 °F (36.6 °C)  Pulse: 102  Resp: 18  BP: 177/73    Intake/Output:     Intake/Output Summary (Last 24 hours) at 4/9/2024 1513  Last data filed at 4/9/2024 1429  Gross per 24 hour   Intake 2489 ml   Output 2450 ml   Net 39 ml       Physical Exam:     General: NAD  HEENT: Normocephalic, anicteric sclera, neck supple.  Neck: No JVD, carotids 2+, no bruits.  Cardiac: Regular rate and rhythm. S1, S2 normal. No murmur, pericardial rub, S3.  Lungs: Clear without wheezes, rales, rhonchi or dullness.  Normal excursions and effort.  Abdomen: Soft, non-tender. BS-present.  Extremities: Without clubbing, cyanosis or edema.  Peripheral pulses are 2+.  Neurologic: Non-focal  Skin: Warm and dry.       Brad Jeff MD  4/9/2024  3:13 PM        Electronically signed by Brad Jeff MD on 4/9/2024  3:50 PM              Discharge Summary - D/C Summary      Discharge Summary signed by Sharan Lopez MD at 4/12/2024 10:26 AM  Version 1 of 1    Author: Sharan Lopez MD Service: Pulmonology Author Type: Physician    Filed: 4/12/2024 10:26 AM Date of Service: 4/12/2024 10:22 AM Status: Signed    : Sharan Lopez MD (Physician)       Discharge Summary    Date of Admission: 4/5/2024    Date of Discharge: 4/12/24    Hospital Course:   The patient was admitted to the hospital with a periprosthetic hip fracture.  She was evaluated by orthopedics and the decision was made not to proceed to surgery.  Unfortunately, she developed pulmonary edema resulting in significant respiratory impairment with radiographic changes and oxygen requirement.  Cardiology was consulted.  Medications were adjusted including diuretic.  The patient had significant anemia.  She  received DVT prophylaxis.  Her blood pressure was controlled with medication.  MRI of the hip demonstrated acute to subacute mildly displaced periprosthetic fracture involving the greater trochanter.  Echocardiography revealed ejection fraction 50% and diastolic dysfunction was suspected.  She developed ankle swelling and discomfort and uric acid level was normal.  She progressed very slowly with physical therapy and the decision was made to admit directly to subacute rehabilitation.  She wanted to go to [facility] but unfortunately beds have been tight.  If the patient cannot get into [facility] today, will discharge to [facility] rehab [place].    Discharge Medications:     Medication List        START taking these medications      enoxaparin 30 MG/0.3ML Sosy  Commonly known as: Lovenox  Start taking on: April 13, 2024     fleet enema 7-19 GM/118ML Enem     furosemide 40 MG Tabs  Commonly known as: Lasix  Start taking on: April 13, 2024     HYDROcodone-acetaminophen 5-325 MG Tabs  Commonly known as: Norco     ondansetron 4 MG/2ML Soln  Commonly known as: Zofran     Polyethylene Glycol 3350 17 g Pack  Commonly known as: MIRALAX     Sennosides 17.2 MG Tabs            CHANGE how you take these medications      zolpidem 10 MG Tabs  Commonly known as: Ambien  What changed:   additional instructions  Another medication with the same name was removed. Continue taking this medication, and follow the directions you see here.            CONTINUE taking these medications      fluticasone propionate 50 MCG/ACT Susp  Commonly known as: Flonase  USE 2 SPRAYS IN EACH NOSTRIL DAILY     levothyroxine 50 MCG Tabs  Commonly known as: Synthroid  Take 1 tablet (50 mcg total) by mouth before breakfast.     metoprolol tartrate 25 MG Tabs  Commonly known as: Lopressor  Take 0.5 tablets (12.5 mg total) by mouth 2x Daily(Beta Blocker).     Misc. Devices Misc  20% salicylic acid with 2% 5-fluorouracil.  Apply to warts daily as instructed             STOP taking these medications      amLODIPine 5 MG Tabs  Commonly known as: Norvasc     celecoxib 200 MG Caps  Commonly known as: CeleBREX     traMADol 50 MG Tabs  Commonly known as: Ultram     triamterene-hydroCHLOROthiazide 37.5-25 MG Caps  Commonly known as: Dyazide              Discharge Plans:  Will be discharged to rehab, either [facility] or [place]    Discharge Diagnosis:  Fall with periprosthetic hip fracture, congestive heart failure, anemia, hypertension, lower extremity edema, shoulder pain, depression    Sharan Lopez MD  Medical Director, Critical Care, [facility]  Medical Director, [facility]  Pager: [phone]      Electronically signed by Sharan Lopez MD on 4/12/2024 10:26 AM              Physical Therapy Notes (last 72 hours)      Physical Therapy Note signed by Peri Mahajan PT at 4/11/2024 12:49 PM  Version 1 of 1    Author: Peri Mahajan PT Service: — Author Type: Physical Therapist    Filed: 4/11/2024 12:49 PM Date of Service: 4/11/2024 10:15 AM Status: Signed    : Peri Mahajan PT (Physical Therapist)       PHYSICAL THERAPY TREATMENT NOTE - INPATIENT     Room Number: 106/106-A       Presenting Problem: mechanical fall with periprosthetic hip fracture of L femur  Co-Morbidities : bilateral hip replacements, HTN, arthritis    Problem List  Principal Problem:    Periprosthetic hip fracture, initial encounter  Active Problems:    Left hip pain    Fall, initial encounter      PHYSICAL THERAPY ASSESSMENT   Patient demonstrates fair progress this session, goals  remain in progress.    Patient continues to function below baseline with bed mobility, transfers, and gait.  Contributing factors to remaining limitations include decreased functional strength, pain, and medical status.  Next session anticipate patient to progress bed mobility, transfers, and gait.  Physical Therapy will continue to follow patient for duration of  hospitalization.    Patient continues to benefit from continued skilled PT services: to promote return to prior level of function and safety with continuous assistance and gradual rehabilitative therapy .    PLAN  PT Treatment Plan: Bed mobility;Body mechanics;Patient education;Gait training;Transfer training;Balance training  Frequency (Obs): 5x/week    SUBJECTIVE  \"I guess this is pretty good\"    OBJECTIVE  Precautions: Limb alert - left    WEIGHT BEARING RESTRICTION           L Lower Extremity: Weight Bearing as Tolerated    PAIN ASSESSMENT   Rating: Unable to rate  Location: L hip  Management Techniques: Body mechanics;Activity promotion;Repositioning    BALANCE  Static Sitting: Fair +  Dynamic Sitting: Fair  Static Standing: Poor  Dynamic Standing: Poor -    AM-PAC '6-Clicks' INPATIENT SHORT FORM - BASIC MOBILITY  How much difficulty does the patient currently have...  Patient Difficulty: Turning over in bed (including adjusting bedclothes, sheets and blankets)?: A Lot   Patient Difficulty: Sitting down on and standing up from a chair with arms (e.g., wheelchair, bedside commode, etc.): A Lot   Patient Difficulty: Moving from lying on back to sitting on the side of the bed?: A Lot   How much help from another person does the patient currently need...   Help from Another: Moving to and from a bed to a chair (including a wheelchair)?: A Lot   Help from Another: Need to walk in hospital room?: Total   Help from Another: Climbing 3-5 steps with a railing?: Total     AM-PAC Score:  Raw Score: 10   Approx Degree of Impairment: 76.75%   Standardized Score (AM-PAC Scale): 32.29   CMS Modifier (G-Code): CL    FUNCTIONAL ABILITY STATUS  Functional Mobility/Gait Assessment  Gait Assistance: Not tested  Rolling:  not tested   Supine to Sit: maximum assist and assist of 2  Sit to Supine:  not tested   Sit to Stand: maximum assist and assist of 2    Additional information: Patient on room air. Patient with max A x 2 for  mobility during the session, able to stand on standing scale with max A x 1 for support. Then able to stand pivot transfer to bedside chair with max A x 2. Patient agreeable to stand from bedside chair for stefan care twice, max A x 2 to stand for about 30 seconds. Patient with max A x 1 to remain standing upright, knees did begin to buckle while standing and then returned to bedside chair with max A x 1. The patient's Approx Degree of Impairment: 76.75% has been calculated based on documentation in the [facility] '6 clicks' Inpatient Daily Activity Short Form.  Research supports that patients with this level of impairment may benefit from long term care. Patient received semi-fowlers in bed, agreeable to physical therapy. Education with patient provided verbally on physical therapy plan of care and physiological benefits of out of bed mobility. Patient with good carryover during session. Anticipated therapy needs remain appropriate based on the patient's performance, personal factors, and remaining functional impairments. Final disposition will be made by interdisciplinary medical team.    Patient End of Session: Up in chair;Needs met;Call light within reach;RN aware of session/findings;All patient questions and concerns addressed;Family present    CURRENT GOALS   Goals to be met by: 4/13/24  Patient Goal Patient's self-stated goal is: none   Goal #1 Patient is able to demonstrate supine - sit EOB @ level: minimum assistance      Goal #1   Current Status Max A x 2    Goal #2 Patient is able to demonstrate transfers Sit to/from Stand at assistance level: minimum assistance with walker - rolling      Goal #2  Current Status Max A x 2    Goal #3 Patient is able to ambulate 25 feet with assist device: walker - rolling at assistance level: minimum assistance   Goal #3   Current Status Not tested          Goal #4   Current Status     Goal #5 Patient to demonstrate independence with home activity/exercise instructions provided  to patient in preparation for discharge.   Goal #5   Current Status In progress   Goal #6     Goal #6  Current Status       Therapeutic Activity: 13 minutes  Therapeutic Exercise: 10 minutes       Physical Therapy Note signed by Jeanne Mariee PT at 4/10/2024  1:32 PM  Version 1 of 1    Author: Jeanne Mariee PT Service: Rehab Author Type: Physical Therapist    Filed: 4/10/2024  1:32 PM Date of Service: 4/10/2024 12:09 PM Status: Signed    : Jeanne Mariee PT (Physical Therapist)       PHYSICAL THERAPY HIP TREATMENT NOTE - INPATIENT    Room Number: 106/106-A            Presenting Problem: mechanical fall with periprosthetic hip fracture of L femur  Co-Morbidities : bilateral hip replacements, HTN, arthritis    Problem List  Principal Problem:    Periprosthetic hip fracture, initial encounter  Active Problems:    Left hip pain    Fall, initial encounter      PHYSICAL THERAPY ASSESSMENT   Patient demonstrates fair progress this session, goals  remain in progress.    Patient continues to function below baseline with bed mobility, transfers, and gait.  Contributing factors to remaining limitations include decreased functional strength, pain, and limited   ROM.  Next session anticipate patient to progress bed mobility, transfers, and gait.  Physical Therapy will continue to follow patient for duration of hospitalization.    Patient continues to benefit from continued skilled PT services: to promote return to prior level of function and safety with continuous assistance and gradual rehabilitative therapy .    PLAN  PT Treatment Plan: Bed mobility;Patient education;Family education;Gait training;Transfer training  Frequency (Obs): 5x/week    SUBJECTIVE  \"It hurts\"    OBJECTIVE  Precautions: Limb alert - left    WEIGHT BEARING RESTRICTION           L Lower Extremity: Weight Bearing as Tolerated    PAIN ASSESSMENT   Ratin  Location: pain on plantar aspect of feet  Management Techniques: Activity  promotion;Body mechanics;Breathing techniques;Repositioning    BALANCE  Static Sitting: Fair -  Dynamic Sitting: Fair -  Static Standing: Poor  Dynamic Standing: Poor -  AM-PAC '6-Clicks' INPATIENT SHORT FORM - BASIC MOBILITY  How much difficulty does the patient currently have...  Patient Difficulty: Turning over in bed (including adjusting bedclothes, sheets and blankets)?: A Lot   Patient Difficulty: Sitting down on and standing up from a chair with arms (e.g., wheelchair, bedside commode, etc.): A Lot   Patient Difficulty: Moving from lying on back to sitting on the side of the bed?: A Lot   How much help from another person does the patient currently need...   Help from Another: Moving to and from a bed to a chair (including a wheelchair)?: A Lot   Help from Another: Need to walk in hospital room?: Total   Help from Another: Climbing 3-5 steps with a railing?: Total     AM-PAC Score:  Raw Score: 10   Approx Degree of Impairment: 76.75%   Standardized Score (AM-PAC Scale): 32.29   CMS Modifier (G-Code): CL    FUNCTIONAL ABILITY STATUS  Functional Mobility/Gait Assessment  Gait Assistance: Other (Comment) (SPT)  Rolling: moderate assist  Supine to Sit: maximum assist  Sit to Supine: maximum assist  Sit to Stand: maximum assist  Static standing: mod A x40sec  Bed to chair: Max A    The patient's Approx Degree of Impairment: 76.75% has been calculated based on documentation in the [facility] '6 clicks' Inpatient Basic Mobility Short Form.  Research supports that patients with this level of impairment may benefit from LTC. Final disposition will be made by interdisciplinary medical team. Patient End of Session: Up in chair;With  staff;Needs met;Call light within reach;RN aware of session/findings;Other (Comment) (JAN left in room for transfer chair to bed)    CURRENT GOALS   Goals to be met by: 4/13/24  Patient Goal Patient's self-stated goal is: none   Goal #1 Patient is able to demonstrate supine - sit EOB @  level: minimum assistance      Goal #1   Current Status  maxAx2   Goal #2 Patient is able to demonstrate transfers Sit to/from Stand at assistance level: minimum assistance with walker - rolling      Goal #2  Current Status  attempted, unable   Goal #3 Patient is able to ambulate 25 feet with assist device: walker - rolling at assistance level: minimum assistance   Goal #3   Current Status  NT         Goal #4   Current Status     Goal #5 Patient to demonstrate independence with home activity/exercise instructions provided to patient in preparation for discharge.   Goal #5   Current Status  in progress   Goal #6     Goal #6  Current Status        Therapeutic Activity: 25 minutes  Therapeutic Exercise: 14 minutes           Physical Therapy Note signed by Joyce Gorman PT at 4/9/2024 12:03 PM  Version 1 of 1    Author: Joyce Gomran PT Service: Rehab Author Type: Physical Therapist    Filed: 4/9/2024 12:03 PM Date of Service: 4/9/2024 10:52 AM Status: Signed    : Joyce Gorman PT (Physical Therapist)       PHYSICAL THERAPY TREATMENT NOTE - INPATIENT     Room Number: 106/106-A       Presenting Problem: mechanical fall with periprosthetic hip fracture of L femur  Co-Morbidities : bilateral hip replacements, HTN, arthritis    Problem List  Principal Problem:    Periprosthetic hip fracture, initial encounter  Active Problems:    Left hip pain    Fall, initial encounter      PHYSICAL THERAPY ASSESSMENT   Patient demonstrates fair progress this session, goals  remain in progress.    Patient continues to function below baseline with bed mobility, transfers, gait, standing prolonged periods, and performing household tasks.  Contributing factors to remaining limitations include decreased functional strength, pain, decreased muscular endurance, and increased O2 needs from baseline.  Next session anticipate patient to progress bed mobility and transfers.  Physical Therapy will continue to follow patient for  duration of hospitalization.    Patient continues to benefit from continued skilled PT services: to promote return to prior level of function and safety with continuous assistance and gradual rehabilitative therapy .    PLAN  PT Treatment Plan: Bed mobility;Body mechanics;Patient education;Family education;Gait training;Strengthening;Transfer training;Balance training  Frequency (Obs): 5x/week    SUBJECTIVE  Anytime I move it, it hurts    OBJECTIVE  Precautions: Bed/chair alarm    WEIGHT BEARING RESTRICTION           L Lower Extremity: Weight Bearing as Tolerated    PAIN ASSESSMENT   Rating: 10  Location: L hip  Management Techniques: Activity promotion;Body mechanics;Breathing techniques;Repositioning    BALANCE  Static Sitting: Fair -  Dynamic Sitting: Poor  Static Standing: Not tested  Dynamic Standing: Not tested    ACTIVITY TOLERANCE  Pulse: 105  Heart Rate Source: Monitor     BP: 107/67  BP Location: Left arm  BP Method: Automatic  Patient Position: Lying     O2 WALK  Oxygen Therapy  SPO2% on Oxygen at Rest: 94  At rest oxygen flow (liters per minute): 2    AM-PAC '6-Clicks' INPATIENT SHORT FORM - BASIC MOBILITY  How much difficulty does the patient currently have...  Patient Difficulty: Turning over in bed (including adjusting bedclothes, sheets and blankets)?: A Lot   Patient Difficulty: Sitting down on and standing up from a chair with arms (e.g., wheelchair, bedside commode, etc.): Unable   Patient Difficulty: Moving from lying on back to sitting on the side of the bed?: A Lot   How much help from another person does the patient currently need...   Help from Another: Moving to and from a bed to a chair (including a wheelchair)?: Total   Help from Another: Need to walk in hospital room?: Total   Help from Another: Climbing 3-5 steps with a railing?: Total     AM-PAC Score:  Raw Score: 8   Approx Degree of Impairment: 86.62%   Standardized Score (AM-PAC Scale): 28.58   CMS Modifier (G-Code): CM    FUNCTIONAL  ABILITY STATUS  Functional Mobility/Gait Assessment  Gait Assistance: Not tested  Rolling: maximum assist  Supine to Sit: maximum assist  Sit to Supine: maximum assist  Sit to Stand: maximum assist and squat stand--unable to fully stand    Additional information: Pt ok to be seen per COREY Guzman. Pt willing to participate, but limited by pain. Pt able to perform ankle pumps and mini heel slides on LLE without significant pain. Rehab tech present for mobility, under direction of PT. Pt maxAx2 for sup to sit, and pt was participatory. Pt sat EOB with supv. Able to perform ankle pumps in sitting. Pt sat x5 mins, cued for breathing pattern. Pt willing to attempt sit>stand with RW. Pt maxAx2 for sit to stand, but unable to fully elevate buttocks off bed. Pt requested to return to sit d/t pain and inability to stand at this time. Pt maxAx2 to return to supine, positioned comfortably at end of session. Pt stated, \"I feel encouraged\" at end of session. Will continue to follow pt and progress mobility.    The patient's Approx Degree of Impairment: 86.62% has been calculated based on documentation in the [facility] '6 clicks' Inpatient Daily Activity Short Form.  Research supports that patients with this level of impairment may benefit from LTC, rec gradual rehab as pt is performing below baseline and has rehab potential.  Final disposition will be made by interdisciplinary medical team.    THERAPEUTIC EXERCISES  Lower Extremity Ankle pumps  Heel slides     Position Supine       Patient End of Session: In bed;Needs met;Call light within reach;RN aware of session/findings;All patient questions and concerns addressed;SCDs in place;Alarm set;Discussed recommendations with /    CURRENT GOALS   Goals to be met by: 4/13/24  Patient Goal Patient's self-stated goal is: none   Goal #1 Patient is able to demonstrate supine - sit EOB @ level: minimum assistance      Goal #1   Current Status  maxAx2   Goal #2 Patient is  able to demonstrate transfers Sit to/from Stand at assistance level: minimum assistance with walker - rolling      Goal #2  Current Status  attempted, unable   Goal #3 Patient is able to ambulate 25 feet with assist device: walker - rolling at assistance level: minimum assistance   Goal #3   Current Status  NT         Goal #4   Current Status     Goal #5 Patient to demonstrate independence with home activity/exercise instructions provided to patient in preparation for discharge.   Goal #5   Current Status  in progress   Goal #6     Goal #6  Current Status        Therapeutic Activity: 25 minutes                    Occupational Therapy Notes (last 72 hours)      Occupational Therapy Note signed by Natalia Faulkner OT at 4/11/2024  2:06 PM  Version 1 of 1    Author: Natalia Faulkner OT Service: Rehab Author Type: Occupational Therapist    Filed: 4/11/2024  2:06 PM Date of Service: 4/11/2024 10:35 AM Status: Signed    : Natalia Faulkner OT (Occupational Therapist)       OCCUPATIONAL THERAPY TREATMENT NOTE - INPATIENT    Room Number: 106/106-A  Presenting Problem: L hip fracture     Problem List  Principal Problem:    Periprosthetic hip fracture, initial encounter  Active Problems:    Left hip pain    Fall, initial encounter      OCCUPATIONAL THERAPY ASSESSMENT   Patient demonstrates fair progress this session, goals remain in progress.    Patient continues to function below baseline with adls and functional mobility.   Contributing factors to remaining limitations include decreased functional strength, decreased endurance, and pain.  Next session anticipate patient to progress lower body dressing, bed mobility, transfers, and static standing balance.  Occupational Therapy will continue to follow patient for duration of hospitalization.    Patient continues to benefit from continued skilled OT services: to promote return to prior level of function and safety with continuous assistance and gradual rehabilitative  therapy .     PLAN  OT Treatment Plan: Compensatory technique education;Patient/Family training;Patient/Family education;Endurance training;Functional transfer training;ADL training;Balance activities  OT Device Recommendations: TBD    SUBJECTIVE  \"I need to use the walker because my back gives out\"    OBJECTIVE  Precautions: Limb alert - left    WEIGHT BEARING RESTRICTION  L Lower Extremity: Weight Bearing as Tolerated    PAIN ASSESSMENT  Rating: -- (pt did not rate pain)  Location: -- (Lle)  Management Techniques: Relaxation; Repositioning; Activity promotion    ACTIVITY TOLERANCE  good    O2 SATURATIONS  Activity on room air    ACTIVITIES OF DAILY LIVING ASSESSMENT  AM-PAC ‘6-Clicks’ Inpatient Daily Activity Short Form  How much help from another person does the patient currently need…  -   Putting on and taking off regular lower body clothing?: A Lot  -   Bathing (including washing, rinsing, drying)?: A Lot  -   Toileting, which includes using toilet, bedpan or urinal? : A Lot  -   Putting on and taking off regular upper body clothing?: A Lot  -   Taking care of personal grooming such as brushing teeth?: A Little  -   Eating meals?: None    AM-PAC Score:  Score: 15  Approx Degree of Impairment: 56.46%  Standardized Score (AM-PAC Scale): 34.69  CMS Modifier (G-Code): CK    FUNCTIONAL TRANSFER ASSESSMENT  Sit to Stand: Chair  Chair: Maximum Assist    BED MOBILITY  Supine to Sit : Maximum Assist  Sit to Supine (OT): Maximum Assist (x2)    BALANCE ASSESSMENT  Static Sitting: Supervision  Sitting Bilateral: Minimal Assist    FUNCTIONAL ADL ASSESSMENT  Eating: independent  Grooming: setup assist  UB Dressing: min assist  LB Dressing: max assist  Toileting: max assist    Skilled Therapy Provided:RN contacted prior to start of care. Treatment coordinated w/ PT. Pt agreeable to participation in therapy. Gait belt used during dynamic activity. Pt received in bed, alert and oriented;daughter at bedside.Pt currently  requires max a x 2 to transfer supine<>sit at eob. Pt maintained unsupported sitting w/ supervision. Pt required max a x 2 for sit<>stand from bed/chair x 3 attempts. Pt incontinent of bowel w/ standing and required max a for toileting. Pt required max a to maintain static standing w/ rw to complete stefan care. Standing tolerance limited to 30 sec each. Pt currently requires max a to manage le dressing tasks.    At end of session pt remaining up in chair w/ all needs in reach;daughter at bedside. RN aware of pt's status and performance in therapy     EDUCATION PROVIDED  Patient: Plan of Care; Functional Transfer Techniques; Fall Prevention; Posture/Positioning  Patient's Response to Education: Verbalized Understanding    The patient's Approx Degree of Impairment: 56.46% has been calculated based on documentation in the [facility] '6 clicks' Inpatient Daily Activity Short Form.  Research supports that patients with this level of impairment may benefit from IRF.  Final disposition will be made by interdisciplinary medical team.    Patient End of Session: Up in chair;Needs met;Call light within reach;RN aware of session/findings;All patient questions and concerns addressed;Family present    OT Goals:     Patient will complete LE dressing with min A using LHAE as indicated  Comment: na    Patient will complete toilet transfer with min A  Comment: pt required max a    Patient will tolerate supported standing x2-3 minutes with CA for participation in self care  Comment:pt required max a to maintain static standing w/ rw < ! min    Patient will complete bed mobility with Min A  Comment:pt required max a x 2         Goals  on: 24  Frequency: 3x/week    Self-Care Home Management: 10 minutes  Therapeutic Activity: 13 minutes         Occupational Therapy Note signed by Ev Huffman OT at 4/10/2024 12:52 PM  Version 1 of 1    Author: Ev Huffman OT Service: Rehab Author Type: Occupational Therapist    Filed:  4/10/2024 12:52 PM Date of Service: 4/10/2024 12:36 PM Status: Signed    : Ev Huffman, OT (Occupational Therapist)       OCCUPATIONAL THERAPY TREATMENT NOTE - INPATIENT    Room Number: 106/106-A     Presenting Problem: L hip fracture     Problem List  Principal Problem:    Periprosthetic hip fracture, initial encounter  Active Problems:    Left hip pain    Fall, initial encounter      OCCUPATIONAL THERAPY ASSESSMENT   Patient demonstrates good  progress this session, goals remain in progress.    Patient continues to function below baseline with BADLs and functional mobility.   Contributing factors to remaining limitations include decreased functional strength, decreased functional reach, decreased endurance, pain, and decreased muscular endurance.  Next session anticipate patient to progress activity tolerance, strength and balance.  Occupational Therapy will continue to follow patient for duration of hospitalization.    Patient continues to benefit from continued skilled OT services: to promote return to prior level of function and safety with continuous assistance and gradual rehabilitative therapy .     PLAN  OT Treatment Plan: Balance activities;IADL training;ADL training;Energy conservation/work simplification techniques;Functional transfer training;UE strengthening/ROM;Endurance training;Patient/Family education;Patient/Family training;Equipment eval/education;Compensatory technique education  OT Device Recommendations: TBD    SUBJECTIVE  \"I want to try otherwise I won't get better\"    OBJECTIVE  Precautions: Limb alert - left    WEIGHT BEARING RESTRICTION  L Lower Extremity: Weight Bearing as Tolerated    PAIN ASSESSMENT  Rating: -- (severe)  Location: LT hip/LE, soles of NORA feet  Management Techniques: Activity promotion; Body mechanics; Relaxation; Repositioning    ACTIVITY TOLERANCE  Good with pacing and rest breaks. Pt demo SOB with activity.    ACTIVITIES OF DAILY LIVING ASSESSMENT  AM-PAC  ‘6-Clicks’ Inpatient Daily Activity Short Form  How much help from another person does the patient currently need…  -   Putting on and taking off regular lower body clothing?: A Lot  -   Bathing (including washing, rinsing, drying)?: A Lot  -   Toileting, which includes using toilet, bedpan or urinal? : A Lot  -   Putting on and taking off regular upper body clothing?: A Lot  -   Taking care of personal grooming such as brushing teeth?: A Little  -   Eating meals?: None    AM-PAC Score:  Score: 15  Approx Degree of Impairment: 56.46%  Standardized Score (AM-PAC Scale): 34.69  CMS Modifier (G-Code): CK    FUNCTIONAL TRANSFER ASSESSMENT  Sit to Stand: Chair  Chair: Maximum Assist    Bed to chair Xfer (high to low): Max A squat pivot without AD  Sit to stand: Max A from bed side chair  Provide MOd A supported standing with R/W x~40 seconds for self care  BED MOBILITY  Supine to sit: Max A  Tolerates ~7 minutes unsupported EOB with supervision    BALANCE ASSESSMENT  Static Sitting: Supervision  Sitting Bilateral: Minimal Assist    FUNCTIONAL ADL ASSESSMENT  Feeding: I  Grooming: set up in sitting  LE self care: Max A  Toileting: total A; incontinent of loose stool upon standing    Skilled Therapy Provided: Pt received in bed, no family present. Pt is very pleasant and motivated. Chief c/o is painful NORA soles of feet with WB. Pt demo limited NORA shd flexion, with pt reporting chronic shd pain. Spoke to nurse and PCT upon completion of session- aware of pt performance and of recommendations for mechanical lift back to bed- Merry left at bed side.         EDUCATION PROVIDED  Patient: Role of Occupational Therapy; Plan of Care; Discharge Recommendations; Functional Transfer Techniques; Posture/Positioning; Compensatory ADL Techniques  Patient's Response to Education: Verbalized Understanding; Requires Further Education    The patient's Approx Degree of Impairment: 56.46% has been calculated based on documentation in the  [facility] '6 clicks' Inpatient Daily Activity Short Form.  Research supports that patients with this level of impairment may benefit from ALEXA.  Final disposition will be made by interdisciplinary medical team.    Patient End of Session: Up in chair;With  staff;Needs met;Call light within reach;RN aware of session/findings;All patient questions and concerns addressed    OT Goals:    Patient self-stated goal is: to stand and walk     Patient will complete LE dressing with min A using LHAE as indicated  Comment:     Patient will complete toilet transfer with min A  Comment:     Patient will tolerate supported standing x2-3 minutes with CA for participation in self care  Comment:    Patient will complete bed mobility with Min A  Comment:         Goals  on: 24  Frequency: 3-5x/week    Self-Care Home Management: 30 minutes               Video Swallow Study Notes    No notes of this type exist for this encounter.     SLP Notes    No notes of this type exist for this encounter.     Immunizations     Name Date      Arexvy RSV Vaccine 23     Covid-19 Pfizer 22     Covid-19 Pfizer 21     Covid-19 Pfizer 21     Covid-19 Pfizer Bivalent 11/15/22     INFLUENZA 10/10/23     INFLUENZA 10/13/22     INFLUENZA 10/10/20     INFLUENZA 18     INFLUENZA 11/16/15     INFLUENZA 10/28/14     INFLUENZA 10/15/12     INFLUENZA 10/21/99     Pfizer Covid-19 Vaccine 30mcg/0.3mL 12yrs+ 23     Pneumovax 23 11/16/15     Pneumovax 00     Zoster Vaccine Live (Zostavax) 03/10/13     Zoster Vaccine Recombinant Adjuvanted (Shingrix) 23       Multidisciplinary Problems     Active Goals        Problem: Patient/Family Goals    Goal Priority Disciplines Outcome Interventions   Patient/Family Long Term Goal     Interdisciplinary Progressing    Description: Patient's Long Term Goal: ***    Interventions:  - ***  - See additional Care Plan goals for specific interventions   Patient/Family Short Term  Goal     Interdisciplinary Progressing    Description: Patient's Short Term Goal: ***    Interventions:   - ***  - See additional Care Plan goals for specific interventions               Discharge Treatment Preferences    Flowsheet Row Most Recent Value   Preferences    [facility]   PASRR Level 1 Submitted Yes

## (undated) NOTE — LETTER
18      Patient: Jose Montelongo  : 1928 Visit date: 2018    Dear Driss Kaur,      I examined your patient in consultation today. She has an inclusion cyst of the right breast which is been infected.   The infection is reso

## (undated) NOTE — IP AVS SNAPSHOT
Patient Demographics     Address  Shena Campos [address] [place] [address] [id number] [place]ib[place] Schreiber [address] Phone  [phone] [facility]) E-mail Address  [email or web address]      Emergency Contact(s)     Name Relation Home Work Megan Massey 2 Daughter   [phone]    Pam Milligan [id number] Triamterene-HCTZ (DYAZIDE) 37.5-25 MG per cap 1 capsule 02/02/19 0818 Given      [id number] Zolpidem Tartrate (AMBIEN) tab 5 mg 02/01/19 2223 Given      [id number] celecoxib (CeleBREX) cap 200 mg 02/02/19 0819 Given      [id number] oxyCODONE-acetam recent injection therapy which she notes did not help.     History     Past Medical History:   Diagnosis Date   • Arthritis    • Back problem     multiple spinal injections   • Cataract     bilateral cataract surgery   • Cataracts, bilateral     cataracts, Smokeless tobacco: Never Used    Alcohol use: No      Alcohol/week: 0.0 oz      Frequency: Never    Drug use: No    Allergies/Medications:    Allergies: No Known Allergies    Medications Prior to Admission:  HYDROcodone-acetaminophen 5-325 MG Oral Tab T HCT 37.4 01/28/2019     01/28/2019    CREATSERUM 1.26 01/28/2019    BUN 47 01/28/2019     01/28/2019    K 4.6 01/28/2019     01/28/2019    CO2 20 01/28/2019    GLU 84 01/28/2019    CA 9.3 01/28/2019     X-rays of the lumbar spine–severe significant past medical history of chronic low back pain, 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   Patient has had pain in the same area for many years and has been seeing pa Past Surgical History:   Procedure Laterality Date   • CATARACT      cataract extraction   • CATARACT Left 1997    Phaco w/PC IOL   • CATARACT Right 1998    Phaco w/ PC IOL   • CATARACT EXTRACTION Aleah Isaac IOL/ Dr. Floridalma Ramsey Musculoskeletal: As per HPI   Skin: Negative. Neurological: As per HPI  Endo/Heme/Allergies: Negative. Psychiatric/Behavioral: Negative. All other systems reviewed and are negative. Pertinent positives and negatives noted in the HPI.         PHYS Admission on 10/16/2018, Discharged on 10/17/2018   Component Date Value Ref Range Status   • Glucose 10/17/2018 112* 70 - 99 mg/dL Final   • Sodium 10/17/2018 129* 136 - 144 mmol/L Final   • Potassium 10/17/2018 4.0  3.3 - 5.1 mmol/L Final   • Chloride 10 • Lymphocyte Absolute 10/17/2018 0.8* 1.0 - 4.0 K/UL Final   • Monocyte Absolute 10/17/2018 1.3* 0.0 - 1.0 K/UL Final   • Eosinophil Absolute 10/17/2018 0.0  0.0 - 0.7 K/UL Final   • Basophil Absolute 10/17/2018 0.0  0.0 - 0.2 K/UL Final   Appointment on 1 • HGB 10/02/2018 12.0  12.0 - 16.0 g/dL Final   • HCT 10/02/2018 35.6  35.0 - 48.0 % Final   • MCV 10/02/2018 90.5  80.0 - 100.0 fL Final   • MCH 10/02/2018 30.6  27.0 - 32.0 pg Final   • MCHC 10/02/2018 33.8  32.0 - 37.0 g/dl Final   • RDW 10/02/2018 13. 8 Approved by (CST): John Hatfield MD on 1/29/2019 at 13:22[YP.2]              Therapy Progress:  · PT: Pending  · OT: Pending      Assessment  The patient is a[YP.3 80year old[YP.2] right handed female with significant past medical history of chronic low #Bowel: Patient endorsing she is having regular bowel movements would continue to monitor[YP.1]       Chronic left-sided low back pain with left-sided sciatica  (primary encounter diagnosis)[YP.2]    Isma Fuentes DO, FABPMR & CAQSM  Physical Medicine and Re therapy through an outside pain clinic in [facility]. Presented  to the emergency room with severe left back and hip discomfort.    Otherwise no other active issue with negative the rest on ROS   Seen by pain management   better with percocet   Day of discha Physical Therapy Note signed by Dona Richard PT at 1/31/2019  3:06 PM  Version 1 of 1    Author:  Dona Richard PT Service:  Physical Medicine and Rehabilitation Author Type:  Physical Therapist    Filed:  1/31/2019  3:06 PM Date of Service:  1/31 demonstrate mobility deficits associated with LLE pain. The patient's[TL.1] Approx Degree of Impairment: 61.29%[TL.2] has been calculated based on documentation in the [facility] '6 clicks' Inpatient Basic Mobility Short Form.   Research supports that patients Approx Degree of Impairment: 61.29%   Standardized Score (AM-PAC Scale): 38.1   CMS Modifier (G-Code): CL[TL.2]    FUNCTIONAL ABILITY STATUS  Gait Assessment[TL.1]   Gait Assistance: Minimum assistance; Moderate assistance  Distance (ft): 25ft  Assistive Abe Huizar Filed:  1/30/2019 11:50 AM Date of Service:  1/30/2019 10:00 AM Status:  Signed    :  Amarilis Mendoza PT (Physical Therapist)        PHYSICAL THERAPY EVALUATION - INPATIENT     Room Number: 527/527-A  Evaluation Date: 1/30/2019  Type of Evaluation Patient functioning lower than reported baseline levels, 2-3 weeks ago pt able to ambulate with 3WW, now, requiring Mod A x 1 for bed mobility and transfers and primarily in [facility].  The patient's Approx Degree of Impairment: 64.91% has been calculated based on • CATARACT EXTRACTION W/  INTRAOCULAR LENS IMPLANT Right 1998     IOL / Dr. Yared Quintanilla   • CHOLECYSTECTOMY     • COLONOSCOPY     • ENDOSCOPIC RETROGRADE CHOLANGIOPANCREATOGRAPHY (ERCP) N/A 10/16/2018    Performed by Sahra Brown MD at [address] [place] [address] Lower extremity strength is within functional limits except for the following:    LLE: 3+/5, RLE:3-/5    BALANCE  Static Sitting: Normal  Dynamic Sitting: Good  Static Standing: Fair  Dynamic Standing: Fair -    ACTIVITY TOLERANCE  Pulse: 80  Heart Rate So Current Status    Goal #2 Patient is able to demonstrate transfers Sit to/from Stand at assistance level: minimum assistance with walker - rolling     Goal #2  Current Status    Goal #3 Patient is able to ambulate 25 feet with assist device: walker - rolli functionally while performing ADLs and fx mobility/tfr. The patient is below baseline and would benefit from skilled inpatient OT to address the above deficits, maximizing patient's ability to return to prior level of function.     Pt received supine in b Chronic left-sided low back pain with left-sided sciatica      Past Medical History  Past Medical History:   Diagnosis Date   • Arthritis    • Back problem     multiple spinal injections   • Cataract     bilateral cataract surgery   • Cataracts, bilatera Stairs in Home:[ST.1] 0[ST.3]  Use of Assistive Device(s):[ST.1] 3 wheeled walker, w/c,[ST.3]    Prior Level of [place]:[ST.1] Per pt report, she typically ambulates with 3 wheeled walker, lives with her spouse (whom is w/c bound) at [employer] -   Toileting, which includes using toilet, bedpan or urinal? : A Lot  -   Putting on and taking off regular upper body clothing?: A Little  -   Taking care of personal grooming such as brushing teeth?: A Little  -   Eating meals?: None    AM-PAC Score:  S ST.1 Jt Torres, OT on 1/31/2019  1:34 PM  ST.2 - Netta Pouch, OT on 1/31/2019  1:39 PM  ST.3 - Netta Pouch, OT on 1/31/2019  2:20 PM                     Video Swallow Study Notes     No notes of this type exist for this encounter.       Summer Pierce

## (undated) NOTE — LETTER
[place] [address]  Authorization for Invasive Procedures  Date: 01/12/2024           Time: 1158    I hereby authorize ***, my physician and his/her assistants (if applicable), which may include medical students, residents, and/or fellows, to perform the following surgical operation/ procedure and administer such anesthesia as may be determined necessary by my physician: *** on Elba Malik  2.   I recognize that during the surgical operation/procedure, unforeseen conditions may necessitate additional or different procedures than those listed above.  I, therefore, further authorize and request that the above-named surgeon, assistants, or designees perform such procedures as are, in their judgment, necessary and desirable.    3.   My surgeon/physician has discussed prior to my surgery the potential benefits, risks and side effects of this procedure; the likelihood of achieving goals; and potential problems that might occur during recuperation.  They also discussed reasonable alternatives to the procedure, including risks, benefits, and side effects related to the alternatives and risks related to not receiving this procedure.  I have had all my questions answered and I acknowledge that no guarantee has been made as to the result that may be obtained.    4.   Should the need arise during my operation/procedure, which includes change of level of care prior to discharge, I also consent to the administration of blood and/or blood products.  Further, I understand that despite careful testing and screening of blood or blood products by collecting agencies, I may still be subject to ill effects as a result of receiving a blood transfusion and/or blood products.  The following are some, but not all, of the potential risks that can occur: fever and allergic reactions, hemolytic reactions, transmission of diseases such as Hepatitis, AIDS and Cytomegalovirus (CMV) and fluid overload.  In the  event that I wish to have an autologous transfusion of my own blood, or a directed donor transfusion, I will discuss this with my physician.   Check only if Refusing Blood or Blood Products  I understand refusal of blood or blood products as deemed necessary by my physician may have serious consequences to my condition to include possible death. I hereby assume responsibility for my refusal and release the hospital, its personnel, and my physicians from any responsibility for the consequences of my refusal.         o  Refuse         5.   I authorize the use of any specimen, organs, tissues, body parts or foreign objects that may be removed from my body during the operation/procedure for diagnosis, research or teaching purposes and their subsequent disposal by hospital authorities.  I also authorize the release of specimen test results and/or written reports to my treating physician on the hospital medical staff or other referring or consulting physicians involved in my care, at the discretion of the Pathologist or my treating physician.    6.   I consent to the photographing or videotaping of the operations or procedures to be performed, including appropriate portions of my body for medical, scientific, or educational purposes, provided my identity is not revealed by the pictures or by descriptive texts accompanying them.  If the procedure has been photographed/videotaped, the surgeon will obtain the original picture, image, videotape or CD.  The hospital will not be responsible for storage, release or maintenance of the picture, image, tape or CD.    7.   I consent to the presence of a  or observers in the operating room as deemed necessary by my physician or their designees.    8.   I recognize that in the event my procedure results in extended X-Ray/fluoroscopy time, I may develop a skin reaction.    9. If I have a Do Not Attempt Resuscitation (DNAR) order in place, that status will be suspended  while in the operating room, procedural suite, and during the recovery period unless otherwise explicitly stated by me (or a person authorized to consent on my behalf). The surgeon or my attending physician will determine when the applicable recovery period ends for purposes of reinstating the DNAR order.  10. Patients having a sterilization procedure: I understand that if the procedure is successful the results will be permanent and it will therefore be impossible for me to inseminate, conceive, or bear children.  I also understand that the procedure is intended to result in sterility, although the result has not been guaranteed.   11. I acknowledge that my physician has explained sedation/analgesia administration to me including the risk and benefits I consent to the administration of sedation/analgesia as may be necessary or desirable in the judgment of my physician.    I CERTIFY THAT I HAVE READ AND FULLY UNDERSTAND THE ABOVE CONSENT TO OPERATION and/or OTHER PROCEDURE.        ____________________________________       _________________________________      ______________________________  Signature of Patient         Signature of Responsible Person        Printed Name of Responsible Person        ____________________________________      _________________________________      ______________________________       Signature of Witness          Relationship to Patient                       Date                                       Time    Patient Name: Elba Malik     : 1928                 Printed: 2024      Medical Record #: [id number]                      Page 1 of 2          STATEMENT OF PHYSICIAN My signature below affirms that prior to the time of the procedure; I have explained to the patient and/or his/her legal representative, the risks and benefits involved in the proposed treatment and any reasonable alternative to the proposed treatment. I have also explained the risks and  benefits involved in refusal of the proposed treatment and alternatives to the proposed treatment and have answered the patient's questions. If I have a significant financial interest in a co-management agreement or a significant financial interest in any product or implant, or other significant relationship used in this procedure/surgery, I have disclosed this and had a discussion with my patient.     _______________________________________________________________ _____________________________  (Signature of Physician)                                                                                         (Date)                                   (Time)    Patient Name: Elba Malik     : 1928                 Printed: 2024      Medical Record #: [id number]                      Page 2 of 2